# Patient Record
Sex: FEMALE | Race: WHITE | Employment: UNEMPLOYED | URBAN - METROPOLITAN AREA
[De-identification: names, ages, dates, MRNs, and addresses within clinical notes are randomized per-mention and may not be internally consistent; named-entity substitution may affect disease eponyms.]

---

## 2017-01-04 DIAGNOSIS — Z76.0 MEDICATION REFILL: ICD-10-CM

## 2017-01-04 RX ORDER — CYCLOBENZAPRINE HCL 10 MG
10 TABLET ORAL
Qty: 270 TAB | Refills: 4 | Status: SHIPPED | OUTPATIENT
Start: 2017-01-04 | End: 2017-03-08

## 2017-01-04 RX ORDER — VENLAFAXINE HYDROCHLORIDE 75 MG/1
CAPSULE, EXTENDED RELEASE ORAL
Qty: 270 CAP | Refills: 5 | Status: SHIPPED | OUTPATIENT
Start: 2017-01-04 | End: 2019-04-15 | Stop reason: SDUPTHER

## 2017-01-04 RX ORDER — ZOLPIDEM TARTRATE 12.5 MG/1
12.5 TABLET, FILM COATED, EXTENDED RELEASE ORAL
Qty: 90 TAB | Refills: 1 | Status: SHIPPED | OUTPATIENT
Start: 2017-01-04 | End: 2017-01-19 | Stop reason: SDUPTHER

## 2017-01-04 RX ORDER — ALPRAZOLAM 0.5 MG/1
1.5 TABLET ORAL
Qty: 270 TAB | Refills: 1 | Status: SHIPPED | OUTPATIENT
Start: 2017-01-04 | End: 2017-05-15

## 2017-01-04 RX ORDER — ACYCLOVIR 400 MG/1
400 TABLET ORAL 3 TIMES DAILY
Qty: 45 TAB | Refills: 4 | Status: SHIPPED | OUTPATIENT
Start: 2017-01-04 | End: 2017-03-08

## 2017-01-04 RX ORDER — POTASSIUM CHLORIDE 20 MEQ/1
20 TABLET, EXTENDED RELEASE ORAL 2 TIMES DAILY
Qty: 180 TAB | Refills: 4 | Status: SHIPPED | OUTPATIENT
Start: 2017-01-04 | End: 2017-12-06 | Stop reason: SDUPTHER

## 2017-01-04 RX ORDER — ROPINIROLE 3 MG/1
3 TABLET, FILM COATED ORAL 2 TIMES DAILY
Qty: 180 TAB | Refills: 4 | Status: SHIPPED | OUTPATIENT
Start: 2017-01-04 | End: 2018-03-31 | Stop reason: SDUPTHER

## 2017-01-04 RX ORDER — IBUPROFEN 800 MG/1
800 TABLET ORAL
Qty: 270 TAB | Refills: 4 | Status: SHIPPED | OUTPATIENT
Start: 2017-01-04 | End: 2018-03-15

## 2017-01-04 RX ORDER — GABAPENTIN 600 MG/1
600 TABLET ORAL
Qty: 90 TAB | Refills: 4 | Status: SHIPPED | OUTPATIENT
Start: 2017-01-04 | End: 2017-09-19

## 2017-01-04 NOTE — TELEPHONE ENCOUNTER
Requested Prescriptions     Pending Prescriptions Disp Refills    gabapentin (NEURONTIN) 600 mg tablet 90 Tab 4     Sig: Take 1 Tab by mouth nightly. Indications: NEUROPATHIC PAIN    zolpidem CR (AMBIEN CR) 12.5 mg tablet 90 Tab 1     Sig: Take 1 Tab by mouth nightly as needed for Sleep. Max Daily Amount: 12.5 mg.    ALPRAZolam (XANAX) 0.5 mg tablet 270 Tab 1     Sig: Take 3 Tabs by mouth three (3) times daily as needed for Anxiety. Max Daily Amount: 4.5 mg. Indications: ANXIETY    acyclovir (ZOVIRAX) 400 mg tablet 45 Tab 4     Sig: Take 1 Tab by mouth three (3) times daily. Indications: HERPES SIMPLEX INFECTION    cyclobenzaprine (FLEXERIL) 10 mg tablet 270 Tab 4     Sig: Take 1 Tab by mouth three (3) times daily as needed for Muscle Spasm(s).  potassium chloride (K-DUR, KLOR-CON) 20 mEq tablet 180 Tab 4     Sig: Take 1 Tab by mouth two (2) times a day.  rOPINIRole (REQUIP) 3 mg tab tab 180 Tab 4     Sig: Take 1 Tab by mouth two (2) times a day.  ibuprofen (MOTRIN) 800 mg tablet 270 Tab 1     Sig: Take 1 Tab by mouth every eight (8) hours as needed for Pain.  Cetirizine 10 mg cap       Sig: Take  by mouth.  venlafaxine-SR (EFFEXOR-XR) 75 mg capsule 90 Cap 0     Sig: Take 1 Cap by mouth daily.  Indications: ANXIETY WITH DEPRESSION     Pharmacy requesting 90 day supply of medications

## 2017-01-17 ENCOUNTER — OFFICE VISIT (OUTPATIENT)
Dept: INTERNAL MEDICINE CLINIC | Age: 62
End: 2017-01-17

## 2017-01-17 VITALS
BODY MASS INDEX: 34.81 KG/M2 | TEMPERATURE: 98.9 F | OXYGEN SATURATION: 97 % | WEIGHT: 216.6 LBS | HEIGHT: 66 IN | SYSTOLIC BLOOD PRESSURE: 145 MMHG | RESPIRATION RATE: 20 BRPM | DIASTOLIC BLOOD PRESSURE: 85 MMHG | HEART RATE: 83 BPM

## 2017-01-17 DIAGNOSIS — J01.90 ACUTE NON-RECURRENT SINUSITIS, UNSPECIFIED LOCATION: Primary | ICD-10-CM

## 2017-01-17 RX ORDER — AMOXICILLIN AND CLAVULANATE POTASSIUM 875; 125 MG/1; MG/1
1 TABLET, FILM COATED ORAL 2 TIMES DAILY
Qty: 20 TAB | Refills: 1 | Status: SHIPPED | OUTPATIENT
Start: 2017-01-17 | End: 2017-01-27

## 2017-01-17 NOTE — MR AVS SNAPSHOT
Visit Information Date & Time Provider Department Dept. Phone Encounter #  
 1/17/2017  1:45 PM Delano Butler [facility] [phone] [id number] Follow-up Instructions Return if symptoms worsen or fail to improve, for or as appointed. Your Appointments 4/3/2017 10:30 AM  
Office Visit with Delano Butler [facility]) Appt Note: Return in about 4 months (around 4/2/2017) for memory issues, breast pain, .; [address] [facility] [address] [facility]  
  
   
 [address] [facility] Upcoming Health Maintenance Date Due DTaP/Tdap/Td series (1 - Tdap) 12/2/2017* ZOSTER VACCINE AGE 60> 11/2/2018* FOBT Q 1 YEAR AGE 50-75 12/8/2017 BREAST CANCER SCRN MAMMOGRAM 12/15/2018 *Topic was postponed. The date shown is not the original due date. Allergies as of 1/17/2017  Review Complete On: 1/17/2017 By: Megan Mcintyre MD  
  
 Severity Noted Reaction Type Reactions Gluten  07/22/2016    Other (comments) GI upset Morphine  03/07/2016    Rash Stadol [Butorphanol Tartrate]  03/07/2016    Other (comments) Made her have palpitations Ultram [Tramadol]  03/07/2016    Other (comments) Made her have palpitations Current Immunizations  Reviewed on 7/22/2016 Name Date Influenza Vaccine (Quad) PF 12/2/2016 Pneumococcal Polysaccharide (PPSV-23) 2/9/2013 Not reviewed this visit You Were Diagnosed With   
  
 Codes Comments Acute non-recurrent sinusitis, unspecified location    -  Primary ICD-10-CM: J01.90 ICD-9-CM: 461.9 Vitals BP Pulse Temp Resp Height(growth percentile) Weight(growth percentile) 145/85 83 98.9 °F (37.2 °C) (Oral) 20 5' 6\" (1.676 m) 216 lb 9.6 oz (98.2 kg) SpO2 BMI OB Status Smoking Status 97% 34.96 kg/m2 Hysterectomy Never Smoker BMI and BSA Data Body Mass Index Body Surface Area 34.96 kg/m 2 2.14 m 2 Preferred Pharmacy Pharmacy Name Phone [address] [facility] [phone] Your Updated Medication List  
  
   
This list is accurate as of: 1/17/17  2:12 PM.  Always use your most recent med list.  
  
  
  
  
 acyclovir 400 mg tablet Commonly known as:  ZOVIRAX Take 1 Tab by mouth three (3) times daily. Indications: HERPES SIMPLEX INFECTION  
  
 albuterol 90 mcg/actuation inhaler Commonly known as:  PROVENTIL HFA, VENTOLIN HFA, PROAIR HFA Take  by inhalation. ALPRAZolam 0.5 mg tablet Commonly known as:  Chirag Cape Take 3 Tabs by mouth three (3) times daily as needed for Anxiety. Max Daily Amount: 4.5 mg. Indications: ANXIETY  
  
 amoxicillin-clavulanate 875-125 mg per tablet Commonly known as:  AUGMENTIN Take 1 Tab by mouth two (2) times a day for 10 days. aspirin delayed-release 81 mg tablet Take 1 Tab by mouth daily. Cetirizine 10 mg Cap Take 1 Can by mouth daily. Indications: SEASONAL ALLERGIC RHINITIS  
  
 cyclobenzaprine 10 mg tablet Commonly known as:  FLEXERIL Take 1 Tab by mouth three (3) times daily as needed for Muscle Spasm(s). Indications: MUSCLE SPASM  
  
 diazePAM 5 mg tablet Commonly known as:  VALIUM Take 2 tabs 30 minutes prior to testing. May take additional 5 mg in 2 hours if needed  Indications: ANXIETY  
  
 frovatriptan 2.5 mg tablet Commonly known as:  Eleni Dills Take 1 Tab by mouth once as needed for Migraine for up to 1 dose. furosemide 40 mg tablet Commonly known as:  LASIX Take 1 Tab by mouth daily. Indications: EDEMA, PERIPHERAL EDEMA DUE TO CHRONIC HEART FAILURE, as needed only for edema  
  
 gabapentin 600 mg tablet Commonly known as:  NEURONTIN Take 1 Tab by mouth nightly. Indications: NEUROPATHIC PAIN  
  
 hyoscyamine SR 0.375 mg SR tablet Commonly known as:  LEVBID  
 Take 1 Tab by mouth every twelve (12) hours as needed for Cramping. Indications: Irritable Bowel Syndrome  
  
 ibuprofen 800 mg tablet Commonly known as:  MOTRIN Take 1 Tab by mouth every eight (8) hours as needed for Pain. Indications: PAIN  
  
 mometasone 220 mcg (120 doses) Aepb inhaler Commonly known as:  Amado Hosteller Take 1 Puff by inhalation two (2) times a day. omeprazole 40 mg capsule Commonly known as:  PRILOSEC Take 1 Cap by mouth daily. ONE-A-DAY WOMENS FORMULA PO Take 2 Tabs by Mouth Once a Day. oxyCODONE-acetaminophen  mg per tablet Commonly known as:  PERCOCET 10 Take 1 Tab by mouth every four (4) hours as needed for Pain. Max Daily Amount: 6 Tabs. potassium chloride 20 mEq tablet Commonly known as:  K-DUR, KLOR-CON Take 1 Tab by mouth two (2) times a day. Indications: HYPOKALEMIA QUEtiapine 50 mg tablet Commonly known as:  SEROquel Take 1 Tab by mouth daily. Indications: DEPRESSION TREATMENT ADJUNCT  
  
 rOPINIRole 3 mg Tab tab Commonly known as:  Mallissa Poles Take 1 Tab by mouth two (2) times a day. Indications: Restless Legs Syndrome  
  
 sucralfate 1 gram tablet Commonly known as:  Justina Hamilton Take 1 g by mouth four (4) times daily. tiZANidine 4 mg capsule Commonly known as:  Palma Manual Take 4 mg by mouth three (3) times daily as needed. venlafaxine-SR 75 mg capsule Commonly known as:  EFFEXOR-XR Take 3 capsules by mouth daily  Indications: ANXIETY WITH DEPRESSION  
  
 ZOLMitriptan 5 mg nasal solution Commonly known as:  ZOMIG  
1 Spray by Nasal route once as needed for Migraine for up to 1 dose. zolpidem CR 12.5 mg tablet Commonly known as:  AMBIEN CR Take 1 Tab by mouth nightly as needed for Sleep. Max Daily Amount: 12.5 mg.  
  
  
  
  
Prescriptions Printed  Refills  
 amoxicillin-clavulanate (AUGMENTIN) 875-125 mg per tablet 1  
 Sig: Take 1 Tab by mouth two (2) times a day for 10 days. Class: Print Route: Oral  
  
Follow-up Instructions Return if symptoms worsen or fail to improve, for or as appointed. Patient Instructions Sinusitis: Care Instructions Your Care Instructions Sinusitis is an infection of the lining of the sinus cavities in your head. Sinusitis often follows a cold. It causes pain and pressure in your head and face. In most cases, sinusitis gets better on its own in 1 to 2 weeks. But some mild symptoms may last for several weeks. Sometimes antibiotics are needed. Follow-up care is a key part of your treatment and safety. Be sure to make and go to all appointments, and call your doctor if you are having problems. It's also a good idea to know your test results and keep a list of the medicines you take. How can you care for yourself at home? · Take an over-the-counter pain medicine, such as acetaminophen (Tylenol), ibuprofen (Advil, Motrin), or naproxen (Aleve). Read and follow all instructions on the label. · If the doctor prescribed antibiotics, take them as directed. Do not stop taking them just because you feel better. You need to take the full course of antibiotics. · Be careful when taking over-the-counter cold or flu medicines and Tylenol at the same time. Many of these medicines have acetaminophen, which is Tylenol. Read the labels to make sure that you are not taking more than the recommended dose. Too much acetaminophen (Tylenol) can be harmful. · Breathe warm, moist air from a steamy shower, a hot bath, or a sink filled with hot water. Avoid cold, dry air. Using a humidifier in your home may help. Follow the directions for cleaning the machine. · Use saline (saltwater) nasal washes to help keep your nasal passages open and wash out mucus and bacteria. You can buy saline nose drops at a grocery store or drugstore.  Or you can make your own at home by adding 1 teaspoon of salt and 1 teaspoon of baking soda to 2 cups of distilled water. If you make your own, fill a bulb syringe with the solution, insert the tip into your nostril, and squeeze gently. Melvia Creamer your nose. · Put a hot, wet towel or a warm gel pack on your face 3 or 4 times a day for 5 to 10 minutes each time. · Try a decongestant nasal spray like oxymetazoline (Afrin). Do not use it for more than 3 days in a row. Using it for more than 3 days can make your congestion worse. When should you call for help? Call your doctor now or seek immediate medical care if: 
· You have new or worse swelling or redness in your face or around your eyes. · You have a new or higher fever. Watch closely for changes in your health, and be sure to contact your doctor if: 
· You have new or worse facial pain. · The mucus from your nose becomes thicker (like pus) or has new blood in it. · You are not getting better as expected. Where can you learn more? Go to http://carly-jose.info/. Enter K520 in the search box to learn more about \"Sinusitis: Care Instructions. \" Current as of: July 29, 2016 Content Version: 11.1 © 9618-3560 [employer]. Care instructions adapted under license by [employer] (which disclaims liability or warranty for this information). If you have questions about a medical condition or this instruction, always ask your healthcare professional. [place] [address] any warranty or liability for your use of this information. Introducing [facility]! David Luna introduces [employer] patient portal. Now you can access parts of your medical record, email your doctor's office, and request medication refills online. 1. In your internet browser, go to [email or web address] 2. Click on the First Time User? Click Here link in the Sign In box. You will see the New Member Sign Up page. 3. Enter your [employer] Access Code exactly as it appears below. You will not need to use this code after youve completed the sign-up process. If you do not sign up before the expiration date, you must request a new code. · [employer] Access Code: [email or web address] Expires: 2/24/2017  1:44 PM 
 
4. Enter the last four digits of your Social Security Number (xxxx) and Date of Birth (mm/dd/yyyy) as indicated and click Submit. You will be taken to the next sign-up page. 5. Create a [employer] ID. This will be your [employer] login ID and cannot be changed, so think of one that is secure and easy to remember. 6. Create a [employer] password. You can change your password at any time. 7. Enter your Password Reset Question and Answer. This can be used at a later time if you forget your password. 8. Enter your e-mail address. You will receive e-mail notification when new information is available in [address]. 9. Click Sign Up. You can now view and download portions of your medical record. 10. Click the Download Summary menu link to download a portable copy of your medical information. If you have questions, please visit the Frequently Asked Questions section of the [employer] website. Remember, [employer] is NOT to be used for urgent needs. For medical emergencies, dial 911. Now available from your iPhone and Android! Please provide this summary of care documentation to your next provider. Your primary care clinician is listed as [facility]. If you have any questions after today's visit, please call [phone].

## 2017-01-17 NOTE — PROGRESS NOTES
ROOM # 5    Pt presents today for complaint of possible sinus infection. Pt states that the sxs started approx 7-10 days ago. Was initially a stuffy/runny nose but is now sinus congestion with a productive cough with yellow/green mucus. Pt preferred language for health care discussion is english. Is someone accompanying this pt? no    Is the patient using any DME equipment during OV? no    Depression Screening completed. Active Dx    Learning Assessment completed. Yes    Abuse Screening completed. Yes    Health Maintenance reviewed and discussed per provider. Yes, up to date    Advance Directive:  1. Do you have an advance directive in place? Patient Reply: No    2. If not, would you like material regarding how to put one in place? Patient Reply: No    Coordination of Care:  1. Have you been to the ER, urgent care clinic since your last visit? Hospitalized since your last visit? no    2. Have you seen or consulted any other health care providers outside of the [address] [place] [address] since your last visit? Include any pap smears or colon screening.  No

## 2017-01-17 NOTE — PROGRESS NOTES
HISTORY OF PRESENT ILLNESS  Toby Oliver is a 59 y.o. female. Visit Vitals    /85    Pulse 83    Temp 98.9 °F (37.2 °C) (Oral)    Resp 20    Ht 5' 6\" (1.676 m)    Wt 216 lb 9.6 oz (98.2 kg)    SpO2 97%    BMI 34.96 kg/m2       HPI Comments: In December she started with sinus infection. Responded to OTC tx but about a week ago sxs came back (after our snow storm). Now she actually feels worse. Sinus Infection    The history is provided by the patient (see comments). This is a recurrent problem. The current episode started more than 1 week ago. The problem has not changed since onset. Patient reports a subjective fever - was not measured. Associated symptoms include chills, congestion, ear pain, sinus pressure, sore throat, cough and rhinorrhea. Review of Systems   Constitutional: Positive for chills. HENT: Positive for congestion, ear pain, rhinorrhea, sinus pressure and sore throat. Greenish nasal drainage. Respiratory: Positive for cough. Physical Exam   Constitutional: She is oriented to person, place, and time. She appears well-developed and well-nourished. No distress. HENT:   Right Ear: Tympanic membrane, external ear and ear canal normal.   Left Ear: Tympanic membrane, external ear and ear canal normal.   Nose: Mucosal edema present. Right sinus exhibits maxillary sinus tenderness and frontal sinus tenderness. Left sinus exhibits maxillary sinus tenderness and frontal sinus tenderness. Mouth/Throat: Uvula is midline and mucous membranes are normal. Posterior oropharyngeal erythema present. Cardiovascular: Normal rate and regular rhythm. Pulmonary/Chest: Effort normal and breath sounds normal.   Musculoskeletal: She exhibits no edema. Neurological: She is alert and oriented to person, place, and time. Skin: Skin is warm and dry. She is not diaphoretic. Psychiatric: She has a normal mood and affect. Nursing note and vitals reviewed.       ASSESSMENT and PLAN    ICD-10-CM ICD-9-CM    1. Acute non-recurrent sinusitis, unspecified location J01.90 461.9      Nasal rinses and Augmentin.  Prn mucinex    F/u prn

## 2017-01-19 DIAGNOSIS — Z76.0 MEDICATION REFILL: ICD-10-CM

## 2017-01-19 RX ORDER — ZOLPIDEM TARTRATE 12.5 MG/1
12.5 TABLET, FILM COATED, EXTENDED RELEASE ORAL
Qty: 90 TAB | Refills: 1 | Status: SHIPPED | OUTPATIENT
Start: 2017-01-19 | End: 2017-05-15

## 2017-01-19 NOTE — TELEPHONE ENCOUNTER
Requested Prescriptions     Pending Prescriptions Disp Refills    zolpidem CR (AMBIEN CR) 12.5 mg tablet 90 Tab 1     Sig: Take 1 Tab by mouth nightly as needed for Sleep.  Max Daily Amount: 12.5 mg.

## 2017-01-31 ENCOUNTER — OFFICE VISIT (OUTPATIENT)
Dept: INTERNAL MEDICINE CLINIC | Age: 62
End: 2017-01-31

## 2017-01-31 VITALS
WEIGHT: 213 LBS | HEIGHT: 66 IN | BODY MASS INDEX: 34.23 KG/M2 | DIASTOLIC BLOOD PRESSURE: 76 MMHG | OXYGEN SATURATION: 98 % | RESPIRATION RATE: 18 BRPM | SYSTOLIC BLOOD PRESSURE: 132 MMHG | TEMPERATURE: 99 F | HEART RATE: 106 BPM

## 2017-01-31 DIAGNOSIS — J98.01 BRONCHOSPASM: ICD-10-CM

## 2017-01-31 DIAGNOSIS — J40 BRONCHITIS: ICD-10-CM

## 2017-01-31 DIAGNOSIS — R09.89 CHEST CONGESTION: Primary | ICD-10-CM

## 2017-01-31 DIAGNOSIS — R06.2 WHEEZING: ICD-10-CM

## 2017-01-31 RX ORDER — HYDROCODONE POLISTIREX AND CHLORPHENIRAMINE POLISTIREX 10; 8 MG/5ML; MG/5ML
1 SUSPENSION, EXTENDED RELEASE ORAL
Qty: 115 ML | Refills: 0 | Status: SHIPPED | OUTPATIENT
Start: 2017-01-31 | End: 2017-03-08

## 2017-01-31 RX ORDER — IPRATROPIUM BROMIDE AND ALBUTEROL SULFATE 2.5; .5 MG/3ML; MG/3ML
3 SOLUTION RESPIRATORY (INHALATION)
Qty: 1 NEBULE | Refills: 0 | Status: SHIPPED | COMMUNITY
Start: 2017-01-31 | End: 2017-01-31

## 2017-01-31 RX ORDER — HYDROCODONE POLISTIREX AND CHLORPHENIRAMINE POLISTIREX 10; 8 MG/5ML; MG/5ML
SUSPENSION, EXTENDED RELEASE ORAL
Status: CANCELLED | OUTPATIENT
Start: 2017-01-31

## 2017-01-31 NOTE — MR AVS SNAPSHOT
Visit Information Date & Time Provider Department Dept. Phone Encounter #  
 1/31/2017  2:30 PM Nilo Araiza, NP [email or web address] [phone] [id number] Your Appointments 4/3/2017 10:30 AM  
Office Visit with Gege Tarango, [address] [facility] [address] [facility]) Appt Note: Return in about 4 months (around 4/2/2017) for memory issues, breast pain, .; [address] [facility] [address]  
  
   
 [facility] [address] [facility] Upcoming Health Maintenance Date Due DTaP/Tdap/Td series (1 - Tdap) 12/2/2017* ZOSTER VACCINE AGE 60> 11/2/2018* FOBT Q 1 YEAR AGE 50-75 12/8/2017 BREAST CANCER SCRN MAMMOGRAM 12/15/2018 *Topic was postponed. The date shown is not the original due date. Allergies as of 1/31/2017  Review Complete On: 1/31/2017 By: Kishan Almaguer LPN Severity Noted Reaction Type Reactions Gluten  07/22/2016    Other (comments) GI upset Morphine  03/07/2016    Rash Stadol [Butorphanol Tartrate]  03/07/2016    Other (comments) Made her have palpitations Ultram [Tramadol]  03/07/2016    Other (comments) Made her have palpitations Current Immunizations  Reviewed on 7/22/2016 Name Date Influenza Vaccine (Quad) PF 12/2/2016 Pneumococcal Polysaccharide (PPSV-23) 2/9/2013 Not reviewed this visit You Were Diagnosed With   
  
 Codes Comments Chest congestion    -  Primary ICD-10-CM: R09.89 ICD-9-CM: 786.9 Bronchitis     ICD-10-CM: J40 ICD-9-CM: 737 Bronchospasm     ICD-10-CM: J98.01 
ICD-9-CM: 519.11 Vitals BP Pulse Temp Resp Height(growth percentile) Weight(growth percentile) 132/76 (BP 1 Location: Right arm, BP Patient Position: Sitting) (!) 104 99 °F (37.2 °C) (Oral) 16 5' 6\" (1.676 m) 213 lb (96.6 kg) SpO2 BMI OB Status Smoking Status 97% 34.38 kg/m2 Hysterectomy Never Smoker Vitals History BMI and BSA Data Body Mass Index Body Surface Area  
 34.38 kg/m 2 2.12 m 2 Preferred Pharmacy Pharmacy Name Phone [facility] [phone] Your Updated Medication List  
  
   
This list is accurate as of: 1/31/17  4:36 PM.  Always use your most recent med list.  
  
  
  
  
 acyclovir 400 mg tablet Commonly known as:  ZOVIRAX Take 1 Tab by mouth three (3) times daily. Indications: HERPES SIMPLEX INFECTION  
  
 albuterol 90 mcg/actuation inhaler Commonly known as:  PROVENTIL HFA, VENTOLIN HFA, PROAIR HFA Take  by inhalation. albuterol-ipratropium 2.5 mg-0.5 mg/3 ml Nebu Commonly known as:  DUO-NEB  
3 mL by Nebulization route now for 1 dose. ALPRAZolam 0.5 mg tablet Commonly known as:  Star Carp Take 3 Tabs by mouth three (3) times daily as needed for Anxiety. Max Daily Amount: 4.5 mg. Indications: ANXIETY  
  
 aspirin delayed-release 81 mg tablet Take 1 Tab by mouth daily. Cetirizine 10 mg Cap Take 1 Can by mouth daily. Indications: SEASONAL ALLERGIC RHINITIS  
  
 chlorpheniramine-HYDROcodone 10-8 mg/5 mL suspension Commonly known as:  Milon Baugh Take 5 mL by mouth every twelve (12) hours as needed for Cough. Max Daily Amount: 10 mL. cyclobenzaprine 10 mg tablet Commonly known as:  FLEXERIL Take 1 Tab by mouth three (3) times daily as needed for Muscle Spasm(s). Indications: MUSCLE SPASM  
  
 diazePAM 5 mg tablet Commonly known as:  VALIUM Take 2 tabs 30 minutes prior to testing. May take additional 5 mg in 2 hours if needed  Indications: ANXIETY  
  
 frovatriptan 2.5 mg tablet Commonly known as:  Luvenia Greta Take 1 Tab by mouth once as needed for Migraine for up to 1 dose. furosemide 40 mg tablet Commonly known as:  LASIX Take 1 Tab by mouth daily.  Indications: EDEMA, PERIPHERAL EDEMA DUE TO CHRONIC HEART FAILURE, as needed only for edema  
  
 gabapentin 600 mg tablet Commonly known as:  NEURONTIN Take 1 Tab by mouth nightly. Indications: NEUROPATHIC PAIN  
  
 hyoscyamine SR 0.375 mg SR tablet Commonly known as:  LEVBID Take 1 Tab by mouth every twelve (12) hours as needed for Cramping. Indications: Irritable Bowel Syndrome  
  
 ibuprofen 800 mg tablet Commonly known as:  MOTRIN Take 1 Tab by mouth every eight (8) hours as needed for Pain. Indications: PAIN  
  
 methylPREDNISolone (PF) 125 mg/2 mL Solr Commonly known as:  SOLU-MEDROL 2 mL by IntraVENous route once for 1 dose. mometasone 220 mcg (120 doses) Aepb inhaler Commonly known as:  Antonio Muss Take 1 Puff by inhalation two (2) times a day. omeprazole 40 mg capsule Commonly known as:  PRILOSEC Take 1 Cap by mouth daily. ONE-A-DAY WOMENS FORMULA PO Take 2 Tabs by Mouth Once a Day. oxyCODONE-acetaminophen  mg per tablet Commonly known as:  PERCOCET 10 Take 1 Tab by mouth every four (4) hours as needed for Pain. Max Daily Amount: 6 Tabs. potassium chloride 20 mEq tablet Commonly known as:  K-DUR, KLOR-CON Take 1 Tab by mouth two (2) times a day. Indications: HYPOKALEMIA QUEtiapine 50 mg tablet Commonly known as:  SEROquel Take 1 Tab by mouth daily. Indications: DEPRESSION TREATMENT ADJUNCT  
  
 rOPINIRole 3 mg Tab tab Commonly known as:  Leamon Hannah Take 1 Tab by mouth two (2) times a day. Indications: Restless Legs Syndrome  
  
 sucralfate 1 gram tablet Commonly known as:  Sho [place] Take 1 g by mouth four (4) times daily. tiZANidine 4 mg capsule Commonly known as:  Michelle Maguire Take 4 mg by mouth three (3) times daily as needed. venlafaxine-SR 75 mg capsule Commonly known as:  EFFEXOR-XR Take 3 capsules by mouth daily  Indications: ANXIETY WITH DEPRESSION  
  
 ZOLMitriptan 5 mg nasal solution Commonly known as:  ZOMIG  
 1 Spray by Nasal route once as needed for Migraine for up to 1 dose. zolpidem CR 12.5 mg tablet Commonly known as:  AMBIEN CR Take 1 Tab by mouth nightly as needed for Sleep. Max Daily Amount: 12.5 mg.  
  
  
  
  
Prescriptions Printed Refills  
 chlorpheniramine-HYDROcodone (TUSSIONEX) 10-8 mg/5 mL suspension 0 Sig: Take 5 mL by mouth every twelve (12) hours as needed for Cough. Max Daily Amount: 10 mL. Class: Print Route: Oral  
  
We Performed the Following METHYLPREDNISOLONE INJECTION [ [facility]] CA THER/PROPH/DIAG INJECTION, SUBCUT/IM [id number] CPT(R)] Patient Instructions Bronchitis: Care Instructions Your Care Instructions Bronchitis is inflammation of the bronchial tubes, which carry air to the lungs. The tubes swell and produce mucus, or phlegm. The mucus and inflamed bronchial tubes make you cough. You may have trouble breathing. Most cases of bronchitis are caused by viruses like those that cause colds. Antibiotics usually do not help and they may be harmful. Bronchitis usually develops rapidly and lasts about 2 to 3 weeks in otherwise healthy people. Follow-up care is a key part of your treatment and safety. Be sure to make and go to all appointments, and call your doctor if you are having problems. It's also a good idea to know your test results and keep a list of the medicines you take. How can you care for yourself at home? · Take all medicines exactly as prescribed. Call your doctor if you think you are having a problem with your medicine. · Get some extra rest. 
· Take an over-the-counter pain medicine, such as acetaminophen (Tylenol), ibuprofen (Advil, Motrin), or naproxen (Aleve) to reduce fever and relieve body aches. Read and follow all instructions on the label. · Do not take two or more pain medicines at the same time unless the doctor told you to.  Many pain medicines have acetaminophen, which is Tylenol. Too much acetaminophen (Tylenol) can be harmful. · Take an over-the-counter cough medicine that contains dextromethorphan to help quiet a dry, hacking cough so that you can sleep. Avoid cough medicines that have more than one active ingredient. Read and follow all instructions on the label. · Breathe moist air from a humidifier, hot shower, or sink filled with hot water. The heat and moisture will thin mucus so you can cough it out. · Do not smoke. Smoking can make bronchitis worse. If you need help quitting, talk to your doctor about stop-smoking programs and medicines. These can increase your chances of quitting for good. When should you call for help? Call 911 anytime you think you may need emergency care. For example, call if: 
· You have severe trouble breathing. Call your doctor now or seek immediate medical care if: 
· You have new or worse trouble breathing. · You cough up dark brown or bloody mucus (sputum). · You have a new or higher fever. · You have a new rash. Watch closely for changes in your health, and be sure to contact your doctor if: 
· You cough more deeply or more often, especially if you notice more mucus or a change in the color of your mucus. · You are not getting better as expected. Where can you learn more? Go to http://carly-jose.info/. Enter H333 in the search box to learn more about \"Bronchitis: Care Instructions. \" Current as of: May 23, 2016 Content Version: 11.1 © 2179-5864 [employer]. Care instructions adapted under license by [employer] (which disclaims liability or warranty for this information). If you have questions about a medical condition or this instruction, always ask your healthcare professional. [place] [address] any warranty or liability for your use of this information. Introducing [facility]!    
 Manju Torrez introduces "[employer] patient portal. Now you can access parts of your medical record, email your doctor's office, and request medication refills online. 1. In your internet browser, go to [email or web address] 2. Click on the First Time User? Click Here link in the Sign In box. You will see the New Member Sign Up page. 3. Enter your [employer] Access Code exactly as it appears below. You will not need to use this code after youve completed the sign-up process. If you do not sign up before the expiration date, you must request a new code. · [employer] Access Code: [email or web address] Expires: 2/24/2017  1:44 PM 
 
4. Enter the last four digits of your Social Security Number (xxxx) and Date of Birth (mm/dd/yyyy) as indicated and click Submit. You will be taken to the next sign-up page. 5. Create a [employer] ID. This will be your [employer] login ID and cannot be changed, so think of one that is secure and easy to remember. 6. Create a [employer] password. You can change your password at any time. 7. Enter your Password Reset Question and Answer. This can be used at a later time if you forget your password. 8. Enter your e-mail address. You will receive e-mail notification when new information is available in [address] 9. Click Sign Up. You can now view and download portions of your medical record. 10. Click the Download Summary menu link to download a portable copy of your medical information. If you have questions, please visit the Frequently Asked Questions section of the [employer] website. Remember, [employer] is NOT to be used for urgent needs. For medical emergencies, dial 911. Now available from your iPhone and Android! Please provide this summary of care documentation to your next provider. Your primary care clinician is listed as [facility]. If you have any questions after today's visit, please call [phone].

## 2017-01-31 NOTE — PROGRESS NOTES
Pt presented today with cold and chest congestion x 3 weeks. Has pt had any falls since last visit? No.  Pt preferred language for health care discussion is english. Advanced Directive? No    Is someone accompanying this pt? No    Is the patient using any DME equipment during OV? No      1. Have you been to the ER, urgent care clinic since your last visit? Hospitalized since your last visit? Yes When: 01/27/2017 [facility]    2. Have you seen or consulted any other health care providers outside of the [address] [place] [address] since your last visit? Include any pap smears or colon screening. No      Pt has a reminder for a \"due or due soon\" health maintenance. I have asked that she contact his primary care provider for follow-up on this health maintenance. Pt given injection of 125 mg of Solu Medrol. Pt tolerated well. Pt reports no pain after injection.

## 2017-01-31 NOTE — PROGRESS NOTES
Patient presents with chest congestion and unproductive cough x 3 weeks, states she was seen at [facility] ED three weeks ago and treated with abx,steroids and Tussionex, states she is still coughing with post tussive CP. Denies any worsening of the cough, denies any SOB,fever,chills, NVD. On checking records patient was seen at [facility] on the 01/22 and had a full cardiac work up including a negative Xray.

## 2017-01-31 NOTE — PATIENT INSTRUCTIONS
Bronchitis: Care Instructions  Your Care Instructions    Bronchitis is inflammation of the bronchial tubes, which carry air to the lungs. The tubes swell and produce mucus, or phlegm. The mucus and inflamed bronchial tubes make you cough. You may have trouble breathing. Most cases of bronchitis are caused by viruses like those that cause colds. Antibiotics usually do not help and they may be harmful. Bronchitis usually develops rapidly and lasts about 2 to 3 weeks in otherwise healthy people. Follow-up care is a key part of your treatment and safety. Be sure to make and go to all appointments, and call your doctor if you are having problems. It's also a good idea to know your test results and keep a list of the medicines you take. How can you care for yourself at home? · Take all medicines exactly as prescribed. Call your doctor if you think you are having a problem with your medicine. · Get some extra rest.  · Take an over-the-counter pain medicine, such as acetaminophen (Tylenol), ibuprofen (Advil, Motrin), or naproxen (Aleve) to reduce fever and relieve body aches. Read and follow all instructions on the label. · Do not take two or more pain medicines at the same time unless the doctor told you to. Many pain medicines have acetaminophen, which is Tylenol. Too much acetaminophen (Tylenol) can be harmful. · Take an over-the-counter cough medicine that contains dextromethorphan to help quiet a dry, hacking cough so that you can sleep. Avoid cough medicines that have more than one active ingredient. Read and follow all instructions on the label. · Breathe moist air from a humidifier, hot shower, or sink filled with hot water. The heat and moisture will thin mucus so you can cough it out. · Do not smoke. Smoking can make bronchitis worse. If you need help quitting, talk to your doctor about stop-smoking programs and medicines. These can increase your chances of quitting for good.   When should you call for help? Call 911 anytime you think you may need emergency care. For example, call if:  · You have severe trouble breathing. Call your doctor now or seek immediate medical care if:  · You have new or worse trouble breathing. · You cough up dark brown or bloody mucus (sputum). · You have a new or higher fever. · You have a new rash. Watch closely for changes in your health, and be sure to contact your doctor if:  · You cough more deeply or more often, especially if you notice more mucus or a change in the color of your mucus. · You are not getting better as expected. Where can you learn more? Go to http://carly-jose.info/. Enter H333 in the search box to learn more about \"Bronchitis: Care Instructions. \"  Current as of: May 23, 2016  Content Version: 11.1  © 8234-8015 [employer]. Care instructions adapted under license by [employer] (which disclaims liability or warranty for this information). If you have questions about a medical condition or this instruction, always ask your healthcare professional. [address] any warranty or liability for your use of this information.

## 2017-01-31 NOTE — PROGRESS NOTES
HISTORY OF PRESENT ILLNESS  Ceferino Coronado is a 59 y.o. female. Patient presents with chest congestion and unproductive cough x 3 weeks, states she was seen at [facility] ED three weeks ago and treated with abx,steroids and Tussionex, states she is still coughing with post tussive CP. Denies any worsening of the cough, denies any SOB,fever,chills, NVD. On checking records patient was seen at [facility] on the 01/22 and had a full cardiac work up including a negative Xray. Cold Symptoms   The history is provided by the patient. This is a recurrent problem. The current episode started more than 1 week ago. The problem occurs constantly. The problem has not changed since onset. The cough is non-productive. There has been no fever. Associated symptoms include chills and wheezing. Pertinent negatives include no sweats, no weight loss, no eye redness, no ear congestion, no ear pain, no headaches, no rhinorrhea, no sore throat, no myalgias, no shortness of breath, no nausea, no vomiting and no confusion. She has tried antibiotics, cough syrup, prescription drugs, inhalers and steroids for the symptoms. The treatment provided moderate relief. Her past medical history is significant for bronchitis and asthma. Review of Systems   Constitutional: Positive for chills. Negative for weight loss. HENT: Negative. Negative for ear pain, rhinorrhea and sore throat. Eyes: Negative for redness. Respiratory: Positive for cough and wheezing. Negative for shortness of breath. Cardiovascular: Negative. Gastrointestinal: Negative. Negative for nausea and vomiting. Genitourinary: Negative. Musculoskeletal: Negative. Negative for myalgias. Neurological: Negative. Negative for headaches. Psychiatric/Behavioral: Negative. Negative for confusion. Physical Exam   Constitutional: She is oriented to person, place, and time. She appears well-developed. HENT:   Head: Normocephalic. Cardiovascular: Tachycardia present. Pulmonary/Chest: Effort normal. She has wheezes in the right upper field and the left upper field. Musculoskeletal: Normal range of motion. Neurological: She is alert and oriented to person, place, and time. ASSESSMENT and PLAN    ICD-10-CM ICD-9-CM    1. Chest congestion R09.89 786.9 methylPREDNISolone, PF, (SOLU-MEDROL) 125 mg/2 mL solr      METHYLPREDNISOLONE INJECTION      OH THER/PROPH/DIAG INJECTION, SUBCUT/IM      chlorpheniramine-HYDROcodone (TUSSIONEX) 10-8 mg/5 mL suspension   2. Bronchitis J40 490 methylPREDNISolone, PF, (SOLU-MEDROL) 125 mg/2 mL solr      METHYLPREDNISOLONE INJECTION      OH THER/PROPH/DIAG INJECTION, SUBCUT/IM      chlorpheniramine-HYDROcodone (TUSSIONEX) 10-8 mg/5 mL suspension   3. Bronchospasm J98.01 519.11 methylPREDNISolone, PF, (SOLU-MEDROL) 125 mg/2 mL solr      METHYLPREDNISOLONE INJECTION      OH THER/PROPH/DIAG INJECTION, SUBCUT/IM      chlorpheniramine-HYDROcodone (TUSSIONEX) 10-8 mg/5 mL suspension   4. Wheezing R06.2 786.07      Encounter Diagnoses   Name Primary?  Chest congestion Yes    Bronchitis     Bronchospasm     Wheezing      Orders Placed This Encounter    albuterol-ipratropium (DUO-NEB) 2.5 mg-0.5 mg/3 ml nebu    methylPREDNISolone, PF, (SOLU-MEDROL) 125 mg/2 mL solr    chlorpheniramine-HYDROcodone (TUSSIONEX) 10-8 mg/5 mL suspension     Orders Placed This Encounter    METHYLPREDNISOLONE INJECTION (Qty 2)     Order Specific Question:   Dose     Answer:   125 mg/ 2 mL     Order Specific Question:   Site     Answer:   RIGHT GLUTEUS     Order Specific Question:   Expiration Date     Answer:   2/28/2019     Order Specific Question:   Lot#     Answer:   [id number]     Order Specific Question:        Answer:   Anuj Sánchez & Jj Mirza     Order Specific Question:   Charge Quantity? Answer:   4     Order Specific Question:   Perfomed by/Witnessed by:      Answer:   Saint Rashel LPN/ Yari Villar NP Order Specific Question:   NDC#     Answer:   4877-6881-97    THER/PROPH/DIAG INJECTION, SUBCUT/IM    albuterol-ipratropium (DUO-NEB) 2.5 mg-0.5 mg/3 ml nebu     Sig: 3 mL by Nebulization route now for 1 dose. Dispense:  1 Nebule     Refill:  0     Order Specific Question:   Expiration Date     Answer:   10/17/2018     Order Specific Question:   Lot#     Answer:   [id number]     Order Specific Question:        Answer:   [facility] [employer]    methylPREDNISolone, PF, (SOLU-MEDROL) 125 mg/2 mL solr     Si mL by IntraVENous route once for 1 dose. Dispense:  1 Vial     Refill:  0    chlorpheniramine-HYDROcodone (TUSSIONEX) 10-8 mg/5 mL suspension     Sig: Take 5 mL by mouth every twelve (12) hours as needed for Cough. Max Daily Amount: 10 mL. Dispense:  115 mL     Refill:  0     Orders Placed This Encounter    METHYLPREDNISOLONE INJECTION (Qty 2)    THER/PROPH/DIAG INJECTION, SUBCUT/IM    albuterol-ipratropium (DUO-NEB) 2.5 mg-0.5 mg/3 ml nebu    methylPREDNISolone, PF, (SOLU-MEDROL) 125 mg/2 mL solr    chlorpheniramine-HYDROcodone (TUSSIONEX) 10-8 mg/5 mL suspension     Megan Elizabeth was seen today for cold symptoms. Diagnoses and all orders for this visit:    Chest congestion  -     methylPREDNISolone, PF, (SOLU-MEDROL) 125 mg/2 mL solr; 2 mL by IntraVENous route once for 1 dose. -     METHYLPREDNISOLONE INJECTION (Qty 2)  -     THER/PROPH/DIAG INJECTION, SUBCUT/IM  -     chlorpheniramine-HYDROcodone (TUSSIONEX) 10-8 mg/5 mL suspension; Take 5 mL by mouth every twelve (12) hours as needed for Cough. Max Daily Amount: 10 mL. Bronchitis  -     methylPREDNISolone, PF, (SOLU-MEDROL) 125 mg/2 mL solr; 2 mL by IntraVENous route once for 1 dose. -     METHYLPREDNISOLONE INJECTION (Qty 2)  -     THER/PROPH/DIAG INJECTION, SUBCUT/IM  -     chlorpheniramine-HYDROcodone (TUSSIONEX) 10-8 mg/5 mL suspension; Take 5 mL by mouth every twelve (12) hours as needed for Cough.  Max Daily Amount: 10 mL.    Bronchospasm  -     methylPREDNISolone, PF, (SOLU-MEDROL) 125 mg/2 mL solr; 2 mL by IntraVENous route once for 1 dose. -     METHYLPREDNISOLONE INJECTION (Qty 2)  -     THER/PROPH/DIAG INJECTION, SUBCUT/IM  -     chlorpheniramine-HYDROcodone (TUSSIONEX) 10-8 mg/5 mL suspension; Take 5 mL by mouth every twelve (12) hours as needed for Cough. Max Daily Amount: 10 mL. Wheezing    Other orders  -     albuterol-ipratropium (DUO-NEB) 2.5 mg-0.5 mg/3 ml nebu; 3 mL by Nebulization route now for 1 dose. -     Cancel: chlorpheniramine-HYDROcodone (TUSSIONEX) 10-8 mg/5 mL suspension; Take  by mouth every twelve (12) hours as needed for Cough. Follow-up Disposition:  Return if symptoms worsen or fail to improve.   current treatment plan is effective, no change in therapy

## 2017-02-17 ENCOUNTER — OFFICE VISIT (OUTPATIENT)
Dept: INTERNAL MEDICINE CLINIC | Age: 62
End: 2017-02-17

## 2017-02-17 VITALS
HEART RATE: 115 BPM | DIASTOLIC BLOOD PRESSURE: 67 MMHG | OXYGEN SATURATION: 98 % | SYSTOLIC BLOOD PRESSURE: 146 MMHG | TEMPERATURE: 97.8 F | RESPIRATION RATE: 16 BRPM | HEIGHT: 66 IN | WEIGHT: 213.4 LBS | BODY MASS INDEX: 34.3 KG/M2

## 2017-02-17 DIAGNOSIS — J30.2 SEASONAL ALLERGIC RHINITIS, UNSPECIFIED ALLERGIC RHINITIS TRIGGER: ICD-10-CM

## 2017-02-17 DIAGNOSIS — J45.41 MODERATE PERSISTENT ASTHMA WITH ACUTE EXACERBATION: ICD-10-CM

## 2017-02-17 DIAGNOSIS — R05.3 PERSISTENT COUGH: Primary | ICD-10-CM

## 2017-02-17 RX ORDER — BENZONATATE 200 MG/1
200 CAPSULE ORAL
Qty: 21 CAP | Refills: 1 | Status: SHIPPED | OUTPATIENT
Start: 2017-02-17 | End: 2017-02-24

## 2017-02-17 RX ORDER — MONTELUKAST SODIUM 10 MG/1
10 TABLET ORAL DAILY
Qty: 30 TAB | Refills: 2 | Status: SHIPPED | OUTPATIENT
Start: 2017-02-17 | End: 2017-06-29 | Stop reason: SDUPTHER

## 2017-02-17 NOTE — MR AVS SNAPSHOT
Visit Information Date & Time Provider Department Dept. Phone Encounter #  
 2/17/2017  4:00 PM Delilah Music STARFACE [phone] [id number] Follow-up Instructions Return if symptoms worsen or fail to improve. Your Appointments 4/3/2017 10:30 AM  
Office Visit with [place] [facility]) Appt Note: Return in about 4 months (around 4/2/2017) for memory issues, breast pain, .; [address] [facility] [address]  
  
   
 Formerly [facility] [address] [facility] Upcoming Health Maintenance Date Due DTaP/Tdap/Td series (1 - Tdap) 12/2/2017* ZOSTER VACCINE AGE 60> 11/2/2018* FOBT Q 1 YEAR AGE 50-75 12/8/2017 BREAST CANCER SCRN MAMMOGRAM 12/15/2018 *Topic was postponed. The date shown is not the original due date. Allergies as of 2/17/2017  Review Complete On: 2/17/2017 By: Fan Beck PA-C Severity Noted Reaction Type Reactions Gluten  07/22/2016    Other (comments) GI upset Morphine  03/07/2016    Rash Stadol [Butorphanol Tartrate]  03/07/2016    Other (comments) Made her have palpitations Ultram [Tramadol]  03/07/2016    Other (comments) Made her have palpitations Current Immunizations  Reviewed on 7/22/2016 Name Date Influenza Vaccine (Quad) PF 12/2/2016 Pneumococcal Polysaccharide (PPSV-23) 2/9/2013 Not reviewed this visit You Were Diagnosed With   
  
 Codes Comments Persistent cough    -  Primary ICD-10-CM: D98 ICD-9-CM: 786.2 Moderate persistent asthma with acute exacerbation     ICD-10-CM: J45.41 
ICD-9-CM: 493.92 Seasonal allergic rhinitis, unspecified allergic rhinitis trigger     ICD-10-CM: J30.2 ICD-9-CM: 477.9 Vitals BP Pulse Temp Resp Height(growth percentile) Weight(growth percentile) 146/67 (BP 1 Location: Left arm, BP Patient Position: Sitting) (!) 115 97.8 °F (36.6 °C) (Oral) 16 5' 6\" (1.676 m) 213 lb 6.4 oz (96.8 kg) SpO2 BMI OB Status Smoking Status 98% 34.44 kg/m2 Hysterectomy Never Smoker BMI and BSA Data Body Mass Index Body Surface Area 34.44 kg/m 2 2.12 m 2 Preferred Pharmacy Pharmacy Name Phone [address] [facility] [phone] Your Updated Medication List  
  
   
This list is accurate as of: 2/17/17  5:24 PM.  Always use your most recent med list.  
  
  
  
  
 acyclovir 400 mg tablet Commonly known as:  ZOVIRAX Take 1 Tab by mouth three (3) times daily. Indications: HERPES SIMPLEX INFECTION  
  
 albuterol 90 mcg/actuation inhaler Commonly known as:  PROVENTIL HFA, VENTOLIN HFA, PROAIR HFA Take  by inhalation. ALPRAZolam 0.5 mg tablet Commonly known as:  Genny Moser Take 3 Tabs by mouth three (3) times daily as needed for Anxiety. Max Daily Amount: 4.5 mg. Indications: ANXIETY  
  
 aspirin delayed-release 81 mg tablet Take 1 Tab by mouth daily. benzonatate 200 mg capsule Commonly known as:  TESSALON Take 1 Cap by mouth three (3) times daily as needed for Cough for up to 7 days. Indications: COUGH Cetirizine 10 mg Cap Take 1 Can by mouth daily. Indications: SEASONAL ALLERGIC RHINITIS  
  
 chlorpheniramine-HYDROcodone 10-8 mg/5 mL suspension Commonly known as:  Melissa Toyins Take 5 mL by mouth every twelve (12) hours as needed for Cough. Max Daily Amount: 10 mL. cyclobenzaprine 10 mg tablet Commonly known as:  FLEXERIL Take 1 Tab by mouth three (3) times daily as needed for Muscle Spasm(s). Indications: MUSCLE SPASM  
  
 diazePAM 5 mg tablet Commonly known as:  VALIUM Take 2 tabs 30 minutes prior to testing. May take additional 5 mg in 2 hours if needed  Indications: ANXIETY  
  
 frovatriptan 2.5 mg tablet Commonly known as:  Brigitte Chambers Take 1 Tab by mouth once as needed for Migraine for up to 1 dose. furosemide 40 mg tablet Commonly known as:  LASIX Take 1 Tab by mouth daily. Indications: EDEMA, PERIPHERAL EDEMA DUE TO CHRONIC HEART FAILURE, as needed only for edema  
  
 gabapentin 600 mg tablet Commonly known as:  NEURONTIN Take 1 Tab by mouth nightly. Indications: NEUROPATHIC PAIN  
  
 hyoscyamine SR 0.375 mg SR tablet Commonly known as:  LEVBID Take 1 Tab by mouth every twelve (12) hours as needed for Cramping. Indications: Irritable Bowel Syndrome  
  
 ibuprofen 800 mg tablet Commonly known as:  MOTRIN Take 1 Tab by mouth every eight (8) hours as needed for Pain. Indications: PAIN  
  
 mometasone 220 mcg (120 doses) Aepb inhaler Commonly known as:  Tg Hippo Take 1 Puff by inhalation two (2) times a day. montelukast 10 mg tablet Commonly known as:  SINGULAIR Take 1 Tab by mouth daily. Indications: MAINTENANCE THERAPY FOR ASTHMA  
  
 omeprazole 40 mg capsule Commonly known as:  PRILOSEC Take 1 Cap by mouth daily. ONE-A-DAY WOMENS FORMULA PO Take 2 Tabs by Mouth Once a Day. oxyCODONE-acetaminophen  mg per tablet Commonly known as:  PERCOCET 10 Take 1 Tab by mouth every four (4) hours as needed for Pain. Max Daily Amount: 6 Tabs. potassium chloride 20 mEq tablet Commonly known as:  K-DUR, KLOR-CON Take 1 Tab by mouth two (2) times a day. Indications: HYPOKALEMIA QUEtiapine 50 mg tablet Commonly known as:  SEROquel Take 1 Tab by mouth daily. Indications: DEPRESSION TREATMENT ADJUNCT  
  
 rOPINIRole 3 mg Tab tab Commonly known as:  Emiliano Tang Take 1 Tab by mouth two (2) times a day. Indications: Restless Legs Syndrome  
  
 sucralfate 1 gram tablet Commonly known as:  Eudelia Brocks Take 1 g by mouth four (4) times daily. tiZANidine 4 mg capsule Commonly known as:  Aurora Muscat Take 4 mg by mouth three (3) times daily as needed. venlafaxine-SR 75 mg capsule Commonly known as:  EFFEXOR-XR Take 3 capsules by mouth daily  Indications: ANXIETY WITH DEPRESSION  
  
 ZOLMitriptan 5 mg nasal solution Commonly known as:  ZOMIG  
1 Spray by Nasal route once as needed for Migraine for up to 1 dose. zolpidem CR 12.5 mg tablet Commonly known as:  AMBIEN CR Take 1 Tab by mouth nightly as needed for Sleep. Max Daily Amount: 12.5 mg.  
  
  
  
  
Prescriptions Sent to Pharmacy Refills  
 benzonatate (TESSALON) 200 mg capsule 1 Sig: Take 1 Cap by mouth three (3) times daily as needed for Cough for up to 7 days. Indications: COUGH Class: Normal  
 Pharmacy: [address], [address] [place] [address] Ph #: [phone] Route: Oral  
 montelukast (SINGULAIR) 10 mg tablet 2 Sig: Take 1 Tab by mouth daily. Indications: MAINTENANCE THERAPY FOR ASTHMA Class: Normal  
 Pharmacy: [address], [address] Ph #: [phone] Route: Oral  
  
Follow-up Instructions Return if symptoms worsen or fail to improve. Patient Instructions Follow up with pulmonology ASAP to get your breathing checked out. If you need a referral, let us know. Cough: Care Instructions Your Care Instructions A cough is your body's response to something that bothers your throat or airways. Many things can cause a cough. You might cough because of a cold or the flu, bronchitis, or asthma. Smoking, postnasal drip, allergies, and stomach acid that backs up into your throat also can cause coughs. A cough is a symptom, not a disease. Most coughs stop when the cause, such as a cold, goes away. You can take a few steps at home to cough less and feel better. Follow-up care is a key part of your treatment and safety.  Be sure to make and go to all appointments, and call your doctor if you are having problems. It's also a good idea to know your test results and keep a list of the medicines you take. How can you care for yourself at home? · Drink lots of water and other fluids. This helps thin the mucus and soothes a dry or sore throat. Honey or lemon juice in hot water or tea may ease a dry cough. · Take cough medicine as directed by your doctor. · Prop up your head on pillows to help you breathe and ease a dry cough. · Try cough drops to soothe a dry or sore throat. Cough drops don't stop a cough. Medicine-flavored cough drops are no better than candy-flavored drops or hard candy. · Do not smoke. Avoid secondhand smoke. If you need help quitting, talk to your doctor about stop-smoking programs and medicines. These can increase your chances of quitting for good. When should you call for help? Call 911 anytime you think you may need emergency care. For example, call if: 
· You have severe trouble breathing. Call your doctor now or seek immediate medical care if: 
· You cough up blood. · You have new or worse trouble breathing. · You have a new or higher fever. · You have a new rash. Watch closely for changes in your health, and be sure to contact your doctor if: 
· You cough more deeply or more often, especially if you notice more mucus or a change in the color of your mucus. · You have new symptoms, such as a sore throat, an earache, or sinus pain. · You do not get better as expected. Where can you learn more? Go to http://carly-jose.info/. Enter D279 in the search box to learn more about \"Cough: Care Instructions. \" Current as of: May 27, 2016 Content Version: 11.1 © 0252-3490 [employer]. Care instructions adapted under license by [employer] (which disclaims liability or warranty for this information).  If you have questions about a medical condition or this instruction, always ask your healthcare professional. [employer] disclaims any warranty or liability for your use of this information. Introducing [facility]! Ann Mame introduces CITIA patient portal. Now you can access parts of your medical record, email your doctor's office, and request medication refills online. 1. In your internet browser, go to [email or web address]. [employer] 2. Click on the First Time User? Click Here link in the Sign In box. You will see the New Member Sign Up page. 3. Enter your CITIA Access Code exactly as it appears below. You will not need to use this code after youve completed the sign-up process. If you do not sign up before the expiration date, you must request a new code. · CITIA Access Code: [email or web address] Expires: 2/24/2017  1:44 PM 
 
4. Enter the last four digits of your Social Security Number (xxxx) and Date of Birth (mm/dd/yyyy) as indicated and click Submit. You will be taken to the next sign-up page. 5. Create a CITIA ID. This will be your CITIA login ID and cannot be changed, so think of one that is secure and easy to remember. 6. Create a CITIA password. You can change your password at any time. 7. Enter your Password Reset Question and Answer. This can be used at a later time if you forget your password. 8. Enter your e-mail address. You will receive e-mail notification when new information is available in [address] 9. Click Sign Up. You can now view and download portions of your medical record. 10. Click the Download Summary menu link to download a portable copy of your medical information. If you have questions, please visit the Frequently Asked Questions section of the CITIA website. Remember, CITIA is NOT to be used for urgent needs. For medical emergencies, dial 911. Now available from your iPhone and Android! Please provide this summary of care documentation to your next provider. Your primary care clinician is listed as [facility]. If you have any questions after today's visit, please call [phone].

## 2017-02-17 NOTE — PATIENT INSTRUCTIONS
Follow up with pulmonology ASAP to get your breathing checked out. If you need a referral, let us know. Cough: Care Instructions  Your Care Instructions  A cough is your body's response to something that bothers your throat or airways. Many things can cause a cough. You might cough because of a cold or the flu, bronchitis, or asthma. Smoking, postnasal drip, allergies, and stomach acid that backs up into your throat also can cause coughs. A cough is a symptom, not a disease. Most coughs stop when the cause, such as a cold, goes away. You can take a few steps at home to cough less and feel better. Follow-up care is a key part of your treatment and safety. Be sure to make and go to all appointments, and call your doctor if you are having problems. It's also a good idea to know your test results and keep a list of the medicines you take. How can you care for yourself at home? · Drink lots of water and other fluids. This helps thin the mucus and soothes a dry or sore throat. Honey or lemon juice in hot water or tea may ease a dry cough. · Take cough medicine as directed by your doctor. · Prop up your head on pillows to help you breathe and ease a dry cough. · Try cough drops to soothe a dry or sore throat. Cough drops don't stop a cough. Medicine-flavored cough drops are no better than candy-flavored drops or hard candy. · Do not smoke. Avoid secondhand smoke. If you need help quitting, talk to your doctor about stop-smoking programs and medicines. These can increase your chances of quitting for good. When should you call for help? Call 911 anytime you think you may need emergency care. For example, call if:  · You have severe trouble breathing. Call your doctor now or seek immediate medical care if:  · You cough up blood. · You have new or worse trouble breathing. · You have a new or higher fever. · You have a new rash.   Watch closely for changes in your health, and be sure to contact your doctor if:  · You cough more deeply or more often, especially if you notice more mucus or a change in the color of your mucus. · You have new symptoms, such as a sore throat, an earache, or sinus pain. · You do not get better as expected. Where can you learn more? Go to http://carly-jose.info/. Enter D279 in the search box to learn more about \"Cough: Care Instructions. \"  Current as of: May 27, 2016  Content Version: 11.1  © 7423-2352 [employer]. Care instructions adapted under license by [employer] (which disclaims liability or warranty for this information). If you have questions about a medical condition or this instruction, always ask your healthcare professional. [address] any warranty or liability for your use of this information.

## 2017-02-17 NOTE — PROGRESS NOTES
HISTORY OF PRESENT ILLNESS  Bita Yuan is a 59 y.o. female. HPI Comments: Pt with PMH of asthma presents with c/o 5 weeks of cough. She was seen at [facility] [place] on 1/22/17, where chest x-rays were clear. She was then seen here on 1/31/17 and treated with steroids and Tussionex. She feels that the steroids briefly lightened her cough, but never resolved it. States she was being seen by pulmonology for sleep, but hasn't been back. States she uses her Asmanex daily, and her albuterol multiple times per day. When asked, she didn't feel that the nebulizer treatment she received last time was particularly helpful. Headache   Associated symptoms include chest pain (possibly musculoskeletal secondary to cough), headaches and shortness of breath. Cough   Associated symptoms include chest pain (possibly musculoskeletal secondary to cough), headaches and shortness of breath. Review of Systems   Constitutional: Positive for chills and fever (subjective). HENT: Positive for ear pain and sore throat. Respiratory: Positive for cough, sputum production (intermittent, yellow), shortness of breath and wheezing. Negative for hemoptysis. Cardiovascular: Positive for chest pain (possibly musculoskeletal secondary to cough). Neurological: Positive for dizziness and headaches. Visit Vitals    /67 (BP 1 Location: Left arm, BP Patient Position: Sitting)    Pulse (!) 115    Temp 97.8 °F (36.6 °C) (Oral)    Resp 16    Ht 5' 6\" (1.676 m)    Wt 213 lb 6.4 oz (96.8 kg)    SpO2 98%    BMI 34.44 kg/m2       Physical Exam   Constitutional: She is oriented to person, place, and time. Vital signs are normal. She appears well-developed and well-nourished. She is cooperative. She does not appear ill. No distress. HENT:   Head: Normocephalic and atraumatic.    Right Ear: Tympanic membrane, external ear and ear canal normal.   Left Ear: Tympanic membrane, external ear and ear canal normal.   Nose: Nose normal. No mucosal edema or rhinorrhea. Mouth/Throat: Uvula is midline, oropharynx is clear and moist and mucous membranes are normal. Abnormal dentition. Eyes: Conjunctivae are normal.   Neck: Neck supple. Cardiovascular: Normal rate, regular rhythm and normal heart sounds. Exam reveals no gallop and no friction rub. No murmur heard. Pulses:       Radial pulses are 2+ on the right side, and 2+ on the left side. Pulmonary/Chest: Effort normal. She has no decreased breath sounds. She has wheezes (mild diffuse). She has no rhonchi. She has no rales. Lymphadenopathy:     She has cervical adenopathy. Right cervical: Superficial cervical adenopathy present. Left cervical: Superficial cervical adenopathy present. Neurological: She is alert and oriented to person, place, and time. Skin: Skin is warm and dry. Psychiatric: She has a normal mood and affect. Her speech is normal and behavior is normal. Thought content normal.   Nursing note and vitals reviewed. ASSESSMENT and PLAN    ICD-10-CM ICD-9-CM    1. Persistent cough R05 786.2 benzonatate (TESSALON) 200 mg capsule   2. Moderate persistent asthma with acute exacerbation J45.41 493.92 montelukast (SINGULAIR) 10 mg tablet   3. Seasonal allergic rhinitis, unspecified allergic rhinitis trigger J30.2 477.9 montelukast (SINGULAIR) 10 mg tablet     Seems to be poorly-managed asthma vs. COPD process? Pt strongly encouraged to follow up with pulmonology. Provided after-visit information on  Cough  Reviewed reasons to return or seek emergency care. Pt verbalized understanding and agreement with the plan of care.     Claudine De Leon PA-C

## 2017-02-17 NOTE — PROGRESS NOTES
Pt presented today with cough x 5 weeks, dizziness x 2 day and weak bladder . Has pt had any falls since last visit? Yes fell on sidewalk last week . Pt preferred language for health care discussion is english. Advanced Directive? no    Is someone accompanying this pt? no    Is the patient using any DME equipment during OV? no      1. Have you been to the ER, urgent care clinic since your last visit? Yes [facility]  for cough   Hospitalized since your last visit? No    2. Have you seen or consulted any other health care providers outside of the [address] [place] [address] since your last visit? Include any pap smears or colon screening. No      Patient  has a reminder for a \"due or due soon\" health maintenance. I have asked that she contact his primary care provider for follow-up on this health maintenance.

## 2017-02-20 ENCOUNTER — TELEPHONE (OUTPATIENT)
Dept: INTERNAL MEDICINE CLINIC | Age: 62
End: 2017-02-20

## 2017-02-20 NOTE — TELEPHONE ENCOUNTER
Pharmacist  from [address] [place], [place] [address] called wanting to know if they could have patient of Singulair 10 mg as an 90 day Rx  Please return call to pharmacy for clarification [phone] Reference number #[id number] please return call when available

## 2017-02-22 NOTE — TELEPHONE ENCOUNTER
Called and spoke with pharmacist (as requested below). Authorized 90 day supply of Singulair with 1 refill.

## 2017-03-03 ENCOUNTER — HOSPITAL ENCOUNTER (OUTPATIENT)
Dept: LAB | Age: 62
Discharge: HOME | End: 2017-03-03
Payer: MEDICARE

## 2017-03-03 ENCOUNTER — OFFICE VISIT (OUTPATIENT)
Dept: INTERNAL MEDICINE CLINIC | Age: 62
End: 2017-03-03

## 2017-03-03 VITALS
HEIGHT: 66 IN | OXYGEN SATURATION: 99 % | TEMPERATURE: 96.6 F | RESPIRATION RATE: 16 BRPM | SYSTOLIC BLOOD PRESSURE: 132 MMHG | HEART RATE: 82 BPM | DIASTOLIC BLOOD PRESSURE: 90 MMHG | BODY MASS INDEX: 33.91 KG/M2 | WEIGHT: 211 LBS

## 2017-03-03 DIAGNOSIS — N30.01 ACUTE CYSTITIS WITH HEMATURIA: Primary | ICD-10-CM

## 2017-03-03 DIAGNOSIS — N89.8 VAGINAL DISCHARGE: ICD-10-CM

## 2017-03-03 DIAGNOSIS — R10.30 LOWER ABDOMINAL PAIN: ICD-10-CM

## 2017-03-03 DIAGNOSIS — N30.01 ACUTE CYSTITIS WITH HEMATURIA: ICD-10-CM

## 2017-03-03 DIAGNOSIS — Z86.19 HISTORY OF GONORRHEA: ICD-10-CM

## 2017-03-03 LAB
BILIRUB UR QL STRIP: NEGATIVE
GLUCOSE UR-MCNC: NEGATIVE MG/DL
KETONES P FAST UR STRIP-MCNC: NEGATIVE MG/DL
PH UR STRIP: 5 [PH] (ref 4.6–8)
PROT UR QL STRIP: NORMAL MG/DL
SP GR UR STRIP: 1.02 (ref 1–1.03)
UA UROBILINOGEN AMB POC: NORMAL (ref 0.2–1)
URINALYSIS CLARITY POC: CLEAR
URINALYSIS COLOR POC: YELLOW
URINE BLOOD POC: NORMAL
URINE LEUKOCYTES POC: NORMAL
URINE NITRITES POC: NEGATIVE

## 2017-03-03 PROCEDURE — 87491 CHLMYD TRACH DNA AMP PROBE: CPT | Performed by: NURSE PRACTITIONER

## 2017-03-03 PROCEDURE — 87086 URINE CULTURE/COLONY COUNT: CPT | Performed by: NURSE PRACTITIONER

## 2017-03-03 RX ORDER — HYDROCODONE POLISTIREX AND CHLORPHENIRAMINE POLISTIREX 10; 8 MG/5ML; MG/5ML
5 SUSPENSION, EXTENDED RELEASE ORAL
COMMUNITY
Start: 2017-01-22 | End: 2017-03-08 | Stop reason: ALTCHOICE

## 2017-03-03 RX ORDER — DOXYCYCLINE 100 MG/1
100 TABLET ORAL 2 TIMES DAILY
Qty: 20 TAB | Refills: 0 | Status: SHIPPED | OUTPATIENT
Start: 2017-03-03 | End: 2017-03-08 | Stop reason: ALTCHOICE

## 2017-03-03 RX ORDER — FENTANYL 50 UG/1
1 PATCH TRANSDERMAL
COMMUNITY
End: 2017-05-15

## 2017-03-03 RX ORDER — AZITHROMYCIN 250 MG/1
250 TABLET, FILM COATED ORAL
COMMUNITY
Start: 2017-02-24 | End: 2017-03-08 | Stop reason: ALTCHOICE

## 2017-03-03 NOTE — PROGRESS NOTES
Pt presented today with vaginal discharge x 1 week, pt reports she was diagnosed with gonorrhea in 09/2016 . Has pt had any falls since last visit? No.  Pt preferred language for health care discussion is english. Advanced Directive? No    Is someone accompanying this pt? No    Is the patient using any DME equipment during OV? No      1. Have you been to the ER, urgent care clinic since your last visit? Hospitalized since your last visit? Yes When: one week ago for passing out [place] [facility]    2. Have you seen or consulted any other health care providers outside of the [address] [place] [address] since your last visit? Include any pap smears or colon screening. No      Pt has a reminder for a \"due or due soon\" health maintenance. I have asked that she contact his primary care provider for follow-up on this health maintenance.

## 2017-03-03 NOTE — MR AVS SNAPSHOT
Visit Information Date & Time Provider Department Dept. Phone Encounter #  
 3/3/2017  3:30 PM GAMAL Villatoromichael Asher 139 [id number] Your Appointments 4/3/2017 10:30 AM  
Office Visit with Erlinda Madihayung [employer] [address]) Appt Note: Return in about 4 months (around 4/2/2017) for memory issues, breast pain, .; [address] [facility] [address]  
  
   
 Formerly [facility] [address] [facility] Upcoming Health Maintenance Date Due DTaP/Tdap/Td series (1 - Tdap) 12/2/2017* ZOSTER VACCINE AGE 60> 11/2/2018* FOBT Q 1 YEAR AGE 50-75 12/8/2017 BREAST CANCER SCRN MAMMOGRAM 12/15/2018 *Topic was postponed. The date shown is not the original due date. Allergies as of 3/3/2017  Review Complete On: 3/3/2017 By: Ruma Dominguez LPN Severity Noted Reaction Type Reactions Gluten  07/22/2016    Other (comments) GI upset Morphine  03/07/2016    Rash Stadol [Butorphanol Tartrate]  03/07/2016    Other (comments) Made her have palpitations Ultram [Tramadol]  03/07/2016    Other (comments) Made her have palpitations Current Immunizations  Reviewed on 7/22/2016 Name Date Influenza Vaccine (Quad) PF 12/2/2016 Pneumococcal Polysaccharide (PPSV-23) 2/9/2013 Not reviewed this visit You Were Diagnosed With   
  
 Codes Comments Acute cystitis with hematuria    -  Primary ICD-10-CM: N30.01 
ICD-9-CM: 595.0 Vaginal discharge     ICD-10-CM: N89.8 ICD-9-CM: 623.5 Lower abdominal pain     ICD-10-CM: R10.30 ICD-9-CM: 789.09 History of gonorrhea     ICD-10-CM: Z86.19 ICD-9-CM: V12.09 Vitals BP Pulse Temp Resp Height(growth percentile) Weight(growth percentile)  132/90 (BP 1 Location: Right arm, BP Patient Position: Sitting) 82 96.6 °F (35.9 °C) (Oral) 16 5' 6\" (1.676 m) 211 lb (95.7 kg) SpO2 BMI OB Status Smoking Status 99% 34.06 kg/m2 Hysterectomy Never Smoker Vitals History BMI and BSA Data Body Mass Index Body Surface Area 34.06 kg/m 2 2.11 m 2 Preferred Pharmacy Pharmacy Name Phone [address] [phone] Your Updated Medication List  
  
   
This list is accurate as of: 3/3/17  4:00 PM.  Always use your most recent med list.  
  
  
  
  
 acyclovir 400 mg tablet Commonly known as:  ZOVIRAX Take 1 Tab by mouth three (3) times daily. Indications: HERPES SIMPLEX INFECTION  
  
 albuterol 90 mcg/actuation inhaler Commonly known as:  PROVENTIL HFA, VENTOLIN HFA, PROAIR HFA Take  by inhalation. ALPRAZolam 0.5 mg tablet Commonly known as:  Carletha Puller Take 3 Tabs by mouth three (3) times daily as needed for Anxiety. Max Daily Amount: 4.5 mg. Indications: ANXIETY  
  
 aspirin delayed-release 81 mg tablet Take 1 Tab by mouth daily. azithromycin 250 mg tablet Commonly known as:  ZITHROMAX  
250 mg. Cetirizine 10 mg Cap Take 1 Can by mouth daily. Indications: SEASONAL ALLERGIC RHINITIS * chlorpheniramine-HYDROcodone 10-8 mg/5 mL suspension Commonly known as:  TUSSIONEX  
5 mL. * chlorpheniramine-HYDROcodone 10-8 mg/5 mL suspension Commonly known as:  Kentrell Mahamed Take 5 mL by mouth every twelve (12) hours as needed for Cough. Max Daily Amount: 10 mL. cyclobenzaprine 10 mg tablet Commonly known as:  FLEXERIL Take 1 Tab by mouth three (3) times daily as needed for Muscle Spasm(s). Indications: MUSCLE SPASM  
  
 diazePAM 5 mg tablet Commonly known as:  VALIUM Take 2 tabs 30 minutes prior to testing. May take additional 5 mg in 2 hours if needed  Indications: ANXIETY  
  
 doxycycline 100 mg tablet Commonly known as:  ADOXA Take 1 Tab by mouth two (2) times a day for 10 days. fentaNYL 50 mcg/hr PATCH Commonly known as:  DURAGESIC  
1 Patch. frovatriptan 2.5 mg tablet Commonly known as:  Madeline Brisa Take 1 Tab by mouth once as needed for Migraine for up to 1 dose. furosemide 40 mg tablet Commonly known as:  LASIX Take 1 Tab by mouth daily. Indications: EDEMA, PERIPHERAL EDEMA DUE TO CHRONIC HEART FAILURE, as needed only for edema  
  
 gabapentin 600 mg tablet Commonly known as:  NEURONTIN Take 1 Tab by mouth nightly. Indications: NEUROPATHIC PAIN  
  
 hyoscyamine SR 0.375 mg SR tablet Commonly known as:  LEVBID Take 1 Tab by mouth every twelve (12) hours as needed for Cramping. Indications: Irritable Bowel Syndrome  
  
 ibuprofen 800 mg tablet Commonly known as:  MOTRIN Take 1 Tab by mouth every eight (8) hours as needed for Pain. Indications: PAIN  
  
 mometasone 220 mcg (120 doses) Aepb inhaler Commonly known as:  Deena Loupe Take 1 Puff by inhalation two (2) times a day. montelukast 10 mg tablet Commonly known as:  SINGULAIR Take 1 Tab by mouth daily. Indications: MAINTENANCE THERAPY FOR ASTHMA  
  
 omeprazole 40 mg capsule Commonly known as:  PRILOSEC Take 1 Cap by mouth daily. ONE-A-DAY WOMENS FORMULA PO Take 2 Tabs by Mouth Once a Day. oxyCODONE-acetaminophen  mg per tablet Commonly known as:  PERCOCET 10 Take 1 Tab by mouth every four (4) hours as needed for Pain. Max Daily Amount: 6 Tabs. potassium chloride 20 mEq tablet Commonly known as:  K-DUR, KLOR-CON Take 1 Tab by mouth two (2) times a day. Indications: HYPOKALEMIA QUEtiapine 50 mg tablet Commonly known as:  SEROquel Take 1 Tab by mouth daily. Indications: DEPRESSION TREATMENT ADJUNCT  
  
 rOPINIRole 3 mg Tab tab Commonly known as:  Amberly Alfaro Take 1 Tab by mouth two (2) times a day. Indications: Restless Legs Syndrome  
  
 tiZANidine 4 mg capsule Commonly known as:  [place] Take 4 mg by mouth three (3) times daily as needed. venlafaxine-SR 75 mg capsule Commonly known as:  EFFEXOR-XR Take 3 capsules by mouth daily  Indications: ANXIETY WITH DEPRESSION  
  
 ZOLMitriptan 5 mg nasal solution Commonly known as:  ZOMIG  
1 Spray by Nasal route once as needed for Migraine for up to 1 dose. zolpidem CR 12.5 mg tablet Commonly known as:  AMBIEN CR Take 1 Tab by mouth nightly as needed for Sleep. Max Daily Amount: 12.5 mg.  
  
 * Notice: This list has 2 medication(s) that are the same as other medications prescribed for you. Read the directions carefully, and ask your doctor or other care provider to review them with you. Prescriptions Sent to Pharmacy Refills  
 doxycycline (ADOXA) 100 mg tablet 0 Sig: Take 1 Tab by mouth two (2) times a day for 10 days. Class: Normal  
 Pharmacy: [address] [place] [address] #: [phone] Route: Oral  
  
We Performed the Following AMB POC URINALYSIS DIP STICK AUTO W/O MICRO [98497 CPT(R)] To-Do List   
 03/03/2017 Microbiology:  CULTURE, URINE   
  
 03/03/2017 Pathology:  NEISSERIA GONORRHOEAE, DEBO THINPREP Introducing [facility]! Aly Aguila introduces ikaSystems patient portal. Now you can access parts of your medical record, email your doctor's office, and request medication refills online. 1. In your internet browser, go to [email or web address] 2. Click on the First Time User? Click Here link in the Sign In box. You will see the New Member Sign Up page. 3. Enter your ikaSystems Access Code exactly as it appears below. You will not need to use this code after youve completed the sign-up process. If you do not sign up before the expiration date, you must request a new code. · [employer]ka[employer]s Access Code: [email or web address] Expires: 6/1/2017  3:59 PM 
 
 4. Enter the last four digits of your Social Security Number (xxxx) and Date of Birth (mm/dd/yyyy) as indicated and click Submit. You will be taken to the next sign-up page. 5. Create a Codexis ID. This will be your Codexis login ID and cannot be changed, so think of one that is secure and easy to remember. 6. Create a Codexis password. You can change your password at any time. 7. Enter your Password Reset Question and Answer. This can be used at a later time if you forget your password. 8. Enter your e-mail address. You will receive e-mail notification when new information is available in [address] 9. Click Sign Up. You can now view and download portions of your medical record. 10. Click the Download Summary menu link to download a portable copy of your medical information. If you have questions, please visit the Frequently Asked Questions section of the Codexis website. Remember, Codexis is NOT to be used for urgent needs. For medical emergencies, dial 911. Now available from your iPhone and Android! Please provide this summary of care documentation to your next provider. Your primary care clinician is listed as [facility]. If you have any questions after today's visit, please call [phone].

## 2017-03-03 NOTE — PROGRESS NOTES
HISTORY OF PRESENT ILLNESS  Lisandra Clayton is a 59 y.o. female. Vaginal Discharge    The history is provided by the patient. This is a new problem. The current episode started more than 2 days ago. The problem occurs constantly. The problem has been gradually worsening. The discharge occurs spontaneously. The discharge was white and yellow. She is not pregnant. Associated symptoms include abdominal pain, nausea, dysuria, genital itching and perineal odor. Pertinent negatives include no anorexia, no diaphoresis, no fever, no abdominal swelling, no constipation, no diarrhea, no vomiting, no dyspareunia, no frequency, no genital burning, no genital lesions, no perineal pain and no painful intercourse. Risk factors include history of STDs. She has tried nothing for the symptoms. The treatment provided no relief. Her past medical history is significant for STD. Her past medical history does not include irregular periods. Review of Systems   Constitutional: Negative for chills, diaphoresis, fever and malaise/fatigue. Eyes: Negative for blurred vision. Respiratory: Negative for shortness of breath. Cardiovascular: Negative for chest pain and palpitations. Gastrointestinal: Positive for abdominal pain and nausea. Negative for anorexia, blood in stool, constipation, diarrhea, heartburn and vomiting. Genitourinary: Positive for dysuria and vaginal discharge. Negative for dyspareunia, flank pain, frequency, hematuria and urgency. Musculoskeletal: Negative for myalgias. Neurological: Negative for dizziness and headaches. Endo/Heme/Allergies: Negative for polydipsia. Psychiatric/Behavioral: Negative for depression. Physical Exam   Constitutional: She appears well-developed and well-nourished. No distress. HENT:   Head: Normocephalic and atraumatic. Right Ear: External ear normal.   Left Ear: External ear normal.   Mouth/Throat: No oropharyngeal exudate.    Eyes: Pupils are equal, round, and reactive to light. Neck: Neck supple. Cardiovascular: Regular rhythm. Pulmonary/Chest: Breath sounds normal.   Abdominal: Bowel sounds are normal. She exhibits no distension. There is no tenderness. Skin: She is not diaphoretic. Psychiatric: She has a normal mood and affect. Nursing note and vitals reviewed. ASSESSMENT and PLAN    ICD-10-CM ICD-9-CM    1. Acute cystitis with hematuria N30.01 595.0 doxycycline (ADOXA) 100 mg tablet      AMB POC URINALYSIS DIP STICK AUTO W/O MICRO      CULTURE, URINE      NEISSERIA GONORRHOEAE, DEBO THINPREP   2. Vaginal discharge N89.8 623.5 doxycycline (ADOXA) 100 mg tablet      AMB POC URINALYSIS DIP STICK AUTO W/O MICRO      CULTURE, URINE      NEISSERIA GONORRHOEAE, DEBO THINPREP   3. Lower abdominal pain R10.30 789.09 doxycycline (ADOXA) 100 mg tablet      AMB POC URINALYSIS DIP STICK AUTO W/O MICRO      CULTURE, URINE      NEISSERIA GONORRHOEAE, DEBO THINPREP   4. History of gonorrhea Z86.19 V12.09 doxycycline (ADOXA) 100 mg tablet      AMB POC URINALYSIS DIP STICK AUTO W/O MICRO      CULTURE, URINE      NEISSERIA GONORRHOEAE, DEBO THINPREP   patient is started on broad spectrum antibiotic for the possibility of any std. Further plan of care will be established according to culture results.

## 2017-03-05 LAB
BACTERIA SPEC CULT: ABNORMAL
SERVICE CMNT-IMP: ABNORMAL

## 2017-03-06 DIAGNOSIS — Z86.19 HISTORY OF GONORRHEA: ICD-10-CM

## 2017-03-06 DIAGNOSIS — Z86.19 HISTORY OF GONORRHEA: Primary | ICD-10-CM

## 2017-03-07 ENCOUNTER — TELEPHONE (OUTPATIENT)
Dept: INTERNAL MEDICINE CLINIC | Age: 62
End: 2017-03-07

## 2017-03-07 LAB
C TRACH RRNA SPEC QL NAA+PROBE: NEGATIVE
N GONORRHOEA RRNA SPEC QL NAA+PROBE: POSITIVE
SPECIMEN SOURCE: ABNORMAL

## 2017-03-07 NOTE — TELEPHONE ENCOUNTER
Spoke with patient confirmed name and . Advised that we are still waiting on results, and will call as soon as they all come in.      Be advised

## 2017-03-08 ENCOUNTER — OFFICE VISIT (OUTPATIENT)
Dept: INTERNAL MEDICINE CLINIC | Age: 62
End: 2017-03-08

## 2017-03-08 VITALS
RESPIRATION RATE: 16 BRPM | HEIGHT: 66 IN | BODY MASS INDEX: 33.75 KG/M2 | WEIGHT: 210 LBS | OXYGEN SATURATION: 96 % | SYSTOLIC BLOOD PRESSURE: 111 MMHG | DIASTOLIC BLOOD PRESSURE: 82 MMHG | TEMPERATURE: 97.1 F | HEART RATE: 100 BPM

## 2017-03-08 DIAGNOSIS — M25.561 ACUTE PAIN OF RIGHT KNEE: Primary | ICD-10-CM
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
## 2017-03-08 DIAGNOSIS — A54.9 GONORRHEA: ICD-10-CM

## 2017-03-08 DIAGNOSIS — Z76.0 MEDICATION REFILL: ICD-10-CM

## 2017-03-08 RX ORDER — OXYCODONE AND ACETAMINOPHEN 10; 325 MG/1; MG/1
1 TABLET ORAL
Qty: 30 TAB | Refills: 0 | Status: SHIPPED | OUTPATIENT
Start: 2017-03-08 | End: 2017-05-15

## 2017-03-08 RX ORDER — DOXYCYCLINE 100 MG/1
TABLET ORAL
Refills: 0 | COMMUNITY
Start: 2017-03-03 | End: 2017-05-15 | Stop reason: ALTCHOICE

## 2017-03-08 RX ORDER — CEFTRIAXONE 250 MG/8ML
250 INJECTION, POWDER, FOR SOLUTION INTRAMUSCULAR; INTRAVENOUS ONCE
Qty: 250 MG | Refills: 0
Start: 2017-03-08 | End: 2017-03-08

## 2017-03-08 NOTE — MR AVS SNAPSHOT
Visit Information Date & Time Provider Department Dept. Phone Encounter #  
 3/8/2017  3:00 PM Marlon Prader, [address] [phone] [id number] Follow-up Instructions Return in about 10 days (around 3/18/2017) for or after MRI. Your Appointments 4/3/2017 10:30 AM  
Office Visit with Bhupindern Prader, [address] [facility] Appt Note: Return in about 4 months (around 4/2/2017) for memory issues, breast pain, .; [address] [facility] [address] [facility] [address] [facility] 75 [address] [facility] Upcoming Health Maintenance Date Due DTaP/Tdap/Td series (1 - Tdap) 12/2/2017* ZOSTER VACCINE AGE 60> 11/2/2018* FOBT Q 1 YEAR AGE 50-75 12/8/2017 BREAST CANCER SCRN MAMMOGRAM 12/15/2018 *Topic was postponed. The date shown is not the original due date. Allergies as of 3/8/2017  Review Complete On: 3/8/2017 By: Marlon Prader, MD  
  
 Severity Noted Reaction Type Reactions Gluten  07/22/2016    Other (comments) GI upset Morphine  03/07/2016    Rash Stadol [Butorphanol Tartrate]  03/07/2016    Other (comments) Made her have palpitations Ultram [Tramadol]  03/07/2016    Other (comments) Made her have palpitations Carisoprodol Low 08/07/2013    Other (comments) Constipaton Current Immunizations  Reviewed on 7/22/2016 Name Date Influenza Vaccine (Quad) PF 12/2/2016 Pneumococcal Polysaccharide (PPSV-23) 2/9/2013 Not reviewed this visit You Were Diagnosed With   
  
 Codes Comments Acute pain of right knee    -  Primary ICD-10-CM: M25.561 ICD-9-CM: 719.46 Fall, initial encounter     ICD-10-CM: W19. Siennafatuma Orozco ICD-9-CM: E888.9 Gonorrhea     ICD-10-CM: A54.9 ICD-9-CM: 098.0 Medication refill     ICD-10-CM: Z76.0 ICD-9-CM: V68.1 Vitals BP Pulse Temp Resp Height(growth percentile) Weight(growth percentile) 111/82 100 97.1 °F (36.2 °C) 16 5' 6\" (1.676 m) 210 lb (95.3 kg) SpO2 BMI OB Status Smoking Status 96% 33.89 kg/m2 Hysterectomy Never Smoker Vitals History BMI and BSA Data Body Mass Index Body Surface Area  
 33.89 kg/m 2 2.11 m 2 Preferred Pharmacy Pharmacy Name Phone 609 [address] [phone] Your Updated Medication List  
  
   
This list is accurate as of: 3/8/17  3:00 PM.  Always use your most recent med list.  
  
  
  
  
 albuterol 90 mcg/actuation inhaler Commonly known as:  PROVENTIL HFA, VENTOLIN HFA, PROAIR HFA Take  by inhalation. ALPRAZolam 0.5 mg tablet Commonly known as:  Ace Sa Take 3 Tabs by mouth three (3) times daily as needed for Anxiety. Max Daily Amount: 4.5 mg. Indications: ANXIETY  
  
 aspirin delayed-release 81 mg tablet Take 1 Tab by mouth daily. cefTRIAXone 250 mg injection Commonly known as:  ROCEPHIN  
250 mg by IntraMUSCular route once for 1 dose. Cetirizine 10 mg Cap Take 1 Can by mouth daily. Indications: SEASONAL ALLERGIC RHINITIS  
  
 diazePAM 5 mg tablet Commonly known as:  VALIUM Take 2 tabs 30 minutes prior to testing. May take additional 5 mg in 2 hours if needed  Indications: ANXIETY  
  
 doxycycline 100 mg tablet Commonly known as:  ADOXA  
take 1 tablet by mouth twice a day for 10 days  
  
 fentaNYL 50 mcg/hr PATCH Commonly known as:  DURAGESIC  
1 Patch. furosemide 40 mg tablet Commonly known as:  LASIX Take 1 Tab by mouth daily. Indications: EDEMA, PERIPHERAL EDEMA DUE TO CHRONIC HEART FAILURE, as needed only for edema  
  
 gabapentin 600 mg tablet Commonly known as:  NEURONTIN Take 1 Tab by mouth nightly. Indications: NEUROPATHIC PAIN  
  
 hyoscyamine SR 0.375 mg SR tablet Commonly known as:  LEVBID  
 Take 1 Tab by mouth every twelve (12) hours as needed for Cramping. Indications: Irritable Bowel Syndrome  
  
 ibuprofen 800 mg tablet Commonly known as:  MOTRIN Take 1 Tab by mouth every eight (8) hours as needed for Pain. Indications: PAIN  
  
 mometasone 220 mcg (120 doses) Aepb inhaler Commonly known as:  Amado Chandraeller Take 1 Puff by inhalation two (2) times a day. montelukast 10 mg tablet Commonly known as:  SINGULAIR Take 1 Tab by mouth daily. Indications: MAINTENANCE THERAPY FOR ASTHMA  
  
 omeprazole 40 mg capsule Commonly known as:  PRILOSEC Take 1 Cap by mouth daily. ONE-A-DAY WOMENS FORMULA PO Take 2 Tabs by Mouth Once a Day. oxyCODONE-acetaminophen  mg per tablet Commonly known as:  PERCOCET 10 Take 1 Tab by mouth every four (4) hours as needed for Pain. Max Daily Amount: 6 Tabs. potassium chloride 20 mEq tablet Commonly known as:  K-DUR, KLOR-CON Take 1 Tab by mouth two (2) times a day. Indications: HYPOKALEMIA QUEtiapine 50 mg tablet Commonly known as:  SEROquel Take 1 Tab by mouth daily. Indications: DEPRESSION TREATMENT ADJUNCT  
  
 rOPINIRole 3 mg Tab tab Commonly known as:  Mallissa Poles Take 1 Tab by mouth two (2) times a day. Indications: Restless Legs Syndrome  
  
 tiZANidine 4 mg capsule Commonly known as:  Palma Manual Take 4 mg by mouth three (3) times daily as needed. venlafaxine-SR 75 mg capsule Commonly known as:  EFFEXOR-XR Take 3 capsules by mouth daily  Indications: ANXIETY WITH DEPRESSION  
  
 zolpidem CR 12.5 mg tablet Commonly known as:  AMBIEN CR Take 1 Tab by mouth nightly as needed for Sleep. Max Daily Amount: 12.5 mg.  
  
  
  
  
Prescriptions Printed Refills  
 oxyCODONE-acetaminophen (PERCOCET 10)  mg per tablet 0 Sig: Take 1 Tab by mouth every four (4) hours as needed for Pain. Max Daily Amount: 6 Tabs. Class: Print Route: Oral  
  
We Performed the Following CEFTRIAXONE SODIUM INJECTION  MG [ [facility]] Comments:  
 Mix per protocol NE THER/PROPH/DIAG INJECTION, SUBCUT/IM [id number] CPT(R)] Follow-up Instructions Return in about 10 days (around 3/18/2017) for or after MRI. To-Do List   
 03/08/2017 Imaging:  MRI KNEE RT WO CONT   
  
 03/08/2017 Imaging:  XR KNEE RT 3 V Introducing [facility]! Leon Mendoza introduces Tripl patient portal. Now you can access parts of your medical record, email your doctor's office, and request medication refills online. 1. In your internet browser, go to [email or web address] 2. Click on the First Time User? Click Here link in the Sign In box. You will see the New Member Sign Up page. 3. Enter your Tripl Access Code exactly as it appears below. You will not need to use this code after youve completed the sign-up process. If you do not sign up before the expiration date, you must request a new code. · Tripl Access Code: [email or web address] Expires: 6/1/2017  3:59 PM 
 
4. Enter the last four digits of your Social Security Number (xxxx) and Date of Birth (mm/dd/yyyy) as indicated and click Submit. You will be taken to the next sign-up page. 5. Create a Tripl ID. This will be your Tripl login ID and cannot be changed, so think of one that is secure and easy to remember. 6. Create a Tripl password. You can change your password at any time. 7. Enter your Password Reset Question and Answer. This can be used at a later time if you forget your password. 8. Enter your e-mail address. You will receive e-mail notification when new information is available in [address] 9. Click Sign Up. You can now view and download portions of your medical record. 10. Click the Download Summary menu link to download a portable copy of your medical information.  
 
If you have questions, please visit the Frequently Asked Questions section of the [employer]. Remember, [employer] is NOT to be used for urgent needs. For medical emergencies, dial 911. Now available from your iPhone and Android! Please provide this summary of care documentation to your next provider. Your primary care clinician is listed as [facility]. If you have any questions after today's visit, please call [phone].

## 2017-03-08 NOTE — PROGRESS NOTES
HISTORY OF PRESENT ILLNESS  Sylvie Arora is a 59 y.o. female. Visit Vitals    /82    Pulse 100    Temp 97.1 °F (36.2 °C)    Resp 16    Ht 5' 6\" (1.676 m)    Wt 210 lb (95.3 kg)    SpO2 96%    BMI 33.89 kg/m2       HPI Comments: Had a twisting fall a week ago. Does not recall her right knee hurting at the time. No walking uncomfortable, can not go up steps, and the knee throbs    Leg Pain    The history is provided by the patient (see comments). This is a new problem. The current episode started more than 2 days ago. Associated symptoms include limited range of motion and stiffness. Treatments tried: NSAID, flexeril. The treatment provided mild relief. Exposure to STD   The history is provided by the patient (see last visit note. Tested + for GC. Here for rocephin shot). This is a new problem. Review of Systems   Musculoskeletal: Positive for stiffness. Physical Exam   Constitutional: She is oriented to person, place, and time. She appears well-developed and well-nourished. No distress. Cardiovascular: Normal rate. Pulmonary/Chest: Effort normal.   Musculoskeletal:        Back:    Right knee has some soft tissue swelling. Pain along medial meniscus  Ligaments appear intact  Do not appreciate swelling popliteal   Neurological: She is alert and oriented to person, place, and time. Skin: Skin is warm and dry. She is not diaphoretic. Psychiatric: She has a normal mood and affect. Nursing note and vitals reviewed. ASSESSMENT and PLAN    ICD-10-CM ICD-9-CM    1. Acute pain of right knee M25.561 719.46 MRI KNEE RT WO CONT      XR KNEE RT 3 V      oxyCODONE-acetaminophen (PERCOCET 10)  mg per tablet   2. Fall, initial encounter [id number]. XXXA H536.8 MRI KNEE RT WO CONT      XR KNEE RT 3 V      oxyCODONE-acetaminophen (PERCOCET 10)  mg per tablet   3.  Gonorrhea A54.9 098.0 doxycycline (ADOXA) 100 mg tablet      cefTRIAXone (ROCEPHIN) 250 mg injection      CEFTRIAXONE SODIUM INJECTION  MG      ND THER/PROPH/DIAG INJECTION, SUBCUT/IM   4. Medication refill Z76.0 V68.1        I suspect she tore a meniscus in her right knee--need MRI.  Plain xray here today    continue NSAIDS and muscle relaxer for her back    Will add a few days of percocet--not more than 3 days--to allow w/u    F/u one week

## 2017-03-08 NOTE — TELEPHONE ENCOUNTER
Spoke with Pt, confirmed name and . Advised of lab results. Pt stated that she has an appointment scheduled for today @ 1500. Will take care of Rocephin injection at that time. Pt verbalized understanding.      Be advised

## 2017-03-08 NOTE — PROGRESS NOTES
Chief Complaint   Patient presents with    Leg Pain     Right leg. sxs started yesterday has gotten progressively worse. Pt states that she fell last week    Exposure to STD     She tested + for gonorrhea. Needs to come in for a rocephin injection (250 mg) IM per Dr. James Quintana       Pt preferred language for health care discussion is english. Is someone accompanying this pt? no    Is the patient using any DME equipment during OV? no    Depression Screening completed. Active Dx    Learning Assessment completed. Yes    Abuse Screening completed. Yes    Health Maintenance reviewed and discussed per provider. Yes, up to date    Advance Directive:  1. Do you have an advance directive in place? Patient Reply:no    2. If not, would you like material regarding how to put one in place? Patient Reply: No    Coordination of Care:  1. Have you been to the ER, urgent care clinic since your last visit? Hospitalized since your last visit? no    2. Have you seen or consulted any other health care providers outside of the [address] [place] [address] since your last visit? Include any pap smears or colon screening.  no

## 2017-03-09 ENCOUNTER — TELEPHONE (OUTPATIENT)
Dept: INTERNAL MEDICINE CLINIC | Age: 62
End: 2017-03-09

## 2017-03-09 NOTE — TELEPHONE ENCOUNTER
Spoke with patient, confirmed name and . Advised patient of xray results, per Dr. Hayden Mclean. Patient verbalized understanding.      Be advised

## 2017-03-09 NOTE — TELEPHONE ENCOUNTER
----- Message from Lorie Owens MD sent at 3/8/2017 10:57 PM EST -----  Advise her that the bones of her knee looked good on the Xray. Will wait for the MRI.

## 2017-03-10 ENCOUNTER — TELEPHONE (OUTPATIENT)
Dept: INTERNAL MEDICINE CLINIC | Age: 62
End: 2017-03-10

## 2017-03-24 ENCOUNTER — HOSPITAL ENCOUNTER (OUTPATIENT)
Dept: MRI IMAGING | Age: 62
Discharge: HOME | End: 2017-03-24
Attending: INTERNAL MEDICINE
Payer: MEDICARE
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
## 2017-03-24 DIAGNOSIS — M25.561 ACUTE PAIN OF RIGHT KNEE: ICD-10-CM

## 2017-03-24 PROCEDURE — 73721 MRI JNT OF LWR EXTRE W/O DYE: CPT

## 2017-03-25 DIAGNOSIS — S83.209A TORN MENISCUS: Primary | ICD-10-CM

## 2017-03-27 ENCOUNTER — TELEPHONE (OUTPATIENT)
Dept: INTERNAL MEDICINE CLINIC | Age: 62
End: 2017-03-27

## 2017-03-27 NOTE — TELEPHONE ENCOUNTER
2 pt. Identifiers confirmed. Pt. Notified of below. She was told she will receive a call to schedule an aptmt when the ortho referral is placed.  No other questions/concerns at this time

## 2017-03-27 NOTE — TELEPHONE ENCOUNTER
----- Message from Allen Morrow MD sent at 3/25/2017  1:13 PM EDT -----  Please advise her that she does have a torn meniscus in her knee. I want to refer her to ortho.

## 2017-05-08 ENCOUNTER — TELEPHONE (OUTPATIENT)
Dept: INTERNAL MEDICINE CLINIC | Age: 62
End: 2017-05-08

## 2017-05-08 NOTE — TELEPHONE ENCOUNTER
Patient called and stated that she is in the hospital with TIA room 3 [address] [place] [address] was admitted last night

## 2017-05-09 ENCOUNTER — PATIENT OUTREACH (OUTPATIENT)
Dept: INTERNAL MEDICINE CLINIC | Age: 62
End: 2017-05-09

## 2017-05-09 NOTE — PROGRESS NOTES
Transitional Care Nurse Navigator Note:  Hospital Follow Up   Current admission [address] [facility] 5/7/17    Per EMR due to:   syncope     NN outgoing call to patient. ID verified. Pt states she is still admitted to the hospital. She is going to the [facility] and they didn't have a bed for her. She will see what the plan is for tomorrow. She received a walker today and will schedule an appt with Dr Randal Vaughn. Some of her meds were changed. She will take the bus to appts or have a friend bring her. She states she needs a MRI on her knee. She will call us back when she gets situated to schedule an appt. Will follow up with pt once discharged.

## 2017-05-11 ENCOUNTER — PATIENT OUTREACH (OUTPATIENT)
Dept: INTERNAL MEDICINE CLINIC | Age: 62
End: 2017-05-11

## 2017-05-11 NOTE — PROGRESS NOTES
Transitional Care Nurse Navigator Note:  Hospital Follow Up for Admission from [facility] 5/7/17 - 5/9/17      RRAT score: [facility]/high risk    Per EMR due to:   Syncope  RLE weakness    Discharge Plan  Cardiac Diet   Follow up with Brain Henderson MD within 7 days.   [address]: Physical Therapy only. Pt was going home to the [facility]. DME: walker with wheels      NN outgoing call to patient. Not able to reach pt. Left message on voice mail for return call.

## 2017-05-11 NOTE — TELEPHONE ENCOUNTER
Spoke with patient, confirmed name and . Pt states that she is currently in a shelter. I asked what she was returning a call about, pt unable to verbalized. I transferred pt to the front dest to schedule her appointment.        Be advised

## 2017-05-12 ENCOUNTER — PATIENT OUTREACH (OUTPATIENT)
Dept: INTERNAL MEDICINE CLINIC | Age: 62
End: 2017-05-12

## 2017-05-12 NOTE — PROGRESS NOTES
Transitional Care Nurse Navigator Note:  Hospital Follow Up for Admission from [address] [place] [address] 5/7/17 - 5/9/17  Per EMR due to:       NN outgoing call to patient. ID verified. Medications were reviewed. Pt states she needs meds but is not sure what needs new rx and refills. She will bring in medication bottles and written rx's she has. Also the wants to know what Dr Cheryl Parker wants her to take. She states she does not have percocet, xanax, ambien. She returned to [facility]. She is not able to lay down during the day and she has headache. She has taken motrin. Pt will take the bus or uber to her appts. She will also have visits from [address] [place] [address] for PT. She has a walker. Pt education done for syncope, changes in mentation, headache with no improvement to seek medical attention. Pt verbalized understanding and thanked us for the call. Pt follow up appt scheduled for 5/15/17.

## 2017-05-15 ENCOUNTER — OFFICE VISIT (OUTPATIENT)
Dept: INTERNAL MEDICINE CLINIC | Age: 62
End: 2017-05-15

## 2017-05-15 VITALS
BODY MASS INDEX: 34.87 KG/M2 | SYSTOLIC BLOOD PRESSURE: 122 MMHG | RESPIRATION RATE: 20 BRPM | DIASTOLIC BLOOD PRESSURE: 81 MMHG | HEIGHT: 66 IN | HEART RATE: 91 BPM | WEIGHT: 217 LBS | OXYGEN SATURATION: 97 % | TEMPERATURE: 98.2 F

## 2017-05-15 DIAGNOSIS — G43.909 MIGRAINE WITHOUT STATUS MIGRAINOSUS, NOT INTRACTABLE, UNSPECIFIED MIGRAINE TYPE: ICD-10-CM

## 2017-05-15 DIAGNOSIS — Z76.0 MEDICATION REFILL: ICD-10-CM

## 2017-05-15 DIAGNOSIS — R55 SYNCOPE, UNSPECIFIED SYNCOPE TYPE: Primary | ICD-10-CM

## 2017-05-15 RX ORDER — TIZANIDINE HYDROCHLORIDE 4 MG/1
4 CAPSULE, GELATIN COATED ORAL
Status: CANCELLED | OUTPATIENT
Start: 2017-05-15

## 2017-05-15 RX ORDER — ZOLMITRIPTAN 5 MG/1
SPRAY NASAL
COMMUNITY
End: 2017-05-15 | Stop reason: SDUPTHER

## 2017-05-15 RX ORDER — ALPRAZOLAM 0.5 MG/1
1.5 TABLET ORAL
Qty: 270 TAB | Refills: 1 | Status: CANCELLED | OUTPATIENT
Start: 2017-05-15

## 2017-05-15 RX ORDER — ZOLPIDEM TARTRATE 12.5 MG/1
12.5 TABLET, FILM COATED, EXTENDED RELEASE ORAL
Qty: 90 TAB | Refills: 1 | Status: CANCELLED | OUTPATIENT
Start: 2017-05-15

## 2017-05-15 RX ORDER — FROVATRIPTAN SUCCINATE 2.5 MG/1
2.5 TABLET, FILM COATED ORAL
COMMUNITY
End: 2018-03-15

## 2017-05-15 RX ORDER — ZOLMITRIPTAN 5 MG/1
1 SPRAY NASAL
Qty: 1 CONTAINER | Refills: 2 | Status: SHIPPED | OUTPATIENT
Start: 2017-05-15 | End: 2018-03-15

## 2017-05-15 NOTE — PATIENT INSTRUCTIONS
Learning About Polypharmacy  What is polypharmacy? Polypharmacy means taking many medicines. Older adults or people with long-term (chronic) diseases often need to do this. People may take many medicines to manage health problems and improve their health. But problems can happen when you take a lot of medicines:  · One medicine may cause problems if you take it with another medicine. A medicine also can cause problems if you have certain health problems. Or it can react to certain things you eat or drink. These are all called medicine interactions. · Side effects are more likely. · You may find it hard to keep track of and take the medicines as you should. Who is likely to have problems? Older adults and people with long-term health problems often take many types of medicines. They also may take herbs, vitamins, or supplements. The more medicines you take, the more likely problems are. The bodies of older adults may be more sensitive to medicines. This can also cause problems. What is a medicine interaction? The biggest danger when you take many medicines is an interaction. · A medicine may make another medicine stronger or weaker. For instance, a blood thinner makes bleeding more likely. If you also take aspirin, you make bleeding even more likely. And that could be harmful. · A medicine may cause side effects that create problems with other medicines. For instance, a medicine you take to control your urine may affect one you take for dementia. · A medicine you take for one health problem can make another health problem worse. For instance, a medicine you use for a cold could make high blood pressure worse. · A medicine or interaction can make some common problems worse. This happens most often in older adults. Medicines can make you fall or feel dizzy or confused. You may have trouble sleeping or feel depressed. Interactions can happen with:  · Prescription medicines.   · Over-the-counter (OTC) medicines. · Vitamin and mineral supplements. · Herbal remedies. · Foods and drinks. What can you do to make problems less likely? · Make a list of all the prescription and over-the-counter medicines you take. Include everything you take, even herbs and other products. Take the list to each doctor or hospital visit. Ask your doctor if there are any things on your list that you do not need. Find out if there are things you should not take. Anytime you see a doctor, show him or her your list of medicines. · Give your pharmacist your list of medicines before you  any new ones. Ask about interactions with any other medicines you take. If you go to more than one drugstore, make sure each of them has your list.  · Ask your doctor or pharmacist to run your list through a drug interaction . This is a computer database. It checks for medicines that can cause problems with others. If you find a problem, talk to your doctor. · Do not start taking any new medicines, herbs, vitamins, homeopathic remedies, supplements, or over-the-counter medicines without talking to your doctor first.  · Know your medicines by name. Understand what they do. Know how to take each one. Read all the information sheets that come with your medicines. You can use the Internet or find books that list the side effects and interactions of medicines. · Ask your doctor what side effects to expect. Learn what to do if you have one. Keep track of any symptoms or changes in how you feel. · Take your medicines exactly as prescribed. Call your doctor if you think you are having a problem with your medicine. Where can you learn more? Go to http://carly-jose.info/. Enter V645 in the search box to learn more about \"Learning About Polypharmacy. \"  Current as of: August 14, 2016  Content Version: 11.2  © 7694-2203 [employer].  Care instructions adapted under license by [employer] (which disclaims liability or warranty for this information). If you have questions about a medical condition or this instruction, always ask your healthcare professional. [place] [address] any warranty or liability for your use of this information.

## 2017-05-15 NOTE — MR AVS SNAPSHOT
Visit Information Date & Time Provider Department Dept. Phone Encounter #  
 5/15/2017  2:15 PM David Sands, [phone]  Follow-up Instructions Return in about 4 weeks (around 6/12/2017), or if symptoms worsen or fail to improve. Upcoming Health Maintenance Date Due DTaP/Tdap/Td series (1 - Tdap) 12/2/2017* ZOSTER VACCINE AGE 60> 11/2/2018* INFLUENZA AGE 9 TO ADULT 8/1/2017 FOBT Q 1 YEAR AGE 50-75 12/8/2017 BREAST CANCER SCRN MAMMOGRAM 12/15/2018 *Topic was postponed. The date shown is not the original due date. Allergies as of 5/15/2017  Review Complete On: 5/15/2017 By: David Sands MD  
  
 Severity Noted Reaction Type Reactions Gluten  07/22/2016    Other (comments) GI upset Morphine  03/07/2016    Rash Stadol [Butorphanol Tartrate]  03/07/2016    Other (comments) Made her have palpitations Ultram [Tramadol]  03/07/2016    Other (comments) Made her have palpitations Carisoprodol Low 08/07/2013    Other (comments) Constipaton Current Immunizations  Reviewed on 7/22/2016 Name Date Influenza Vaccine (Quad) PF 12/2/2016 Pneumococcal Polysaccharide (PPSV-23) 2/9/2013 Not reviewed this visit You Were Diagnosed With   
  
 Codes Comments Syncope, unspecified syncope type    -  Primary ICD-10-CM: R55 
ICD-9-CM: 780.2 Migraine without status migrainosus, not intractable, unspecified migraine type     ICD-10-CM: G43.909 ICD-9-CM: 346.90 Medication refill     ICD-10-CM: Z76.0 ICD-9-CM: V68.1 Vitals BP Pulse Temp Resp Height(growth percentile) Weight(growth percentile) 122/81 (BP 1 Location: Left arm, BP Patient Position: Sitting) 91 98.2 °F (36.8 °C) (Oral) 20 5' 6\" (1.676 m) 217 lb (98.4 kg) SpO2 BMI OB Status Smoking Status 97% 35.02 kg/m2 Hysterectomy Never Smoker Vitals History BMI and BSA Data Body Mass Index Body Surface Area 35.02 kg/m 2 2.14 m 2 Preferred Pharmacy Pharmacy Name Phone [facility], [address] [place] [address] [phone] Your Updated Medication List  
  
   
This list is accurate as of: 5/15/17  3:21 PM.  Always use your most recent med list.  
  
  
  
  
 albuterol 90 mcg/actuation inhaler Commonly known as:  PROVENTIL HFA, VENTOLIN HFA, PROAIR HFA Take  by inhalation. aspirin delayed-release 81 mg tablet Take 1 Tab by mouth daily. Cetirizine 10 mg Cap Take 1 Can by mouth daily. Indications: SEASONAL ALLERGIC RHINITIS  
  
 FROVA 2.5 mg tablet Generic drug:  frovatriptan Take 2.5 mg by mouth once as needed for Migraine. furosemide 40 mg tablet Commonly known as:  LASIX Take 1 Tab by mouth daily. Indications: EDEMA, PERIPHERAL EDEMA DUE TO CHRONIC HEART FAILURE, as needed only for edema  
  
 gabapentin 600 mg tablet Commonly known as:  NEURONTIN Take 1 Tab by mouth nightly. Indications: NEUROPATHIC PAIN  
  
 hyoscyamine SR 0.375 mg SR tablet Commonly known as:  LEVBID Take 1 Tab by mouth every twelve (12) hours as needed for Cramping. Indications: Irritable Bowel Syndrome  
  
 ibuprofen 800 mg tablet Commonly known as:  MOTRIN Take 1 Tab by mouth every eight (8) hours as needed for Pain. Indications: PAIN  
  
 mometasone 220 mcg (120 doses) Aepb inhaler Commonly known as:  Margart Major Take 1 Puff by inhalation two (2) times a day. montelukast 10 mg tablet Commonly known as:  SINGULAIR Take 1 Tab by mouth daily. Indications: MAINTENANCE THERAPY FOR ASTHMA  
  
 omeprazole 40 mg capsule Commonly known as:  PRILOSEC Take 1 Cap by mouth daily. ONE-A-DAY WOMENS FORMULA PO Take 2 Tabs by Mouth Once a Day. potassium chloride 20 mEq tablet Commonly known as:  K-OMEGA CAMPOS-CON  
 Take 1 Tab by mouth two (2) times a day. Indications: HYPOKALEMIA QUEtiapine 50 mg tablet Commonly known as:  SEROquel Take 1 Tab by mouth daily. Indications: DEPRESSION TREATMENT ADJUNCT  
  
 rOPINIRole 3 mg Tab tab Commonly known as:  Belita Leer Take 1 Tab by mouth two (2) times a day. Indications: Restless Legs Syndrome  
  
 tiZANidine 4 mg capsule Commonly known as:  Gerarda Fahad Take 4 mg by mouth three (3) times daily as needed. venlafaxine-SR 75 mg capsule Commonly known as:  EFFEXOR-XR Take 3 capsules by mouth daily  Indications: ANXIETY WITH DEPRESSION  
  
 ZOLMitriptan 5 mg nasal solution Commonly known as:  ZOMIG  
1 Spray by Nasal route once as needed for Migraine. Prescriptions Sent to Pharmacy Refills ZOLMitriptan (ZOMIG) 5 mg nasal solution 2 Si Spray by Nasal route once as needed for Migraine. Class: Normal  
 Pharmacy: [address] [place] [address] #: [phone] Route: Nasal  
  
Follow-up Instructions Return in about 4 weeks (around 2017), or if symptoms worsen or fail to improve. Patient Instructions Learning About Polypharmacy What is polypharmacy? Polypharmacy means taking many medicines. Older adults or people with long-term (chronic) diseases often need to do this. People may take many medicines to manage health problems and improve their health. But problems can happen when you take a lot of medicines: · One medicine may cause problems if you take it with another medicine. A medicine also can cause problems if you have certain health problems. Or it can react to certain things you eat or drink. These are all called medicine interactions. · Side effects are more likely. · You may find it hard to keep track of and take the medicines as you should. Who is likely to have problems? Older adults and people with long-term health problems often take many types of medicines. They also may take herbs, vitamins, or supplements. The more medicines you take, the more likely problems are. The bodies of older adults may be more sensitive to medicines. This can also cause problems. What is a medicine interaction? The biggest danger when you take many medicines is an interaction. · A medicine may make another medicine stronger or weaker. For instance, a blood thinner makes bleeding more likely. If you also take aspirin, you make bleeding even more likely. And that could be harmful. · A medicine may cause side effects that create problems with other medicines. For instance, a medicine you take to control your urine may affect one you take for dementia. · A medicine you take for one health problem can make another health problem worse. For instance, a medicine you use for a cold could make high blood pressure worse. · A medicine or interaction can make some common problems worse. This happens most often in older adults. Medicines can make you fall or feel dizzy or confused. You may have trouble sleeping or feel depressed. Interactions can happen with: 
· Prescription medicines. · Over-the-counter (OTC) medicines. · Vitamin and mineral supplements. · Herbal remedies. · Foods and drinks. What can you do to make problems less likely? · Make a list of all the prescription and over-the-counter medicines you take. Include everything you take, even herbs and other products. Take the list to each doctor or hospital visit. Ask your doctor if there are any things on your list that you do not need. Find out if there are things you should not take. Anytime you see a doctor, show him or her your list of medicines. · Give your pharmacist your list of medicines before you  any new ones. Ask about interactions with any other medicines you take.  If you go to more than one drugstore, make sure each of them has your list. 
· Ask your doctor or pharmacist to run your list through a drug interaction . This is a computer database. It checks for medicines that can cause problems with others. If you find a problem, talk to your doctor. · Do not start taking any new medicines, herbs, vitamins, homeopathic remedies, supplements, or over-the-counter medicines without talking to your doctor first. 
· Know your medicines by name. Understand what they do. Know how to take each one. Read all the information sheets that come with your medicines. You can use the Internet or find books that list the side effects and interactions of medicines. · Ask your doctor what side effects to expect. Learn what to do if you have one. Keep track of any symptoms or changes in how you feel. · Take your medicines exactly as prescribed. Call your doctor if you think you are having a problem with your medicine. Where can you learn more? Go to http://carly-jose.info/. Enter H198 in the search box to learn more about \"Learning About Polypharmacy. \" Current as of: August 14, 2016 Content Version: 11.2 © 1278-7899 [employer]. Care instructions adapted under license by [employer] (which disclaims liability or warranty for this information). If you have questions about a medical condition or this instruction, always ask your healthcare professional. [address] any warranty or liability for your use of this information. Introducing [facility]! [facility] introduces [employer] patient portal. Now you can access parts of your medical record, email your doctor's office, and request medication refills online. 1. In your internet browser, go to [email or web address] 2. Click on the First Time User? Click Here link in the Sign In box. You will see the New Member Sign Up page. 3. Enter your [employer] Access Code exactly as it appears below. You will not need to use this code after youve completed the sign-up process. If you do not sign up before the expiration date, you must request a new code. · [employer] Access Code: [email or web address] Expires: 6/1/2017  4:59 PM 
 
4. Enter the last four digits of your Social Security Number (xxxx) and Date of Birth (mm/dd/yyyy) as indicated and click Submit. You will be taken to the next sign-up page. 5. Create a [employer] ID. This will be your [employer] login ID and cannot be changed, so think of one that is secure and easy to remember. 6. Create a [employer] password. You can change your password at any time. 7. Enter your Password Reset Question and Answer. This can be used at a later time if you forget your password. 8. Enter your e-mail address. You will receive e-mail notification when new information is available in [address]. 9. Click Sign Up. You can now view and download portions of your medical record. 10. Click the Download Summary menu link to download a portable copy of your medical information. If you have questions, please visit the Frequently Asked Questions section of the [employer] website. Remember, [employer] is NOT to be used for urgent needs. For medical emergencies, dial 911. Now available from your iPhone and Android! Please provide this summary of care documentation to your next provider. Your primary care clinician is listed as [facility]. If you have any questions after today's visit, please call [phone].
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
## 2017-05-15 NOTE — PROGRESS NOTES
Pt presents today for a Hospital F/U TIA     Pt preferred language for health care discussion is english. Is someone accompanying this pt? no    Is the patient using any DME equipment during [address]? Yes a walker    Depression Screening completed. Not due    Learning Assessment completed. Not due    Abuse Screening completed. Not due    Health Maintenance reviewed and discussed per provider. yes      Advance Directive:  1. Do you have an advance directive in place? Patient Reply: No    Coordination of Care:  1. Have you been to the ER, urgent care clinic since your last visit? Hospitalized since your last visit? Yes [address] patient states she was diagnosed with a TIA, patient states she was discharged on Wednesday 5/10/17. 2. Have you seen or consulted any other health care providers outside of the [address] [place] [address] since your last visit? Include any pap smears or colon screening.  Yes see care team

## 2017-05-22 DIAGNOSIS — Z76.0 MEDICATION REFILL: ICD-10-CM

## 2017-05-22 RX ORDER — ZOLPIDEM TARTRATE 12.5 MG/1
12.5 TABLET, FILM COATED, EXTENDED RELEASE ORAL
Qty: 90 TAB | Refills: 1 | Status: CANCELLED | OUTPATIENT
Start: 2017-05-22

## 2017-05-22 NOTE — TELEPHONE ENCOUNTER
Patient request, last OV 5/15/17, next scheduled 6/13/17    Requested Prescriptions     Pending Prescriptions Disp Refills    ALPRAZolam (XANAX) 0.5 mg tablet 270 Tab 1     Sig: Take 3 Tabs by mouth three (3) times daily as needed for Anxiety. Max Daily Amount: 4.5 mg. Indications: anxiety    zolpidem CR (AMBIEN CR) 12.5 mg tablet 90 Tab 1     Sig: Take 1 Tab by mouth nightly as needed for Sleep.  Max Daily Amount: 12.5 mg.

## 2017-05-23 RX ORDER — ALPRAZOLAM 0.5 MG/1
1.5 TABLET ORAL
Qty: 270 TAB | Refills: 1 | Status: SHIPPED | OUTPATIENT
Start: 2017-05-23 | End: 2017-09-19 | Stop reason: SDUPTHER

## 2017-05-23 NOTE — TELEPHONE ENCOUNTER
She was recently in the hospital where her Sherri Been was stopped due to medication misuse. I will not restart it.  I will restart her xanax BUT WITH CAUTION    She needs to  the xanax RX

## 2017-05-30 ENCOUNTER — OFFICE VISIT (OUTPATIENT)
Dept: INTERNAL MEDICINE CLINIC | Age: 62
End: 2017-05-30

## 2017-05-30 VITALS
TEMPERATURE: 97 F | DIASTOLIC BLOOD PRESSURE: 83 MMHG | RESPIRATION RATE: 16 BRPM | HEART RATE: 78 BPM | BODY MASS INDEX: 35.52 KG/M2 | WEIGHT: 221 LBS | OXYGEN SATURATION: 98 % | SYSTOLIC BLOOD PRESSURE: 135 MMHG | HEIGHT: 66 IN

## 2017-05-30 DIAGNOSIS — S89.91XS KNEE INJURY, RIGHT, SEQUELA: ICD-10-CM

## 2017-05-30 DIAGNOSIS — M25.561 CHRONIC PAIN OF RIGHT KNEE: Primary | ICD-10-CM
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
## 2017-05-30 DIAGNOSIS — M25.561 ACUTE PAIN OF RIGHT KNEE: ICD-10-CM

## 2017-05-30 DIAGNOSIS — G89.29 CHRONIC PAIN OF RIGHT KNEE: Primary | ICD-10-CM

## 2017-05-30 LAB
ALCOHOL UR POC: NORMAL
AMPHETAMINES UR POC: NEGATIVE
BARBITURATES UR POC: NEGATIVE
BENZODIAZEPINES UR POC: NORMAL
BUPRENORPHINE UR POC: NORMAL
CANNABINOIDS UR POC: NORMAL
CARISOPRODOL UR POC: NORMAL
COCAINE UR POC: NEGATIVE
FENTANYL UR POC: NORMAL
Lab: NEGATIVE
MDMA/ECSTASY UR POC: NEGATIVE
METHADONE UR POC: NEGATIVE
METHAMPHETAMINE UR POC: NEGATIVE
METHYLPHENIDATE UR POC: NORMAL
OPIATES UR POC: NEGATIVE
OXYCODONE UR POC: NEGATIVE
PHENCYCLIDINE UR POC: NEGATIVE
PROPOXYPHENE UR POC: NORMAL
TRAMADOL UR POC: NORMAL
TRICYCLICS UR POC: NORMAL

## 2017-05-30 RX ORDER — OXYCODONE AND ACETAMINOPHEN 10; 325 MG/1; MG/1
1 TABLET ORAL
Qty: 45 TAB | Refills: 0 | Status: SHIPPED | OUTPATIENT
Start: 2017-05-30 | End: 2017-09-19 | Stop reason: SDUPTHER

## 2017-05-30 RX ORDER — METHYLPREDNISOLONE 4 MG/1
TABLET ORAL
Qty: 1 DOSE PACK | Refills: 0 | Status: SHIPPED | OUTPATIENT
Start: 2017-05-30 | End: 2017-09-19 | Stop reason: ALTCHOICE

## 2017-05-30 NOTE — PROGRESS NOTES
ROOM # 4    Tom Hoover presents today for   Chief Complaint   Patient presents with    Knee Pain     right knee pain. s/p fall on [place] knees in March       Mai Ga preferred language for health care discussion is english/other. Is someone accompanying this pt? no    Is the patient using any DME equipment during OV? no    Depression Screening:  PHQ over the last two weeks 7/22/2016   PHQ Not Done Active Diagnosis of Depression or Bipolar Disorder       Learning Assessment:  Learning Assessment 7/22/2016   PRIMARY LEARNER Patient   HIGHEST LEVEL OF EDUCATION - PRIMARY LEARNER  SOME COLLEGE   PRIMARY LANGUAGE ENGLISH   LEARNER PREFERENCE PRIMARY DEMONSTRATION   ANSWERED BY TINO Ga   RELATIONSHIP SELF       Abuse Screening:  Abuse Screening Questionnaire 7/22/2016   Do you ever feel afraid of your partner? N   Are you in a relationship with someone who physically or mentally threatens you? N   Is it safe for you to go home? Y       Fall Risk  No flowsheet data found. Health Maintenance reviewed and discussed per provider. Yes      Advance Directive:  1. Do you have an advance directive in place? Patient Reply: no    2. If not, would you like material regarding how to put one in place? Patient Reply: no    Coordination of Care:  1. Have you been to the ER, urgent care clinic since your last visit? Hospitalized since your last visit? Martín Roman    2. Have you seen or consulted any other health care providers outside of the [facility] since your last visit? Include any pap smears or colon screening.  no

## 2017-05-30 NOTE — MR AVS SNAPSHOT
Visit Information Date & Time Provider Department Dept. Phone Encounter #  
 5/30/2017  2:45 PM Kofi Vaz [address] [phone] [id number] Follow-up Instructions Return if symptoms worsen or fail to improve, for or as appointed. Your Appointments 5/30/2017  2:45 PM  
Office Visit with MD ANNIA Vaz [facility]) Appt Note: c/o pain R knee [address] [id number]  
[address] [facility]  
  
    
 6/13/2017  5:15 PM  
Office Visit with MD Kofi Vaz [address] [place] [address]) Appt Note: FU AND MED REFILLS  
 [address] [id number]  
[phone] Upcoming Health Maintenance Date Due DTaP/Tdap/Td series (1 - Tdap) 12/2/2017* ZOSTER VACCINE AGE 60> 11/2/2018* INFLUENZA AGE 9 TO ADULT 8/1/2017 FOBT Q 1 YEAR AGE 50-75 12/8/2017 BREAST CANCER SCRN MAMMOGRAM 12/15/2018 *Topic was postponed. The date shown is not the original due date. Allergies as of 5/30/2017  Review Complete On: 5/30/2017 By: Amber Turner MD  
  
 Severity Noted Reaction Type Reactions Gluten  07/22/2016    Other (comments) GI upset Morphine  03/07/2016    Rash Stadol [Butorphanol Tartrate]  03/07/2016    Other (comments) Made her have palpitations Ultram [Tramadol]  03/07/2016    Other (comments) Made her have palpitations Carisoprodol Low 08/07/2013    Other (comments) Constipaton Current Immunizations  Reviewed on 7/22/2016 Name Date Influenza Vaccine 3/18/2016 12:00 AM, 10/8/2010 12:00 AM, 9/28/2009 12:00 AM, 12/5/2008 12:00 AM  
 Influenza Vaccine (Quad) 10/28/2011 12:00 AM  
 Influenza Vaccine (Quad) PF 12/2/2016  Novel Influenza-H1N1-09, All Formulations 1/18/2010 12:00 AM  
 Pneumococcal Polysaccharide (PPSV-23) 2/9/2013 12:00 AM  
  
 Not reviewed this visit You Were Diagnosed With   
  
 Codes Comments Chronic pain of right knee    -  Primary ICD-10-CM: M25.561, N82.90 ICD-9-CM: 719.46, 338.29 Knee injury, right, sequela     ICD-10-CM: S89.91XS 
ICD-9-CM: 659. 9 Acute pain of right knee     ICD-10-CM: M25.561 ICD-9-CM: 719.46 Fall, initial encounter     ICD-10-CM: W19. Ruben Buitrago ICD-9-CM: E888.9 Vitals BP Pulse Temp Resp Height(growth percentile) Weight(growth percentile) 135/83 (BP 1 Location: Left arm, BP Patient Position: Sitting) 78 97 °F (36.1 °C) (Oral) 16 5' 6\" (1.676 m) 221 lb (100.2 kg) SpO2 BMI OB Status Smoking Status 98% 35.67 kg/m2 Hysterectomy Never Smoker Vitals History BMI and BSA Data Body Mass Index Body Surface Area  
 35.67 kg/m 2 2.16 m 2 Preferred Pharmacy Pharmacy Name Phone [phone] [facility], [address] [place] [address] [phone] Your Updated Medication List  
  
   
This list is accurate as of: 5/30/17  2:29 PM.  Always use your most recent med list.  
  
  
  
  
 albuterol 90 mcg/actuation inhaler Commonly known as:  PROVENTIL HFA, VENTOLIN HFA, PROAIR HFA Take  by inhalation. ALPRAZolam 0.5 mg tablet Commonly known as:  Lilian Jackson Take 3 Tabs by mouth three (3) times daily as needed for Anxiety. Max Daily Amount: 4.5 mg. Indications: anxiety  
  
 aspirin delayed-release 81 mg tablet Take 1 Tab by mouth daily. Cetirizine 10 mg Cap Take 1 Can by mouth daily. Indications: SEASONAL ALLERGIC RHINITIS  
  
 FROVA 2.5 mg tablet Generic drug:  frovatriptan Take 2.5 mg by mouth once as needed for Migraine. furosemide 40 mg tablet Commonly known as:  LASIX Take 1 Tab by mouth daily. Indications: EDEMA, PERIPHERAL EDEMA DUE TO CHRONIC HEART FAILURE, as needed only for edema  
  
 gabapentin 600 mg tablet Commonly known as:  NEURONTIN Take 1 Tab by mouth nightly. Indications: NEUROPATHIC PAIN  
  
 hyoscyamine SR 0.375 mg SR tablet Commonly known as:  LEVBID Take 1 Tab by mouth every twelve (12) hours as needed for Cramping. Indications: Irritable Bowel Syndrome  
  
 ibuprofen 800 mg tablet Commonly known as:  MOTRIN Take 1 Tab by mouth every eight (8) hours as needed for Pain. Indications: PAIN  
  
 methylPREDNISolone 4 mg tablet Commonly known as:  Jose L Hint Take as directed on the package  
  
 mometasone 220 mcg (120 doses) Aepb inhaler Commonly known as:  Levi Hoyles Take 1 Puff by inhalation two (2) times a day. montelukast 10 mg tablet Commonly known as:  SINGULAIR Take 1 Tab by mouth daily. Indications: MAINTENANCE THERAPY FOR ASTHMA  
  
 omeprazole 40 mg capsule Commonly known as:  PRILOSEC Take 1 Cap by mouth daily. ONE-A-DAY WOMENS FORMULA PO Take 2 Tabs by Mouth Once a Day. oxyCODONE-acetaminophen  mg per tablet Commonly known as:  PERCOCET 10 Take 1 Tab by mouth every four (4) hours as needed for Pain. Max Daily Amount: 6 Tabs. potassium chloride 20 mEq tablet Commonly known as:  K-DUR, KLOR-CON Take 1 Tab by mouth two (2) times a day. Indications: HYPOKALEMIA QUEtiapine 50 mg tablet Commonly known as:  SEROquel Take 1 Tab by mouth daily. Indications: DEPRESSION TREATMENT ADJUNCT  
  
 rOPINIRole 3 mg Tab tab Commonly known as:  Rexford Corns Take 1 Tab by mouth two (2) times a day. Indications: Restless Legs Syndrome  
  
 tiZANidine 4 mg capsule Commonly known as:  Laretta Bolk Take 4 mg by mouth three (3) times daily as needed. venlafaxine-SR 75 mg capsule Commonly known as:  EFFEXOR-XR Take 3 capsules by mouth daily  Indications: ANXIETY WITH DEPRESSION  
  
 ZOLMitriptan 5 mg nasal solution Commonly known as:  ZOMIG  
1 Spray by Nasal route once as needed for Migraine. Prescriptions Printed Refills  
 methylPREDNISolone (MEDROL DOSEPACK) 4 mg tablet 0 Sig: Take as directed on the package Class: Print  
 oxyCODONE-acetaminophen (PERCOCET 10)  mg per tablet 0 Sig: Take 1 Tab by mouth every four (4) hours as needed for Pain. Max Daily Amount: 6 Tabs. Class: Print Route: Oral  
  
We Performed the Following AMB POC DRUG SCREEN () [ HCP] Follow-up Instructions Return if symptoms worsen or fail to improve, for or as appointed. Introducing [facility]! [facility] introduces SnapMD patient portal. Now you can access parts of your medical record, email your doctor's office, and request medication refills online. 1. In your internet browser, go to [email or web address] 2. Click on the First Time User? Click Here link in the Sign In box. You will see the New Member Sign Up page. 3. Enter your SnapMD Access Code exactly as it appears below. You will not need to use this code after youve completed the sign-up process. If you do not sign up before the expiration date, you must request a new code. · SnapMD Access Code: [email or web address] Expires: 6/1/2017  4:59 PM 
 
4. Enter the last four digits of your Social Security Number (xxxx) and Date of Birth (mm/dd/yyyy) as indicated and click Submit. You will be taken to the next sign-up page. 5. Create a SnapMD ID. This will be your SnapMD login ID and cannot be changed, so think of one that is secure and easy to remember. 6. Create a SnapMD password. You can change your password at any time. 7. Enter your Password Reset Question and Answer. This can be used at a later time if you forget your password. 8. Enter your e-mail address. You will receive e-mail notification when new information is available in [address] 9. Click Sign Up. You can now view and download portions of your medical record. 10. Click the Download Summary menu link to download a portable copy of your medical information. If you have questions, please visit the Frequently Asked Questions section of the [employer] website. Remember, [employer] is NOT to be used for urgent needs. For medical emergencies, dial 911. Now available from your iPhone and Android! Please provide this summary of care documentation to your next provider. Your primary care clinician is listed as [facility]. If you have any questions after today's visit, please call [phone].

## 2017-05-30 NOTE — PROGRESS NOTES
HISTORY OF PRESENT ILLNESS  Araceli Mix is a 59 y.o. female. Visit Vitals    /83 (BP 1 Location: Left arm, BP Patient Position: Sitting)    Pulse 78    Temp 97 °F (36.1 °C) (Oral)    Resp 16    Ht 5' 6\" (1.676 m)    Wt 221 lb (100.2 kg)    SpO2 98%    BMI 35.67 kg/m2       HPI Comments: She has been in homeless shelter. But this all happened back in February and had a plan set up. But she has not yet seen ortho (if she has been in a shelter, they may not have been able to find her)  She ended up at [facility] after slipping and re-injuring her knee. They took Xrays and told her it was OK. But we know she has a torn meniscus    Today she was recently in the hospital with syncope. They told her it was due to too much medication. She denies this. See CareEverywhere. She was in [place] when this happened. She had several tests done inc EKG and nothing was found. But their notes are not clear as to what they actually stopped. But it appears they stopped her seroquel and ambien    Knee Pain   The history is provided by the patient (right ). This is a recurrent problem. The current episode started more than 1 week ago. The problem occurs daily. Review of Systems   Constitutional: Negative for chills and fever. Musculoskeletal: Positive for joint pain. Right knee pain         Physical Exam   Constitutional: She is oriented to person, place, and time. She appears well-developed and well-nourished. No distress. Musculoskeletal:   Right knee pain on motion. No redness or swelling. .    Neurological: She is alert and oriented to person, place, and time. Skin: Skin is warm and dry. She is not diaphoretic. Nursing note and vitals reviewed. ASSESSMENT and PLAN    ICD-10-CM ICD-9-CM    1. Chronic pain of right knee M25.561 719.46 AMB POC DRUG SCREEN ()    G89.29 338.29 oxyCODONE-acetaminophen (PERCOCET 10)  mg per tablet   2. Knee injury, right, sequela S89. 91XS 908.9 AMB POC DRUG SCREEN ()   3. Acute pain of right knee M25.561 719.46    4. Fall, initial encounter [address] eDlilah  F094.2       OK. Last controlled substance filled was 30 percocet on 3/9/17. UDS negative    Pt advised she must f/u with a pain specialist and must f/u with ortho re her knee. She has been seen at [facility] but they dismissed her for not having meds in her system. She knows about Dr. Ruthie Roche, but can not afford his up front fees.     F/u here as appt in June for recheck

## 2017-06-29 DIAGNOSIS — J45.41 MODERATE PERSISTENT ASTHMA WITH ACUTE EXACERBATION: ICD-10-CM

## 2017-06-29 DIAGNOSIS — J30.2 SEASONAL ALLERGIC RHINITIS, UNSPECIFIED ALLERGIC RHINITIS TRIGGER: ICD-10-CM

## 2017-06-29 RX ORDER — MONTELUKAST SODIUM 10 MG/1
TABLET ORAL
Qty: 90 TAB | Refills: 0 | Status: SHIPPED | OUTPATIENT
Start: 2017-06-29 | End: 2019-01-05

## 2017-07-11 ENCOUNTER — PATIENT OUTREACH (OUTPATIENT)
Dept: INTERNAL MEDICINE CLINIC | Age: 62
End: 2017-07-11

## 2017-07-11 NOTE — PROGRESS NOTES
Transitional Care Nurse Navigator Note:  Hospital Follow Up for Admission from [facility] 7/5/17 - 7/7/17    Per EMR due to: Altered mental status    NN outgoing call to patient. ID verified. Pt states she is very stressed out. She is at the [facility] at this time. But she is homeless and [place]. She was overlooked for a room. Pt offered resources for shelters. She states she has already tried and their full. Pt states her memory still fades some, and her speech is better. At this time speech sounds okay, she sounds tired. She is complaining of ear pain. Pt offered and appt, she really only wants to only see Dr Perla Wade.   appt scheduled for 7/27/17 with Dr Perla Wade.     Pt encouraged to call the office for concerns or help with resources. She verbalized understanding and thanked us for the call.

## 2017-09-07 NOTE — TELEPHONE ENCOUNTER
Requested Prescriptions     Pending Prescriptions Disp Refills    Cetirizine 10 mg cap 90 Cap 5     Sig: Take 1 Can by mouth daily.  Indications: SEASONAL ALLERGIC RHINITIS

## 2017-09-19 ENCOUNTER — HOSPITAL ENCOUNTER (OUTPATIENT)
Dept: LAB | Age: 62
Discharge: HOME | End: 2017-09-19
Payer: MEDICARE

## 2017-09-19 ENCOUNTER — OFFICE VISIT (OUTPATIENT)
Dept: INTERNAL MEDICINE CLINIC | Age: 62
End: 2017-09-19

## 2017-09-19 VITALS
OXYGEN SATURATION: 97 % | DIASTOLIC BLOOD PRESSURE: 87 MMHG | HEART RATE: 74 BPM | HEIGHT: 66 IN | TEMPERATURE: 97.9 F | BODY MASS INDEX: 34.07 KG/M2 | SYSTOLIC BLOOD PRESSURE: 146 MMHG | WEIGHT: 212 LBS | RESPIRATION RATE: 18 BRPM

## 2017-09-19 DIAGNOSIS — Z87.898 H/O: PITUITARY TUMOR: ICD-10-CM

## 2017-09-19 DIAGNOSIS — Z76.0 MEDICATION REFILL: ICD-10-CM

## 2017-09-19 DIAGNOSIS — Z23 ENCOUNTER FOR IMMUNIZATION: ICD-10-CM

## 2017-09-19 DIAGNOSIS — M25.561 CHRONIC PAIN OF RIGHT KNEE: ICD-10-CM

## 2017-09-19 DIAGNOSIS — Z79.891 LONG TERM (CURRENT) USE OF OPIATE ANALGESIC: ICD-10-CM

## 2017-09-19 DIAGNOSIS — R41.3 MEMORY IMPAIRMENT: Chronic | ICD-10-CM

## 2017-09-19 DIAGNOSIS — E78.5 DYSLIPIDEMIA: Chronic | ICD-10-CM

## 2017-09-19 DIAGNOSIS — G89.29 CHRONIC PAIN OF RIGHT KNEE: ICD-10-CM

## 2017-09-19 DIAGNOSIS — G25.81 RESTLESS LEGS SYNDROME (RLS): Chronic | ICD-10-CM

## 2017-09-19 DIAGNOSIS — G89.4 CHRONIC PAIN SYNDROME: Chronic | ICD-10-CM

## 2017-09-19 DIAGNOSIS — G89.4 CHRONIC PAIN SYNDROME: Primary | Chronic | ICD-10-CM

## 2017-09-19 LAB
ALBUMIN SERPL-MCNC: 3.6 G/DL (ref 3.4–5)
ALBUMIN/GLOB SERPL: 1 {RATIO} (ref 0.8–1.7)
ALP SERPL-CCNC: 102 U/L (ref 45–117)
ALT SERPL-CCNC: 19 U/L (ref 13–56)
ANION GAP SERPL CALC-SCNC: 7 MMOL/L (ref 3–18)
AST SERPL-CCNC: 11 U/L (ref 15–37)
BASOPHILS # BLD: 0 K/UL (ref 0–0.06)
BASOPHILS NFR BLD: 0 % (ref 0–2)
BILIRUB SERPL-MCNC: 0.3 MG/DL (ref 0.2–1)
BUN SERPL-MCNC: 12 MG/DL (ref 7–18)
BUN/CREAT SERPL: 14 (ref 12–20)
CALCIUM SERPL-MCNC: 8.6 MG/DL (ref 8.5–10.1)
CHLORIDE SERPL-SCNC: 106 MMOL/L (ref 100–108)
CHOLEST SERPL-MCNC: 196 MG/DL
CO2 SERPL-SCNC: 29 MMOL/L (ref 21–32)
CREAT SERPL-MCNC: 0.88 MG/DL (ref 0.6–1.3)
DIFFERENTIAL METHOD BLD: ABNORMAL
EOSINOPHIL # BLD: 0.2 K/UL (ref 0–0.4)
EOSINOPHIL NFR BLD: 2 % (ref 0–5)
ERYTHROCYTE [DISTWIDTH] IN BLOOD BY AUTOMATED COUNT: 14.6 % (ref 11.6–14.5)
FOLATE SERPL-MCNC: 17.3 NG/ML (ref 3.1–17.5)
GLOBULIN SER CALC-MCNC: 3.6 G/DL (ref 2–4)
GLUCOSE SERPL-MCNC: 84 MG/DL (ref 74–99)
HCT VFR BLD AUTO: 39.5 % (ref 35–45)
HDLC SERPL-MCNC: 68 MG/DL (ref 40–60)
HDLC SERPL: 2.9 {RATIO} (ref 0–5)
HGB BLD-MCNC: 12.6 G/DL (ref 12–16)
LDLC SERPL CALC-MCNC: 111.6 MG/DL (ref 0–100)
LIPID PROFILE,FLP: ABNORMAL
LYMPHOCYTES # BLD: 2.8 K/UL (ref 0.9–3.6)
LYMPHOCYTES NFR BLD: 29 % (ref 21–52)
MCH RBC QN AUTO: 31.8 PG (ref 24–34)
MCHC RBC AUTO-ENTMCNC: 31.9 G/DL (ref 31–37)
MCV RBC AUTO: 99.7 FL (ref 74–97)
MONOCYTES # BLD: 0.7 K/UL (ref 0.05–1.2)
MONOCYTES NFR BLD: 8 % (ref 3–10)
NEUTS SEG # BLD: 5.8 K/UL (ref 1.8–8)
NEUTS SEG NFR BLD: 61 % (ref 40–73)
PLATELET # BLD AUTO: 334 K/UL (ref 135–420)
PMV BLD AUTO: 11 FL (ref 9.2–11.8)
POTASSIUM SERPL-SCNC: 3.7 MMOL/L (ref 3.5–5.5)
PROT SERPL-MCNC: 7.2 G/DL (ref 6.4–8.2)
RBC # BLD AUTO: 3.96 M/UL (ref 4.2–5.3)
SODIUM SERPL-SCNC: 142 MMOL/L (ref 136–145)
T4 FREE SERPL-MCNC: 0.9 NG/DL (ref 0.7–1.5)
TRIGL SERPL-MCNC: 82 MG/DL (ref ?–150)
TSH SERPL DL<=0.05 MIU/L-ACNC: 1.36 UIU/ML (ref 0.36–3.74)
VIT B12 SERPL-MCNC: 822 PG/ML (ref 211–911)
VLDLC SERPL CALC-MCNC: 16.4 MG/DL
WBC # BLD AUTO: 9.4 K/UL (ref 4.6–13.2)

## 2017-09-19 PROCEDURE — 36415 COLL VENOUS BLD VENIPUNCTURE: CPT | Performed by: INTERNAL MEDICINE

## 2017-09-19 PROCEDURE — 85025 COMPLETE CBC W/AUTO DIFF WBC: CPT | Performed by: INTERNAL MEDICINE

## 2017-09-19 PROCEDURE — 80053 COMPREHEN METABOLIC PANEL: CPT | Performed by: INTERNAL MEDICINE

## 2017-09-19 PROCEDURE — G0480 DRUG TEST DEF 1-7 CLASSES: HCPCS | Performed by: INTERNAL MEDICINE

## 2017-09-19 PROCEDURE — 80061 LIPID PANEL: CPT | Performed by: INTERNAL MEDICINE

## 2017-09-19 PROCEDURE — 84439 ASSAY OF FREE THYROXINE: CPT | Performed by: INTERNAL MEDICINE

## 2017-09-19 PROCEDURE — 82607 VITAMIN B-12: CPT | Performed by: INTERNAL MEDICINE

## 2017-09-19 PROCEDURE — 84146 ASSAY OF PROLACTIN: CPT | Performed by: INTERNAL MEDICINE

## 2017-09-19 RX ORDER — FLUTICASONE PROPIONATE 110 UG/1
2 AEROSOL, METERED RESPIRATORY (INHALATION)
COMMUNITY
Start: 2017-05-16 | End: 2017-11-09 | Stop reason: SDUPTHER

## 2017-09-19 RX ORDER — ALPRAZOLAM 0.5 MG/1
1.5 TABLET ORAL
Qty: 270 TAB | Refills: 1 | Status: SHIPPED | OUTPATIENT
Start: 2017-09-19 | End: 2018-02-23 | Stop reason: SDUPTHER

## 2017-09-19 RX ORDER — ATORVASTATIN CALCIUM 10 MG/1
10 TABLET, FILM COATED ORAL DAILY
Qty: 90 TAB | Refills: 3 | Status: SHIPPED | OUTPATIENT
Start: 2017-09-19 | End: 2018-05-15 | Stop reason: SDUPTHER

## 2017-09-19 RX ORDER — OXYCODONE AND ACETAMINOPHEN 10; 325 MG/1; MG/1
1 TABLET ORAL
Qty: 45 TAB | Refills: 0 | Status: SHIPPED | OUTPATIENT
Start: 2017-09-19 | End: 2017-11-09 | Stop reason: SDUPTHER

## 2017-09-19 RX ORDER — TIZANIDINE HYDROCHLORIDE 4 MG/1
4 CAPSULE, GELATIN COATED ORAL 2 TIMES DAILY
Qty: 180 CAP | Refills: 1 | Status: SHIPPED | OUTPATIENT
Start: 2017-09-19 | End: 2017-11-09 | Stop reason: ALTCHOICE

## 2017-09-19 RX ORDER — ATORVASTATIN CALCIUM 10 MG/1
10 TABLET, FILM COATED ORAL
COMMUNITY
Start: 2017-05-09 | End: 2017-09-19 | Stop reason: SDUPTHER

## 2017-09-19 RX ORDER — BENZONATATE 200 MG/1
200 CAPSULE ORAL
Qty: 60 CAP | Refills: 5 | Status: SHIPPED | OUTPATIENT
Start: 2017-09-19 | End: 2018-03-15

## 2017-09-19 NOTE — PROGRESS NOTES
Chief Complaint   Patient presents with    Pain (Chronic)    Medication Refill       Pt preferred language for health care discussion is English. Is someone accompanying this pt? no    Is the patient using any DME equipment during OV? no    Depression Screening:  PHQ over the last two weeks 9/19/2017 7/22/2016   PHQ Not Done - Active Diagnosis of Depression or Bipolar Disorder   Little interest or pleasure in doing things Nearly every day -   Feeling down, depressed or hopeless Nearly every day -   Total Score PHQ 2 6 -       Learning Assessment:  Learning Assessment 7/22/2016   PRIMARY LEARNER Patient   HIGHEST LEVEL OF EDUCATION - PRIMARY LEARNER  SOME COLLEGE   PRIMARY LANGUAGE ENGLISH   LEARNER PREFERENCE PRIMARY DEMONSTRATION   ANSWERED BY TINO Ga   RELATIONSHIP SELF       Abuse Screening:  Abuse Screening Questionnaire 7/22/2016   Do you ever feel afraid of your partner? N   Are you in a relationship with someone who physically or mentally threatens you? N   Is it safe for you to go home? Y         Health Maintenance reviewed and discussed per provider. Yes    Pt is due for FIT test, Colonoscopy, Mammogram, Bone Density, Pap, Hep C screen, Microalbumin, A1C, Lipid, Foot exam, Diabetic eye exam, Zostavax, Pneumo-13 or Peumo-23, Flu, Tdap. Please order/place referral if appropriate. Pt currently taking Antiplatelet therapy? no      Advance Directive:  1. Do you have an advance directive in place? Patient Reply:no    2. If not, would you like material regarding how to put one in place? Patient Reply: no    Coordination of Care:  1. Have you been to the ER, urgent care clinic since your last visit? Hospitalized since your last visit? no    2. Have you seen or consulted any other health care providers outside of the [address] [place] [address] since your last visit? Include any pap smears or colon screening. no      Any and all medication changes made under Dr. Nick Cobos verbal orders.

## 2017-09-19 NOTE — PROGRESS NOTES
HISTORY OF PRESENT ILLNESS  Jimmy Duarte is a 57 y.o. female. Visit Vitals    /87    Pulse 74    Temp 97.9 °F (36.6 °C) (Oral)    Resp 18    Ht 5' 6\" (1.676 m)    Wt 212 lb (96.2 kg)    SpO2 97%    BMI 34.22 kg/m2       HPI Comments: Grandson passed away. He had cancer. She has been staying with her son temporarily. She has been homeless. Has no reliable transportation. Needs form for [employer]. Pt here for pain meds. She stopped her gabapentin because she says it was causing problems with her mentation and \"craziness. \"    Medication Refill   The history is provided by the patient. This is a new problem. Review of Systems   Constitutional: Negative. Musculoskeletal: Positive for back pain, joint pain, myalgias and neck pain. Psychiatric/Behavioral: Depression: denies suicidal ideation. The patient is nervous/anxious. Physical Exam   Constitutional: She is oriented to person, place, and time. She appears well-developed and well-nourished. No distress. Cardiovascular: Normal rate and regular rhythm. Pulmonary/Chest: Effort normal and breath sounds normal.   Musculoskeletal: She exhibits no edema. Neurological: She is alert and oriented to person, place, and time. Skin: Skin is warm and dry. She is not diaphoretic. Psychiatric: She has a normal mood and affect. Nursing note and vitals reviewed. ASSESSMENT and PLAN    ICD-10-CM ICD-9-CM    1. Chronic pain syndrome M09.3 057.0 METABOLIC PANEL, COMPREHENSIVE      CBC WITH AUTOMATED DIFF   2. Restless legs syndrome (RLS) U04.31 861.48 METABOLIC PANEL, COMPREHENSIVE      CBC WITH AUTOMATED DIFF   3. Memory impairment I93.1 489.00 METABOLIC PANEL, COMPREHENSIVE      LIPID PANEL      CBC WITH AUTOMATED DIFF      TSH AND FREE T4      VITAMIN B12 & FOLATE   4. Chronic pain of right knee M25.561 719.46 oxyCODONE-acetaminophen (PERCOCET 10)  mg per tablet    G89.29 338.29    5.  Dyslipidemia E78.5 272.4 LIPID PANEL   6. Encounter for immunization Z23 V03.89 INFLUENZA VIRUS VAC QUAD,SPLIT,PRESV FREE SYRINGE IM      ADMIN INFLUENZA VIRUS VAC   7. H/O: pituitary tumor Z87.898 V12.29 PROLACTIN   8. Medication refill Z76.0 V68.1 ALPRAZolam (XANAX) 0.5 mg tablet      atorvastatin (LIPITOR) 10 mg tablet      tiZANidine (ZANAFLEX) 4 mg capsule      oxyCODONE-acetaminophen (PERCOCET 10)  mg per tablet      benzonatate (TESSALON) 200 mg capsule   9.  Long term (current) use of opiate analgesic Z79.891 V58.69 TOXASSURE SELECT 13 (MW)       Update lab    Refills as noted    F/u 8 weeks for MWV

## 2017-09-19 NOTE — MR AVS SNAPSHOT
Visit Information Date & Time Provider Department Dept. Phone Encounter #  
 9/19/2017  5:15 PM Sergey Borden, [address] [phone] [id number] Follow-up Instructions Return in about 2 months (around 11/19/2017) for Medicare Wellness Visit, htn, cholesterol. Your Appointments 9/19/2017  5:15 PM  
Office Visit with MD ANNIA Love [facility]) Appt Note: MED REFILLS  
 [address] [employer] [address]  
  
   
 [address] [facility] Upcoming Health Maintenance Date Due INFLUENZA AGE 9 TO ADULT 10/1/2017* DTaP/Tdap/Td series (1 - Tdap) 12/2/2017* ZOSTER VACCINE AGE 60> 11/2/2018* FOBT Q 1 YEAR AGE 50-75 12/8/2017 BREAST CANCER SCRN MAMMOGRAM 12/15/2018 *Topic was postponed. The date shown is not the original due date. Allergies as of 9/19/2017  Review Complete On: 9/19/2017 By: Sergey Borden MD  
  
 Severity Noted Reaction Type Reactions Gluten  07/22/2016    Other (comments) GI upset Morphine  03/07/2016    Rash Stadol [Butorphanol Tartrate]  03/07/2016    Other (comments) Made her have palpitations Ultram [Tramadol]  03/07/2016    Other (comments) Made her have palpitations Carisoprodol Low 08/07/2013    Other (comments) Constipaton Current Immunizations  Reviewed on 7/22/2016 Name Date Influenza Vaccine 3/18/2016 12:00 AM, 10/8/2010 12:00 AM, 9/28/2009 12:00 AM, 12/5/2008 12:00 AM  
 Influenza Vaccine (Quad) 10/28/2011 12:00 AM  
 Influenza Vaccine (Quad) PF  Incomplete, 12/2/2016 Novel Influenza-H1N1-09, All Formulations 1/18/2010 12:00 AM  
 Pneumococcal Polysaccharide (PPSV-23) 2/9/2013 12:00 AM  
  
 Not reviewed this visit You Were Diagnosed With   
  
 Codes Comments Chronic pain syndrome    -  Primary ICD-10-CM: G89.4 ICD-9-CM: 338.4 Restless legs syndrome (RLS)     ICD-10-CM: G25.81 ICD-9-CM: 333.94 Memory impairment     ICD-10-CM: R41.3 ICD-9-CM: 780.93 Chronic pain of right knee     ICD-10-CM: M25.561, G89.29 ICD-9-CM: 719.46, 338.29 Dyslipidemia     ICD-10-CM: E78.5 ICD-9-CM: 272.4 Encounter for immunization     ICD-10-CM: U54 ICD-9-CM: V03.89   
 H/O: pituitary tumor     ICD-10-CM: Z87.898 ICD-9-CM: V12.29 Medication refill     ICD-10-CM: Z76.0 ICD-9-CM: V68.1 Long term (current) use of opiate analgesic     ICD-10-CM: S24.630 ICD-9-CM: V58.69 Vitals BP Pulse Temp Resp Height(growth percentile) Weight(growth percentile) 146/87 74 97.9 °F (36.6 °C) (Oral) 18 5' 6\" (1.676 m) 212 lb (96.2 kg) SpO2 BMI OB Status Smoking Status 97% 34.22 kg/m2 Hysterectomy Never Smoker BMI and BSA Data Body Mass Index Body Surface Area  
 34.22 kg/m 2 2.12 m 2 Preferred Pharmacy Pharmacy Name Phone 100 Leanne Liang [facility] [phone] Your Updated Medication List  
  
   
This list is accurate as of: 9/19/17  5:03 PM.  Always use your most recent med list.  
  
  
  
  
 albuterol 90 mcg/actuation inhaler Commonly known as:  PROVENTIL HFA, VENTOLIN HFA, PROAIR HFA Take  by inhalation. ALPRAZolam 0.5 mg tablet Commonly known as:  Shermon Collie Take 3 Tabs by mouth three (3) times daily as needed for Anxiety. Max Daily Amount: 4.5 mg. Indications: anxiety  
  
 aspirin delayed-release 81 mg tablet Take 1 Tab by mouth daily. atorvastatin 10 mg tablet Commonly known as:  LIPITOR Take 1 Tab by mouth daily. benzonatate 200 mg capsule Commonly known as:  TESSALON Take 1 Cap by mouth three (3) times daily as needed for Cough. Indications: COUGH Cetirizine 10 mg Cap Take 1 Can by mouth daily. Indications: SEASONAL ALLERGIC RHINITIS  
  
 fluticasone 110 mcg/actuation inhaler Commonly known as:  FLOVENT HFA  
2 Puffs. FROVA 2.5 mg tablet Generic drug:  frovatriptan Take 2.5 mg by mouth once as needed for Migraine. furosemide 40 mg tablet Commonly known as:  LASIX Take 1 Tab by mouth daily. Indications: EDEMA, PERIPHERAL EDEMA DUE TO CHRONIC HEART FAILURE, as needed only for edema  
  
 hyoscyamine SR 0.375 mg SR tablet Commonly known as:  LEVBID Take 1 Tab by mouth every twelve (12) hours as needed for Cramping. Indications: Irritable Bowel Syndrome  
  
 ibuprofen 800 mg tablet Commonly known as:  MOTRIN Take 1 Tab by mouth every eight (8) hours as needed for Pain. Indications: PAIN  
  
 mometasone 220 mcg (120 doses) Aepb inhaler Commonly known as:  Al Barthel Take 1 Puff by inhalation two (2) times a day. montelukast 10 mg tablet Commonly known as:  SINGULAIR  
TAKE 1 TABLET DAILY FOR MAINTENANCE THERAPY FOR ASTHMA  
  
 omeprazole 40 mg capsule Commonly known as:  PRILOSEC Take 1 Cap by mouth daily. ONE-A-DAY WOMENS FORMULA PO Take 2 Tabs by Mouth Once a Day. oxyCODONE-acetaminophen  mg per tablet Commonly known as:  PERCOCET 10 Take 1 Tab by mouth every four (4) hours as needed for Pain. Max Daily Amount: 6 Tabs. potassium chloride 20 mEq tablet Commonly known as:  K-DUR, KLOR-CON Take 1 Tab by mouth two (2) times a day. Indications: HYPOKALEMIA QUEtiapine 50 mg tablet Commonly known as:  SEROquel Take 1 Tab by mouth daily. Indications: DEPRESSION TREATMENT ADJUNCT  
  
 rOPINIRole 3 mg Tab tab Commonly known as:  Aliya Christine Take 1 Tab by mouth two (2) times a day. Indications: Restless Legs Syndrome  
  
 tiZANidine 4 mg capsule Commonly known as:  Bragg Husk Take 1 Cap by mouth two (2) times a day. Indications: MUSCLE SPASM  
  
 venlafaxine-SR 75 mg capsule Commonly known as:  EFFEXOR-XR Take 3 capsules by mouth daily  Indications: ANXIETY WITH DEPRESSION  
  
 ZOLMitriptan 5 mg nasal solution Commonly known as:  ZOMIG  
1 Spray by Nasal route once as needed for Migraine. Prescriptions Printed Refills ALPRAZolam (XANAX) 0.5 mg tablet 1 Sig: Take 3 Tabs by mouth three (3) times daily as needed for Anxiety. Max Daily Amount: 4.5 mg. Indications: anxiety Class: Print Route: Oral  
 oxyCODONE-acetaminophen (PERCOCET 10)  mg per tablet 0 Sig: Take 1 Tab by mouth every four (4) hours as needed for Pain. Max Daily Amount: 6 Tabs. Class: Print Route: Oral  
  
Prescriptions Sent to Pharmacy Refills  
 atorvastatin (LIPITOR) 10 mg tablet 3 Sig: Take 1 Tab by mouth daily. Class: Normal  
 Pharmacy: [address], [address] Ph #: [phone] Route: Oral  
 tiZANidine (ZANAFLEX) 4 mg capsule 1 Sig: Take 1 Cap by mouth two (2) times a day. Indications: MUSCLE SPASM Class: Normal  
 Pharmacy: [address], [address] Ph #: [phone] Route: Oral  
 benzonatate (TESSALON) 200 mg capsule 5 Sig: Take 1 Cap by mouth three (3) times daily as needed for Cough. Indications: COUGH Class: Normal  
 Pharmacy: [address], [address] Ph #: [phone] Route: Oral  
  
We Performed the Following ADMIN INFLUENZA VIRUS VAC [ [facility]] INFLUENZA VIRUS VAC QUAD,SPLIT,PRESV FREE SYRINGE IM [id number] CPT(R)] Follow-up Instructions Return in about 2 months (around 11/19/2017) for Medicare Wellness Visit, htn, cholesterol. To-Do List   
 09/19/2017 Lab:  CBC WITH AUTOMATED DIFF   
  
 09/19/2017 Lab:  LIPID PANEL   
  
 09/19/2017 Lab:  METABOLIC PANEL, COMPREHENSIVE   
  
 09/19/2017 Lab:  PROLACTIN   
  
 09/19/2017 Lab:  [address] (MW)   
  
 09/19/2017 Lab:  TSH AND FREE T4   
  
 09/19/2017 Lab:  VITAMIN B12 & FOLATE Introducing [facility]! [facility] introduces [employer] patient portal. Now you can access parts of your medical record, email your doctor's office, and request medication refills online. 1. In your internet browser, go to [email or web address] 2. Click on the First Time User? Click Here link in the Sign In box. You will see the New Member Sign Up page. 3. Enter your eV[employer] Access Code exactly as it appears below. You will not need to use this code after youve completed the sign-up process. If you do not sign up before the expiration date, you must request a new code. · [employer] Access Code: [email or web address] Expires: 12/18/2017  5:03 PM 
 
4. Enter the last four digits of your Social Security Number (xxxx) and Date of Birth (mm/dd/yyyy) as indicated and click Submit. You will be taken to the next sign-up page. 5. Create a eVariant ID. This will be your eVariant login ID and cannot be changed, so think of one that is secure and easy to remember. 6. Create a eVariant password. You can change your password at any time. 7. Enter your Password Reset Question and Answer. This can be used at a later time if you forget your password. 8. Enter your e-mail address. You will receive e-mail notification when new information is available in [address] 9. Click Sign Up. You can now view and download portions of your medical record. 10. Click the Download Summary menu link to download a portable copy of your medical information. If you have questions, please visit the Frequently Asked Questions section of the eV[employer] website. Remember, eVariant is NOT to be used for urgent needs. For medical emergencies, dial 911. Now available from your iPhone and Android! Please provide this summary of care documentation to your next provider. Your primary care clinician is listed as [facility].  If you have any questions after today's visit, please call [phone].

## 2017-09-22 LAB — PROLACTIN SERPL-MCNC: 24.2 NG/ML

## 2017-09-28 NOTE — PROGRESS NOTES
Xanax noted. Percocet not noted.  But pt indicated she takes this intermittently so it might not be present

## 2017-09-29 LAB — TOXASSURE SELECT 13: NORMAL

## 2017-10-16 ENCOUNTER — TELEPHONE (OUTPATIENT)
Dept: INTERNAL MEDICINE CLINIC | Age: 62
End: 2017-10-16

## 2017-11-09 ENCOUNTER — OFFICE VISIT (OUTPATIENT)
Dept: INTERNAL MEDICINE CLINIC | Age: 62
End: 2017-11-09

## 2017-11-09 VITALS
TEMPERATURE: 98.8 F | DIASTOLIC BLOOD PRESSURE: 91 MMHG | HEIGHT: 66 IN | RESPIRATION RATE: 20 BRPM | SYSTOLIC BLOOD PRESSURE: 132 MMHG | HEART RATE: 75 BPM | BODY MASS INDEX: 33.59 KG/M2 | WEIGHT: 209 LBS | OXYGEN SATURATION: 98 %

## 2017-11-09 DIAGNOSIS — G43.509 PERSISTENT MIGRAINE AURA WITHOUT CEREBRAL INFARCTION AND WITHOUT STATUS MIGRAINOSUS, NOT INTRACTABLE: Primary | ICD-10-CM

## 2017-11-09 DIAGNOSIS — Z76.0 MEDICATION REFILL: ICD-10-CM

## 2017-11-09 DIAGNOSIS — R51.9 OCCIPITAL HEADACHE: ICD-10-CM

## 2017-11-09 PROBLEM — G89.29 CHRONIC PAIN OF RIGHT KNEE: Status: ACTIVE | Noted: 2017-11-09

## 2017-11-09 PROBLEM — M25.561 CHRONIC PAIN OF RIGHT KNEE: Status: ACTIVE | Noted: 2017-11-09

## 2017-11-09 RX ORDER — BACLOFEN 10 MG/1
10 TABLET ORAL 3 TIMES DAILY
Qty: 45 TAB | Refills: 5 | Status: SHIPPED | OUTPATIENT
Start: 2017-11-09 | End: 2017-11-09 | Stop reason: SDUPTHER

## 2017-11-09 RX ORDER — BACLOFEN 10 MG/1
10 TABLET ORAL 3 TIMES DAILY
Qty: 45 TAB | Refills: 5 | Status: SHIPPED | OUTPATIENT
Start: 2017-11-09 | End: 2018-01-18 | Stop reason: SDUPTHER

## 2017-11-09 RX ORDER — FLUTICASONE PROPIONATE 110 UG/1
2 AEROSOL, METERED RESPIRATORY (INHALATION) EVERY 12 HOURS
Qty: 1 INHALER | Refills: 5 | Status: SHIPPED | OUTPATIENT
Start: 2017-11-09 | End: 2017-11-10 | Stop reason: SDUPTHER

## 2017-11-09 RX ORDER — OXYCODONE AND ACETAMINOPHEN 10; 325 MG/1; MG/1
1 TABLET ORAL
Qty: 45 TAB | Refills: 0 | Status: SHIPPED | OUTPATIENT
Start: 2017-11-09 | End: 2017-12-06 | Stop reason: SDUPTHER

## 2017-11-09 RX ORDER — ALBUTEROL SULFATE 90 UG/1
2 AEROSOL, METERED RESPIRATORY (INHALATION)
Qty: 1 INHALER | Refills: 5 | Status: SHIPPED | OUTPATIENT
Start: 2017-11-09 | End: 2018-03-15

## 2017-11-09 NOTE — PROGRESS NOTES
ROOM # 3    Ayaan Hernandez presents today for   Chief Complaint   Patient presents with    Headache     pt states her migraine medicine is not working, she is having head and neck pain       Ayaan Hernandez preferred language for health care discussion is english/other. Is someone accompanying this pt? no    Is the patient using any DME equipment during OV? no    Depression Screening:  PHQ over the last two weeks 9/19/2017 7/22/2016   PHQ Not Done - Active Diagnosis of Depression or Bipolar Disorder   Little interest or pleasure in doing things Nearly every day -   Feeling down, depressed or hopeless Nearly every day -   Total Score PHQ 2 6 -       Learning Assessment:  Learning Assessment 7/22/2016   PRIMARY LEARNER Patient   HIGHEST LEVEL OF EDUCATION - PRIMARY LEARNER  SOME COLLEGE   PRIMARY LANGUAGE ENGLISH   LEARNER PREFERENCE PRIMARY DEMONSTRATION   ANSWERED BY TINO Ga   RELATIONSHIP SELF       Abuse Screening:  Abuse Screening Questionnaire 7/22/2016   Do you ever feel afraid of your partner? N   Are you in a relationship with someone who physically or mentally threatens you? N   Is it safe for you to go home? Y       Fall Risk  No flowsheet data found. Health Maintenance reviewed and discussed per provider. Yes    Ayaan Hernandez is due for none. Please order/place referral if appropriate. Advance Directive:  1. Do you have an advance directive in place? Patient Reply: no    2. If not, would you like material regarding how to put one in place? Patient Reply: yes    Coordination of Care:  1. Have you been to the ER, urgent care clinic since your last visit? Hospitalized since your last visit? no    2. Have you seen or consulted any other health care providers outside of the [address] [place] [address] since your last visit? Include any pap smears or colon screening.  no

## 2017-11-09 NOTE — MR AVS SNAPSHOT
Visit Information Date & Time Provider Department Dept. Phone Encounter #  
 11/9/2017  5:00 PM [address] [phone] [id number] Follow-up Instructions Return in about 6 weeks (around 12/21/2017) for headache, blood pressure, check on med changes. Your Appointments 12/6/2017  3:45 PM  
Office Visit with [address] ([address]) Appt Note: 30 mwv htn and chol; medicare annual wellness visit [address]  
  
   
 [address] [facility] Upcoming Health Maintenance Date Due FOBT Q 1 YEAR AGE 50-75 12/8/2017 DTaP/Tdap/Td series (1 - Tdap) 12/2/2017* ZOSTER VACCINE AGE 60> 11/2/2018* BREAST CANCER SCRN MAMMOGRAM 12/15/2018 *Topic was postponed. The date shown is not the original due date. Allergies as of 11/9/2017  Review Complete On: 11/9/2017 By: Radha Anton MD  
  
 Severity Noted Reaction Type Reactions Gluten  07/22/2016    Other (comments) GI upset Morphine  03/07/2016    Rash Stadol [Butorphanol Tartrate]  03/07/2016    Other (comments) Made her have palpitations Ultram [Tramadol]  03/07/2016    Other (comments) Made her have palpitations Carisoprodol Low 08/07/2013    Other (comments) Constipaton Current Immunizations  Reviewed on 7/22/2016 Name Date Influenza Vaccine 3/18/2016 12:00 AM, 10/8/2010 12:00 AM, 9/28/2009 12:00 AM, 12/5/2008 12:00 AM  
 Influenza Vaccine (Quad) 10/28/2011 12:00 AM  
 Influenza Vaccine (Quad) PF 9/19/2017  5:12 PM, 12/2/2016 Novel Influenza-H1N1-09, All Formulations 1/18/2010 12:00 AM  
 Pneumococcal Polysaccharide (PPSV-23) 2/9/2013 12:00 AM  
  
 Not reviewed this visit You Were Diagnosed With   
  
 Codes Comments Persistent migraine aura without cerebral infarction and without status migrainosus, not intractable    -  Primary ICD-10-CM: G43.509 ICD-9-CM: 346.50 Occipital headache     ICD-10-CM: R51 ICD-9-CM: 784.0 Medication refill     ICD-10-CM: Z76.0 ICD-9-CM: V68.1 Vitals BP Pulse Temp Resp Height(growth percentile) Weight(growth percentile) (!) 132/91 (BP 1 Location: Left arm, BP Patient Position: Sitting) 75 98.8 °F (37.1 °C) (Oral) 20 5' 6\" (1.676 m) 209 lb (94.8 kg) SpO2 BMI OB Status Smoking Status 98% 33.73 kg/m2 Hysterectomy Never Smoker Vitals History BMI and BSA Data Body Mass Index Body Surface Area  
 33.73 kg/m 2 2.1 m 2 Preferred Pharmacy Pharmacy Name Phone 100 Leanne Liang, [facility] [phone] Your Updated Medication List  
  
   
This list is accurate as of: 11/9/17  5:44 PM.  Always use your most recent med list.  
  
  
  
  
 albuterol 90 mcg/actuation inhaler Commonly known as:  PROVENTIL HFA, VENTOLIN HFA, PROAIR HFA Take 2 Puffs by inhalation every four (4) hours as needed for Wheezing. Indications: Bronchospastic Pulmonary Disease ALPRAZolam 0.5 mg tablet Commonly known as:  Palacios Leaks Take 3 Tabs by mouth three (3) times daily as needed for Anxiety. Max Daily Amount: 4.5 mg. Indications: anxiety  
  
 aspirin delayed-release 81 mg tablet Take 1 Tab by mouth daily. atorvastatin 10 mg tablet Commonly known as:  LIPITOR Take 1 Tab by mouth daily. baclofen 10 mg tablet Commonly known as:  LIORESAL Take 1 Tab by mouth three (3) times daily. Indications: Muscle Spasticity of Spinal Origin  
  
 benzonatate 200 mg capsule Commonly known as:  TESSALON Take 1 Cap by mouth three (3) times daily as needed for Cough. Indications: COUGH Cetirizine 10 mg Cap Take 1 Can by mouth daily. Indications: SEASONAL ALLERGIC RHINITIS fluticasone 110 mcg/actuation inhaler Commonly known as:  FLOVENT HFA Take 2 Puffs by inhalation every twelve (12) hours. FROVA 2.5 mg tablet Generic drug:  frovatriptan Take 2.5 mg by mouth once as needed for Migraine. furosemide 40 mg tablet Commonly known as:  LASIX Take 1 Tab by mouth daily. Indications: EDEMA, PERIPHERAL EDEMA DUE TO CHRONIC HEART FAILURE, as needed only for edema  
  
 hyoscyamine SR 0.375 mg SR tablet Commonly known as:  LEVBID Take 1 Tab by mouth every twelve (12) hours as needed for Cramping. Indications: Irritable Bowel Syndrome  
  
 ibuprofen 800 mg tablet Commonly known as:  MOTRIN Take 1 Tab by mouth every eight (8) hours as needed for Pain. Indications: PAIN  
  
 mometasone 220 mcg (120 doses) Aepb inhaler Commonly known as:  Deena Loupe Take 1 Puff by inhalation two (2) times a day. montelukast 10 mg tablet Commonly known as:  SINGULAIR  
TAKE 1 TABLET DAILY FOR MAINTENANCE THERAPY FOR ASTHMA  
  
 omeprazole 40 mg capsule Commonly known as:  PRILOSEC Take 1 Cap by mouth daily. ONE-A-DAY WOMENS FORMULA PO Take 2 Tabs by Mouth Once a Day. oxyCODONE-acetaminophen  mg per tablet Commonly known as:  PERCOCET 10 Take 1 Tab by mouth every four (4) hours as needed for Pain. Max Daily Amount: 6 Tabs. potassium chloride 20 mEq tablet Commonly known as:  K-DUR, KLOR-CON Take 1 Tab by mouth two (2) times a day. Indications: HYPOKALEMIA QUEtiapine 50 mg tablet Commonly known as:  SEROquel Take 1 Tab by mouth daily. Indications: DEPRESSION TREATMENT ADJUNCT  
  
 rOPINIRole 3 mg Tab tab Commonly known as:  Amberly Alfaro Take 1 Tab by mouth two (2) times a day. Indications: Restless Legs Syndrome  
  
 venlafaxine-SR 75 mg capsule Commonly known as:  EFFEXOR-XR Take 3 capsules by mouth daily  Indications: ANXIETY WITH DEPRESSION  
  
 ZOLMitriptan 5 mg nasal solution Commonly known as:  ZOMIG  
 1 Spray by Nasal route once as needed for Migraine. Prescriptions Printed Refills  
 oxyCODONE-acetaminophen (PERCOCET 10)  mg per tablet 0 Sig: Take 1 Tab by mouth every four (4) hours as needed for Pain. Max Daily Amount: 6 Tabs. Class: Print Route: Oral  
  
Prescriptions Sent to Pharmacy Refills  
 albuterol (PROVENTIL HFA, VENTOLIN HFA, PROAIR HFA) 90 mcg/actuation inhaler 5 Sig: Take 2 Puffs by inhalation every four (4) hours as needed for Wheezing. Indications: Bronchospastic Pulmonary Disease Class: Normal  
 Pharmacy: [address], [address] Ph #: [phone] Route: Inhalation  
 fluticasone (FLOVENT HFA) 110 mcg/actuation inhaler 5 Sig: Take 2 Puffs by inhalation every twelve (12) hours. Class: Normal  
 Pharmacy: [address], [address] Ph #: [phone] Route: Inhalation  
 baclofen (LIORESAL) 10 mg tablet 5 Sig: Take 1 Tab by mouth three (3) times daily. Indications: Muscle Spasticity of Spinal Origin Class: Normal  
 Pharmacy: [address], [address] Ph #: [phone] Route: Oral  
  
We Performed the Following REFERRAL TO NEUROLOGY [ARV12 Custom] Follow-up Instructions Return in about 6 weeks (around 12/21/2017) for headache, blood pressure, check on med changes. Referral Information Referral ID Referred By Referred To  
  
 [id number] Henna Barrera MD   
   [id number] Phone: [phone] Fax: [phone] Visits Status Start Date End Date 1 New Request 11/9/17 11/9/18 If your referral has a status of pending review or denied, additional information will be sent to support the outcome of this decision. Introducing [facility]! Suzy Mallory introduces [employer] patient portal. Now you can access parts of your medical record, email your doctor's office, and request medication refills online. 1. In your internet browser, go to [email or web address] 2. Click on the First Time User? Click Here link in the Sign In box. You will see the New Member Sign Up page. 3. Enter your [employer] Access Code exactly as it appears below. You will not need to use this code after youve completed the sign-up process. If you do not sign up before the expiration date, you must request a new code. · [employer] Access Code: [email or web address] Expires: 12/18/2017  4:03 PM 
 
4. Enter the last four digits of your Social Security Number (xxxx) and Date of Birth (mm/dd/yyyy) as indicated and click Submit. You will be taken to the next sign-up page. 5. Create a [employer]annaBe ID. This will be your [employer] login ID and cannot be changed, so think of one that is secure and easy to remember. 6. Create a [employer]Be password. You can change your password at any time. 7. Enter your Password Reset Question and Answer. This can be used at a later time if you forget your password. 8. Enter your e-mail address. You will receive e-mail notification when new information is available in [address] 9. Click Sign Up. You can now view and download portions of your medical record. 10. Click the Download Summary menu link to download a portable copy of your medical information. If you have questions, please visit the Frequently Asked Questions section of the [employer] website. Remember, [employer]Be is NOT to be used for urgent needs. For medical emergencies, dial 911. Now available from your iPhone and Android! Please provide this summary of care documentation to your next provider. Your primary care clinician is listed as [facility]. If you have any questions after today's visit, please call [phone].

## 2017-11-09 NOTE — PROGRESS NOTES
HISTORY OF PRESENT ILLNESS  Toby Oliver is a 57 y.o. female. Visit Vitals    BP (!) 132/91 (BP 1 Location: Left arm, BP Patient Position: Sitting)    Pulse 75    Temp 98.8 °F (37.1 °C) (Oral)    Resp 20    Ht 5' 6\" (1.676 m)    Wt 209 lb (94.8 kg)    SpO2 98%    BMI 33.73 kg/m2       HPI Comments: Ms. Gael Jane reports continued severe headaches. She has been taking migraine medications without relief. Pt reports recent dental visits, sts she needs to have dental work done. She has been treated with abx for toothache recently. Pt also has hx of chronic neck pain which is ongoing. Her pain is localized to the front and sides of her head. She also c/o photophobia. Headache   The history is provided by the patient. The current episode started more than 1 week ago. The problem occurs daily. The problem has not changed since onset. Associated symptoms include headaches. Pertinent negatives include no chest pain and no shortness of breath. The symptoms are relieved by medications. Review of Systems   Constitutional: Negative for chills and fever. HENT:        Toothache   Eyes: Positive for photophobia. Respiratory: Negative for shortness of breath. Cardiovascular: Negative for chest pain and palpitations. Musculoskeletal: Positive for neck pain. Neurological: Positive for headaches. Physical Exam   Constitutional: She is oriented to person, place, and time. She appears well-developed and well-nourished. No distress. HENT:   Head: Normocephalic. Right Ear: Tympanic membrane, external ear and ear canal normal.   Left Ear: Tympanic membrane, external ear and ear canal normal.   Mouth/Throat: Uvula is midline, oropharynx is clear and moist and mucous membranes are normal.   Eyes: Conjunctivae and EOM are normal. Pupils are equal, round, and reactive to light. Right eye exhibits no discharge. Left eye exhibits no discharge. Mildly photophobic   Neck: Muscular tenderness present. Rigidity present. Decreased range of motion present. Fullness of the submandibular glands. Occipital pain and tightness of the muscles. Cardiovascular: Normal rate and regular rhythm. Pulmonary/Chest: Effort normal and breath sounds normal.   Musculoskeletal: She exhibits no edema. Neurological: She is alert and oriented to person, place, and time. Skin: Skin is warm and dry. She is not diaphoretic. Psychiatric: She has a normal mood and affect. Nursing note and vitals reviewed. ASSESSMENT and PLAN    ICD-10-CM ICD-9-CM    1. Persistent migraine aura without cerebral infarction and without status migrainosus, not intractable G43.509 346.50 baclofen (LIORESAL) 10 mg tablet      REFERRAL TO NEUROLOGY   2. Occipital headache R51 784.0 oxyCODONE-acetaminophen (PERCOCET 10)  mg per tablet      baclofen (LIORESAL) 10 mg tablet      REFERRAL TO NEUROLOGY   3. Medication refill Z76.0 V68.1 albuterol (PROVENTIL HFA, VENTOLIN HFA, PROAIR HFA) 90 mcg/actuation inhaler      fluticasone (FLOVENT HFA) 110 mcg/actuation inhaler      oxyCODONE-acetaminophen (PERCOCET 10)  mg per tablet     Pt is having significant, persistent pain that is likely due to an overlap of different problems including migraines, occipital headaches, dental problems and TMJ. Will change from Zanaflex to baclofen for neck muscle spasms. Will refill oxycodone. Refer to neurology for further evaluation. Pt will also follow up with dentist/oral surgeon - instructed her to call [employer] to see if any local oral surgeons will take her [employer] insurance. She can follow up here in 6 weeks to re-check.      Discussed BMI/weight, lifestyle, diet and exercise

## 2017-11-10 DIAGNOSIS — Z76.0 MEDICATION REFILL: ICD-10-CM

## 2017-11-10 RX ORDER — FLUTICASONE PROPIONATE 110 UG/1
2 AEROSOL, METERED RESPIRATORY (INHALATION) EVERY 12 HOURS
Qty: 3 INHALER | Refills: 5 | Status: SHIPPED | OUTPATIENT
Start: 2017-11-10 | End: 2017-11-13 | Stop reason: SDUPTHER

## 2017-11-10 NOTE — TELEPHONE ENCOUNTER
Requested Prescriptions     Pending Prescriptions Disp Refills    fluticasone (FLOVENT HFA) 110 mcg/actuation inhaler 1 Inhaler 5     Sig: Take 2 Puffs by inhalation every twelve (12) hours.      PHARMACY IS REQUESTING A 90 DAY SUPPLY

## 2017-11-13 DIAGNOSIS — Z76.0 MEDICATION REFILL: ICD-10-CM

## 2017-11-13 RX ORDER — FLUTICASONE PROPIONATE 110 UG/1
2 AEROSOL, METERED RESPIRATORY (INHALATION) EVERY 12 HOURS
Qty: 3 INHALER | Refills: 5 | Status: SHIPPED | OUTPATIENT
Start: 2017-11-13 | End: 2018-03-15

## 2017-12-06 ENCOUNTER — OFFICE VISIT (OUTPATIENT)
Dept: INTERNAL MEDICINE CLINIC | Age: 62
End: 2017-12-06

## 2017-12-06 VITALS
SYSTOLIC BLOOD PRESSURE: 167 MMHG | HEART RATE: 76 BPM | TEMPERATURE: 98.6 F | WEIGHT: 203 LBS | OXYGEN SATURATION: 98 % | DIASTOLIC BLOOD PRESSURE: 103 MMHG | RESPIRATION RATE: 18 BRPM | HEIGHT: 66 IN | BODY MASS INDEX: 32.62 KG/M2

## 2017-12-06 DIAGNOSIS — G89.29 CHRONIC PAIN OF RIGHT KNEE: ICD-10-CM

## 2017-12-06 DIAGNOSIS — M25.561 CHRONIC PAIN OF RIGHT KNEE: ICD-10-CM

## 2017-12-06 DIAGNOSIS — Z23 ENCOUNTER FOR IMMUNIZATION: ICD-10-CM

## 2017-12-06 DIAGNOSIS — Z74.09 MOBILITY IMPAIRED: ICD-10-CM

## 2017-12-06 DIAGNOSIS — E87.6 HYPOKALEMIA: ICD-10-CM

## 2017-12-06 DIAGNOSIS — Z12.31 ENCOUNTER FOR SCREENING MAMMOGRAM FOR MALIGNANT NEOPLASM OF BREAST: ICD-10-CM

## 2017-12-06 DIAGNOSIS — I10 ESSENTIAL HYPERTENSION: Chronic | ICD-10-CM

## 2017-12-06 DIAGNOSIS — Z76.0 MEDICATION REFILL: ICD-10-CM

## 2017-12-06 DIAGNOSIS — G25.81 RESTLESS LEGS SYNDROME (RLS): Chronic | ICD-10-CM

## 2017-12-06 DIAGNOSIS — G89.4 CHRONIC PAIN SYNDROME: Chronic | ICD-10-CM

## 2017-12-06 DIAGNOSIS — Z00.00 MEDICARE ANNUAL WELLNESS VISIT, SUBSEQUENT: Primary | ICD-10-CM

## 2017-12-06 RX ORDER — OXYCODONE AND ACETAMINOPHEN 10; 325 MG/1; MG/1
1 TABLET ORAL
Qty: 60 TAB | Refills: 0 | Status: SHIPPED | OUTPATIENT
Start: 2017-12-06 | End: 2018-01-18 | Stop reason: SDUPTHER

## 2017-12-06 RX ORDER — POTASSIUM CHLORIDE 20 MEQ/1
20 TABLET, EXTENDED RELEASE ORAL 2 TIMES DAILY
Qty: 180 TAB | Refills: 4 | Status: SHIPPED | OUTPATIENT
Start: 2017-12-06 | End: 2018-03-31 | Stop reason: SDUPTHER
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
## 2017-12-06 NOTE — MR AVS SNAPSHOT
Visit Information Date & Time Provider Department Dept. Phone Encounter #  
 12/6/2017  3:45 PM [facility] [address] [phone] [id number] Follow-up Instructions Return in about 6 weeks (around 1/17/2018) for htn, chronic pain. Your Appointments 12/21/2017  2:15 PM  
Office Visit with MD [facility] [address] [facility]) Appt Note: 6 week f/u headache, blood pressure, med changes [address]  
  
   
 [address] [facility] Upcoming Health Maintenance Date Due  
 MEDICARE YEARLY EXAM 6/29/1973 FOBT Q 1 YEAR AGE 50-75 12/8/2017 ZOSTER VACCINE AGE 60> 11/2/2018* DTaP/Tdap/Td series (1 - Tdap) 12/6/2018* BREAST CANCER SCRN MAMMOGRAM 12/15/2018 *Topic was postponed. The date shown is not the original due date. Allergies as of 12/6/2017  Review Complete On: 12/6/2017 By: Bari Silverman LPN Severity Noted Reaction Type Reactions Gluten  07/22/2016    Other (comments) GI upset Morphine  03/07/2016    Rash Stadol [Butorphanol Tartrate]  03/07/2016    Other (comments) Made her have palpitations Ultram [Tramadol]  03/07/2016    Other (comments) Made her have palpitations Carisoprodol Low 08/07/2013    Other (comments) Constipaton Current Immunizations  Reviewed on 7/22/2016 Name Date Influenza Vaccine 3/18/2016 12:00 AM, 10/8/2010 12:00 AM, 9/28/2009 12:00 AM, 12/5/2008 12:00 AM  
 Influenza Vaccine (Quad) 10/28/2011 12:00 AM  
 Influenza Vaccine (Quad) PF  Incomplete, 9/19/2017  5:12 PM, 12/2/2016 Novel Influenza-H1N1-09, All Formulations 1/18/2010 12:00 AM  
 Pneumococcal Polysaccharide (PPSV-23) 2/9/2013 12:00 AM  
  
 Not reviewed this visit You Were Diagnosed With   
  
 Codes Comments Medicare annual wellness visit, subsequent    -  Primary ICD-10-CM: Z00.00 ICD-9-CM: V70.0 Encounter for immunization     ICD-10-CM: T47 ICD-9-CM: V03.89 Encounter for screening mammogram for malignant neoplasm of breast     ICD-10-CM: Z12.31 
ICD-9-CM: V76.12 Essential hypertension     ICD-10-CM: I10 
ICD-9-CM: 401.9 Chronic pain syndrome     ICD-10-CM: G89.4 ICD-9-CM: 338.4 Restless legs syndrome (RLS)     ICD-10-CM: G25.81 ICD-9-CM: 333.94 Hypokalemia     ICD-10-CM: E87.6 ICD-9-CM: 276.8 Mobility impaired     ICD-10-CM: Z74.09 
ICD-9-CM: 799.89 Chronic pain of right knee     ICD-10-CM: M25.561, G89.29 ICD-9-CM: 719.46, 338.29 Medication refill     ICD-10-CM: Z76.0 ICD-9-CM: V68.1 Vitals BP Pulse Temp Resp Height(growth percentile) Weight(growth percentile) (!) 167/103 76 98.6 °F (37 °C) (Oral) 18 5' 6\" (1.676 m) 203 lb (92.1 kg) SpO2 BMI OB Status Smoking Status 98% 32.77 kg/m2 Hysterectomy Never Smoker Vitals History BMI and BSA Data Body Mass Index Body Surface Area 32.77 kg/m 2 2.07 m 2 Preferred Pharmacy Pharmacy Name Phone [address] [facility] [phone] Your Updated Medication List  
  
   
This list is accurate as of: 12/6/17  4:56 PM.  Always use your most recent med list.  
  
  
  
  
 albuterol 90 mcg/actuation inhaler Commonly known as:  PROVENTIL HFA, VENTOLIN HFA, PROAIR HFA Take 2 Puffs by inhalation every four (4) hours as needed for Wheezing. Indications: Bronchospastic Pulmonary Disease ALPRAZolam 0.5 mg tablet Commonly known as:  Marj Cook Take 3 Tabs by mouth three (3) times daily as needed for Anxiety. Max Daily Amount: 4.5 mg. Indications: anxiety  
  
 aspirin delayed-release 81 mg tablet Take 1 Tab by mouth daily. atorvastatin 10 mg tablet Commonly known as:  LIPITOR Take 1 Tab by mouth daily. baclofen 10 mg tablet Commonly known as:  LIORESAL Take 1 Tab by mouth three (3) times daily. Indications: Muscle Spasticity of Spinal Origin  
  
 benzonatate 200 mg capsule Commonly known as:  TESSALON Take 1 Cap by mouth three (3) times daily as needed for Cough. Indications: COUGH Cetirizine 10 mg Cap Take 1 Can by mouth daily. Indications: SEASONAL ALLERGIC RHINITIS  
  
 fluticasone 110 mcg/actuation inhaler Commonly known as:  FLOVENT HFA Take 2 Puffs by inhalation every twelve (12) hours. FROVA 2.5 mg tablet Generic drug:  frovatriptan Take 2.5 mg by mouth once as needed for Migraine. furosemide 40 mg tablet Commonly known as:  LASIX Take 1 Tab by mouth daily. Indications: EDEMA, PERIPHERAL EDEMA DUE TO CHRONIC HEART FAILURE, as needed only for edema  
  
 hyoscyamine SR 0.375 mg SR tablet Commonly known as:  LEVBID Take 1 Tab by mouth every twelve (12) hours as needed for Cramping. Indications: Irritable Bowel Syndrome  
  
 ibuprofen 800 mg tablet Commonly known as:  MOTRIN Take 1 Tab by mouth every eight (8) hours as needed for Pain. Indications: PAIN  
  
 montelukast 10 mg tablet Commonly known as:  SINGULAIR  
TAKE 1 TABLET DAILY FOR MAINTENANCE THERAPY FOR ASTHMA  
  
 omeprazole 40 mg capsule Commonly known as:  PRILOSEC Take 1 Cap by mouth daily. ONE-A-DAY WOMENS FORMULA PO Take 2 Tabs by Mouth Once a Day. oxyCODONE-acetaminophen  mg per tablet Commonly known as:  PERCOCET 10 Take 1 Tab by mouth every four (4) hours as needed for Pain. Max Daily Amount: 6 Tabs. potassium chloride 20 mEq tablet Commonly known as:  K-DUR, KLOR-CON Take 1 Tab by mouth two (2) times a day. Indications: hypokalemia QUEtiapine 50 mg tablet Commonly known as:  SEROquel Take 1 Tab by mouth daily. Indications: DEPRESSION TREATMENT ADJUNCT  
  
 rOPINIRole 3 mg Tab tab Commonly known as:  Demario Arianne  
 Take 1 Tab by mouth two (2) times a day. Indications: Restless Legs Syndrome  
  
 venlafaxine-SR 75 mg capsule Commonly known as:  EFFEXOR-XR Take 3 capsules by mouth daily  Indications: ANXIETY WITH DEPRESSION  
  
 ZOLMitriptan 5 mg nasal solution Commonly known as:  ZOMIG  
1 Spray by Nasal route once as needed for Migraine. Prescriptions Printed Refills  
 oxyCODONE-acetaminophen (PERCOCET 10)  mg per tablet 0 Sig: Take 1 Tab by mouth every four (4) hours as needed for Pain. Max Daily Amount: 6 Tabs. Class: Print Route: Oral  
  
Prescriptions Sent to Pharmacy Refills  
 potassium chloride (K-DUR, KLOR-CON) 20 mEq tablet 4 Sig: Take 1 Tab by mouth two (2) times a day. Indications: hypokalemia Class: Normal  
 Pharmacy: [address], [address] [place] [address] #: [phone] Route: Oral  
  
We Performed the Following ADMIN INFLUENZA VIRUS VAC [ [facility]] AMB SUPPLY ORDER [[id number] Custom] Comments:  
 Rollator walker INFLUENZA VIRUS VAC QUAD,SPLIT,PRESV FREE SYRINGE IM [id number] CPT(R)] Follow-up Instructions Return in about 6 weeks (around 1/17/2018) for htn, chronic pain. To-Do List   
 12/09/2017 Imaging:  [facility] MAMMO BI SCREENING INCL CAD Patient Instructions Medicare Wellness Visit, Female The best way to live healthy is to have a healthy lifestyle by eating a well-balanced diet, exercising regularly, limiting alcohol and stopping smoking. Regular physical exams and screening tests are another way to keep healthy. Preventive exams provided by your health care provider can find health problems before they become diseases or illnesses. Preventive services including immunizations, screening tests, monitoring and exams can help you take care of your own health.  
 
All people over age 72 should have a pneumovax  and and a prevnar shot to prevent pneumonia. These are once in a lifetime unless you and your provider decide differently. All people over 65 should have a yearly flu shot and a tetanus vaccine every 10 years. A bone mass density to screen for osteoporosis or thinning of the bones should be done every 2 years after 65. Screening for diabetes mellitus with a blood sugar test should be done every year. Glaucoma is a disease of the eye due to increased ocular pressure that can lead to blindness and it should be done every year by an eye professional. 
 
Cardiovascular screening tests that check for elevated lipids (fatty part of blood) which can lead to heart disease and strokes should be done every 5 years. Colorectal screening that evaluates for blood or polyps in your colon should be done yearly as a stool test or every five years as a flexible sigmoidoscope or every 10 years as a colonoscopy up to age 76. Breast cancer screening with a mammogram is recommended biennially  for women age 54-69. Screening for cervical cancer with a pap smear and pelvic exam is recommended for women after age 72 years every 2 years up to age 79 or when the provider and patient decide to stop. If there is a history of cervical abnormalities or other increased risk for cancer then the test is recommended yearly. Hepatitis C screening is also recommended for anyone born between [birth year] through [address]. A shingles vaccine is also recommended once in a lifetime after age 61. Your Medicare Wellness Exam is recommended annually. Here is a list of your current Health Maintenance items with a due date: 
Health Maintenance Due Topic Date Due  
 Annual Well Visit  06/29/1973  Stool testing for trace blood  12/08/2017 Introducing [facility]! Ann Vilchis introduces [employer] patient portal. Now you can access parts of your medical record, email your doctor's office, and request medication refills online. 1. In your internet browser, go to [email or web address] 2. Click on the First Time User? Click Here link in the Sign In box. You will see the New Member Sign Up page. 3. Enter your [employer] Access Code exactly as it appears below. You will not need to use this code after youve completed the sign-up process. If you do not sign up before the expiration date, you must request a new code. · [employer] Access Code: [email or web address] Expires: 12/18/2017  4:03 PM 
 
4. Enter the last four digits of your Social Security Number (xxxx) and Date of Birth (mm/dd/yyyy) as indicated and click Submit. You will be taken to the next sign-up page. 5. Create a [employer] ID. This will be your [employer] login ID and cannot be changed, so think of one that is secure and easy to remember. 6. Create a [employer] password. You can change your password at any time. 7. Enter your Password Reset Question and Answer. This can be used at a later time if you forget your password. 8. Enter your e-mail address. You will receive e-mail notification when new information is available in [address] 9. Click Sign Up. You can now view and download portions of your medical record. 10. Click the Download Summary menu link to download a portable copy of your medical information. If you have questions, please visit the Frequently Asked Questions section of the [employer] website. Remember, [employer] is NOT to be used for urgent needs. For medical emergencies, dial 911. Now available from your iPhone and Android! Please provide this summary of care documentation to your next provider. Your primary care clinician is listed as [facility]. If you have any questions after today's visit, please call [phone].

## 2017-12-06 NOTE — PROGRESS NOTES
HISTORY OF PRESENT ILLNESS  Moon Caputo is a 57 y.o. female. Visit Vitals    BP (!) 167/103    Pulse 76    Temp 98.6 °F (37 °C) (Oral)    Resp 18    Ht 5' 6\" (1.676 m)    Wt 203 lb (92.1 kg)    SpO2 98%    BMI 32.77 kg/m2       HPI Comments: Pt need refill on her percocet  She is awaiting a new pain doctor. I have agreed to give her meds until then. She would also like a walker/rollator. She is having more trouble when she goes out and she fatigues easily. She has balance problems as well as arthritis    She is currently in a program to get her into stable housing    Medication Refill     Hypertension    The history is provided by the patient. This is a chronic problem. The current episode started more than 1 week ago. The problem has not changed since onset. Associated symptoms include neck pain. Risk factors include obesity, a sedentary lifestyle, hypertension, stress and postmenopause. Joint Pain    The history is provided by the patient. This is a chronic problem. The current episode started more than 1 week ago. The problem occurs daily. The problem has not changed since onset. Pain location: multiple, primarily knees. The quality of the pain is described as aching and constant. The pain is moderate. Associated symptoms include limited range of motion, stiffness, back pain and neck pain. Review of Systems   Constitutional: Negative for chills and fever. Respiratory: Negative. Cardiovascular: Negative. Musculoskeletal: Positive for back pain, falls, joint pain, neck pain and stiffness. Neurological: Negative for sensory change and focal weakness. Physical Exam   Constitutional: She is oriented to person, place, and time. She appears well-developed and well-nourished. No distress. Cardiovascular: Normal rate and regular rhythm. Pulmonary/Chest: Effort normal and breath sounds normal.   Musculoskeletal: She exhibits no edema. Wearing right knee support. . Uses cane to ambulate   Neurological: She is alert and oriented to person, place, and time. Skin: Skin is warm and dry. She is not diaphoretic. Psychiatric: She has a normal mood and affect. Speech and mentation are slightly slowed   Nursing note and vitals reviewed. ASSESSMENT and PLAN    ICD-10-CM ICD-9-CM    1. Medicare annual wellness visit, subsequent Z00.00 V70.0    2. Encounter for immunization Z23 V03.89 ADMIN INFLUENZA VIRUS VAC      INFLUENZA VIRUS VAC QUAD,SPLIT,PRESV FREE SYRINGE IM   3. Encounter for screening mammogram for malignant neoplasm of breast Z12.31 V76.12 [facility] MAMMO BI SCREENING INCL CAD   4. Essential hypertension I10 401.9    5. Chronic pain syndrome G89.4 338.4 AMB SUPPLY ORDER   6. Restless legs syndrome (RLS) G25.81 333.94 AMB SUPPLY ORDER   7. Hypokalemia E87.6 276.8 potassium chloride (K-DUR, KLOR-CON) 20 mEq tablet   8. Mobility impaired Z74.09 799.89 AMB SUPPLY ORDER   9. Chronic pain of right knee M25.561 719.46 AMB SUPPLY ORDER    G89.29 338.29    10. Medication refill Z76.0 V68.1 oxyCODONE-acetaminophen (PERCOCET 10)  mg per tablet      potassium chloride (K-DUR, KLOR-CON) 20 mEq tablet       BP way up today--she is in pain    Refill her percocet   reviewed and appropriate activity noted. No adverse activity noted.   UDS done Sept    Reviewed last lab--needs to continue her potassium supplement    F/u 6 weeks for BP check

## 2017-12-06 NOTE — PATIENT INSTRUCTIONS

## 2017-12-06 NOTE — PROGRESS NOTES
ROOM # 3    Gabino Reynoso presents today for   Chief Complaint   Patient presents with    Annual Wellness Visit    Medication Refill       Gabino Reynoso preferred language for health care discussion is english/other. Is someone accompanying this pt? no    Is the patient using any DME equipment during [address]? Yes, cane    Depression Screening:  PHQ over the last two weeks 9/19/2017 7/22/2016   PHQ Not Done - Active Diagnosis of Depression or Bipolar Disorder   Little interest or pleasure in doing things Nearly every day -   Feeling down, depressed or hopeless Nearly every day -   Total Score PHQ 2 6 -       Learning Assessment:  Learning Assessment 7/22/2016   PRIMARY LEARNER Patient   HIGHEST LEVEL OF EDUCATION - PRIMARY LEARNER  SOME COLLEGE   PRIMARY LANGUAGE ENGLISH   LEARNER PREFERENCE PRIMARY DEMONSTRATION   ANSWERED BY TINO Ga   RELATIONSHIP SELF       Abuse Screening:  Abuse Screening Questionnaire 12/6/2017 7/22/2016   Do you ever feel afraid of your partner? N N   Are you in a relationship with someone who physically or mentally threatens you? N N   Is it safe for you to go home? Y Y       Fall Risk  No flowsheet data found. Health Maintenance reviewed and discussed per provider. Yes    Gabino Reynoso is due for mwv, tdap. Please order/place referral if appropriate. Advance Directive:  1. Do you have an advance directive in place? Patient Reply: no    2. If not, would you like material regarding how to put one in place? Patient Reply: no    Coordination of Care:  1. Have you been to the ER, urgent care clinic since your last visit? Hospitalized since your last visit? no    2. Have you seen or consulted any other health care providers outside of the [address] [place] [address] since your last visit? Include any pap smears or colon screening. no      Presented for Influenza Vaccine. Administered in left deltoid without complaints. Pt observed for 5 min. No adverse reaction noted.  Pt left office in stable condition

## 2018-01-18 ENCOUNTER — HOSPITAL ENCOUNTER (OUTPATIENT)
Dept: LAB | Age: 63
Discharge: HOME | End: 2018-01-18
Payer: MEDICARE

## 2018-01-18 ENCOUNTER — OFFICE VISIT (OUTPATIENT)
Dept: INTERNAL MEDICINE CLINIC | Age: 63
End: 2018-01-18

## 2018-01-18 VITALS
DIASTOLIC BLOOD PRESSURE: 81 MMHG | SYSTOLIC BLOOD PRESSURE: 144 MMHG | RESPIRATION RATE: 14 BRPM | HEART RATE: 73 BPM | WEIGHT: 209 LBS | BODY MASS INDEX: 33.59 KG/M2 | TEMPERATURE: 98.3 F | HEIGHT: 66 IN | OXYGEN SATURATION: 95 %

## 2018-01-18 DIAGNOSIS — I10 ESSENTIAL HYPERTENSION: Primary | Chronic | ICD-10-CM

## 2018-01-18 DIAGNOSIS — R41.3 MEMORY DEFICIT: ICD-10-CM

## 2018-01-18 DIAGNOSIS — Z79.891 LONG TERM (CURRENT) USE OF OPIATE ANALGESIC: ICD-10-CM

## 2018-01-18 DIAGNOSIS — E87.6 HYPOKALEMIA: ICD-10-CM

## 2018-01-18 DIAGNOSIS — Z86.73 HX OF COMPLETED STROKE: ICD-10-CM

## 2018-01-18 DIAGNOSIS — Z76.0 MEDICATION REFILL: ICD-10-CM

## 2018-01-18 DIAGNOSIS — R51.9 OCCIPITAL HEADACHE: ICD-10-CM

## 2018-01-18 DIAGNOSIS — G43.509 PERSISTENT MIGRAINE AURA WITHOUT CEREBRAL INFARCTION AND WITHOUT STATUS MIGRAINOSUS, NOT INTRACTABLE: ICD-10-CM

## 2018-01-18 DIAGNOSIS — G89.4 CHRONIC PAIN SYNDROME: Chronic | ICD-10-CM

## 2018-01-18 DIAGNOSIS — I10 ESSENTIAL HYPERTENSION: Chronic | ICD-10-CM

## 2018-01-18 LAB
ALBUMIN SERPL-MCNC: 3.8 G/DL (ref 3.4–5)
ALBUMIN/GLOB SERPL: 1.1 {RATIO} (ref 0.8–1.7)
ALP SERPL-CCNC: 98 U/L (ref 45–117)
ALT SERPL-CCNC: 20 U/L (ref 13–56)
ANION GAP SERPL CALC-SCNC: 4 MMOL/L (ref 3–18)
AST SERPL-CCNC: 17 U/L (ref 15–37)
BILIRUB SERPL-MCNC: 0.4 MG/DL (ref 0.2–1)
BUN SERPL-MCNC: 13 MG/DL (ref 7–18)
BUN/CREAT SERPL: 14 (ref 12–20)
CALCIUM SERPL-MCNC: 8.8 MG/DL (ref 8.5–10.1)
CHLORIDE SERPL-SCNC: 105 MMOL/L (ref 100–108)
CHOLEST SERPL-MCNC: 186 MG/DL
CO2 SERPL-SCNC: 32 MMOL/L (ref 21–32)
CREAT SERPL-MCNC: 0.95 MG/DL (ref 0.6–1.3)
FOLATE SERPL-MCNC: 11.8 NG/ML (ref 3.1–17.5)
GLOBULIN SER CALC-MCNC: 3.5 G/DL (ref 2–4)
GLUCOSE SERPL-MCNC: 87 MG/DL (ref 74–99)
HDLC SERPL-MCNC: 71 MG/DL (ref 40–60)
HDLC SERPL: 2.6 {RATIO} (ref 0–5)
LDLC SERPL CALC-MCNC: 96.6 MG/DL (ref 0–100)
LIPID PROFILE,FLP: ABNORMAL
POTASSIUM SERPL-SCNC: 3.9 MMOL/L (ref 3.5–5.5)
PROT SERPL-MCNC: 7.3 G/DL (ref 6.4–8.2)
SODIUM SERPL-SCNC: 141 MMOL/L (ref 136–145)
TRIGL SERPL-MCNC: 92 MG/DL (ref ?–150)
VIT B12 SERPL-MCNC: 890 PG/ML (ref 211–911)
VLDLC SERPL CALC-MCNC: 18.4 MG/DL

## 2018-01-18 PROCEDURE — 36415 COLL VENOUS BLD VENIPUNCTURE: CPT | Performed by: INTERNAL MEDICINE

## 2018-01-18 PROCEDURE — 82607 VITAMIN B-12: CPT | Performed by: INTERNAL MEDICINE

## 2018-01-18 PROCEDURE — 80061 LIPID PANEL: CPT | Performed by: INTERNAL MEDICINE

## 2018-01-18 PROCEDURE — G0480 DRUG TEST DEF 1-7 CLASSES: HCPCS | Performed by: INTERNAL MEDICINE

## 2018-01-18 PROCEDURE — 80053 COMPREHEN METABOLIC PANEL: CPT | Performed by: INTERNAL MEDICINE

## 2018-01-18 RX ORDER — BACLOFEN 10 MG/1
10 TABLET ORAL 3 TIMES DAILY
Qty: 270 TAB | Refills: 4 | Status: SHIPPED | OUTPATIENT
Start: 2018-01-18 | End: 2018-03-15

## 2018-01-18 RX ORDER — OXYCODONE AND ACETAMINOPHEN 10; 325 MG/1; MG/1
1 TABLET ORAL
Qty: 60 TAB | Refills: 0 | Status: SHIPPED | OUTPATIENT
Start: 2018-01-18 | End: 2018-02-27 | Stop reason: SDUPTHER

## 2018-01-18 NOTE — MR AVS SNAPSHOT
[facility] 
 
 
 [address] [facility] [address] 
[phone] Patient: Memo Metcalf MRN: [id number] Visit Information Date & Time Provider Department Dept. Phone Encounter #  
 1/18/2018  4:00 PM Melissa Ceron [facility] [id number] Follow-up Instructions Return in about 8 weeks (around 3/15/2018) for htn, memory. Upcoming Health Maintenance Date Due FOBT Q 1 YEAR AGE 50-75 12/8/2017 ZOSTER VACCINE AGE 60> 11/2/2018* DTaP/Tdap/Td series (1 - Tdap) 12/6/2018* MEDICARE YEARLY EXAM 12/7/2018 BREAST CANCER SCRN MAMMOGRAM 12/15/2018 *Topic was postponed. The date shown is not the original due date. Allergies as of 1/18/2018  Review Complete On: 1/18/2018 By: Dolores Coffey MD  
  
 Severity Noted Reaction Type Reactions Gluten  07/22/2016    Other (comments) GI upset Morphine  03/07/2016    Rash Stadol [Butorphanol Tartrate]  03/07/2016    Other (comments) Made her have palpitations Ultram [Tramadol]  03/07/2016    Other (comments) Made her have palpitations Carisoprodol Low 08/07/2013    Other (comments) Constipaton Current Immunizations  Reviewed on 7/22/2016 Name Date Influenza Vaccine 3/18/2016 12:00 AM, 10/8/2010 12:00 AM, 9/28/2009 12:00 AM, 12/5/2008 12:00 AM  
 Influenza Vaccine (Quad) 10/28/2011 12:00 AM  
 Influenza Vaccine (Quad) PF 12/6/2017, 9/19/2017  5:12 PM, 12/2/2016 Novel Influenza-H1N1-09, All Formulations 1/18/2010 12:00 AM  
 Pneumococcal Polysaccharide (PPSV-23) 2/9/2013 12:00 AM  
  
 Not reviewed this visit You Were Diagnosed With   
  
 Codes Comments Essential hypertension    -  Primary ICD-10-CM: I10 
ICD-9-CM: 401.9 Chronic pain syndrome     ICD-10-CM: G89.4 ICD-9-CM: 338.4  Persistent migraine aura without cerebral infarction and without status migrainosus, not intractable     ICD-10-CM: G43.509 ICD-9-CM: 346.50 Occipital headache     ICD-10-CM: R51 ICD-9-CM: 784.0 Memory deficit     ICD-10-CM: R41.3 ICD-9-CM: 780.93 Hypokalemia     ICD-10-CM: E87.6 ICD-9-CM: 276.8 Medication refill     ICD-10-CM: Z76.0 ICD-9-CM: V68.1 Hx of completed stroke     ICD-10-CM: Z86.73 
ICD-9-CM: V12.54 Long term (current) use of opiate analgesic     ICD-10-CM: R72.004 ICD-9-CM: V58.69 Vitals BP Pulse Temp Resp Height(growth percentile) Weight(growth percentile) 144/81 (BP 1 Location: Left arm, BP Patient Position: Sitting) 73 98.3 °F (36.8 °C) (Oral) 14 5' 6\" (1.676 m) 209 lb (94.8 kg) SpO2 BMI OB Status Smoking Status 95% 33.73 kg/m2 Hysterectomy Never Smoker BMI and BSA Data Body Mass Index Body Surface Area  
 33.73 kg/m 2 2.1 m 2 Preferred Pharmacy Pharmacy Name Phone [address], [facility] [phone] Your Updated Medication List  
  
   
This list is accurate as of: 1/18/18  4:08 PM.  Always use your most recent med list.  
  
  
  
  
 albuterol 90 mcg/actuation inhaler Commonly known as:  PROVENTIL HFA, VENTOLIN HFA, PROAIR HFA Take 2 Puffs by inhalation every four (4) hours as needed for Wheezing. Indications: Bronchospastic Pulmonary Disease ALPRAZolam 0.5 mg tablet Commonly known as:  Bro[place]lyn Snuffer Take 3 Tabs by mouth three (3) times daily as needed for Anxiety. Max Daily Amount: 4.5 mg. Indications: anxiety  
  
 aspirin delayed-release 81 mg tablet Take 1 Tab by mouth daily. atorvastatin 10 mg tablet Commonly known as:  LIPITOR Take 1 Tab by mouth daily. baclofen 10 mg tablet Commonly known as:  LIORESAL Take 1 Tab by mouth three (3) times daily. Indications: Muscle Spasticity of Spinal Origin  
  
 benzonatate 200 mg capsule Commonly known as:  TESSALON  
 Take 1 Cap by mouth three (3) times daily as needed for Cough. Indications: COUGH Cetirizine 10 mg Cap Take 1 Can by mouth daily. Indications: SEASONAL ALLERGIC RHINITIS  
  
 fluticasone 110 mcg/actuation inhaler Commonly known as:  FLOVENT HFA Take 2 Puffs by inhalation every twelve (12) hours. FROVA 2.5 mg tablet Generic drug:  frovatriptan Take 2.5 mg by mouth once as needed for Migraine. furosemide 40 mg tablet Commonly known as:  LASIX Take 1 Tab by mouth daily. Indications: EDEMA, PERIPHERAL EDEMA DUE TO CHRONIC HEART FAILURE, as needed only for edema  
  
 hyoscyamine SR 0.375 mg SR tablet Commonly known as:  LEVBID Take 1 Tab by mouth every twelve (12) hours as needed for Cramping. Indications: Irritable Bowel Syndrome  
  
 ibuprofen 800 mg tablet Commonly known as:  MOTRIN Take 1 Tab by mouth every eight (8) hours as needed for Pain. Indications: PAIN  
  
 montelukast 10 mg tablet Commonly known as:  SINGULAIR  
TAKE 1 TABLET DAILY FOR MAINTENANCE THERAPY FOR ASTHMA  
  
 omeprazole 40 mg capsule Commonly known as:  PRILOSEC Take 1 Cap by mouth daily. ONE-A-DAY WOMENS FORMULA PO Take 2 Tabs by Mouth Once a Day. oxyCODONE-acetaminophen  mg per tablet Commonly known as:  PERCOCET 10 Take 1 Tab by mouth every four (4) hours as needed for Pain. Max Daily Amount: 6 Tabs. potassium chloride 20 mEq tablet Commonly known as:  K-DUR, KLOR-CON Take 1 Tab by mouth two (2) times a day. Indications: hypokalemia QUEtiapine 50 mg tablet Commonly known as:  SEROquel Take 1 Tab by mouth daily. Indications: DEPRESSION TREATMENT ADJUNCT  
  
 rOPINIRole 3 mg Tab tab Commonly known as:  Fan Comstock Take 1 Tab by mouth two (2) times a day. Indications: Restless Legs Syndrome  
  
 venlafaxine-SR 75 mg capsule Commonly known as:  EFFEXOR-XR Take 3 capsules by mouth daily  Indications: ANXIETY WITH DEPRESSION  
  
 ZOLMitriptan 5 mg nasal solution Commonly known as:  ZOMIG  
1 Spray by Nasal route once as needed for Migraine. Prescriptions Printed Refills  
 oxyCODONE-acetaminophen (PERCOCET 10)  mg per tablet 0 Sig: Take 1 Tab by mouth every four (4) hours as needed for Pain. Max Daily Amount: 6 Tabs. Class: Print Route: Oral  
  
Prescriptions Sent to Pharmacy Refills  
 baclofen (LIORESAL) 10 mg tablet 4 Sig: Take 1 Tab by mouth three (3) times daily. Indications: Muscle Spasticity of Spinal Origin Class: Normal  
 Pharmacy: [address], [address] [place] [address] #: [phone] Route: Oral  
  
We Performed the Following REFERRAL TO NEUROPSYCHOLOGY [UTU61 Custom] Comments:  
 Please test for memory deficits. Pt has h/o stroke, TIA, pituitary tumor, anxiety Follow-up Instructions Return in about 8 weeks (around 3/15/2018) for htn, memory. To-Do List   
 01/18/2018 Lab:  LIPID PANEL   
  
 01/18/2018 Lab:  METABOLIC PANEL, COMPREHENSIVE   
  
 01/18/2018 Imaging:  MRI BRAIN WO CONT   
  
 01/18/2018 Lab:  Tami Avila 13 (MW)   
  
 01/18/2018 Lab:  VITAMIN B12 & FOLATE Referral Information Referral ID Referred By Referred To  
  
 [id number] [facility] Not Available Visits Status Start Date End Date 1 New Request 1/18/18 1/18/19 If your referral has a status of pending review or denied, additional information will be sent to support the outcome of this decision. Referral ID Referred By Referred To  
 [id number] [facility] Not Available Visits Status Start Date End Date 1 New Request 1/18/18 1/18/19 If your referral has a status of pending review or denied, additional information will be sent to support the outcome of this decision. Introducing hospitals & HEALTH SERVICES!    
 Waqas Greenberg introduces [employer] patient portal. Now you can access parts of your medical record, email your doctor's office, and request medication refills online. 1. In your internet browser, go to [email or web address] 2. Click on the First Time User? Click Here link in the Sign In box. You will see the New Member Sign Up page. 3. Enter your [employer] Access Code exactly as it appears below. You will not need to use this code after youve completed the sign-up process. If you do not sign up before the expiration date, you must request a new code. · [employer] Access Code: [email or web address] Expires: 4/18/2018  4:08 PM 
 
4. Enter the last four digits of your Social Security Number (xxxx) and Date of Birth (mm/dd/yyyy) as indicated and click Submit. You will be taken to the next sign-up page. 5. Create a [employer] ID. This will be your [employer] login ID and cannot be changed, so think of one that is secure and easy to remember. 6. Create a [employer] password. You can change your password at any time. 7. Enter your Password Reset Question and Answer. This can be used at a later time if you forget your password. 8. Enter your e-mail address. You will receive e-mail notification when new information is available in [address] 9. Click Sign Up. You can now view and download portions of your medical record. 10. Click the Download Summary menu link to download a portable copy of your medical information. If you have questions, please visit the Frequently Asked Questions section of the [employer] website. Remember, [employer] is NOT to be used for urgent needs. For medical emergencies, dial 911. Now available from your iPhone and Android! Please provide this summary of care documentation to your next provider. Your primary care clinician is listed as [facility]. If you have any questions after today's visit, please call [phone].

## 2018-01-18 NOTE — PROGRESS NOTES
Patient presents to the clinic for a medication refill. Patient would also like to discuss elevated blood pressure. Flu Shot Requested: received    Depression Screening:  PHQ over the last two weeks 9/19/2017 7/22/2016   PHQ Not Done - Active Diagnosis of Depression or Bipolar Disorder   Little interest or pleasure in doing things Nearly every day -   Feeling down, depressed or hopeless Nearly every day -   Total Score PHQ 2 6 -       Learning Assessment:  Learning Assessment 7/22/2016   PRIMARY LEARNER Patient   HIGHEST LEVEL OF EDUCATION - PRIMARY LEARNER  SOME COLLEGE   PRIMARY LANGUAGE ENGLISH   LEARNER PREFERENCE PRIMARY DEMONSTRATION   ANSWERED BY TINO Ga   RELATIONSHIP SELF       Abuse Screening:  Abuse Screening Questionnaire 12/6/2017 7/22/2016   Do you ever feel afraid of your partner? N N   Are you in a relationship with someone who physically or mentally threatens you? N N   Is it safe for you to go home? Y Y       Health Maintenance reviewed and discussed per provider: yes     Coordination of Care:    1. Have you been to the ER, urgent care clinic since your last visit? Hospitalized since your last visit? no    2. Have you seen or consulted any other health care providers outside of the [address] [place] [address] since your last visit? Include any pap smears or colon screening.  yes

## 2018-01-18 NOTE — PROGRESS NOTES
HISTORY OF PRESENT ILLNESS  Joselito Looney is a 57 y.o. female. Blood pressure 144/81, pulse 73, temperature 98.3 °F (36.8 °C), temperature source Oral, resp. rate 14, height 5' 6\" (1.676 m), weight 209 lb (94.8 kg), SpO2 95 %. HPI Comments: Pt moved recently and missed her pain management appointment. She has not called them yet to reschedule. She says she fell a couple of times in the snow last week    She has seen Dr. Enoc Fountain and told neurologic is stable. He is a neurologic ophthalmologist    Medication Refill   The history is provided by the patient. This is a new problem. Hypertension    The history is provided by the patient. This is a chronic problem. The current episode started more than 1 week ago. The problem has been gradually improving. Associated symptoms include malaise/fatigue. There are no associated agents to hypertension. Risk factors include postmenopause, stress and hypertension. Review of Systems   Constitutional: Positive for malaise/fatigue. Negative for chills and fever. Respiratory: Negative. Cardiovascular: Negative. Musculoskeletal: Positive for back pain, joint pain and myalgias. Psychiatric/Behavioral: The patient is nervous/anxious. Physical Exam   Constitutional: She is oriented to person, place, and time. She appears well-developed and well-nourished. No distress. Pulmonary/Chest: Effort normal.   Neurological: She is alert and oriented to person, place, and time. Mild speech impediment at baseline   Skin: Skin is warm and dry. She is not diaphoretic. Psychiatric: She has a normal mood and affect. Nursing note and vitals reviewed. ASSESSMENT and PLAN    ICD-10-CM ICD-9-CM    1. Essential hypertension J16 687.2 METABOLIC PANEL, COMPREHENSIVE      LIPID PANEL   2. Chronic pain syndrome G89.4 338.4    3.  Persistent migraine aura without cerebral infarction and without status migrainosus, not intractable G43.509 346.50 baclofen (LIORESAL) 10 mg tablet   4. Occipital headache R51 784.0 baclofen (LIORESAL) 10 mg tablet      MRI BRAIN WO CONT   5. Memory deficit R41.3 780.93 REFERRAL TO NEUROPSYCHOLOGY      METABOLIC PANEL, COMPREHENSIVE      VITAMIN B12 & FOLATE      MRI BRAIN WO CONT   6. Hypokalemia P64.5 613.6 METABOLIC PANEL, COMPREHENSIVE   7. Medication refill Z76.0 V68.1 oxyCODONE-acetaminophen (PERCOCET 10)  mg per tablet   8. Hx of completed stroke Z86.73 V12.54 MRI BRAIN WO CONT   9. Long term (current) use of opiate analgesic Z79.891 V58.69 TOXASSURE SELECT 13 (MW)         D/w pt. She will be given one month med. But she must get back with pain management. I will not continue the percocet here. Will update UDS    BP much improved    Will request MRI brain. Refer for neuropsych testing. There may be elements of depression but her sxs may be due to vascular dx and prior CNS events.  Update some pertinent labs for memory    F/u 6-8 weeks for BP, memory

## 2018-01-24 ENCOUNTER — HOSPITAL ENCOUNTER (OUTPATIENT)
Dept: MRI IMAGING | Age: 63
Discharge: HOME | End: 2018-01-24
Attending: INTERNAL MEDICINE
Payer: MEDICARE

## 2018-01-24 DIAGNOSIS — R51.9 OCCIPITAL HEADACHE: ICD-10-CM

## 2018-01-24 DIAGNOSIS — R41.3 MEMORY DEFICIT: ICD-10-CM

## 2018-01-24 DIAGNOSIS — Z86.73 HX OF COMPLETED STROKE: ICD-10-CM

## 2018-01-24 PROCEDURE — 70551 MRI BRAIN STEM W/O DYE: CPT

## 2018-01-27 NOTE — PROGRESS NOTES
Please advise pt that her brain scan looked good.  Some old mild damage noted (could not specify if this could have been from old trauma)but otherwise was fine

## 2018-02-11 LAB — TOXASSURE SELECT 13: NORMAL

## 2018-02-19 ENCOUNTER — OFFICE VISIT (OUTPATIENT)
Dept: INTERNAL MEDICINE CLINIC | Age: 63
End: 2018-02-19

## 2018-02-19 VITALS
SYSTOLIC BLOOD PRESSURE: 145 MMHG | TEMPERATURE: 98.7 F | DIASTOLIC BLOOD PRESSURE: 95 MMHG | BODY MASS INDEX: 36 KG/M2 | RESPIRATION RATE: 18 BRPM | HEIGHT: 66 IN | HEART RATE: 82 BPM | OXYGEN SATURATION: 96 % | WEIGHT: 224 LBS

## 2018-02-19 DIAGNOSIS — R22.0 FACIAL SWELLING: ICD-10-CM

## 2018-02-19 DIAGNOSIS — H91.93 DECREASED HEARING OF BOTH EARS: ICD-10-CM

## 2018-02-19 DIAGNOSIS — J01.10 SUBACUTE FRONTAL SINUSITIS: Primary | ICD-10-CM

## 2018-02-19 RX ORDER — AMOXICILLIN AND CLAVULANATE POTASSIUM 875; 125 MG/1; MG/1
1 TABLET, FILM COATED ORAL 2 TIMES DAILY
Qty: 20 TAB | Refills: 1 | Status: SHIPPED | OUTPATIENT
Start: 2018-02-19 | End: 2018-03-15

## 2018-02-19 RX ORDER — FLUTICASONE PROPIONATE 50 MCG
2 SPRAY, SUSPENSION (ML) NASAL DAILY
Qty: 1 BOTTLE | Refills: 1 | Status: SHIPPED | OUTPATIENT
Start: 2018-02-19 | End: 2019-12-02 | Stop reason: SDUPTHER

## 2018-02-19 NOTE — PROGRESS NOTES
Sunitha Thomas presents today for   Chief Complaint   Patient presents with    Ear Fullness     x 3 weeks      Pt stated she hears \" humming\" x bilateral. Pt stated she has tried putting ear drops in ear which gave no relief. Pt did not bring meds or med list  To OV and is unsure of all meds by name and dosage. Sunitha Thomas preferred language for health care discussion is english/other. Is someone accompanying this pt? No     Is the patient using any DME equipment during OV? No     Depression Screening:  PHQ over the last two weeks 9/19/2017 7/22/2016   PHQ Not Done - Active Diagnosis of Depression or Bipolar Disorder   Little interest or pleasure in doing things Nearly every day -   Feeling down, depressed or hopeless Nearly every day -   Total Score PHQ 2 6 -       Learning Assessment:  Learning Assessment 7/22/2016   PRIMARY LEARNER Patient   HIGHEST LEVEL OF EDUCATION - PRIMARY LEARNER  SOME COLLEGE   PRIMARY LANGUAGE ENGLISH   LEARNER PREFERENCE PRIMARY DEMONSTRATION   ANSWERED BY TINO Ga   RELATIONSHIP SELF       Abuse Screening:  Abuse Screening Questionnaire 12/6/2017 7/22/2016   Do you ever feel afraid of your partner? N N   Are you in a relationship with someone who physically or mentally threatens you? N N   Is it safe for you to go home? Y Y       Fall Risk  No flowsheet data found. Health Maintenance reviewed and discussed per provider. Yes    Sunitha Thomas is due for FOBT. Please order/place referral if appropriate. Advance Directive:  1. Do you have an advance directive in place? Patient Reply:  Yes; pt stating she is rewriting it     2. If not, would you like material regarding how to put one in place? Patient Reply: No     Coordination of Care:  1. Have you been to the ER, urgent care clinic since your last visit? Hospitalized since your last visit? No     2.  Have you seen or consulted any other health care providers outside of the [address] [place] [address] since your last visit?  No

## 2018-02-19 NOTE — PROGRESS NOTES
HISTORY OF PRESENT ILLNESS  Damon Mcburney is a 57 y.o. female. Visit Vitals    BP (!) 145/95 (BP 1 Location: Left arm, BP Patient Position: Sitting)    Pulse 82    Temp 98.7 °F (37.1 °C) (Oral)    Resp 18    Ht 5' 6\" (1.676 m)    Wt 224 lb (101.6 kg)    SpO2 96%    BMI 36.15 kg/m2       HPI Comments: For about 3 weeks she has had sinus and er sxs. Her ears feel \"clogged\" and she does not her as well. Her face hs felt puffy as well. When she leans over sometimesshe can her better as if something\"shifted\" inside. Has been using hot packs on her face. Has been using her sinus pills regularly      Ear Fullness    The history is provided by the patient (see comments). This is a new problem. The problem occurs daily. The problem has not changed since onset. Associated symptoms include headaches (\"solid\" for three weeks. Some like her usual migraines but some not.), hearing loss and sore throat. Pertinent negatives include no ear discharge. Review of Systems   Constitutional: Positive for chills. Negative for fever. HENT: Positive for congestion, hearing loss, sinus pain, sore throat and tinnitus. Negative for ear discharge. Eyes: Negative for blurred vision and double vision. Respiratory: Negative. Cardiovascular: Negative. Neurological: Positive for dizziness and headaches (\"solid\" for three weeks. Some like her usual migraines but some not.). Physical Exam   Constitutional: She is oriented to person, place, and time. She appears well-developed and well-nourished. No distress. HENT:   Right Ear: Tympanic membrane, external ear and ear canal normal.   Left Ear: Tympanic membrane, external ear and ear canal normal.   Nose: Mucosal edema present. Left sinus exhibits no frontal sinus tenderness. Mouth/Throat: Uvula is midline, oropharynx is clear and moist and mucous membranes are normal.   Eyes: Conjunctivae and EOM are normal.   Cardiovascular: Normal rate and regular rhythm. Pulmonary/Chest: Effort normal and breath sounds normal.   Musculoskeletal: She exhibits no edema. Lymphadenopathy:     She has no cervical adenopathy. Neurological: She is alert and oriented to person, place, and time. Skin: Skin is warm and dry. She is not diaphoretic. Psychiatric: She has a normal mood and affect. Nursing note and vitals reviewed. ASSESSMENT and PLAN    ICD-10-CM ICD-9-CM    1. Subacute frontal sinusitis J01.10 461.1 REFERRAL TO ENT-OTOLARYNGOLOGY      amoxicillin-clavulanate (AUGMENTIN) 875-125 mg per tablet      fluticasone (FLONASE) 50 mcg/actuation nasal spray   2. Facial swelling R22.0 784.2 REFERRAL TO ENT-OTOLARYNGOLOGY      amoxicillin-clavulanate (AUGMENTIN) 875-125 mg per tablet      fluticasone (FLONASE) 50 mcg/actuation nasal spray   3. Decreased hearing of both ears H91.93 389.9 REFERRAL TO ENT-OTOLARYNGOLOGY      amoxicillin-clavulanate (AUGMENTIN) 875-125 mg per tablet      fluticasone (FLONASE) 50 mcg/actuation nasal spray       Pt has been sick for three weeks  Will tx for sinusitis and refer to ENT    RX augmentin and flonase    Discussed BMI/weight, lifestyle, diet and exercise. Discussed effect on blood pressure, blood sugar, and joints especially  Focus on limiting white carbs, portion control, and moving more.   Pt has gained 15# of weight --cautioned to work on this    F/u as appointed in March

## 2018-02-19 NOTE — MR AVS SNAPSHOT
[facility] 
 
 
 [address] [place] [address] 
[phone] Patient: Rick Reece MRN: [id number] Visit Information Date & Time Provider Department Dept. Phone Encounter #  
 2018  4:45 PM Anisa Arreguin [place] [phone] [id number] Follow-up Instructions Return if symptoms worsen or fail to improve, for or as appointed in March. Your Appointments 3/15/2018 12:00 PM  
Office Visit with Anisa Arreguin MD  
[employer] [facility]) Appt Note: 8 week f/u htn, memory [address]  
  
   
 [address] [facility] Upcoming Health Maintenance Date Due FOBT Q 1 YEAR AGE 50-75 2017 ZOSTER VACCINE AGE 60> 2018* DTaP/Tdap/Td series (1 - Tdap) 2018* MEDICARE YEARLY EXAM 2018 BREAST CANCER SCRN MAMMOGRAM 12/15/2018 *Topic was postponed. The date shown is not the original due date. Allergies as of 2018  Review Complete On: 2018 By: Anisa Arreguin MD  
  
 Severity Noted Reaction Type Reactions Gluten  2016    Other (comments) GI upset Morphine  2016    Rash Stadol [Butorphanol Tartrate]  2016    Other (comments) Made her have palpitations Ultram [Tramadol]  2016    Other (comments) Made her have palpitations Carisoprodol Low 2013    Other (comments) Constipaton Current Immunizations  Reviewed on 2016 Name Date Influenza Vaccine 3/18/2016 12:00 AM, 10/8/2010 12:00 AM, 2009 12:00 AM, 2008 12:00 AM  
 Influenza Vaccine (Quad) 10/28/2011 12:00 AM  
 Influenza Vaccine (Quad) PF 2017, 2017  5:12 PM, 2016  Novel Influenza-H1N1-09, All Formulations 2010 12:00 AM  
 Pneumococcal Polysaccharide (PPSV-23) 2/9/2013 12:00 AM  
  
 Not reviewed this visit You Were Diagnosed With   
  
 Codes Comments Subacute frontal sinusitis    -  Primary ICD-10-CM: J01.10 ICD-9-CM: 302.8 Facial swelling     ICD-10-CM: R22.0 ICD-9-CM: 784.2 Decreased hearing of both ears     ICD-10-CM: H91.93 
ICD-9-CM: 389. 9 Vitals BP Pulse Temp Resp Height(growth percentile) Weight(growth percentile) (!) 145/95 (BP 1 Location: Left arm, BP Patient Position: Sitting) 82 98.7 °F (37.1 °C) (Oral) 18 5' 6\" (1.676 m) 224 lb (101.6 kg) SpO2 BMI OB Status Smoking Status 96% 36.15 kg/m2 Hysterectomy Never Smoker Vitals History BMI and BSA Data Body Mass Index Body Surface Area  
 36.15 kg/m 2 2.18 m 2 Preferred Pharmacy Pharmacy Name Phone [facility], [address] [place] [address] [phone] Your Updated Medication List  
  
   
This list is accurate as of: 2/19/18  5:31 PM.  Always use your most recent med list.  
  
  
  
  
 albuterol 90 mcg/actuation inhaler Commonly known as:  PROVENTIL HFA, VENTOLIN HFA, PROAIR HFA Take 2 Puffs by inhalation every four (4) hours as needed for Wheezing. Indications: Bronchospastic Pulmonary Disease ALPRAZolam 0.5 mg tablet Commonly known as:  Dewey Dunker Take 3 Tabs by mouth three (3) times daily as needed for Anxiety. Max Daily Amount: 4.5 mg. Indications: anxiety  
  
 amoxicillin-clavulanate 875-125 mg per tablet Commonly known as:  AUGMENTIN Take 1 Tab by mouth two (2) times a day. aspirin delayed-release 81 mg tablet Take 1 Tab by mouth daily. atorvastatin 10 mg tablet Commonly known as:  LIPITOR Take 1 Tab by mouth daily. baclofen 10 mg tablet Commonly known as:  LIORESAL Take 1 Tab by mouth three (3) times daily. Indications: Muscle Spasticity of Spinal Origin  
  
 benzonatate 200 mg capsule Commonly known as:  TESSALON Take 1 Cap by mouth three (3) times daily as needed for Cough. Indications: COUGH Cetirizine 10 mg Cap Take 1 Can by mouth daily. Indications: SEASONAL ALLERGIC RHINITIS * fluticasone 110 mcg/actuation inhaler Commonly known as:  FLOVENT HFA Take 2 Puffs by inhalation every twelve (12) hours. * fluticasone 50 mcg/actuation nasal spray Commonly known as:  Jaymie Jossue 2 Sprays by Both Nostrils route daily. Indications: Allergic Rhinitis FROVA 2.5 mg tablet Generic drug:  frovatriptan Take 2.5 mg by mouth once as needed for Migraine. furosemide 40 mg tablet Commonly known as:  LASIX Take 1 Tab by mouth daily. Indications: EDEMA, PERIPHERAL EDEMA DUE TO CHRONIC HEART FAILURE, as needed only for edema  
  
 hyoscyamine SR 0.375 mg SR tablet Commonly known as:  LEVBID Take 1 Tab by mouth every twelve (12) hours as needed for Cramping. Indications: Irritable Bowel Syndrome  
  
 ibuprofen 800 mg tablet Commonly known as:  MOTRIN Take 1 Tab by mouth every eight (8) hours as needed for Pain. Indications: PAIN  
  
 montelukast 10 mg tablet Commonly known as:  SINGULAIR  
TAKE 1 TABLET DAILY FOR MAINTENANCE THERAPY FOR ASTHMA  
  
 omeprazole 40 mg capsule Commonly known as:  PRILOSEC Take 1 Cap by mouth daily. ONE-A-DAY WOMENS FORMULA PO Take 2 Tabs by Mouth Once a Day. oxyCODONE-acetaminophen  mg per tablet Commonly known as:  PERCOCET 10 Take 1 Tab by mouth every four (4) hours as needed for Pain. Max Daily Amount: 6 Tabs. potassium chloride 20 mEq tablet Commonly known as:  K-DUR, KLOR-CON Take 1 Tab by mouth two (2) times a day. Indications: hypokalemia QUEtiapine 50 mg tablet Commonly known as:  SEROquel Take 1 Tab by mouth daily. Indications: DEPRESSION TREATMENT ADJUNCT  
  
 rOPINIRole 3 mg Tab tab Commonly known as:  Terry Chavez  
 Take 1 Tab by mouth two (2) times a day. Indications: Restless Legs Syndrome  
  
 venlafaxine-SR 75 mg capsule Commonly known as:  EFFEXOR-XR Take 3 capsules by mouth daily  Indications: ANXIETY WITH DEPRESSION  
  
 ZOLMitriptan 5 mg nasal solution Commonly known as:  ZOMIG  
1 Spray by Nasal route once as needed for Migraine. * Notice: This list has 2 medication(s) that are the same as other medications prescribed for you. Read the directions carefully, and ask your doctor or other care provider to review them with you. Prescriptions Sent to Pharmacy Refills  
 amoxicillin-clavulanate (AUGMENTIN) 875-125 mg per tablet 1 Sig: Take 1 Tab by mouth two (2) times a day. Class: Normal  
 Pharmacy: [address] [place] [address] Ph #: [phone] Route: Oral  
 fluticasone (FLONASE) 50 mcg/actuation nasal spray 1 Si Sprays by Both Nostrils route daily. Indications: Allergic Rhinitis Class: Normal  
 Pharmacy: [address] [place] [address] Ph #: [phone] Route: Both Nostrils We Performed the Following REFERRAL TO ENT-OTOLARYNGOLOGY [AXI91 Custom] Follow-up Instructions Return if symptoms worsen or fail to improve, for or as appointed in March. Referral Information Referral ID Referred By Referred To  
  
 [id number] [address], [address] [place] [address], MD Christopher Watson  3rd [facility] Phone: [phone] Fax: [phone] Visits Status Start Date End Date 1 New Request 18 If your referral has a status of pending review or denied, additional information will be sent to support the outcome of this decision. Introducing [facility]!    
 [facility] introduces [employer] patient portal. Now you can access parts of your medical record, email your doctor's office, and request medication refills online. 1. In your internet browser, go to [email or web address] 2. Click on the First Time User? Click Here link in the Sign In box. You will see the New Member Sign Up page. 3. Enter your [employer] Access Code exactly as it appears below. You will not need to use this code after youve completed the sign-up process. If you do not sign up before the expiration date, you must request a new code. · [employer] Access Code: [email or web address] Expires: 4/18/2018  4:08 PM 
 
4. Enter the last four digits of your Social Security Number (xxxx) and Date of Birth (mm/dd/yyyy) as indicated and click Submit. You will be taken to the next sign-up page. 5. Create a [employer] ID. This will be your [employer] login ID and cannot be changed, so think of one that is secure and easy to remember. 6. Create a [employer] password. You can change your password at any time. 7. Enter your Password Reset Question and Answer. This can be used at a later time if you forget your password. 8. Enter your e-mail address. You will receive e-mail notification when new information is available in [address] 9. Click Sign Up. You can now view and download portions of your medical record. 10. Click the Download Summary menu link to download a portable copy of your medical information. If you have questions, please visit the Frequently Asked Questions section of the [employer] website. Remember, [employer] is NOT to be used for urgent needs. For medical emergencies, dial 911. Now available from your iPhone and Android! Please provide this summary of care documentation to your next provider. Your primary care clinician is listed as [facility]. If you have any questions after today's visit, please call [phone].

## 2018-02-19 NOTE — ACP (ADVANCE CARE PLANNING)
Advance Directive:  1. Do you have an advance directive in place? Patient Reply:  Yes; pt stating she is rewriting it     2. If not, would you like material regarding how to put one in place?  Patient Reply: No

## 2018-02-23 DIAGNOSIS — Z76.0 MEDICATION REFILL: ICD-10-CM

## 2018-02-23 NOTE — TELEPHONE ENCOUNTER
Requested Prescriptions     Pending Prescriptions Disp Refills    ALPRAZolam (XANAX) 0.5 mg tablet 270 Tab 1     Sig: Take 3 Tabs by mouth three (3) times daily as needed for Anxiety. Max Daily Amount: 4.5 mg.  Indications: anxiety

## 2018-02-26 RX ORDER — ALPRAZOLAM 0.5 MG/1
1.5 TABLET ORAL
Qty: 270 TAB | Refills: 1 | Status: SHIPPED | OUTPATIENT
Start: 2018-02-26 | End: 2018-03-15

## 2018-02-26 NOTE — TELEPHONE ENCOUNTER
printed and placed in PCP medication refill review folder.      Last UDS date: n/a  Pain contract signed: n/a  Next Appt:03/15/2018  Last Appt:02/19/2018

## 2018-02-27 DIAGNOSIS — Z76.0 MEDICATION REFILL: ICD-10-CM

## 2018-02-27 NOTE — TELEPHONE ENCOUNTER
Requested Prescriptions     Pending Prescriptions Disp Refills    oxyCODONE-acetaminophen (PERCOCET 10)  mg per tablet 60 Tab 0     Sig: Take 1 Tab by mouth every four (4) hours as needed for Pain. Max Daily Amount: 6 Tabs.

## 2018-02-28 RX ORDER — OXYCODONE AND ACETAMINOPHEN 10; 325 MG/1; MG/1
1 TABLET ORAL
Qty: 60 TAB | Refills: 0 | Status: SHIPPED | OUTPATIENT
Start: 2018-02-28 | End: 2018-05-15 | Stop reason: SDUPTHER

## 2018-02-28 NOTE — TELEPHONE ENCOUNTER
printed and placed in PCP medication refill review folder.      Last UDS date: 1/18/2018  Pain contract signed: None signed or scanned in system???  Last office visit: 2/19/2018  Next Appt: 3/15/2018

## 2018-02-28 NOTE — TELEPHONE ENCOUNTER
Still must sign pain contract.   This month she must make it stretch until she sees pain management  RX printed

## 2018-03-02 ENCOUNTER — HOSPITAL ENCOUNTER (OUTPATIENT)
Dept: MAMMOGRAPHY | Age: 63
Discharge: HOME | End: 2018-03-02
Attending: INTERNAL MEDICINE
Payer: MEDICARE

## 2018-03-02 ENCOUNTER — HOSPITAL ENCOUNTER (OUTPATIENT)
Dept: MAMMOGRAPHY | Age: 63
End: 2018-03-02
Attending: INTERNAL MEDICINE
Payer: MEDICARE

## 2018-03-02 DIAGNOSIS — Z12.31 ENCOUNTER FOR SCREENING MAMMOGRAM FOR MALIGNANT NEOPLASM OF BREAST: ICD-10-CM

## 2018-03-02 PROCEDURE — 77063 BREAST TOMOSYNTHESIS BI: CPT

## 2018-03-15 ENCOUNTER — OFFICE VISIT (OUTPATIENT)
Dept: INTERNAL MEDICINE CLINIC | Age: 63
End: 2018-03-15

## 2018-03-15 VITALS
HEIGHT: 66 IN | RESPIRATION RATE: 17 BRPM | WEIGHT: 219 LBS | TEMPERATURE: 98.1 F | OXYGEN SATURATION: 99 % | HEART RATE: 66 BPM | BODY MASS INDEX: 35.2 KG/M2 | DIASTOLIC BLOOD PRESSURE: 80 MMHG | SYSTOLIC BLOOD PRESSURE: 136 MMHG

## 2018-03-15 DIAGNOSIS — I10 ESSENTIAL HYPERTENSION: Primary | Chronic | ICD-10-CM

## 2018-03-15 DIAGNOSIS — F51.01 PRIMARY INSOMNIA: ICD-10-CM

## 2018-03-15 DIAGNOSIS — G89.4 CHRONIC PAIN SYNDROME: Chronic | ICD-10-CM

## 2018-03-15 DIAGNOSIS — E78.5 DYSLIPIDEMIA: Chronic | ICD-10-CM

## 2018-03-15 DIAGNOSIS — R41.3 MEMORY IMPAIRMENT: Chronic | ICD-10-CM

## 2018-03-15 DIAGNOSIS — Z76.0 MEDICATION REFILL: ICD-10-CM

## 2018-03-15 RX ORDER — BISMUTH SUBSALICYLATE 262 MG
1 TABLET,CHEWABLE ORAL DAILY
Qty: 90 TAB | Refills: 4 | Status: SHIPPED | OUTPATIENT
Start: 2018-03-15 | End: 2019-07-29 | Stop reason: SDUPTHER

## 2018-03-15 RX ORDER — GUAIFENESIN 100 MG/5ML
81 LIQUID (ML) ORAL DAILY
Qty: 90 TAB | Refills: 4 | Status: SHIPPED | OUTPATIENT
Start: 2018-03-15 | End: 2019-04-15

## 2018-03-15 RX ORDER — ZOLPIDEM TARTRATE 12.5 MG/1
12.5 TABLET, FILM COATED, EXTENDED RELEASE ORAL
Qty: 90 TAB | Refills: 1 | Status: SHIPPED | OUTPATIENT
Start: 2018-03-15 | End: 2018-03-31

## 2018-03-15 RX ORDER — UREA 10 %
100 LOTION (ML) TOPICAL
COMMUNITY
End: 2018-03-15

## 2018-03-15 RX ORDER — LANOLIN ALCOHOL/MO/W.PET/CERES
1000 CREAM (GRAM) TOPICAL DAILY
Qty: 90 TAB | Refills: 4 | Status: SHIPPED | OUTPATIENT
Start: 2018-03-15 | End: 2019-07-29 | Stop reason: SDUPTHER

## 2018-03-15 NOTE — MR AVS SNAPSHOT
[address] [place] [address] 
 
 
 [address] [facility] [address] 
[phone] Patient: Savanah Medina MRN: [id number] UZE:7/48/1926 Visit Information Date & Time Provider Department Dept. Phone Encounter #  
 3/15/2018 12:00 PM Layne Fontenot, [facility] [phone] [id number] Follow-up Instructions Return in about 3 months (around 6/15/2018) for htn, cholesterol, chronic pain, Med refills. Upcoming Health Maintenance Date Due FOBT Q 1 YEAR AGE 50-75 12/8/2017 ZOSTER VACCINE AGE 60> 11/2/2018* DTaP/Tdap/Td series (1 - Tdap) 12/6/2018* MEDICARE YEARLY EXAM 12/7/2018 BREAST CANCER SCRN MAMMOGRAM 3/2/2020 *Topic was postponed. The date shown is not the original due date. Allergies as of 3/15/2018  Review Complete On: 3/15/2018 By: Layne Fontenot MD  
  
 Severity Noted Reaction Type Reactions Gluten  07/22/2016    Other (comments) GI upset Morphine  03/07/2016    Rash Stadol [Butorphanol Tartrate]  03/07/2016    Other (comments) Made her have palpitations Ultram [Tramadol]  03/07/2016    Other (comments) Made her have palpitations Carisoprodol Low 08/07/2013    Other (comments) Constipaton Current Immunizations  Reviewed on 7/22/2016 Name Date Influenza Vaccine 3/18/2016 12:00 AM, 10/8/2010 12:00 AM, 9/28/2009 12:00 AM, 12/5/2008 12:00 AM  
 Influenza Vaccine (Quad) 10/28/2011 12:00 AM  
 Influenza Vaccine (Quad) PF 12/6/2017, 9/19/2017  5:12 PM, 12/2/2016 Novel Influenza-H1N1-09, All Formulations 1/18/2010 12:00 AM  
 Pneumococcal Polysaccharide (PPSV-23) 2/9/2013 12:00 AM  
  
 Not reviewed this visit You Were Diagnosed With   
  
 Codes Comments Essential hypertension    -  Primary ICD-10-CM: I10 
ICD-9-CM: 401.9 Dyslipidemia     ICD-10-CM: E78.5 ICD-9-CM: 272.4 Memory impairment     ICD-10-CM: R41.3 ICD-9-CM: 780.93   
 Chronic pain syndrome     ICD-10-CM: G89.4 ICD-9-CM: 338.4 Primary insomnia     ICD-10-CM: F51.01 
ICD-9-CM: 307.42 Medication refill     ICD-10-CM: Z76.0 ICD-9-CM: V68.1 Vitals BP Pulse Temp Resp Height(growth percentile) Weight(growth percentile) 136/80 66 98.1 °F (36.7 °C) (Oral) 17 5' 6\" (1.676 m) 219 lb (99.3 kg) SpO2 BMI OB Status Smoking Status 99% 35.35 kg/m2 Hysterectomy Never Smoker Vitals History BMI and BSA Data Body Mass Index Body Surface Area  
 35.35 kg/m 2 2.15 m 2 Preferred Pharmacy Pharmacy Name Phone [address] [facility], [address] [place] [address] [phone] Your Updated Medication List  
  
   
This list is accurate as of 3/15/18 12:38 PM.  Always use your most recent med list.  
  
  
  
  
 aspirin 81 mg chewable tablet Take 1 Tab by mouth daily. atorvastatin 10 mg tablet Commonly known as:  LIPITOR Take 1 Tab by mouth daily. Cetirizine 10 mg Cap Take 1 Can by mouth daily. Indications: SEASONAL ALLERGIC RHINITIS  
  
 cyanocobalamin 1,000 mcg tablet Commonly known as:  VITAMIN B-12 Take 1 Tab by mouth daily. fluticasone 50 mcg/actuation nasal spray Commonly known as:  Jeppie [place] 2 Sprays by Both Nostrils route daily. Indications: Allergic Rhinitis  
  
 montelukast 10 mg tablet Commonly known as:  SINGULAIR  
TAKE 1 TABLET DAILY FOR MAINTENANCE THERAPY FOR ASTHMA  
  
 multivitamin tablet Commonly known as:  ONE A DAY Take 1 Tab by mouth daily. omeprazole 40 mg capsule Commonly known as:  PRILOSEC Take 1 Cap by mouth daily. ONE-A-DAY WOMENS FORMULA PO Take 2 Tabs by Mouth Once a Day. oxyCODONE-acetaminophen  mg per tablet Commonly known as:  PERCOCET 10 Take 1 Tab by mouth every four (4) hours as needed for Pain. Max Daily Amount: 6 Tabs. potassium chloride 20 mEq tablet Commonly known as:  K-DUR, KLOR-CON  
 Take 1 Tab by mouth two (2) times a day. Indications: hypokalemia  
  
 rOPINIRole 3 mg Tab tab Commonly known as:  Carren Bach Take 1 Tab by mouth two (2) times a day. Indications: Restless Legs Syndrome  
  
 venlafaxine-SR 75 mg capsule Commonly known as:  EFFEXOR-XR Take 3 capsules by mouth daily  Indications: ANXIETY WITH DEPRESSION  
  
 zolpidem CR 12.5 mg tablet Commonly known as:  AMBIEN CR Take 1 Tab by mouth nightly as needed for Sleep. Max Daily Amount: 12.5 mg.  
  
  
  
  
Prescriptions Printed Refills  
 zolpidem CR (AMBIEN CR) 12.5 mg tablet 1 Sig: Take 1 Tab by mouth nightly as needed for Sleep. Max Daily Amount: 12.5 mg.  
 Class: Print Route: Oral  
 aspirin 81 mg chewable tablet 4 Sig: Take 1 Tab by mouth daily. Class: Print Route: Oral  
 multivitamin (ONE A DAY) tablet 4 Sig: Take 1 Tab by mouth daily. Class: Print Route: Oral  
 cyanocobalamin (VITAMIN B-12) 1,000 mcg tablet 4 Sig: Take 1 Tab by mouth daily. Class: Print Route: Oral  
  
Follow-up Instructions Return in about 3 months (around 6/15/2018) for htn, cholesterol, chronic pain, Med refills. Introducing [facility]! Dear Chase Chiu: Thank you for requesting a [employer] account. Our records indicate that you already have an active [employer] account. You can access your account anytime at [email or web address] Did you know that you can access your hospital and ER discharge instructions at any time in [employer]? You can also review all of your test results from your hospital stay or ER visit. Additional Information If you have questions, please visit the Frequently Asked Questions section of the [employer] website at [email or web address] Remember, [employer] is NOT to be used for urgent needs. For medical emergencies, dial 911. Now available from your iPhone and Android! Please provide this summary of care documentation to your next provider. Your primary care clinician is listed as [facility]. If you have any questions after today's visit, please call [phone].

## 2018-03-15 NOTE — PROGRESS NOTES
HISTORY OF PRESENT ILLNESS  Savanah Medina is a 57 y.o. female. Visit Vitals    /80    Pulse 66    Temp 98.1 °F (36.7 °C) (Oral)    Resp 17    Ht 5' 6\" (1.676 m)    Wt 219 lb (99.3 kg)    SpO2 99%    BMI 35.35 kg/m2       HPI Comments: She is on percocet. But she reports she is not using it every day now. Has tried to take only as needed. Some days 3-4 per day. She has an appt later this month 3/27 for pain management. She had been taking 3  800 mg ibuprofen per day. Still using a heating pad and resting a bit. She is seeing PT on her shoulder  She will be having knee surgery late in May. Hypertension    The history is provided by the patient. This is a chronic problem. The current episode started more than 1 week ago. The problem has not changed since onset. Pertinent negatives include no confusion. There are no associated agents to hypertension. Risk factors include postmenopause and stress. Memory Loss    The history is provided by the patient. This is a chronic problem. The current episode started more than 1 week ago. The problem has not changed since onset. Pertinent negatives include no confusion and no hallucinations. Risk factors: h/o head trauma, h/o depression, stress. Her past medical history is significant for hypertension. Medication Refill   The history is provided by the patient. This is a new problem. Review of Systems   Psychiatric/Behavioral: Positive for memory loss. Negative for confusion and hallucinations. Physical Exam   Constitutional: She is oriented to person, place, and time. She appears well-developed and well-nourished. No distress. Cardiovascular: Normal rate. Pulmonary/Chest: Effort normal.   Musculoskeletal: She exhibits no edema. Flexion of neck limited. Very tight paraspinus muscles. Neurological: She is alert and oriented to person, place, and time. Slight left facial droop, chronic   Skin: Skin is warm and dry.  She is not diaphoretic. Psychiatric: She has a normal mood and affect. Nursing note and vitals reviewed. ASSESSMENT and PLAN    ICD-10-CM ICD-9-CM    1. Essential hypertension I10 401.9    2. Dyslipidemia E78.5 272.4    3. Memory impairment R41.3 780.93    4. Chronic pain syndrome G89.4 338.4    5. Primary insomnia F51.01 307.42    6. Medication refill Z76.0 V68.1 zolpidem CR (AMBIEN CR) 12.5 mg tablet      aspirin 81 mg chewable tablet      multivitamin (ONE A DAY) tablet      cyanocobalamin (VITAMIN B-12) 1,000 mcg tablet       BP controlled    Lab done in January was good    Has enough percocet until her appt with pain management. Refills as noted. Pr requested a few ambien for sleep. State it worked well for her in the past with no side effects.     F/u 3-4 months for BP, chol

## 2018-03-15 NOTE — PROGRESS NOTES
Identified pt with two pt identifiers(name and ). Reviewed record in preparation for visit and have obtained necessary documentation. Chief Complaint   Patient presents with    Hypertension     2mo f/u    Memory Loss    Medication Refill     pt requesting chewable baby aspirin (90day supply), multi-vitamin be printed so she can take to [facility]      Health Maintenance Due   Topic    FOBT Q 1 YEAR AGE 50-75      Coordination of Care Questionnaire:  :   1) Have you been to an emergency room, urgent care clinic since your last visit? no   Hospitalized since your last visit? no             2. Have seen or consulted any other health care provider since your last visit? YES  If yes, where when, and reason for visit?    -Dr. Chandler Dandy @ [facility] for knee (Right knee replacement coming up 2018)  -PT on both shoulders    Patient is accompanied by self I have received verbal consent from Lauren Mulligan to discuss any/all medical information while they are present in the room.     Nurse unable to find requested pharmacy in EMR, pt gave the following location info:  [facility] [phone] on [address]

## 2018-03-22 ENCOUNTER — PATIENT OUTREACH (OUTPATIENT)
Dept: INTERNAL MEDICINE CLINIC | Age: 63
End: 2018-03-22

## 2018-03-26 RX ORDER — FUROSEMIDE 20 MG/1
TABLET ORAL
COMMUNITY
Start: 2016-06-10 | End: 2018-09-27 | Stop reason: SDUPTHER

## 2018-03-26 RX ORDER — AMLODIPINE BESYLATE 5 MG/1
5 TABLET ORAL
COMMUNITY
Start: 2018-03-21 | End: 2018-05-15

## 2018-03-26 RX ORDER — NALOXONE HYDROCHLORIDE 0.4 MG/ML
0.4 INJECTION, SOLUTION INTRAMUSCULAR; INTRAVENOUS; SUBCUTANEOUS
COMMUNITY
Start: 2018-03-20 | End: 2018-03-31 | Stop reason: SDUPTHER

## 2018-03-26 RX ORDER — HYOSCYAMINE SULFATE 0.38 MG/1
0.38 TABLET, EXTENDED RELEASE ORAL
COMMUNITY
Start: 2017-05-09 | End: 2019-04-15

## 2018-03-26 NOTE — PROGRESS NOTES
Chris Bhagat is a 57 y.o. female   This patient was received as a referral from inpatient admission   Inpatient RRAT Score: 15 - Moderate Risk  Patient's challenges to self management identified:  housing, financial, depression, ineffective coping, lack of knowledge about disease, level of motivation, medication management, PCP relationship, stages of grief, support system, transportation, utilization of services    Medication Management:  poor adherence, poor understanding - she has not started Norvasc - was unsure is she should. Instructed to go ahead and get Rx filled and start medication. Questionable medication compliance after reviewing medication dispense record. Patient states used to have CPAP machine at home but couldn't tolerate it so doesn't use it. She is presently living in a shelter \"apartment\" where she has to sign in regularly. Also states HHN and therapist have been out to see her. Summary of patients top three problems:     Problem 1: Understanding of medications      Problem 2: Housing     Problem 3: financial    Patients motivational level on a scale of 0-10: 3  Advance Care Planning:   Patient was offered the opportunity to discuss advance care planning:  no     Does patient have an Advance Directive:  unknown   If no, did you provide information on Advance Care Planning?  no     [employer], Referrals, and Durable Medical Equipment: walker      Future Appointments  Date Time Provider Cory Simentali   3/31/2018 10:30 AM Grayson Beauchamp [place] [address] [facility]   5/15/2018 1:45 PM Grayson Beauchamp [place] [address] [facility]       Goals      Knowledge and adherence of prescribed medication (ie. action, side effects, missed dose, etc.).  Prevent complications post hospitalization. Patient verbalized understanding of all information discussed. Patient has this Nurse Navigators contact information for any further questions, concerns, or needs.

## 2018-03-31 ENCOUNTER — OFFICE VISIT (OUTPATIENT)
Dept: INTERNAL MEDICINE CLINIC | Age: 63
End: 2018-03-31

## 2018-03-31 VITALS
SYSTOLIC BLOOD PRESSURE: 143 MMHG | RESPIRATION RATE: 14 BRPM | HEART RATE: 65 BPM | HEIGHT: 66 IN | OXYGEN SATURATION: 98 % | WEIGHT: 222 LBS | DIASTOLIC BLOOD PRESSURE: 89 MMHG | BODY MASS INDEX: 35.68 KG/M2 | TEMPERATURE: 98.4 F

## 2018-03-31 DIAGNOSIS — G89.4 CHRONIC PAIN SYNDROME: Chronic | ICD-10-CM

## 2018-03-31 DIAGNOSIS — Z76.0 MEDICATION REFILL: ICD-10-CM

## 2018-03-31 DIAGNOSIS — R30.0 DYSURIA: ICD-10-CM

## 2018-03-31 DIAGNOSIS — E87.6 HYPOKALEMIA: ICD-10-CM

## 2018-03-31 DIAGNOSIS — Z09 HOSPITAL DISCHARGE FOLLOW-UP: Primary | ICD-10-CM

## 2018-03-31 DIAGNOSIS — R29.898 WEAKNESS OF BOTH LEGS: ICD-10-CM

## 2018-03-31 PROBLEM — E66.01 SEVERE OBESITY (BMI 35.0-39.9) WITH COMORBIDITY (HCC): Status: ACTIVE | Noted: 2018-03-31

## 2018-03-31 RX ORDER — NALOXONE HYDROCHLORIDE 0.4 MG/ML
0.4 INJECTION, SOLUTION INTRAMUSCULAR; INTRAVENOUS; SUBCUTANEOUS ONCE
Qty: 1 ML | Refills: 0 | Status: SHIPPED | OUTPATIENT
Start: 2018-03-31 | End: 2018-03-31

## 2018-03-31 RX ORDER — ROPINIROLE 1 MG/1
1.5 TABLET, FILM COATED ORAL 2 TIMES DAILY
Qty: 180 TAB | Refills: 0
Start: 2018-03-31 | End: 2019-04-15

## 2018-03-31 RX ORDER — POTASSIUM CHLORIDE 20 MEQ/1
20 TABLET, EXTENDED RELEASE ORAL DAILY
Qty: 90 TAB | Refills: 0
Start: 2018-03-31 | End: 2019-07-15 | Stop reason: SDUPTHER

## 2018-03-31 NOTE — MR AVS SNAPSHOT
[facility] 
 
 
 [address] [address] 
[phone] Patient: Pillo Nair MRN: [id number] Visit Information Date & Time Provider Department Dept. Phone Encounter #  
 3/31/2018 10:30 AM Keshav Hernandez MD [facility] [phone] [id number] Follow-up Instructions Return if symptoms worsen or fail to improve, for as appt in May. Your Appointments 5/15/2018  1:45 PM  
PHYSICAL with Keshav Hernandez MD  
[facility]) Appt Note: 2 month f/u Chol HTN, chronic pain, PRE OP: R knee replacement [address]  
  
   
 [address] [facility] Upcoming Health Maintenance Date Due FOBT Q 1 YEAR AGE 50-75 12/8/2017 Bone Densitometry (Dexa) Screening 6/29/2018 ZOSTER VACCINE AGE 60> 11/2/2018* DTaP/Tdap/Td series (1 - Tdap) 12/6/2018* MEDICARE YEARLY EXAM 12/7/2018 BREAST CANCER SCRN MAMMOGRAM 3/2/2020 *Topic was postponed. The date shown is not the original due date. Allergies as of 3/31/2018  Review Complete On: 3/31/2018 By: Keshav Hernandez MD  
  
 Severity Noted Reaction Type Reactions Gluten  07/22/2016    Other (comments) GI upset Morphine  03/07/2016    Rash Stadol [Butorphanol Tartrate]  03/07/2016    Other (comments) Made her have palpitations Ultram [Tramadol]  03/07/2016    Other (comments) Made her have palpitations Carisoprodol Low 08/07/2013    Other (comments) Constipaton Current Immunizations  Reviewed on 7/22/2016 Name Date Influenza Vaccine 3/18/2016 12:00 AM, 10/8/2010 12:00 AM, 9/28/2009 12:00 AM, 12/5/2008 12:00 AM  
 Influenza Vaccine (Quad) 10/28/2011 12:00 AM  
 Influenza Vaccine (Quad) PF 12/6/2017, 9/19/2017  5:12 PM, 12/2/2016 Novel Influenza-H1N1-09, All Formulations 1/18/2010 12:00 AM  
 Pneumococcal Polysaccharide (PPSV-23) 2/9/2013 12:00 AM  
  
 Not reviewed this visit You Were Diagnosed With   
  
 Codes Comments Hospital discharge follow-up    -  Primary ICD-10-CM: [facility] ICD-9-CM: V67.59 Weakness of both legs     ICD-10-CM: R29.898 ICD-9-CM: 729.89 Chronic pain syndrome     ICD-10-CM: G89.4 ICD-9-CM: 338. 4 Dysuria     ICD-10-CM: R30.0 ICD-9-CM: 788.1 Hypokalemia     ICD-10-CM: E87.6 ICD-9-CM: 276.8 Medication refill     ICD-10-CM: Z76.0 ICD-9-CM: V68.1 Vitals BP Pulse Temp Resp Height(growth percentile) Weight(growth percentile) 143/89 65 98.4 °F (36.9 °C) (Oral) 14 5' 6\" (1.676 m) 222 lb (100.7 kg) SpO2 BMI OB Status Smoking Status 98% 35.83 kg/m2 Hysterectomy Never Smoker BMI and BSA Data Body Mass Index Body Surface Area  
 35.83 kg/m 2 2.17 m 2 Preferred Pharmacy Pharmacy Name Phone [address] [facility], [address] [place] [address] [phone] Your Updated Medication List  
  
   
This list is accurate as of 3/31/18 11:32 AM.  Always use your most recent med list. amLODIPine 5 mg tablet Commonly known as:  Last Otter 5 mg. aspirin 81 mg chewable tablet Take 1 Tab by mouth daily. atorvastatin 10 mg tablet Commonly known as:  LIPITOR Take 1 Tab by mouth daily. Cetirizine 10 mg Cap Take 1 Can by mouth daily. Indications: SEASONAL ALLERGIC RHINITIS  
  
 cyanocobalamin 1,000 mcg tablet Commonly known as:  VITAMIN B-12 Take 1 Tab by mouth daily. fluticasone 50 mcg/actuation nasal spray Commonly known as:  Marlaine Romance 2 Sprays by Both Nostrils route daily. Indications: Allergic Rhinitis  
  
 furosemide 20 mg tablet Commonly known as:  LASIX TAKE 1 TABLET DAILY AS NEEDED FOR SWELLING  
  
 hyoscyamine SR 0.375 mg SR tablet Commonly known as:  LEVBID  
 0.375 mg.  
  
 montelukast 10 mg tablet Commonly known as:  SINGULAIR  
TAKE 1 TABLET DAILY FOR MAINTENANCE THERAPY FOR ASTHMA  
  
 multivitamin tablet Commonly known as:  ONE A DAY Take 1 Tab by mouth daily. naloxone 0.4 mg/mL injection Commonly known as:  NARCAN  
1 mL by IntraMUSCular route once for 1 dose. Indications: OPIATE-INDUCED RESPIRATORY DEPRESSION  
  
 omeprazole 40 mg capsule Commonly known as:  PRILOSEC Take 1 Cap by mouth daily. oxyCODONE-acetaminophen  mg per tablet Commonly known as:  PERCOCET 10 Take 1 Tab by mouth every four (4) hours as needed for Pain. Max Daily Amount: 6 Tabs. potassium chloride 20 mEq tablet Commonly known as:  K-DUR, KLOR-CON Take 1 Tab by mouth daily. Indications: hypokalemia  
  
 rOPINIRole 1 mg tablet Commonly known as:  Vallonia Dines Take 1.5 Tabs by mouth two (2) times a day. Indications: Restless Legs Syndrome  
  
 venlafaxine-SR 75 mg capsule Commonly known as:  EFFEXOR-XR Take 3 capsules by mouth daily  Indications: ANXIETY WITH DEPRESSION Prescriptions Sent to Pharmacy Refills  
 naloxone (NARCAN) 0.4 mg/mL injection 0 Si mL by IntraMUSCular route once for 1 dose. Indications: OPIATE-INDUCED RESPIRATORY DEPRESSION Class: Normal  
 Pharmacy: [address] [place] [address] #: [phone] Route: IntraMUSCular We Performed the Following REFERRAL TO UROLOGY [FHG971 Custom] Comments:  
 Difficulty emptying bladder Follow-up Instructions Return if symptoms worsen or fail to improve, for as appt in May. Referral Information Referral ID Referred By Referred To  
  
 [id number] [address], [address] [place] [address], MD   
   [address] [place] [address] Phone: [phone] Fax: [phone] Visits Status Start Date End Date 1 New Request 3/31/18 3/31/19 If your referral has a status of pending review or denied, additional information will be sent to support the outcome of this decision. Introducing [facility] SERVICES! Dear Sabrina Shi: Thank you for requesting a Quartzy account. Our records indicate that you already have an active Quartzy account. You can access your account anytime at [email or web address] Did you know that you can access your hospital and ER discharge instructions at any time in Quartzy? You can also review all of your test results from your hospital stay or ER visit. Additional Information If you have questions, please visit the Frequently Asked Questions section of the Quartzy website at [email or web address] Remember, Quartzy is NOT to be used for urgent needs. For medical emergencies, dial 911. Now available from your iPhone and Android! Please provide this summary of care documentation to your next provider. Your primary care clinician is listed as [facility]. If you have any questions after today's visit, please call [phone].

## 2018-03-31 NOTE — PROGRESS NOTES
ROOM # 8    Gema Amaro presents today for   Chief Complaint   Patient presents with   [facility] Follow Up       Gema Amaro preferred language for health care discussion is english/other. Is someone accompanying this pt? no    Is the patient using any DME equipment during [address]? Yes walker    Depression Screening:  PHQ over the last two weeks 9/19/2017 7/22/2016   PHQ Not Done - Active Diagnosis of Depression or Bipolar Disorder   Little interest or pleasure in doing things Nearly every day -   Feeling down, depressed or hopeless Nearly every day -   Total Score PHQ 2 6 -       Learning Assessment:  Learning Assessment 7/22/2016   PRIMARY LEARNER Patient   HIGHEST LEVEL OF EDUCATION - PRIMARY LEARNER  SOME COLLEGE   PRIMARY LANGUAGE ENGLISH   LEARNER PREFERENCE PRIMARY DEMONSTRATION   ANSWERED BY TINO Ga   RELATIONSHIP SELF       Abuse Screening:  Abuse Screening Questionnaire 12/6/2017 7/22/2016   Do you ever feel afraid of your partner? N N   Are you in a relationship with someone who physically or mentally threatens you? N N   Is it safe for you to go home? Y Y       Fall Risk  No flowsheet data found. Health Maintenance reviewed and discussed per provider. Yes    Gema Amaro is due for   Health Maintenance Due   Topic Date Due    FOBT Q 1 YEAR AGE 50-75  12/08/2017    Bone Densitometry (Dexa) Screening  06/29/2018     Please order/place referral if appropriate. Advance Directive:  1. Do you have an advance directive in place? Patient Reply: no    2. If not, would you like material regarding how to put one in place? Patient Reply: no    Coordination of Care:  1. Have you been to the ER, urgent care clinic since your last visit? Hospitalized since your last visit? Yes [address] [place] [address]    2. Have you seen or consulted any other health care providers outside of the [address] [place] [address] since your last visit? Include any pap smears or colon screening.  no

## 2018-03-31 NOTE — PROGRESS NOTES
HISTORY OF PRESENT ILLNESS  Chris Bhagat is a 57 y.o. female. Visit Vitals    /89    Pulse 65    Temp 98.4 °F (36.9 °C) (Oral)    Resp 14    Ht 5' 6\" (1.676 m)    Wt 222 lb (100.7 kg)    SpO2 98%    BMI 35.83 kg/m2       HPI Comments: She was in the hospital with weakness of the legs. She was diagnosed with probable medication misuse and overdose of opiates. She had lumbar MRI which did not find anything new. The prior surgical site was fine    She also reports problem emptying her bladder. But, again, the MRI did not find any compromise of the cauda equina    MRI t-spine found also some degenerative changes to the disc and joints but nothing to cause her sxs    Many of her meds were held per pt. She says she was told not to take a lot of meds but her D/C summary says otherwise. They did change her to the oxycodone from percocet  They told her to stop the [facility] and the xanax  They decreased  Her requip dose    She did not bring any paperwork with her! She is having home health coming out    Basically the cut back several of her meds            Hospital Follow Up   The history is provided by the patient. This is a new problem. ROS    Physical Exam   Constitutional: She is oriented to person, place, and time. She appears well-developed and well-nourished. No distress. Cardiovascular: Normal rate. Pulmonary/Chest: Effort normal.   Neurological: She is alert and oriented to person, place, and time. Skin: Skin is warm and dry. She is not diaphoretic. Nursing note and vitals reviewed. ASSESSMENT and PLAN    ICD-10-CM ICD-9-CM    1. Hospital discharge follow-up Z09 V67.59    2. Weakness of both legs R29.898 729.89    3. Chronic pain syndrome G89.4 338.4    4. Dysuria R30.0 788.1 REFERRAL TO UROLOGY   5. Hypokalemia E87.6 276.8 potassium chloride (K-DUR, KLOR-CON) 20 mEq tablet   6.  Medication refill Z76.0 V68.1 rOPINIRole (REQUIP) 1 mg tablet      potassium chloride (K-DUR, KLOR-CON) 20 mEq tablet     Available hospital/ER record reviewed  Reviewed med changes again with pt. She is somewhat confused by them    She will also discuss with pain management     Leg cramps and \"weakness\" is still present off and on. But nothing was found in the hospital. Neurology was involved but I did not see a note from them. Bladder sxs. ? Med vs other. She rarely takes the levsin which can interfere.  Refer to urology    Continue lower dose of potassium and requip  She will discuss with pain management    rx given for naloxone since she says the hospital did not give her one    F/u as appt in May

## 2018-04-06 ENCOUNTER — TELEPHONE (OUTPATIENT)
Dept: INTERNAL MEDICINE CLINIC | Age: 63
End: 2018-04-06

## 2018-04-06 ENCOUNTER — PATIENT OUTREACH (OUTPATIENT)
Dept: INTERNAL MEDICINE CLINIC | Age: 63
End: 2018-04-06

## 2018-04-06 NOTE — TELEPHONE ENCOUNTER
Patient seen for her back pain today her levels are 8 out of 10 she has Percocet and Scot Klaudia for breakthrough pain has not been filled as of yet just calling to let us know any other questions regarding please call José Antonio Ramirez at [phone]

## 2018-04-06 NOTE — PROGRESS NOTES
Follow-Up      Date/Time:  4/6/2018 3:35 PM    Patient was admitted to [facility] with weakness. She came in and saw Dr. Watt Fleischer in post hospital follow up on 3/31/18. Patient states she is doing ok however continues to have a problem with leg and arm cramping and chronic pain. She has a follow up appointment with Dr. Renata Nix at the pain management office in May. She also has an appointment with urology on April 16th. She said that the home health nurses are still coming and seeing her. Update on   Goals      Knowledge and adherence of prescribed medication (ie. action, side effects, missed dose, etc.). Pt is adhering to medication regime at this time.  Prevent complications post hospitalization. Patient has not had a readmission and is aware of symptoms to watch for and report if they should occur            Will continue to follow.     PCP/Specialist follow up: Future Appointments  Date Time Provider Cory Daniels   4/16/2018 1:15 PM Kimo Vasquez MD [facility]   5/15/2018 1:45 PM Edward Montana MD [address] [facility]

## 2018-04-10 NOTE — TELEPHONE ENCOUNTER
Maximiliano Chaney from [facility] called about pt pain stated that pt did not  her prescription for narcan as the pharmacy was out of it stated understanding. She also stated that pt has an upcoming appt with pain management in May stated understanding and that pt did inform Dr Becca Rivera of this at last appt she stated understanding and that she just had to inform us of pt pain stated understanding no other questions or concerns noted at this time.

## 2018-04-20 ENCOUNTER — PATIENT OUTREACH (OUTPATIENT)
Dept: INTERNAL MEDICINE CLINIC | Age: 63
End: 2018-04-20

## 2018-04-20 NOTE — PROGRESS NOTES
Follow-Up      Date/Time:  2018 12:38 PM    I contacted the patient today  by telephone in follow up. Verified name and  with patient as identifiers. Patient states she is doing well. She states that Home health has finished coming to see her. She continues to exercise (walking) daily - she has a short course that she walks every day. She is in chronic pain and states that most of the time her pain level is an 8. She has an appt with pain management on 5/3/18. She is taking her medication as prescribed. Update on   Goals      Knowledge and adherence of prescribed medication (ie. action, side effects, missed dose, etc.). Pt is adhering to medication regime at this time. Patient continues to take medications as prescribed.  Prevent complications post hospitalization. Patient has not had a readmission and is aware of symptoms to watch for and report if they should occur    Patient continues to do well without readmission            Patient has graduated from the Transitions of Care Coordination  program on 18. Patient's symptoms are stable at this time. Patient/family has the ability to self-manage. Care management goals have been completed at this time. No further nurse navigator follow up scheduled. Pt has nurse navigator's contact information for any further questions, concerns, or needs.   Patients upcoming visits:  Future Appointments  Date Time Provider Cory Daniels   2018 2:00 PM MD Ellis Burch   5/15/2018 1:45 PM Chuyita Bowles [place] [id number] [facility]

## 2018-05-15 ENCOUNTER — OFFICE VISIT (OUTPATIENT)
Dept: INTERNAL MEDICINE CLINIC | Age: 63
End: 2018-05-15

## 2018-05-15 VITALS
TEMPERATURE: 98.1 F | OXYGEN SATURATION: 97 % | WEIGHT: 227.6 LBS | SYSTOLIC BLOOD PRESSURE: 124 MMHG | HEART RATE: 73 BPM | HEIGHT: 66 IN | BODY MASS INDEX: 36.58 KG/M2 | DIASTOLIC BLOOD PRESSURE: 69 MMHG | RESPIRATION RATE: 20 BRPM

## 2018-05-15 DIAGNOSIS — Z01.818 PRE-OP EXAM: Primary | ICD-10-CM

## 2018-05-15 DIAGNOSIS — Z76.0 MEDICATION REFILL: ICD-10-CM

## 2018-05-15 DIAGNOSIS — M17.11 PRIMARY OSTEOARTHRITIS OF RIGHT KNEE: ICD-10-CM

## 2018-05-15 DIAGNOSIS — I10 ESSENTIAL HYPERTENSION: Chronic | ICD-10-CM

## 2018-05-15 DIAGNOSIS — Z01.818 PRE-OP EXAM: ICD-10-CM

## 2018-05-15 DIAGNOSIS — E78.5 DYSLIPIDEMIA: Chronic | ICD-10-CM

## 2018-05-15 DIAGNOSIS — J45.41 MODERATE PERSISTENT ASTHMA WITH ACUTE EXACERBATION: Chronic | ICD-10-CM

## 2018-05-15 DIAGNOSIS — G89.4 CHRONIC PAIN SYNDROME: Chronic | ICD-10-CM

## 2018-05-15 RX ORDER — MONTELUKAST SODIUM 10 MG/1
10 TABLET ORAL DAILY
Qty: 90 TAB | Refills: 4 | Status: SHIPPED | OUTPATIENT
Start: 2018-05-15 | End: 2019-01-05

## 2018-05-15 RX ORDER — ATORVASTATIN CALCIUM 10 MG/1
10 TABLET, FILM COATED ORAL DAILY
Qty: 90 TAB | Refills: 4 | Status: SHIPPED | OUTPATIENT
Start: 2018-05-15 | End: 2019-01-05

## 2018-05-15 RX ORDER — QUETIAPINE FUMARATE 50 MG/1
50 TABLET, FILM COATED ORAL DAILY
Qty: 90 TAB | Refills: 4 | Status: SHIPPED | OUTPATIENT
Start: 2018-05-15 | End: 2019-04-15

## 2018-05-15 RX ORDER — OMEPRAZOLE 40 MG/1
40 CAPSULE, DELAYED RELEASE ORAL DAILY
Qty: 90 CAP | Refills: 4 | Status: SHIPPED | OUTPATIENT
Start: 2018-05-15 | End: 2019-07-29 | Stop reason: SDUPTHER

## 2018-05-15 RX ORDER — ACYCLOVIR 400 MG/1
400 TABLET ORAL 3 TIMES DAILY
Qty: 90 TAB | Refills: 4 | Status: SHIPPED | OUTPATIENT
Start: 2018-05-15 | End: 2019-07-29 | Stop reason: SDUPTHER

## 2018-05-15 RX ORDER — OXYCODONE AND ACETAMINOPHEN 10; 325 MG/1; MG/1
1 TABLET ORAL
Qty: 100 TAB | Refills: 0 | Status: SHIPPED | OUTPATIENT
Start: 2018-05-15 | End: 2018-08-07 | Stop reason: SDUPTHER

## 2018-05-15 RX ORDER — CETIRIZINE HCL 10 MG
10 TABLET ORAL DAILY
Qty: 90 TAB | Refills: 4 | Status: SHIPPED | OUTPATIENT
Start: 2018-05-15 | End: 2019-05-15 | Stop reason: SDUPTHER

## 2018-05-15 RX ORDER — BENZONATATE 200 MG/1
200 CAPSULE ORAL
Qty: 90 CAP | Refills: 4 | Status: SHIPPED | OUTPATIENT
Start: 2018-05-15 | End: 2019-05-16
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
## 2018-05-15 NOTE — MR AVS SNAPSHOT
[address] [facility] 
 
 
 [address] [facility] 83 [address] 
[phone] Patient: Raiza Monet MRN: [id number] Visit Information Date & Time Provider Department Dept. Phone Encounter #  
 5/15/2018  1:45 PM [phone] Follow-up Instructions Return in about 4 weeks (around 6/12/2018) for hospital f/u, knee surgery. Your Appointments 8/31/2018 10:00 AM  
New Patient with Nrom Hilton PT [facility] ([address] [place] [address]) Appt Note: NP - CARY - NPPW **REQ'D TO WAIT UNTIL AUG/SEPT FOR APPT**  
 [address] 2 [facility] 68 [address]  
  
    
 12/10/2018 10:00 AM  
ESTABLISHED PATIENT with Dylon Velasco NP [facility] ([address] [place] [address]) Appt Note: Return in about 7 months (around 12/10/2018) for UUI, FRANCK with NP  
 [address] [place] [address]  
[phone]  
  
   
 [place] [id number] Upcoming Health Maintenance Date Due FOBT Q 1 YEAR AGE 50-75 12/8/2017 Bone Densitometry (Dexa) Screening 6/29/2018 ZOSTER VACCINE AGE 60> 11/2/2018* DTaP/Tdap/Td series (1 - Tdap) 12/6/2018* Influenza Age 5 to Adult 8/1/2018 MEDICARE YEARLY EXAM 12/7/2018 BREAST CANCER SCRN MAMMOGRAM 3/2/2020 *Topic was postponed. The date shown is not the original due date. Allergies as of 5/15/2018  Review Complete On: 5/15/2018 By: Kelton Oropeza MD  
  
 Severity Noted Reaction Type Reactions Gluten  07/22/2016    Other (comments) GI upset Morphine  03/07/2016    Rash Stadol [Butorphanol Tartrate]  03/07/2016    Other (comments) Made her have palpitations Ultram [Tramadol]  03/07/2016    Other (comments) Made her have palpitations Carisoprodol Low 08/07/2013    Other (comments) Constipaton Current Immunizations  Reviewed on 5/14/2018 Name Date Influenza Vaccine 12/6/2017 12:00 AM, 3/18/2016 12:00 AM, 10/8/2010 12:00 AM, 9/28/2009 12:00 AM, 12/5/2008 12:00 AM  
 Influenza Vaccine (Quad) 10/28/2011 12:00 AM  
 Influenza Vaccine (Quad) PF 12/6/2017, 9/19/2017  5:12 PM, 12/2/2016 Novel Influenza-H1N1-09, All Formulations 1/18/2010 12:00 AM  
 Pneumococcal Polysaccharide (PPSV-23) 2/9/2013 12:00 AM  
  
 Not reviewed this visit You Were Diagnosed With   
  
 Codes Comments Pre-op exam    -  Primary ICD-10-CM: U64.980 ICD-9-CM: V72.84 Primary osteoarthritis of right knee     ICD-10-CM: M17.11 ICD-9-CM: 715.16 Essential hypertension     ICD-10-CM: I10 
ICD-9-CM: 401.9 Dyslipidemia     ICD-10-CM: E78.5 ICD-9-CM: 272.4 Chronic pain syndrome     ICD-10-CM: G89.4 ICD-9-CM: 338.4 Moderate persistent asthma with acute exacerbation     ICD-10-CM: J45.41 
ICD-9-CM: 493.92 Medication refill     ICD-10-CM: Z76.0 ICD-9-CM: V68.1 Vitals BP Pulse Temp Resp Height(growth percentile) Weight(growth percentile) 124/69 (BP 1 Location: Left arm, BP Patient Position: Sitting) 73 98.1 °F (36.7 °C) (Oral) 20 5' 6\" (1.676 m) 227 lb 9.6 oz (103.2 kg) SpO2 BMI OB Status Smoking Status 97% 36.74 kg/m2 Hysterectomy Never Smoker Vitals History BMI and BSA Data Body Mass Index Body Surface Area 36.74 kg/m 2 2.19 m 2 Preferred Pharmacy Pharmacy Name Phone [address], [place] [phone] Your Updated Medication List  
  
   
This list is accurate as of 5/15/18  3:00 PM.  Always use your most recent med list.  
  
  
  
  
 acyclovir 400 mg tablet Commonly known as:  ZOVIRAX Take 1 Tab by mouth three (3) times daily. Indications: herpes simplex infection  
  
 aspirin 81 mg chewable tablet Take 1 Tab by mouth daily. atorvastatin 10 mg tablet Commonly known as:  LIPITOR Take 1 Tab by mouth daily. benzonatate 200 mg capsule Commonly known as:  TESSALON Take 1 Cap by mouth three (3) times daily as needed for Cough. Indications: Cough * Cetirizine 10 mg Cap Take 1 Can by mouth daily. Indications: SEASONAL ALLERGIC RHINITIS * cetirizine 10 mg tablet Commonly known as:  ZYRTEC Take 1 Tab by mouth daily. cyanocobalamin 1,000 mcg tablet Commonly known as:  VITAMIN B-12 Take 1 Tab by mouth daily. fluticasone 50 mcg/actuation nasal spray Commonly known as:  Ruthe Para 2 Sprays by Both Nostrils route daily. Indications: Allergic Rhinitis  
  
 furosemide 20 mg tablet Commonly known as:  LASIX TAKE 1 TABLET DAILY AS NEEDED FOR SWELLING  
  
 hyoscyamine SR 0.375 mg SR tablet Commonly known as:  LEVBID  
0.375 mg.  
  
 * montelukast 10 mg tablet Commonly known as:  SINGULAIR  
TAKE 1 TABLET DAILY FOR MAINTENANCE THERAPY FOR ASTHMA * montelukast 10 mg tablet Commonly known as:  SINGULAIR Take 1 Tab by mouth daily. Indications: MAINTENANCE THERAPY FOR ASTHMA  
  
 multivitamin tablet Commonly known as:  ONE A DAY Take 1 Tab by mouth daily. naloxone 2 mg/actuation Spry Use 1 spray intranasally into 1 nostril. Use a new Narcan nasal spray for subsequent doses and administer into alternating nostrils. May repeat every 2 to 3 minutes as needed. omeprazole 40 mg capsule Commonly known as:  PRILOSEC Take 1 Cap by mouth daily. oxyCODONE-acetaminophen  mg per tablet Commonly known as:  PERCOCET 10 Take 1 Tab by mouth every four (4) hours as needed for Pain. Max Daily Amount: 6 Tabs. potassium chloride 20 mEq tablet Commonly known as:  K-DUR, KLOR-CON Take 1 Tab by mouth daily. Indications: hypokalemia QUEtiapine 50 mg tablet Commonly known as:  SEROquel Take 1 Tab by mouth daily. rOPINIRole 1 mg tablet Commonly known as:  Chelsie Massed Take 1.5 Tabs by mouth two (2) times a day. Indications: Restless Legs Syndrome  
  
 venlafaxine-SR 75 mg capsule Commonly known as:  EFFEXOR-XR Take 3 capsules by mouth daily  Indications: ANXIETY WITH DEPRESSION  
  
 * Notice: This list has 4 medication(s) that are the same as other medications prescribed for you. Read the directions carefully, and ask your doctor or other care provider to review them with you. Prescriptions Printed Refills  
 oxyCODONE-acetaminophen (PERCOCET 10)  mg per tablet 0 Sig: Take 1 Tab by mouth every four (4) hours as needed for Pain. Max Daily Amount: 6 Tabs. Class: Print Route: Oral  
 naloxone 2 mg/actuation spry prn Sig: Use 1 spray intranasally into 1 nostril. Use a new Narcan nasal spray for subsequent doses and administer into alternating nostrils. May repeat every 2 to 3 minutes as needed. Class: Print Prescriptions Sent to Pharmacy Refills  
 acyclovir (ZOVIRAX) 400 mg tablet 4 Sig: Take 1 Tab by mouth three (3) times daily. Indications: herpes simplex infection Class: Normal  
 Pharmacy: [address] [facility] Ph #: [phone] Route: Oral  
 benzonatate (TESSALON) 200 mg capsule 4 Sig: Take 1 Cap by mouth three (3) times daily as needed for Cough. Indications: Cough Class: Normal  
 Pharmacy: [address], [facility] Ph #: [phone] Route: Oral  
 QUEtiapine (SEROQUEL) 50 mg tablet 4 Sig: Take 1 Tab by mouth daily. Class: Normal  
 Pharmacy: [address], [facility] Ph #: [phone] Route: Oral  
 montelukast (SINGULAIR) 10 mg tablet 4 Sig: Take 1 Tab by mouth daily. Indications: MAINTENANCE THERAPY FOR ASTHMA  Class: Normal  
 Pharmacy: [facility] 82 [address] [facility] [address], [place] [employer] Ph #: [phone] Route: Oral  
 omeprazole (PRILOSEC) 40 mg capsule 4 Sig: Take 1 Cap by mouth daily. Class: Normal  
 Pharmacy: [address], [place] [employer] Ph #: [phone] Route: Oral  
 cetirizine (ZYRTEC) 10 mg tablet 4 Sig: Take 1 Tab by mouth daily. Class: Normal  
 Pharmacy: [address], [place] [employer] Ph #: [phone] Route: Oral  
 atorvastatin (LIPITOR) 10 mg tablet 4 Sig: Take 1 Tab by mouth daily. Class: Normal  
 Pharmacy: [address], [place] Ph #: [phone] Route: Oral  
  
Follow-up Instructions Return in about 4 weeks (around 6/12/2018) for hospital f/u, knee surgery. To-Do List   
 05/15/2018 Lab:  CBC WITH AUTOMATED DIFF   
  
 05/15/2018 ECG:  EKG, 12 LEAD, INITIAL   
  
 05/15/2018 Lab:  METABOLIC PANEL, BASIC   
  
 05/15/2018 Lab:  PROTHROMBIN TIME + INR   
  
 05/15/2018 Lab:  PTT   
  
 05/15/2018 Lab:  URINALYSIS W/ RFLX MICROSCOPIC   
  
 05/15/2018 Imaging:  XR CHEST PA LAT Introducing [facility]! Dear Kristel List: Thank you for requesting a [employer] account. Our records indicate that you already have an active [employer] account. You can access your account anytime at [email or web address] Did you know that you can access your hospital and ER discharge instructions at any time in [employer]? You can also review all of your test results from your hospital stay or ER visit. Additional Information If you have questions, please visit the Frequently Asked Questions section of the [employer] website at [email or web address] Remember, [employer] is NOT to be used for urgent needs. For medical emergencies, dial 911. Now available from your iPhone and Android! Please provide this summary of care documentation to your next provider. Your primary care clinician is listed as [facility]. If you have any questions after today's visit, please call [phone].

## 2018-05-15 NOTE — PROGRESS NOTES
ROOM # 6  Pt presents today for pre-op exam. Pt states she has also had a lot of problems with mouth and not being able to wear CPAP due to gums shrinking and being swollen. Nicole Lees presents today for   Chief Complaint   Patient presents with   [facility] Pre-op Exam     R TKR       Nicole Lees preferred language for health care discussion is english/other. Is someone accompanying this pt? no    Is the patient using any DME equipment during [address]? Yes, cane    Depression Screening:  PHQ over the last two weeks 9/19/2017 7/22/2016   PHQ Not Done - Active Diagnosis of Depression or Bipolar Disorder   Little interest or pleasure in doing things Nearly every day -   Feeling down, depressed or hopeless Nearly every day -   Total Score PHQ 2 6 -       Learning Assessment:  Learning Assessment 7/22/2016   PRIMARY LEARNER Patient   HIGHEST LEVEL OF EDUCATION - PRIMARY LEARNER  SOME COLLEGE   PRIMARY LANGUAGE ENGLISH   LEARNER PREFERENCE PRIMARY DEMONSTRATION   ANSWERED BY TINO Ga   RELATIONSHIP SELF       Abuse Screening:  Abuse Screening Questionnaire 12/6/2017 7/22/2016   Do you ever feel afraid of your partner? N N   Are you in a relationship with someone who physically or mentally threatens you? N N   Is it safe for you to go home? Y Y       Health Maintenance reviewed and discussed per provider. Yes    Nicole Lees is due for   Health Maintenance Due   Topic Date Due    FOBT Q 1 YEAR AGE 50-75  12/08/2017    Bone Densitometry (Dexa) Screening  06/29/2018     Please order/place referral if appropriate. Advance Directive:  1. Do you have an advance directive in place? Patient Reply: yes    2. If not, would you like material regarding how to put one in place? Patient Reply: no    Coordination of Care:  1. Have you been to the ER, urgent care clinic since your last visit? Hospitalized since your last visit? no    2.  Have you seen or consulted any other health care providers outside of the [facility] [address] [facility] [address] since your last visit? Include any pap smears or colon screening.  no

## 2018-06-08 ENCOUNTER — TELEPHONE (OUTPATIENT)
Dept: INTERNAL MEDICINE CLINIC | Age: 63
End: 2018-06-08

## 2018-06-12 ENCOUNTER — TELEPHONE (OUTPATIENT)
Dept: INTERNAL MEDICINE CLINIC | Age: 63
End: 2018-06-12

## 2018-06-12 DIAGNOSIS — F32.A DEPRESSIVE DISORDER: Chronic | ICD-10-CM

## 2018-06-12 DIAGNOSIS — R41.3 MEMORY IMPAIRMENT: Chronic | ICD-10-CM

## 2018-06-12 DIAGNOSIS — Z96.651 S/P TKR (TOTAL KNEE REPLACEMENT), RIGHT: Primary | ICD-10-CM

## 2018-06-12 DIAGNOSIS — G89.4 CHRONIC PAIN SYNDROME: Chronic | ICD-10-CM

## 2018-06-12 NOTE — TELEPHONE ENCOUNTER
Alison Jasso from [address] [place] [address] called in regards to [address].  requesting PCP place order for Home Health so insurance will pay for it. For further detail please call  at [phone] (Ext [phone]).

## 2018-06-12 NOTE — TELEPHONE ENCOUNTER
???  Home Health for what? I have not assessed her for that. She should have had Home Health written after her surgery.

## 2018-06-12 NOTE — TELEPHONE ENCOUNTER
Attempted to reach patient regarding orders. No answer; left message for pt to return call to the office at [phone]. Will continue to try to contact patient.

## 2018-06-13 NOTE — TELEPHONE ENCOUNTER
June with [place] . 2 pt identifiers confirmed. States that pt has had surgery but is having a hard time accomplishing ADLs by herself at this time. PT was there yesterday and thinks she needs personal care as well. Need order from PCP for Home Health. Verbalized would notify provider and will be in contact.

## 2018-06-14 NOTE — TELEPHONE ENCOUNTER
Tiana Jones with [place] . 2 pt identifiers confirmed. States that pt has only PT at this time. There is no preference for agency. All pt needs is a home health aide. Verbalized understanding. No further questions or concerns at this time.

## 2018-06-14 NOTE — TELEPHONE ENCOUNTER
Does she have any Home Health now? They always send [facility] after knee surgery along with PT  Is there a preference for agency? Are they asking for anything other than a home health aide?

## 2018-06-19 NOTE — TELEPHONE ENCOUNTER
Curtis Reyes with [facility] called and stated that the patient does not have a home health nurse she needs an order from the doctor to receive home health.  Please call Curtis Reyes at [phone] ext [phone] with any questions please

## 2018-06-20 ENCOUNTER — HOME HEALTH ADMISSION (OUTPATIENT)
Dept: HOME HEALTH SERVICES | Facility: HOME HEALTH | Age: 63
End: 2018-06-20

## 2018-06-21 NOTE — TELEPHONE ENCOUNTER
There are 2 different call threads on this matter. Please check and coordinate. Was on order sent to [facility]?

## 2018-06-21 NOTE — TELEPHONE ENCOUNTER
Tien Atkins with [address]. 2 pt identifiers confirmed. She states that PT came from [facility] so she is assuming it will be [facility].

## 2018-06-25 NOTE — TELEPHONE ENCOUNTER
[address]. 2 pt identifiers confirmed. Pt is currently having nurse aide in the home BID and this will be for 5 weeks. The dates should be between 6/24/18-7/28/18. Verbalized understanding. No further questions or concerns at this time.

## 2018-07-26 DIAGNOSIS — G89.4 CHRONIC PAIN SYNDROME: Chronic | ICD-10-CM

## 2018-07-26 DIAGNOSIS — Z76.0 MEDICATION REFILL: ICD-10-CM

## 2018-07-26 RX ORDER — OXYCODONE AND ACETAMINOPHEN 10; 325 MG/1; MG/1
1 TABLET ORAL
Qty: 100 TAB | Refills: 0 | OUTPATIENT
Start: 2018-07-26

## 2018-07-26 NOTE — TELEPHONE ENCOUNTER
PCP: Veena Pugh MD    Last appt: 7/23/2018  Future Appointments  Date Time Provider Cory Simentali   8/31/2018 10:00 AM Moira Duke PT [facility] JOHNNY FERGUSON   12/10/2018 10:00 AM Dylon Velasco, NP [address]       Requested Prescriptions     Pending Prescriptions Disp Refills    oxyCODONE-acetaminophen (PERCOCET 10)  mg per tablet 100 Tab 0     Sig: Take 1 Tab by mouth every four (4) hours as needed for Pain. Max Daily Amount: 6 Tabs. Request for a 30 or 90 day supply? Provider Discretion    Pharmacy: Printed    Other Comments:   printed and placed in PCP medication refill review folder.      Last UDS date: 1-18-18  Pain contract signed:  None noted on file

## 2018-07-26 NOTE — TELEPHONE ENCOUNTER
Requested Prescriptions     Pending Prescriptions Disp Refills    oxyCODONE-acetaminophen (PERCOCET 10)  mg per tablet 100 Tab 0     Sig: Take 1 Tab by mouth every four (4) hours as needed for Pain. Max Daily Amount: 6 Tabs.

## 2018-07-30 NOTE — TELEPHONE ENCOUNTER
2 Pt Identifiers verified;notified pt of reason Percocet refill was denied;verbalized understanding and has upcoming appt 8-7-18

## 2018-08-07 ENCOUNTER — TELEPHONE (OUTPATIENT)
Dept: INTERNAL MEDICINE CLINIC | Age: 63
End: 2018-08-07

## 2018-08-07 ENCOUNTER — OFFICE VISIT (OUTPATIENT)
Dept: INTERNAL MEDICINE CLINIC | Age: 63
End: 2018-08-07

## 2018-08-07 ENCOUNTER — HOSPITAL ENCOUNTER (OUTPATIENT)
Dept: LAB | Age: 63
Discharge: HOME | End: 2018-08-07
Payer: MEDICARE

## 2018-08-07 VITALS
RESPIRATION RATE: 18 BRPM | DIASTOLIC BLOOD PRESSURE: 90 MMHG | OXYGEN SATURATION: 97 % | TEMPERATURE: 98 F | HEART RATE: 88 BPM | BODY MASS INDEX: 32.47 KG/M2 | WEIGHT: 202 LBS | SYSTOLIC BLOOD PRESSURE: 135 MMHG | HEIGHT: 66 IN

## 2018-08-07 DIAGNOSIS — M54.2 NECK PAIN: ICD-10-CM

## 2018-08-07 DIAGNOSIS — Z79.891 LONG TERM (CURRENT) USE OF OPIATE ANALGESIC: ICD-10-CM

## 2018-08-07 DIAGNOSIS — G89.4 CHRONIC PAIN SYNDROME: Chronic | ICD-10-CM

## 2018-08-07 DIAGNOSIS — K04.7 DENTAL ABSCESS: ICD-10-CM

## 2018-08-07 DIAGNOSIS — M54.5 CHRONIC LOW BACK PAIN, UNSPECIFIED BACK PAIN LATERALITY, WITH SCIATICA PRESENCE UNSPECIFIED: ICD-10-CM

## 2018-08-07 DIAGNOSIS — G89.29 CHRONIC LOW BACK PAIN, UNSPECIFIED BACK PAIN LATERALITY, WITH SCIATICA PRESENCE UNSPECIFIED: ICD-10-CM

## 2018-08-07 DIAGNOSIS — Z76.0 MEDICATION REFILL: ICD-10-CM

## 2018-08-07 DIAGNOSIS — Z23 ENCOUNTER FOR IMMUNIZATION: ICD-10-CM

## 2018-08-07 DIAGNOSIS — Z87.898 H/O: PITUITARY TUMOR: ICD-10-CM

## 2018-08-07 DIAGNOSIS — I10 ESSENTIAL HYPERTENSION: Primary | Chronic | ICD-10-CM

## 2018-08-07 PROBLEM — M54.50 CHRONIC LOW BACK PAIN: Status: ACTIVE | Noted: 2018-08-07

## 2018-08-07 PROCEDURE — G0480 DRUG TEST DEF 1-7 CLASSES: HCPCS | Performed by: INTERNAL MEDICINE

## 2018-08-07 RX ORDER — AMOXICILLIN AND CLAVULANATE POTASSIUM 875; 125 MG/1; MG/1
1 TABLET, FILM COATED ORAL 2 TIMES DAILY
Qty: 20 TAB | Refills: 0 | Status: SHIPPED | OUTPATIENT
Start: 2018-08-07 | End: 2018-08-17

## 2018-08-07 RX ORDER — HYDROCODONE BITARTRATE AND ACETAMINOPHEN 5; 325 MG/1; MG/1
TABLET ORAL
Refills: 0 | COMMUNITY
Start: 2018-08-03 | End: 2019-01-05

## 2018-08-07 RX ORDER — OXYCODONE AND ACETAMINOPHEN 10; 325 MG/1; MG/1
1 TABLET ORAL
Qty: 100 TAB | Refills: 0 | Status: SHIPPED | OUTPATIENT
Start: 2018-08-07 | End: 2018-10-16 | Stop reason: SDUPTHER

## 2018-08-07 RX ORDER — DICLOFENAC SODIUM 75 MG/1
75 TABLET, DELAYED RELEASE ORAL 2 TIMES DAILY
Qty: 180 TAB | Refills: 1 | Status: SHIPPED | OUTPATIENT
Start: 2018-08-07 | End: 2019-04-15

## 2018-08-07 NOTE — PROGRESS NOTES
HISTORY OF PRESENT ILLNESS  Louis Boykin is a 70 y.o. female. Visit Vitals    /90 (BP 1 Location: Right arm, BP Patient Position: Sitting)    Pulse 88    Temp 98 °F (36.7 °C) (Oral)    Resp 18    Ht 5' 6\" (1.676 m)    Wt 202 lb (91.6 kg)    SpO2 97%    BMI 32.6 kg/m2       HPI Comments: She also states she has a toothache and an abscess. She finally got a dentist but can not breseen until 8/21/18    She had a right TKR recently 5/29/18. She delayed PT for ? Reason. She is now in PT. She also saw Dr. Delbert Bhandari who told her he would not give her any opioid pain meds and suggested she go to [place] for pain management  She was given a few norco by ? I know ortho wn't give her any more pain meds. She has also been to the ER asking for pain meds. Weigh is down 25#    Joint Pain    The history is provided by the patient. This is a chronic problem. The current episode started more than 1 week ago. The problem occurs daily. The problem has not changed since onset. Dental Pain    The history is provided by the patient. This is a new problem. The current episode started more than 1 week ago. The problem occurs daily. The problem has not changed since onset. The pain is moderate. Review of Systems   HENT: Positive for dental problem. Musculoskeletal: Positive for joint pain. Physical Exam   Constitutional: She is oriented to person, place, and time. She appears well-developed and well-nourished. No distress. Cardiovascular: Normal rate. Pulmonary/Chest: Effort normal and breath sounds normal.   Musculoskeletal:   Right knee looks very good   Neurological: She is alert and oriented to person, place, and time. Skin: Skin is warm and dry. She is not diaphoretic. Nursing note and vitals reviewed. ASSESSMENT and PLAN    ICD-10-CM ICD-9-CM    1. Essential hypertension I10 401.9    2.  Dental abscess K04.7 522.5 amoxicillin-clavulanate (AUGMENTIN) 875-125 mg per tablet 3. Chronic pain syndrome G89.4 338.4 oxyCODONE-acetaminophen (PERCOCET 10)  mg per tablet   4. Neck pain M54.2 723.1 XR SPINE CERV 4 OR 5 V      REFERRAL TO NEUROSURGERY      diclofenac EC (VOLTAREN) 75 mg EC tablet   5. Chronic low back pain, unspecified back pain laterality, with sciatica presence unspecified M54.5 724.2 XR SPINE LUMB COMP W BEND    G89.29 338.29 REFERRAL TO NEUROSURGERY      diclofenac EC (VOLTAREN) 75 mg EC tablet   6. Encounter for immunization Z23 V03.89 HYDROcodone-acetaminophen (NORCO) 5-325 mg per tablet      PNEUMOCOCCAL CONJ VACCINE 13 VALENT IM                     7. Medication refill Z76.0 V68.1 oxyCODONE-acetaminophen (PERCOCET 10)  mg per tablet   8. Long term (current) use of opiate analgesic Z79.891 V58.69 TOXASSURE SELECT 13 (MW)       BP looks good    Refer to neurosurgery for an opinion on recurrent neck and low back pain    Discussed her pain meds. She wants percocet. I advised her I will only write it if she will agree to tapering it down. She has agreed today  Regular dose TID for now, then slow taper each month    UDS today    BP looks great and her weight is down. Due to dental complaints, inability to get in for another few weeks, recent total joint knee surgery, will RX antibiotic. F/u one month to discuss meds and taper further.

## 2018-08-07 NOTE — MR AVS SNAPSHOT
[address] [place] [address] 
 
 
 [facility] [address] [facility] [address] 
[phone] Patient: Rupesh Laboy MRN: [id number] AIE:1[birth year]05/[birth year]106 Visit Information Date & Time Provider Department Dept. Phone Encounter #  
 8/7/2018  2:45 PM [phone] [id number] Follow-up Instructions Return in about 1 month (around 9/7/2018) for chronic pain, taper meds. Your Appointments 8/31/2018 10:00 AM  
New Patient with Nonnie Mealy, PT [facility]. [address] ([address] [place] [address]) Appt Note: NP - MCR - NPPW **REQ'D TO WAIT UNTIL AUG/SEPT FOR APPT**  
 [address] [place] [id number]  
  
    
 12/10/2018 10:00 AM  
ESTABLISHED PATIENT with [address], NP [facility]. [address] ([address] [place] [address]) Appt Note: Return in about 7 months (around 12/10/2018) for UUI, FRANCK with NP  
 [address] [place] [address]  
[phone]  
  
   
 [place] [id number] Upcoming Health Maintenance Date Due FOBT Q 1 YEAR AGE 50-75 12/8/2017 GLAUCOMA SCREENING Q2Y 6/29/2018 Bone Densitometry (Dexa) Screening 6/29/2018 Pneumococcal 65+ Low/Medium Risk (1 of 2 - PCV13) 6/29/2018 Influenza Age 5 to Adult 9/1/2018* ZOSTER VACCINE AGE 60> 11/2/2018* DTaP/Tdap/Td series (1 - Tdap) 12/6/2018* MEDICARE YEARLY EXAM 12/7/2018 BREAST CANCER SCRN MAMMOGRAM 3/2/2020 *Topic was postponed. The date shown is not the original due date. Allergies as of 8/7/2018  Review Complete On: 8/7/2018 By: Beatris Meza MD  
  
 Severity Noted Reaction Type Reactions Gluten  07/22/2016    Other (comments) GI upset Morphine  08/14/2015    Rash Other reaction(s): mild rash/itching Carisoprodol Low 08/07/2013    Other (comments) Other reaction(s): other/intolerance Constipaton Constipaton Stadol [Butorphanol Tartrate] Low 08/07/2013    Other (comments) Other reaction(s): cardiac dysrhythmia Increased HR Made her have palpitations Ultram [Tramadol] Low 08/07/2013    Other (comments) Other reaction(s): unknown Unsure- mixed with Stadol and is unsure which med she reacted to. Made her have palpitations Current Immunizations  Reviewed on 8/7/2018 Name Date Influenza Vaccine 12/6/2017 12:00 AM, 3/18/2016 12:00 AM, 10/8/2010 12:00 AM, 9/28/2009 12:00 AM, 12/5/2008 12:00 AM  
 Influenza Vaccine (Quad) 10/28/2011 12:00 AM  
 Influenza Vaccine (Quad) PF 12/6/2017, 9/19/2017  5:12 PM, 12/2/2016 Novel Influenza-H1N1-09, All Formulations 1/18/2010 12:00 AM  
 Pneumococcal Polysaccharide (PPSV-23) 2/9/2013 12:00 AM  
  
 Reviewed by Bethany Fuller PHARMD on 8/7/2018 at 12:54 PM  
You Were Diagnosed With   
  
 Codes Comments Essential hypertension    -  Primary ICD-10-CM: I10 
ICD-9-CM: 401.9 Dental abscess     ICD-10-CM: K04.7 ICD-9-CM: 522.5 Chronic pain syndrome     ICD-10-CM: G89.4 ICD-9-CM: 338.4 Neck pain     ICD-10-CM: M54.2 ICD-9-CM: 723.1 Chronic low back pain, unspecified back pain laterality, with sciatica presence unspecified     ICD-10-CM: M54.5, G89.29 ICD-9-CM: 724.2, 338.29 Encounter for immunization     ICD-10-CM: W50 ICD-9-CM: V03.89 Medication refill     ICD-10-CM: Z76.0 ICD-9-CM: V68.1 Long term (current) use of opiate analgesic     ICD-10-CM: W37.035 ICD-9-CM: V58.69 Vitals BP Pulse Temp Resp Height(growth percentile) Weight(growth percentile) 135/90 (BP 1 Location: Right arm, BP Patient Position: Sitting) 88 98 °F (36.7 °C) (Oral) 18 5' 6\" (1.676 m) 202 lb (91.6 kg) SpO2 BMI OB Status Smoking Status 97% 32.6 kg/m2 Hysterectomy Never Smoker Vitals History BMI and BSA Data Body Mass Index Body Surface Area 32.6 kg/m 2 2.07 m 2 Preferred Pharmacy Pharmacy Name Phone [address], [place] [phone] Your Updated Medication List  
  
   
This list is accurate as of 8/7/18  3:02 PM.  Always use your most recent med list.  
  
  
  
  
 acyclovir 400 mg tablet Commonly known as:  ZOVIRAX Take 1 Tab by mouth three (3) times daily. Indications: herpes simplex infection  
  
 amoxicillin-clavulanate 875-125 mg per tablet Commonly known as:  AUGMENTIN Take 1 Tab by mouth two (2) times a day for 10 days. aspirin 81 mg chewable tablet Take 1 Tab by mouth daily. atorvastatin 10 mg tablet Commonly known as:  LIPITOR Take 1 Tab by mouth daily. benzonatate 200 mg capsule Commonly known as:  TESSALON Take 1 Cap by mouth three (3) times daily as needed for Cough. Indications: Cough * Cetirizine 10 mg Cap Take 1 Can by mouth daily. Indications: SEASONAL ALLERGIC RHINITIS * cetirizine 10 mg tablet Commonly known as:  ZYRTEC Take 1 Tab by mouth daily. cyanocobalamin 1,000 mcg tablet Commonly known as:  VITAMIN B-12 Take 1 Tab by mouth daily. fluticasone 50 mcg/actuation nasal spray Commonly known as:  Caffie Euler 2 Sprays by Both Nostrils route daily. Indications: Allergic Rhinitis  
  
 furosemide 20 mg tablet Commonly known as:  LASIX TAKE 1 TABLET DAILY AS NEEDED FOR SWELLING. 40mg 8/7/18 HYDROcodone-acetaminophen 5-325 mg per tablet Commonly known as:  Frandy Punt  
take 1-2 tablets by mouth every 4 hours if needed for pain  
  
 hyoscyamine SR 0.375 mg SR tablet Commonly known as:  LEVBID  
0.375 mg.  
  
 * montelukast 10 mg tablet Commonly known as:  SINGULAIR  
TAKE 1 TABLET DAILY FOR MAINTENANCE THERAPY FOR ASTHMA * montelukast 10 mg tablet Commonly known as:  SINGULAIR Take 1 Tab by mouth daily. Indications: MAINTENANCE THERAPY FOR ASTHMA multivitamin tablet Commonly known as:  ONE A DAY Take 1 Tab by mouth daily. naloxone 2 mg/actuation Spry Use 1 spray intranasally into 1 nostril. Use a new Narcan nasal spray for subsequent doses and administer into alternating nostrils. May repeat every 2 to 3 minutes as needed. omeprazole 40 mg capsule Commonly known as:  PRILOSEC Take 1 Cap by mouth daily. oxyCODONE-acetaminophen  mg per tablet Commonly known as:  PERCOCET 10 Take 1 Tab by mouth every four (4) hours as needed for Pain. Max Daily Amount: 6 Tabs. potassium chloride 20 mEq tablet Commonly known as:  K-DUR, KLOR-CON Take 1 Tab by mouth daily. Indications: hypokalemia QUEtiapine 50 mg tablet Commonly known as:  SEROquel Take 1 Tab by mouth daily. rOPINIRole 1 mg tablet Commonly known as:  Sanchez Ramus Take 1.5 Tabs by mouth two (2) times a day. Indications: Restless Legs Syndrome  
  
 venlafaxine-SR 75 mg capsule Commonly known as:  EFFEXOR-XR Take 3 capsules by mouth daily  Indications: ANXIETY WITH DEPRESSION  
  
 * Notice: This list has 4 medication(s) that are the same as other medications prescribed for you. Read the directions carefully, and ask your doctor or other care provider to review them with you. Prescriptions Printed Refills  
 oxyCODONE-acetaminophen (PERCOCET 10)  mg per tablet 0 Sig: Take 1 Tab by mouth every four (4) hours as needed for Pain. Max Daily Amount: 6 Tabs. Class: Print Route: Oral  
  
Prescriptions Sent to Pharmacy Refills  
 amoxicillin-clavulanate (AUGMENTIN) 875-125 mg per tablet 0 Sig: Take 1 Tab by mouth two (2) times a day for 10 days. Class: Normal  
 Pharmacy: [address] [facility] #: [phone] Route: Oral  
  
We Performed the Following ADMIN PNEUMOCOCCAL VACCINE [ [facility]] REFERRAL TO NEUROSURGERY [GCZ45 Custom] Follow-up Instructions Return in about 1 month (around 9/7/2018) for chronic pain, taper meds. To-Do List   
 08/07/2018 Lab:  Camron Garcia 13 (MW)   
  
 08/07/2018 Imaging:  XR SPINE CERV 4 OR 5 V   
  
 08/07/2018 Imaging:  XR SPINE LUMB COMP W BEND Referral Information Referral ID Referred By Referred To  
  
 [id number] [address], [address] [place] [address] [place] Galeazzi, [address] Rupinder Lamas Phone: [phone] Fax: [phone] Visits Status Start Date End Date 1 New Request 8/7/18 8/7/19 If your referral has a status of pending review or denied, additional information will be sent to support the outcome of this decision. Patient Instructions Vaccine Information Statement Pneumococcal Conjugate Vaccine (PCV13): What You Need to Know Many Vaccine Information Statements are available in Slovenian and other languages. See www.immunize.org/vis. Hojas de información Sobre Vacunas están disponibles en español y en muchos otros idiomas. Visite www.immunize.org/vis. 1. Why get vaccinated? Vaccination can protect both children and adults from pneumococcal disease. Pneumococcal disease is caused by bacteria that can spread from person to person through close contact. It can cause ear infections, and it can also lead to more serious infections of the: 
 Lungs (pneumonia),  Blood (bacteremia), and 
 Covering of the brain and spinal cord (meningitis). Pneumococcal pneumonia is most common among adults. Pneumococcal meningitis can cause deafness and brain damage, and it kills about 1 child in 10 who get it. Anyone can get pneumococcal disease, but children under 3years of age and adults 72 years and older, people with certain medical conditions, and cigarette smokers are at the highest risk.  
  
Before there was a vaccine, the [facility] saw: 
 more than 700 cases of meningitis, 
  about 13,000 blood infections, 
 about 5 million ear infections, and 
 about 200 deaths 
in children under 5 each year from pneumococcal disease. Since vaccine became available, severe pneumococcal disease in these children has fallen by 88%. About 18,000 older adults die of pneumococcal disease each year in the [place]. Treatment of pneumococcal infections with penicillin and other drugs is not as effective as it used to be, because some strains of the disease have become resistant to these drugs. This makes prevention of the disease, through vaccination, even more important. 2. PCV13 vaccine Pneumococcal conjugate vaccine (called PCV13) protects against 13 types of pneumococcal bacteria. PCV13 is routinely given to children at 2, 4, 6, and 1515 months of age. It is also recommended for children and adults 3to 59years of age with certain health conditions, and for all adults 72years of age and older. Your doctor can give you details. 3. Some people should not get this vaccine Anyone who has ever had a life-threatening allergic reaction to a dose of this vaccine, to an earlier pneumococcal vaccine called PCV7, or to any vaccine containing diphtheria toxoid (for example, DTaP), should not get PCV13. Anyone with a severe allergy to any component of PCV13 should not get the vaccine. Tell your doctor if the person being vaccinated has any severe allergies. If the person scheduled for vaccination is not feeling well, your healthcare provider might decide to reschedule the shot on another day. 4. Risks of a vaccine reaction With any medicine, including vaccines, there is a chance of reactions. These are usually mild and go away on their own, but serious reactions are also possible. Problems reported following PCV13 varied by age and dose in the series. The most common problems reported among children were:  About half became drowsy after the shot, had a temporary loss of appetite, or had redness or tenderness where the shot was given.  About 1 out of 3 had swelling where the shot was given.  About 1 out of 3 had a mild fever, and about 1 in 20 had a fever over 102.2°F. 
 Up to about 8 out of 10 became fussy or irritable. Adults have reported pain, redness, and swelling where the shot was given; also mild fever, fatigue, headache, chills, or muscle pain. [employer] children who get PCV13 along with inactivated flu vaccine at the same time may be at increased risk for seizures caused by fever. Ask your doctor for more information. Problems that could happen after any vaccine:  People sometimes faint after a medical procedure, including vaccination. Sitting or lying down for about 15 minutes can help prevent fainting, and injuries caused by a fall. Tell your doctor if you feel dizzy, or have vision changes or ringing in the ears.  Some older children and adults get severe pain in the shoulder and have difficulty moving the arm where a shot was given. This happens very rarely.  Any medication can cause a severe allergic reaction. Such reactions from a vaccine are very rare, estimated at about 1 in a million doses, and would happen within a few minutes to a few hours after the vaccination. As with any medicine, there is a very small chance of a vaccine causing a serious injury or death. The safety of vaccines is always being monitored. For more information, visit: www.cdc.gov/vaccinesafety/  
 
5. What if there is a serious reaction? What should I look for?  Look for anything that concerns you, such as signs of a severe allergic reaction, very high fever, or unusual behavior.  
 
Signs of a severe allergic reaction can include hives, swelling of the face and throat, difficulty breathing, a fast heartbeat, dizziness, and weakness  usually within a few minutes to a few hours after the vaccination. What should I do?  If you think it is a severe allergic reaction or other emergency that cant wait, call 9-1-1 or get the person to the nearest hospital. Otherwise, call your doctor. Reactions should be reported to the Vaccine Adverse Event Reporting System (VAERS). Your doctor should file this report, or you can do it yourself through the VAERS web site at www.vaers. [facility].gov, or by calling 6-442.706.3983. VAERS does not give medical advice. 6. The National Vaccine Injury Compensation Program 
 
The [facility] Vaccine Injury Compensation Program (VICP) is a federal program that was created to compensate people who may have been injured by certain vaccines. Persons who believe they may have been injured by a vaccine can learn about the program and about filing a claim by calling 2-325.995.5766 or visiting the [employer] website at www.[facility].com.gov/vaccinecompensation. There is a time limit to file a claim for compensation. 7. How can I learn more?  Ask your healthcare provider. He or she can give you the vaccine package insert or suggest other sources of information.  Call your local or state health department.  Contact the Centers for Disease Control and Prevention (CDC): 
- Call 6-179.870.9791 (1-800-CDC-INFO) or 
- Visit CDCs website at www.cdc.gov/vaccines Vaccine Information Statement PCV13 Vaccine 11/5/2015  
[address] [id number] Department of [facility] and [employer] Centers for Disease Control and Prevention Office Use Only [facility] HE[facility]! Dear Maribel Mohs: Thank you for requesting a [employer] account. Our records indicate that you already have an active [employer] account. You can access your account anytime at [email or web address] Did you know that you can access your hospital and ER discharge instructions at any time in [employer]? You can also review all of your test results from your hospital stay or ER visit. Additional Information If you have questions, please visit the Frequently Asked Questions section of the [employer] website at [email or web address] Remember, [employer] is NOT to be used for urgent needs. For medical emergencies, dial 911. Now available from your iPhone and Android! Please provide this summary of care documentation to your next provider. Your primary care clinician is listed as [facility]. If you have any questions after today's visit, please call [phone].

## 2018-08-07 NOTE — PATIENT INSTRUCTIONS
Vaccine Information Statement     Pneumococcal Conjugate Vaccine (PCV13): What You Need to Know    Many Vaccine Information Statements are available in Faroese and other languages. See www.immunize.org/vis. Hojas de información Sobre Vacunas están disponibles en español y en muchos otros idiomas. Visite www.immunize.org/vis. 1. Why get vaccinated? Vaccination can protect both children and adults from pneumococcal disease. Pneumococcal disease is caused by bacteria that can spread from person to person through close contact. It can cause ear infections, and it can also lead to more serious infections of the:   Lungs (pneumonia),   Blood (bacteremia), and   Covering of the brain and spinal cord (meningitis). Pneumococcal pneumonia is most common among adults. Pneumococcal meningitis can cause deafness and brain damage, and it kills about 1 child in 10 who get it. Anyone can get pneumococcal disease, but children under 3years of age and adults 72 years and older, people with certain medical conditions, and cigarette smokers are at the highest risk. Before there was a vaccine, the [facility] saw:   more than 700 cases of meningitis,   about 13,000 blood infections,   about 5 million ear infections, and   about 200 deaths  in children under 5 each year from pneumococcal disease. Since vaccine became available, severe pneumococcal disease in these children has fallen by 88%. About 18,000 older adults die of pneumococcal disease each year in the [place]. Treatment of pneumococcal infections with penicillin and other drugs is not as effective as it used to be, because some strains of the disease have become resistant to these drugs. This makes prevention of the disease, through vaccination, even more important. 2. PCV13 vaccine    Pneumococcal conjugate vaccine (called PCV13) protects against 13 types of pneumococcal bacteria.       PCV13 is routinely given to children at 2, 4, 6, and 1515 months of age. It is also recommended for children and adults 3to 59years of age with certain health conditions, and for all adults 72years of age and older. Your doctor can give you details. 3. Some people should not get this vaccine    Anyone who has ever had a life-threatening allergic reaction to a dose of this vaccine, to an earlier pneumococcal vaccine called PCV7, or to any vaccine containing diphtheria toxoid (for example, DTaP), should not get PCV13. Anyone with a severe allergy to any component of PCV13 should not get the vaccine. Tell your doctor if the person being vaccinated has any severe allergies. If the person scheduled for vaccination is not feeling well, your healthcare provider might decide to reschedule the shot on another day. 4. Risks of a vaccine reaction    With any medicine, including vaccines, there is a chance of reactions. These are usually mild and go away on their own, but serious reactions are also possible. Problems reported following PCV13 varied by age and dose in the series. The most common problems reported among children were:    About half became drowsy after the shot, had a temporary loss of appetite, or had redness or tenderness where the shot was given.  About 1 out of 3 had swelling where the shot was given.  About 1 out of 3 had a mild fever, and about 1 in 20 had a fever over 102.2°F.   Up to about 8 out of 10 became fussy or irritable. Adults have reported pain, redness, and swelling where the shot was given; also mild fever, fatigue, headache, chills, or muscle pain. [employer] children who get PCV13 along with inactivated flu vaccine at the same time may be at increased risk for seizures caused by fever. Ask your doctor for more information. Problems that could happen after any vaccine:     People sometimes faint after a medical procedure, including vaccination.  Sitting or lying down for about 15 minutes can help prevent fainting, and injuries caused by a fall. Tell your doctor if you feel dizzy, or have vision changes or ringing in the ears.  Some older children and adults get severe pain in the shoulder and have difficulty moving the arm where a shot was given. This happens very rarely.  Any medication can cause a severe allergic reaction. Such reactions from a vaccine are very rare, estimated at about 1 in a million doses, and would happen within a few minutes to a few hours after the vaccination. As with any medicine, there is a very small chance of a vaccine causing a serious injury or death. The safety of vaccines is always being monitored. For more information, visit: www.cdc.gov/vaccinesafety/     5. What if there is a serious reaction? What should I look for?  Look for anything that concerns you, such as signs of a severe allergic reaction, very high fever, or unusual behavior. Signs of a severe allergic reaction can include hives, swelling of the face and throat, difficulty breathing, a fast heartbeat, dizziness, and weakness - usually within a few minutes to a few hours after the vaccination. What should I do?  If you think it is a severe allergic reaction or other emergency that cant wait, call 9-1-1 or get the person to the nearest hospital. Otherwise, call your doctor. Reactions should be reported to the Vaccine Adverse Event Reporting System (VAERS). Your doctor should file this report, or you can do it yourself through the VAERS web site at www.vaers. hhs.gov, or by calling 4-517.239.9013. VAERS does not give medical advice. 6. The National Vaccine Injury Compensation Program    The [facility] Vaccine Injury Compensation Program (VICP) is a federal program that was created to compensate people who may have been injured by certain vaccines.     Persons who believe they may have been injured by a vaccine can learn about the program and about filing a claim by calling 1-847.852.9915 or visiting the [facility] [employer] website at www.[facility].gov/vaccinecompensation. There is a time limit to file a claim for compensation. 7. How can I learn more?  Ask your healthcare provider. He or she can give you the vaccine package insert or suggest other sources of information.  Call your local or state health department.  Contact the Centers for Disease Control and Prevention (CDC):  - Call 2-314.970.5553 (9-558-IRV-INFO) or  - Visit CDCs website at www.cdc.gov/vaccines    Vaccine Information Statement   PCV13 Vaccine   2015   42 ASH Mcbride [id number]    Department of Health and Human Services  Centers for Disease Control and Prevention    Office Use Only

## 2018-08-07 NOTE — PROGRESS NOTES
ROOM # 4    King Sara presents today for   Chief Complaint   Patient presents with    Medication Evaluation       King Sara preferred language for health care discussion is english/other. Is someone accompanying this pt? no    Is the patient using any DME equipment during OV? no    Depression Screening:  PHQ over the last two weeks 9/19/2017 7/22/2016   PHQ Not Done - Active Diagnosis of Depression or Bipolar Disorder   Little interest or pleasure in doing things Nearly every day -   Feeling down, depressed, irritable, or hopeless Nearly every day -   Total Score PHQ 2 6 -       Learning Assessment:  Learning Assessment 7/22/2016   PRIMARY LEARNER Patient   HIGHEST LEVEL OF EDUCATION - PRIMARY LEARNER  SOME COLLEGE   PRIMARY LANGUAGE ENGLISH   LEARNER PREFERENCE PRIMARY DEMONSTRATION   ANSWERED BY TINO Ga   RELATIONSHIP SELF       Abuse Screening:  Abuse Screening Questionnaire 8/7/2018 12/6/2017 7/22/2016   Do you ever feel afraid of your partner? N N N   Are you in a relationship with someone who physically or mentally threatens you? N N N   Is it safe for you to go home? Y Y Y       Fall Risk  Fall Risk Assessment, last 12 mths 8/7/2018   Able to walk? Yes   Fall in past 12 months? No       Health Maintenance reviewed and discussed per provider. Yes    King Sara is due for   Health Maintenance Due   Topic Date Due    FOBT Q 1 YEAR AGE 50-75  12/08/2017    GLAUCOMA SCREENING Q2Y  06/29/2018    Bone Densitometry (Dexa) Screening  06/29/2018    Pneumococcal 65+ Low/Medium Risk (1 of 2 - PCV13) 06/29/2018   HM to be reviewed by provider  Please order/place referral if appropriate. Advance Directive:  1. Do you have an advance directive in place? Patient Reply: no    2. If not, would you like material regarding how to put one in place? Patient Reply: yes, given    Coordination of Care:  1. Have you been to the ER, urgent care clinic since your last visit?   Hospitalized since your last visit? [facility] for pain    2. Have you seen or consulted any other health care providers outside of the [address] [place] [address] since your last visit? Include any pap smears or colon screening.  no

## 2018-08-08 ENCOUNTER — HOSPITAL ENCOUNTER (OUTPATIENT)
Dept: LAB | Age: 63
Discharge: HOME | End: 2018-08-08
Payer: MEDICARE

## 2018-08-08 DIAGNOSIS — Z87.898 H/O: PITUITARY TUMOR: ICD-10-CM

## 2018-08-08 LAB
ANION GAP SERPL CALC-SCNC: 10 MMOL/L (ref 3–18)
APPEARANCE UR: ABNORMAL
APTT PPP: 29.2 SEC (ref 23–36.4)
BACTERIA URNS QL MICRO: ABNORMAL /HPF
BASOPHILS # BLD: 0 K/UL (ref 0–0.1)
BASOPHILS NFR BLD: 0 % (ref 0–2)
BILIRUB UR QL: ABNORMAL
BUN SERPL-MCNC: 9 MG/DL (ref 7–18)
BUN/CREAT SERPL: 9 (ref 12–20)
CALCIUM SERPL-MCNC: 9.5 MG/DL (ref 8.5–10.1)
CAOX CRY URNS QL MICRO: ABNORMAL
CHLORIDE SERPL-SCNC: 108 MMOL/L (ref 100–108)
CO2 SERPL-SCNC: 25 MMOL/L (ref 21–32)
COLOR UR: ABNORMAL
CREAT SERPL-MCNC: 0.96 MG/DL (ref 0.6–1.3)
DIFFERENTIAL METHOD BLD: ABNORMAL
EOSINOPHIL # BLD: 0.4 K/UL (ref 0–0.4)
EOSINOPHIL NFR BLD: 5 % (ref 0–5)
EPITH CASTS URNS QL MICRO: ABNORMAL /LPF (ref 0–5)
ERYTHROCYTE [DISTWIDTH] IN BLOOD BY AUTOMATED COUNT: 16 % (ref 11.6–14.5)
GLUCOSE SERPL-MCNC: 95 MG/DL (ref 74–99)
GLUCOSE UR STRIP.AUTO-MCNC: 100 MG/DL
HCT VFR BLD AUTO: 36.9 % (ref 35–45)
HGB BLD-MCNC: 11.7 G/DL (ref 12–16)
HGB UR QL STRIP: NEGATIVE
INR PPP: 1.1 (ref 0.8–1.2)
KETONES UR QL STRIP.AUTO: ABNORMAL MG/DL
LEUKOCYTE ESTERASE UR QL STRIP.AUTO: ABNORMAL
LYMPHOCYTES # BLD: 2.8 K/UL (ref 0.9–3.6)
LYMPHOCYTES NFR BLD: 36 % (ref 21–52)
MCH RBC QN AUTO: 29 PG (ref 24–34)
MCHC RBC AUTO-ENTMCNC: 31.7 G/DL (ref 31–37)
MCV RBC AUTO: 91.3 FL (ref 74–97)
MONOCYTES # BLD: 0.9 K/UL (ref 0.05–1.2)
MONOCYTES NFR BLD: 11 % (ref 3–10)
NEUTS SEG # BLD: 3.8 K/UL (ref 1.8–8)
NEUTS SEG NFR BLD: 48 % (ref 40–73)
NITRITE UR QL STRIP.AUTO: NEGATIVE
PH UR STRIP: 5 [PH] (ref 5–8)
PLATELET # BLD AUTO: 330 K/UL (ref 135–420)
PMV BLD AUTO: 13.1 FL (ref 9.2–11.8)
POTASSIUM SERPL-SCNC: 3.9 MMOL/L (ref 3.5–5.5)
PROT UR STRIP-MCNC: 300 MG/DL
PROTHROMBIN TIME: 13.4 SEC (ref 11.5–15.2)
RBC # BLD AUTO: 4.04 M/UL (ref 4.2–5.3)
RBC #/AREA URNS HPF: 0 /HPF (ref 0–5)
SODIUM SERPL-SCNC: 143 MMOL/L (ref 136–145)
SP GR UR REFRACTOMETRY: >1.03 (ref 1–1.03)
UROBILINOGEN UR QL STRIP.AUTO: 1 EU/DL (ref 0.2–1)
WBC # BLD AUTO: 7.8 K/UL (ref 4.6–13.2)
WBC URNS QL MICRO: ABNORMAL /HPF (ref 0–4)

## 2018-08-08 PROCEDURE — 84146 ASSAY OF PROLACTIN: CPT | Performed by: INTERNAL MEDICINE

## 2018-08-08 PROCEDURE — 80048 BASIC METABOLIC PNL TOTAL CA: CPT | Performed by: INTERNAL MEDICINE

## 2018-08-08 PROCEDURE — 85730 THROMBOPLASTIN TIME PARTIAL: CPT | Performed by: INTERNAL MEDICINE

## 2018-08-08 PROCEDURE — 85025 COMPLETE CBC W/AUTO DIFF WBC: CPT | Performed by: INTERNAL MEDICINE

## 2018-08-08 PROCEDURE — 81001 URINALYSIS AUTO W/SCOPE: CPT | Performed by: INTERNAL MEDICINE

## 2018-08-08 PROCEDURE — 36415 COLL VENOUS BLD VENIPUNCTURE: CPT | Performed by: INTERNAL MEDICINE

## 2018-08-08 PROCEDURE — 85610 PROTHROMBIN TIME: CPT | Performed by: INTERNAL MEDICINE

## 2018-08-09 ENCOUNTER — TELEPHONE (OUTPATIENT)
Dept: INTERNAL MEDICINE CLINIC | Age: 63
End: 2018-08-09

## 2018-08-09 LAB — PROLACTIN SERPL-MCNC: 2.1 NG/ML

## 2018-08-09 NOTE — TELEPHONE ENCOUNTER
Christa Pugh from lab is calling to see if there is another code being used for INR. Patient's insurance will not cover INR test with current code. Fax is [phone].

## 2018-08-11 DIAGNOSIS — Z76.0 MEDICATION REFILL: ICD-10-CM

## 2018-08-13 LAB — DRUGS UR: NORMAL

## 2018-08-13 RX ORDER — ALPRAZOLAM 0.5 MG/1
1.5 TABLET ORAL
Qty: 270 TAB | Refills: 1 | Status: SHIPPED | OUTPATIENT
Start: 2018-08-13 | End: 2018-10-16 | Stop reason: SDUPTHER

## 2018-08-13 NOTE — TELEPHONE ENCOUNTER
Lab contacted. Informed of below per Dr Xiomara Oates.  No other questions or concerns at this time.

## 2018-08-13 NOTE — TELEPHONE ENCOUNTER
PCP: Jose Kramer MD    Last appt: 8/8/2018  Future Appointments  Date Time Provider Cory Daniels   8/31/2018 10:00 AM Andrew Can [address]   9/6/2018 12:15 PM [facility]   12/10/2018 10:00 AM Migeul Garcia, NP [address]       Requested Prescriptions     Pending Prescriptions Disp Refills    ALPRAZolam (XANAX) 0.5 mg tablet 270 Tab 1     Sig: Take 3 Tabs by mouth three (3) times daily as needed for Anxiety. Max Daily Amount: 4.5 mg. Indications: anxiety       Request for a 30 or 90 day supply? Provider Discretion    Pharmacy: Printed    Other Comments:   printed and placed in PCP medication refill review folder.      Last UDS date: 8-7-18  Pain contract signed: N/A

## 2018-08-20 ENCOUNTER — HOSPITAL ENCOUNTER (EMERGENCY)
Age: 63
Discharge: HOME | End: 2018-08-21
Attending: EMERGENCY MEDICINE
Payer: MEDICARE

## 2018-08-20 ENCOUNTER — APPOINTMENT (OUTPATIENT)
Dept: GENERAL RADIOLOGY | Age: 63
End: 2018-08-20
Attending: EMERGENCY MEDICINE
Payer: MEDICARE

## 2018-08-20 VITALS
HEART RATE: 94 BPM | SYSTOLIC BLOOD PRESSURE: 138 MMHG | OXYGEN SATURATION: 97 % | BODY MASS INDEX: 32.47 KG/M2 | HEIGHT: 66 IN | DIASTOLIC BLOOD PRESSURE: 97 MMHG | RESPIRATION RATE: 17 BRPM | WEIGHT: 202 LBS | TEMPERATURE: 98.5 F

## 2018-08-20 DIAGNOSIS — M25.561 ACUTE PAIN OF RIGHT KNEE: Primary | ICD-10-CM
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
## 2018-08-20 PROCEDURE — 73502 X-RAY EXAM HIP UNI 2-3 VIEWS: CPT

## 2018-08-20 PROCEDURE — 99283 EMERGENCY DEPT VISIT LOW MDM: CPT

## 2018-08-21 ENCOUNTER — APPOINTMENT (OUTPATIENT)
Dept: GENERAL RADIOLOGY | Age: 63
End: 2018-08-21
Attending: EMERGENCY MEDICINE
Payer: MEDICARE

## 2018-08-21 PROCEDURE — 74011250637 HC RX REV CODE- 250/637: Performed by: EMERGENCY MEDICINE

## 2018-08-21 PROCEDURE — 73564 X-RAY EXAM KNEE 4 OR MORE: CPT

## 2018-08-21 PROCEDURE — L1830 KO IMMOB CANVAS LONG PRE OTS: HCPCS

## 2018-08-21 RX ORDER — OXYCODONE AND ACETAMINOPHEN 5; 325 MG/1; MG/1
2 TABLET ORAL
Status: COMPLETED | OUTPATIENT
Start: 2018-08-21 | End: 2018-08-21

## 2018-08-21 RX ADMIN — OXYCODONE HYDROCHLORIDE AND ACETAMINOPHEN 2 TABLET: 5; 325 TABLET ORAL at 01:45
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
## 2018-08-21 NOTE — DISCHARGE INSTRUCTIONS
Preventing Falls: Care Instructions  Your Care Instructions    Getting around your home safely can be a challenge if you have injuries or health problems that make it easy for you to fall. Loose rugs and furniture in walkways are among the dangers for many older people who have problems walking or who have poor eyesight. People who have conditions such as arthritis, osteoporosis, or dementia also have to be careful not to fall. You can make your home safer with a few simple measures. Follow-up care is a key part of your treatment and safety. Be sure to make and go to all appointments, and call your doctor if you are having problems. It's also a good idea to know your test results and keep a list of the medicines you take. How can you care for yourself at home? Taking care of yourself  · You may get dizzy if you do not drink enough water. To prevent dehydration, drink plenty of fluids, enough so that your urine is light yellow or clear like water. Choose water and other caffeine-free clear liquids. If you have kidney, heart, or liver disease and have to limit fluids, talk with your doctor before you increase the amount of fluids you drink. · Exercise regularly to improve your strength, muscle tone, and balance. Walk if you can. Swimming may be a good choice if you cannot walk easily. · Have your vision and hearing checked each year or any time you notice a change. If you have trouble seeing and hearing, you might not be able to avoid objects and could lose your balance. · Know the side effects of the medicines you take. Ask your doctor or pharmacist whether the medicines you take can affect your balance. Sleeping pills or sedatives can affect your balance. · Limit the amount of alcohol you drink. Alcohol can impair your balance and other senses. · Ask your doctor whether calluses or corns on your feet need to be removed.  If you wear loose-fitting shoes because of calluses or corns, you can lose your balance and fall. · Talk to your doctor if you have numbness in your feet. Preventing falls at home  · Remove raised doorway thresholds, throw rugs, and clutter. Repair loose carpet or raised areas in the floor. · Move furniture and electrical cords to keep them out of walking paths. · Use nonskid floor wax, and wipe up spills right away, especially on ceramic tile floors. · If you use a walker or cane, put rubber tips on it. If you use crutches, clean the bottoms of them regularly with an abrasive pad, such as steel wool. · Keep your house well lit, especially [employer], and outside walkways. Use night-lights in areas such as hallways and bathrooms. Add extra light switches or use remote switches (such as switches that go on or off when you clap your hands) to make it easier to turn lights on if you have to get up during the night. · Install sturdy handrails on stairways. · Move items in your cabinets so that the things you use a lot are on the lower shelves (about waist level). · Keep a cordless phone and a flashlight with new batteries by your bed. If possible, put a phone in each of the main rooms of your house, or carry a cell phone in case you fall and cannot reach a phone. Or, you can wear a device around your neck or wrist. You push a button that sends a signal for help. · Wear low-heeled shoes that fit well and give your feet good support. Use footwear with nonskid soles. Check the heels and soles of your shoes for wear. Repair or replace worn heels or soles. · Do not wear socks without shoes on wood floors. · Walk on the grass when the sidewalks are slippery. If you live in an area that gets snow and ice in the winter, sprinkle salt on slippery steps and sidewalks. Preventing falls in the bath  · Install grab bars and nonskid mats inside and outside your shower or tub and near the toilet and sinks. · Use shower chairs and bath benches.   · Use a hand-held shower head that will allow you to sit while showering. · Get into a tub or shower by putting the weaker leg in first. Get out of a tub or shower with your strong side first.  · Repair loose toilet seats and consider installing a raised toilet seat to make getting on and off the toilet easier. · Keep your bathroom door unlocked while you are in the shower. Where can you learn more? Go to http://carly-jose.info/. Enter [id number] [id number] in the search box to learn more about \"Preventing Falls: Care Instructions. \"  Current as of: May 12, 2017  Content Version: 11.7  © 1211-9986 [employer]. Care instructions adapted under license by [employer] (which disclaims liability or warranty for this information). If you have questions about a medical condition or this instruction, always ask your healthcare professional. [place] [address] any warranty or liability for your use of this information. How to Get Up Safely After a Fall: Care Instructions  Your Care Instructions    If you have injuries, health problems, or other reasons that may make it easy for you to fall at home, it is a good idea to learn how to get up safely after a fall. Learning how to get up correctly can help you avoid making an injury worse. Also, knowing what to do if you cannot get up can help you stay safe until help arrives. Follow-up care is a key part of your treatment and safety. Be sure to make and go to all appointments, and call your doctor if you are having problems. It's also a good idea to know your test results and keep a list of the medicines you take. How can you care for yourself after a fall? If you think you can get up  First lie still for a few minutes and think about how you feel. If your body feels okay and you think you can get up safely, follow the rest of the steps below:  1. Look for a chair or other piece of furniture that is close to you.   2. Roll onto your side and rest. Roll by turning your head in the direction you want to roll, move your shoulder and arm, then hip and leg in the same direction. 3. Lie still for a moment to let your blood pressure adjust.  4. Slowly push your upper body up, lift your head, and take a moment to rest.  5. Slowly get up on your hands and knees, and crawl to the chair or other stable piece of furniture. 6. Put your hands on the chair. 7. Move one foot forward, and place it flat on the floor. Your other leg should be bent with the knee on the floor. 8. Rise slowly, turn your body, and sit in the chair. Stay seated for a bit and think about how you feel. Call for help. Even if you feel okay, let someone know what happened to you. You might not know that you have a serious injury. If you cannot get up  1. If you think you are injured after a fall or you cannot get up, try not to panic. 2. Call out for help. 3. If you have a phone within reach or you have an emergency call device, use it to call for help. 4. If you do not have a phone within reach, try to slide yourself toward it. If you cannot get to the phone, try to slide toward a door or window or a place where you think you can be heard. 5. [place] or use an object to make noise so someone might hear you. 6. If you can reach something that you can use for a pillow, place it under your head. Try to stay warm by covering yourself with a blanket or clothing while you wait for help. When should you call for help? Call 911 anytime you think you may need emergency care. For example, call if:    · You passed out (lost consciousness).     · You cannot get up after a fall.     · You have severe pain.    Call your doctor now or seek immediate medical care if:    · You have new or worse pain.     · You are dizzy or lightheaded.     · You hit your head.    Watch closely for changes in your health, and be sure to contact your doctor if:    · You do not get better as expected. Where can you learn more?   Go to http://lisa.info/. Enter Y609 in the search box to learn more about \"How to Get Up Safely After a Fall: Care Instructions. \"  Current as of: May 12, 2017  Content Version: 11.7  © 9135-8507 [employer]. Care instructions adapted under license by [employer] (which disclaims liability or warranty for this information). If you have questions about a medical condition or this instruction, always ask your healthcare professional. [address] any warranty or liability for your use of this information.

## 2018-08-21 NOTE — ED NOTES
1:52 AM  08/21/18     Discharge instructions given to patient (name) with verbalization of understanding. Patient accompanied by self. Patient discharged with the following prescriptions none. Patient discharged to home (destination).       Satinder Corey RN
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
## 2018-09-27 DIAGNOSIS — G89.4 CHRONIC PAIN SYNDROME: Chronic | ICD-10-CM

## 2018-09-27 DIAGNOSIS — Z76.0 MEDICATION REFILL: ICD-10-CM

## 2018-09-27 NOTE — TELEPHONE ENCOUNTER
Patient request, last OV 8/18/18    Requested Prescriptions     Pending Prescriptions Disp Refills    oxyCODONE-acetaminophen (PERCOCET 10)  mg per tablet 100 Tab 0     Sig: Take 1 Tab by mouth every four (4) hours as needed for Pain. Max Daily Amount: 6 Tabs.  furosemide (LASIX) 20 mg tablet      ALPRAZolam (XANAX) 0.5 mg tablet 270 Tab 1     Sig: Take 3 Tabs by mouth three (3) times daily as needed for Anxiety. Max Daily Amount: 4.5 mg. Indications: anxiety    zolpidem CR (AMBIEN CR) 12.5 mg tablet 90 Tab 1     Sig: Take 1 Tab by mouth nightly as needed for Sleep.  Max Daily Amount: 12.5 mg.

## 2018-09-28 RX ORDER — PROMETHAZINE HYDROCHLORIDE 25 MG/1
25 TABLET ORAL
Qty: 60 TAB | Refills: 4 | Status: SHIPPED | OUTPATIENT
Start: 2018-09-28 | End: 2019-07-29 | Stop reason: SDUPTHER

## 2018-09-28 NOTE — TELEPHONE ENCOUNTER
Requested Prescriptions     Pending Prescriptions Disp Refills    promethazine (PHENERGAN) 25 mg tablet       Sig: Take 1 Tab by mouth every six (6) hours as needed for Nausea.

## 2018-10-02 RX ORDER — ALPRAZOLAM 0.5 MG/1
1.5 TABLET ORAL
Qty: 270 TAB | Refills: 1 | Status: CANCELLED | OUTPATIENT
Start: 2018-10-02

## 2018-10-02 RX ORDER — OXYCODONE AND ACETAMINOPHEN 10; 325 MG/1; MG/1
1 TABLET ORAL
Qty: 100 TAB | Refills: 0 | Status: CANCELLED | OUTPATIENT
Start: 2018-10-02

## 2018-10-02 RX ORDER — FUROSEMIDE 20 MG/1
20 TABLET ORAL DAILY
Qty: 30 TAB | Refills: 5 | Status: SHIPPED | OUTPATIENT
Start: 2018-10-02 | End: 2019-07-15 | Stop reason: SDUPTHER

## 2018-10-02 RX ORDER — ZOLPIDEM TARTRATE 12.5 MG/1
12.5 TABLET, FILM COATED, EXTENDED RELEASE ORAL
Qty: 90 TAB | Refills: 1 | Status: CANCELLED | OUTPATIENT
Start: 2018-10-02

## 2018-10-02 NOTE — TELEPHONE ENCOUNTER
PCP: Louis Bowden MD    Last appt: 9/7/2018  Future Appointments  Date Time Provider Cory Daniels   12/10/2018 10:00 AM [address], NP [address]   None schedule with Dr Kunal Betts    Requested Prescriptions     Pending Prescriptions Disp Refills    oxyCODONE-acetaminophen (PERCOCET 10)  mg per tablet 100 Tab 0     Sig: Take 1 Tab by mouth every four (4) hours as needed for Pain. Max Daily Amount: 6 Tabs.  furosemide (LASIX) 20 mg tablet      ALPRAZolam (XANAX) 0.5 mg tablet 270 Tab 1     Sig: Take 3 Tabs by mouth three (3) times daily as needed for Anxiety. Max Daily Amount: 4.5 mg. Indications: anxiety    zolpidem CR (AMBIEN CR) 12.5 mg tablet 90 Tab 1     Sig: Take 1 Tab by mouth nightly as needed for Sleep. Max Daily Amount: 12.5 mg.       Request for a 30 or 90 day supply? Provider Discretion    Pharmacy: [facility]    Other Comments:   printed and placed in PCP medication refill review folder.      Last UDS date: 08/07/2018    Pain contract signed: not found

## 2018-10-16 ENCOUNTER — OFFICE VISIT (OUTPATIENT)
Dept: INTERNAL MEDICINE CLINIC | Age: 63
End: 2018-10-16

## 2018-10-16 VITALS
RESPIRATION RATE: 14 BRPM | SYSTOLIC BLOOD PRESSURE: 139 MMHG | BODY MASS INDEX: 31.98 KG/M2 | HEIGHT: 66 IN | OXYGEN SATURATION: 96 % | HEART RATE: 68 BPM | DIASTOLIC BLOOD PRESSURE: 78 MMHG | WEIGHT: 199 LBS | TEMPERATURE: 98.1 F

## 2018-10-16 DIAGNOSIS — Z79.891 LONG TERM (CURRENT) USE OF OPIATE ANALGESIC: ICD-10-CM

## 2018-10-16 DIAGNOSIS — Z23 ENCOUNTER FOR IMMUNIZATION: ICD-10-CM

## 2018-10-16 DIAGNOSIS — Z76.0 MEDICATION REFILL: ICD-10-CM

## 2018-10-16 DIAGNOSIS — G25.81 RESTLESS LEGS SYNDROME (RLS): Chronic | ICD-10-CM

## 2018-10-16 DIAGNOSIS — I10 ESSENTIAL HYPERTENSION: Primary | Chronic | ICD-10-CM

## 2018-10-16 DIAGNOSIS — E78.5 DYSLIPIDEMIA: Chronic | ICD-10-CM

## 2018-10-16 DIAGNOSIS — G89.4 CHRONIC PAIN SYNDROME: Chronic | ICD-10-CM

## 2018-10-16 RX ORDER — ZOLMITRIPTAN 5 MG/1
1 SPRAY NASAL
Qty: 1 CONTAINER | Refills: 5 | Status: SHIPPED | OUTPATIENT
Start: 2018-10-16 | End: 2019-11-06 | Stop reason: SDUPTHER

## 2018-10-16 RX ORDER — ZOLPIDEM TARTRATE 12.5 MG/1
12.5 TABLET, FILM COATED, EXTENDED RELEASE ORAL
Qty: 90 TAB | Refills: 1 | Status: SHIPPED | OUTPATIENT
Start: 2018-10-16 | End: 2019-04-15

## 2018-10-16 RX ORDER — FROVATRIPTAN SUCCINATE 2.5 MG/1
2.5 TABLET, FILM COATED ORAL
Qty: 30 TAB | Refills: 5 | Status: SHIPPED | OUTPATIENT
Start: 2018-10-16 | End: 2018-10-16

## 2018-10-16 RX ORDER — OXYCODONE AND ACETAMINOPHEN 10; 325 MG/1; MG/1
1 TABLET ORAL
Qty: 100 TAB | Refills: 0 | Status: SHIPPED | OUTPATIENT
Start: 2018-10-16 | End: 2018-11-07 | Stop reason: SDUPTHER

## 2018-10-16 RX ORDER — ALPRAZOLAM 0.5 MG/1
1.5 TABLET ORAL
Qty: 270 TAB | Refills: 1 | Status: SHIPPED | OUTPATIENT
Start: 2018-10-16 | End: 2019-02-04 | Stop reason: SDUPTHER

## 2018-10-16 NOTE — PROGRESS NOTES
ROOM # 5    Savana Martino presents today for   Chief Complaint   Patient presents with    Medication Evaluation       Savana Martino preferred language for health care discussion is english/other. Is someone accompanying this pt? No    Is the patient using any DME equipment during OV? No    Depression Screening:  PHQ over the last two weeks 9/19/2017 7/22/2016   PHQ Not Done - Active Diagnosis of Depression or Bipolar Disorder   Little interest or pleasure in doing things Nearly every day -   Feeling down, depressed, irritable, or hopeless Nearly every day -   Total Score PHQ 2 6 -       Learning Assessment:  Learning Assessment 7/22/2016   PRIMARY LEARNER Patient   HIGHEST LEVEL OF EDUCATION - PRIMARY LEARNER  SOME COLLEGE   PRIMARY LANGUAGE ENGLISH   LEARNER PREFERENCE PRIMARY DEMONSTRATION   ANSWERED BY TINO Ga   RELATIONSHIP SELF       Abuse Screening:  Abuse Screening Questionnaire 8/7/2018 12/6/2017 7/22/2016   Do you ever feel afraid of your partner? N N N   Are you in a relationship with someone who physically or mentally threatens you? N N N   Is it safe for you to go home? Dilia Brewer       Fall Risk  Fall Risk Assessment, last 12 mths 10/16/2018 8/7/2018   Able to walk? Yes Yes   Fall in past 12 months? Yes No   Fall with injury? Yes -   Number of falls in past 12 months 1 -   Fall Risk Score 2 -       Visit Vitals    /78 (BP 1 Location: Left arm, BP Patient Position: Sitting)    Pulse 68    Temp 98.1 °F (36.7 °C) (Oral)    Resp 14    Ht 5' 6\" (1.676 m)    Wt 199 lb (90.3 kg)    SpO2 96%    BMI 32.12 kg/m2       Health Maintenance reviewed and discussed per provider.  Yes    Savana Martino is due for   Health Maintenance Due   Topic Date Due    Shingrix Vaccine Age 50> (1 of 2) 06/29/2003    FOBT Q 1 YEAR AGE 50-75  12/08/2017    GLAUCOMA SCREENING Q2Y  06/29/2018    Bone Densitometry (Dexa) Screening  06/29/2018    Influenza Age 9 to Adult  08/01/2018     Please order/place referral if appropriate. Advance Directive:  1. Do you have an advance directive in place? Patient Reply: No    2. If not, would you like material regarding how to put one in place? Patient Reply: No    Coordination of Care:  1. Have you been to the ER, urgent care clinic since your last visit? Hospitalized since your last visit? No    2. Have you seen or consulted any other health care providers outside of the [address] [place] [address] since your last visit? Include any pap smears or colon screening.  No

## 2018-10-16 NOTE — PROGRESS NOTES
HISTORY OF PRESENT ILLNESS  Alpa Guillen is a 70 y.o. female. Visit Vitals    /78 (BP 1 Location: Left arm, BP Patient Position: Sitting)    Pulse 68    Temp 98.1 °F (36.7 °C) (Oral)    Resp 14    Ht 5' 6\" (1.676 m)    Wt 199 lb (90.3 kg)    LMP  (LMP Unknown)    SpO2 96%    BMI 32.12 kg/m2       HPI Comments: Pt was seen by neurosurgery. See their note in EPIC. Dr. Kelsey Handy commented that she is a set up for chronic pain and recommended pain management. They were supposed to make a referral  She had not yet filled her most recent xanax RX and returned it for a new one. She brought in her percocet bottle. 10/325--filled #100 on 8/10/2018     She also has been seen at the ER with migraines. They gave her an RX for fioricet--she has not filled it yet. .  Cautioned her today re mixing meds. Medication Evaluation   The history is provided by the patient (see comments). This is a new problem. Associated symptoms include headaches. Joint Pain    The history is provided by the patient. This is a chronic problem. The current episode started more than 1 week ago. The problem occurs daily. The problem has not changed since onset. The quality of the pain is described as aching and constant. The pain is moderate. Associated symptoms include back pain and neck pain. The symptoms are aggravated by activity. She has tried OTC pain medications (opioid) for the symptoms. Hypertension    The history is provided by the patient. This is a chronic problem. The current episode started more than 1 week ago. The problem has not changed since onset. Associated symptoms include headaches and neck pain. Risk factors include obesity, postmenopause, a sedentary lifestyle and stress. Review of Systems   Constitutional: Positive for weight loss. Cardiovascular: Negative. Musculoskeletal: Positive for back pain, joint pain, myalgias and neck pain. Neurological: Positive for sensory change and headaches. Psychiatric/Behavioral: The patient is nervous/anxious and has insomnia. Physical Exam   Constitutional: She is oriented to person, place, and time. She appears well-developed and well-nourished. No distress. Cardiovascular: Normal rate and regular rhythm. Pulmonary/Chest: Effort normal and breath sounds normal.   Musculoskeletal: She exhibits no edema. Neurological: She is alert and oriented to person, place, and time. Skin: Skin is warm and dry. She is not diaphoretic. Psychiatric: She has a normal mood and affect. Nursing note and vitals reviewed. ASSESSMENT and PLAN    ICD-10-CM ICD-9-CM    1. Essential hypertension I10 401.9    2. Dyslipidemia E78.5 272.4    3. Restless legs syndrome (RLS) G25.81 333.94    4. Encounter for immunization Z23 V03.89 INFLUENZA VACCINE INACTIVATED (IIV), SUBUNIT, ADJUVANTED, IM   5. Chronic pain syndrome G89.4 338.4 oxyCODONE-acetaminophen (PERCOCET 10)  mg per tablet   6. Medication refill Z76.0 V68.1 ALPRAZolam (XANAX) 0.5 mg tablet      oxyCODONE-acetaminophen (PERCOCET 10)  mg per tablet      naloxone 2 mg/actuation spry      zolpidem CR (AMBIEN CR) 12.5 mg tablet      frovatriptan (FROVA) 2.5 mg tablet      ZOLMitriptan (ZOMIG) 5 mg nasal solution   7. Long term (current) use of opiate analgesic Z79.891 V58.69 oxyCODONE-acetaminophen (PERCOCET 10)  mg per tablet      naloxone 2 mg/actuation spry       BP controlled    Chronic pain on numerous meds. UDS up to date    Reviewed meds with. Cautioned re intermixing and risk of accidental overdose.     F/u 2 months for next re-evaluation and refills

## 2018-10-16 NOTE — MR AVS SNAPSHOT
[address] [place] [address] 
 
 
 [address] [facility] [address] 
[phone] Patient: Nuha Sullivan MRN: [id number] YJP:[id number] Visit Information Date & Time Provider Department Dept. Phone Encounter #  
 10/16/2018  2:15 PM [address] [phone] [id number] Follow-up Instructions Return in about 2 months (around 12/16/2018) for chronic pain, Med refills. Your Appointments 12/10/2018 10:00 AM  
ESTABLISHED PATIENT with Dylon 67, NP [facility] ([facility]) Appt Note: Return in about 7 months (around 12/10/2018) for UUI, FRANCK with NP  
 [address] [address]  
[phone]  
  
   
 [facility] 68 [address] Upcoming Health Maintenance Date Due Shingrix Vaccine Age 50> (1 of 2) 6/29/2003 FOBT Q 1 YEAR AGE 50-75 12/8/2017 GLAUCOMA SCREENING Q2Y 6/29/2018 Bone Densitometry (Dexa) Screening 6/29/2018 Influenza Age 5 to Adult 8/1/2018 DTaP/Tdap/Td series (1 - Tdap) 12/6/2018* MEDICARE YEARLY EXAM 12/7/2018 Pneumococcal 65+ Low/Medium Risk (2 of 2 - PPSV23) 8/7/2019 BREAST CANCER SCRN MAMMOGRAM 3/2/2020 *Topic was postponed. The date shown is not the original due date. Allergies as of 10/16/2018  Review Complete On: 10/16/2018 By: Millie Robledo MD  
  
 Severity Noted Reaction Type Reactions Gluten  07/22/2016    Other (comments) GI upset Morphine  08/14/2015    Rash Other reaction(s): mild rash/itching Carisoprodol Low 08/07/2013    Other (comments) Other reaction(s): other/intolerance Constipaton Constipaton Stadol [Butorphanol Tartrate] Low 08/07/2013    Other (comments) Other reaction(s): cardiac dysrhythmia Increased HR Made her have palpitations Ultram [Tramadol] Low 08/07/2013    Other (comments) Other reaction(s): unknown Unsure- mixed with Stadol and is unsure which med she reacted to. Made her have palpitations Current Immunizations  Reviewed on 10/16/2018 Name Date Influenza Vaccine 12/6/2017 12:00 AM, 3/18/2016 12:00 AM, 10/8/2010 12:00 AM, 9/28/2009 12:00 AM, 12/5/2008 12:00 AM  
 Influenza Vaccine (Quad) 10/28/2011 12:00 AM  
 Influenza Vaccine (Quad) PF 12/6/2017, 9/19/2017  5:12 PM, 12/2/2016 Influenza Vaccine (Tri) Adjuvanted  Incomplete Novel Influenza-H1N1-09, All Formulations 1/18/2010 12:00 AM  
 Pneumococcal Conjugate (PCV-13) 8/7/2018 Pneumococcal Polysaccharide (PPSV-23) 2/9/2013 12:00 AM  
  
 Reviewed by David Hobson PHARMD on 10/16/2018 at 11:24 AM  
You Were Diagnosed With   
  
 Codes Comments Essential hypertension    -  Primary ICD-10-CM: I10 
ICD-9-CM: 401.9 Dyslipidemia     ICD-10-CM: E78.5 ICD-9-CM: 272.4 Restless legs syndrome (RLS)     ICD-10-CM: G25.81 ICD-9-CM: 333.94 Encounter for immunization     ICD-10-CM: I95 ICD-9-CM: V03.89 Chronic pain syndrome     ICD-10-CM: G89.4 ICD-9-CM: 338.4 Medication refill     ICD-10-CM: Z76.0 ICD-9-CM: V68.1 Long term (current) use of opiate analgesic     ICD-10-CM: E57.243 ICD-9-CM: V58.69 Vitals BP Pulse Temp Resp Height(growth percentile) Weight(growth percentile) 139/78 (BP 1 Location: Left arm, BP Patient Position: Sitting) 68 98.1 °F (36.7 °C) (Oral) 14 5' 6\" (1.676 m) 199 lb (90.3 kg) LMP SpO2 BMI OB Status Smoking Status (LMP Unknown) 96% 32.12 kg/m2 Hysterectomy Never Smoker Vitals History BMI and BSA Data Body Mass Index Body Surface Area  
 32.12 kg/m 2 2.05 m 2 Preferred Pharmacy Pharmacy Name Phone [facility] [facility], [address] [place] [phone] Your Updated Medication List  
  
   
This list is accurate as of 10/16/18  2:55 PM.  Always use your most recent med list.  
  
  
  
  
 acyclovir 400 mg tablet Commonly known as:  ZOVIRAX Take 1 Tab by mouth three (3) times daily. Indications: herpes simplex infection ALPRAZolam 0.5 mg tablet Commonly known as:  Edmonia Silversmith Take 3 Tabs by mouth three (3) times daily as needed for Anxiety. Max Daily Amount: 4.5 mg. Indications: anxiety  
  
 aspirin 81 mg chewable tablet Take 1 Tab by mouth daily. atorvastatin 10 mg tablet Commonly known as:  LIPITOR Take 1 Tab by mouth daily. benzonatate 200 mg capsule Commonly known as:  TESSALON Take 1 Cap by mouth three (3) times daily as needed for Cough. Indications: Cough * Cetirizine 10 mg Cap Take 1 Can by mouth daily. Indications: SEASONAL ALLERGIC RHINITIS * cetirizine 10 mg tablet Commonly known as:  ZYRTEC Take 1 Tab by mouth daily. cyanocobalamin 1,000 mcg tablet Commonly known as:  VITAMIN B-12 Take 1 Tab by mouth daily. diclofenac EC 75 mg EC tablet Commonly known as:  VOLTAREN Take 1 Tab by mouth two (2) times a day. fluticasone 50 mcg/actuation nasal spray Commonly known as:  Norma Kana 2 Sprays by Both Nostrils route daily. Indications: Allergic Rhinitis  
  
 frovatriptan 2.5 mg tablet Commonly known as:  Veverly Jury Take 1 Tab by mouth once as needed for Migraine for up to 1 dose. furosemide 20 mg tablet Commonly known as:  LASIX Take 1 Tab by mouth daily. HYDROcodone-acetaminophen 5-325 mg per tablet Commonly known as:  Almeta Reasoner  
take 1-2 tablets by mouth every 4 hours if needed for pain  
  
 hyoscyamine SR 0.375 mg SR tablet Commonly known as:  LEVBID  
0.375 mg.  
  
 * montelukast 10 mg tablet Commonly known as:  SINGULAIR  
TAKE 1 TABLET DAILY FOR MAINTENANCE THERAPY FOR ASTHMA * montelukast 10 mg tablet Commonly known as:  SINGULAIR Take 1 Tab by mouth daily. Indications: MAINTENANCE THERAPY FOR ASTHMA  
  
 multivitamin tablet Commonly known as:  ONE A DAY  
 Take 1 Tab by mouth daily. naloxone 2 mg/actuation Spry Use 1 spray intranasally into 1 nostril. Use a new Narcan nasal spray for subsequent doses and administer into alternating nostrils. May repeat every 2 to 3 minutes as needed. omeprazole 40 mg capsule Commonly known as:  PRILOSEC Take 1 Cap by mouth daily. oxyCODONE-acetaminophen  mg per tablet Commonly known as:  PERCOCET 10 Take 1 Tab by mouth every four (4) hours as needed for Pain. Max Daily Amount: 6 Tabs. potassium chloride 20 mEq tablet Commonly known as:  K-DUR, KLOR-CON Take 1 Tab by mouth daily. Indications: hypokalemia  
  
 promethazine 25 mg tablet Commonly known as:  PHENERGAN Take 1 Tab by mouth every six (6) hours as needed for Nausea. QUEtiapine 50 mg tablet Commonly known as:  SEROquel Take 1 Tab by mouth daily. rOPINIRole 1 mg tablet Commonly known as:  Emeterio Manish Take 1.5 Tabs by mouth two (2) times a day. Indications: Restless Legs Syndrome  
  
 venlafaxine-SR 75 mg capsule Commonly known as:  EFFEXOR-XR Take 3 capsules by mouth daily  Indications: ANXIETY WITH DEPRESSION  
  
 ZOLMitriptan 5 mg nasal solution Commonly known as:  ZOMIG  
1 Spray by Nasal route once as needed for Migraine for up to 1 dose. zolpidem CR 12.5 mg tablet Commonly known as:  AMBIEN CR Take 1 Tab by mouth nightly as needed for Sleep. Max Daily Amount: 12.5 mg.  
  
 * Notice: This list has 4 medication(s) that are the same as other medications prescribed for you. Read the directions carefully, and ask your doctor or other care provider to review them with you. Prescriptions Printed Refills ALPRAZolam (XANAX) 0.5 mg tablet 1 Sig: Take 3 Tabs by mouth three (3) times daily as needed for Anxiety. Max Daily Amount: 4.5 mg. Indications: anxiety Class: Print  Route: Oral  
 oxyCODONE-acetaminophen (PERCOCET 10)  mg per tablet 0  
 Sig: Take 1 Tab by mouth every four (4) hours as needed for Pain. Max Daily Amount: 6 Tabs. Class: Print Route: Oral  
 naloxone 2 mg/actuation spry prn Sig: Use 1 spray intranasally into 1 nostril. Use a new Narcan nasal spray for subsequent doses and administer into alternating nostrils. May repeat every 2 to 3 minutes as needed. Class: Print  
 zolpidem CR (AMBIEN CR) 12.5 mg tablet 1 Sig: Take 1 Tab by mouth nightly as needed for Sleep. Max Daily Amount: 12.5 mg.  
 Class: Print Route: Oral  
  
Prescriptions Sent to Pharmacy Refills  
 frovatriptan (FROVA) 2.5 mg tablet 5 Sig: Take 1 Tab by mouth once as needed for Migraine for up to 1 dose. Class: Normal  
 Pharmacy: [address] [place] [address] Ph #: [phone] Route: Oral  
 ZOLMitriptan (ZOMIG) 5 mg nasal solution 5 Si Spray by Nasal route once as needed for Migraine for up to 1 dose. Class: Normal  
 Pharmacy: [address] [place] [address] Ph #: [phone] Route: Nasal  
  
We Performed the Following INFLUENZA VACCINE INACTIVATED (IIV), SUBUNIT, ADJUVANTED, IM [id number] CPT(R)] Follow-up Instructions Return in about 2 months (around 2018) for chronic pain, Med refills. Introducing hospitals & HEALTH SERVICES! Dear Elsa Young: Thank you for requesting a [employer] account. Our records indicate that you already have an active [employer] account. You can access your account anytime at [email or web address] Did you know that you can access your hospital and ER discharge instructions at any time in [employer]? You can also review all of your test results from your hospital stay or ER visit. Additional Information If you have questions, please visit the Frequently Asked Questions section of the [employer] website at [email or web address] Remember, MyChart is NOT to be used for urgent needs. For medical emergencies, dial 911. Now available from your iPhone and Android! Please provide this summary of care documentation to your next provider. Your primary care clinician is listed as [facility]. If you have any questions after today's visit, please call [phone].

## 2018-11-07 ENCOUNTER — HOSPITAL ENCOUNTER (OUTPATIENT)
Dept: LAB | Age: 63
Discharge: HOME | End: 2018-11-07
Payer: MEDICARE

## 2018-11-07 ENCOUNTER — OFFICE VISIT (OUTPATIENT)
Dept: INTERNAL MEDICINE CLINIC | Age: 63
End: 2018-11-07

## 2018-11-07 VITALS
OXYGEN SATURATION: 98 % | RESPIRATION RATE: 14 BRPM | BODY MASS INDEX: 31.98 KG/M2 | SYSTOLIC BLOOD PRESSURE: 177 MMHG | TEMPERATURE: 96.6 F | DIASTOLIC BLOOD PRESSURE: 92 MMHG | HEART RATE: 63 BPM | HEIGHT: 66 IN | WEIGHT: 199 LBS

## 2018-11-07 DIAGNOSIS — Z79.891 LONG TERM (CURRENT) USE OF OPIATE ANALGESIC: ICD-10-CM

## 2018-11-07 DIAGNOSIS — G44.209 TENSION-TYPE HEADACHE, NOT INTRACTABLE, UNSPECIFIED CHRONICITY PATTERN: ICD-10-CM

## 2018-11-07 DIAGNOSIS — N89.8 VAGINAL DISCHARGE: ICD-10-CM

## 2018-11-07 DIAGNOSIS — G89.4 CHRONIC PAIN SYNDROME: Chronic | ICD-10-CM

## 2018-11-07 DIAGNOSIS — Z76.0 MEDICATION REFILL: ICD-10-CM

## 2018-11-07 DIAGNOSIS — N89.8 VAGINA ITCHING: Primary | ICD-10-CM

## 2018-11-07 DIAGNOSIS — R03.0 ELEVATED BLOOD PRESSURE READING: ICD-10-CM

## 2018-11-07 DIAGNOSIS — N89.8 VAGINA ITCHING: ICD-10-CM

## 2018-11-07 LAB
BILIRUB UR QL STRIP: NEGATIVE
GLUCOSE UR-MCNC: NEGATIVE MG/DL
KETONES P FAST UR STRIP-MCNC: NEGATIVE MG/DL
PH UR STRIP: 5 [PH] (ref 4.6–8)
PROT UR QL STRIP: NEGATIVE
SP GR UR STRIP: 1.01 (ref 1–1.03)
UA UROBILINOGEN AMB POC: NORMAL (ref 0.2–1)
URINALYSIS CLARITY POC: CLEAR
URINALYSIS COLOR POC: YELLOW
URINE BLOOD POC: NEGATIVE
URINE LEUKOCYTES POC: NEGATIVE
URINE NITRITES POC: NEGATIVE

## 2018-11-07 PROCEDURE — 87798 DETECT AGENT NOS DNA AMP: CPT | Performed by: INTERNAL MEDICINE

## 2018-11-07 RX ORDER — OXYCODONE AND ACETAMINOPHEN 10; 325 MG/1; MG/1
1 TABLET ORAL
Qty: 100 TAB | Refills: 0 | Status: SHIPPED | OUTPATIENT
Start: 2018-11-07 | End: 2019-01-05

## 2018-11-07 NOTE — PROGRESS NOTES
HISTORY OF PRESENT ILLNESS  Royal Solis is a 70 y.o. female. Visit Vitals  BP (!) 177/92 (BP 1 Location: Right arm, BP Patient Position: Sitting)   Pulse 63   Temp 96.6 °F (35.9 °C) (Oral)   Resp 14   Ht 5' 6\" (1.676 m)   Wt 199 lb (90.3 kg)   LMP  (LMP Unknown)   SpO2 98%   BMI 32.12 kg/m²       She recently had Oral sex both ways. Male to female and female to male. No vaginal intercourse  The man later called her and told her she gave him an STD but offered no proof and no details. Since then she has noticed an odor, itching, and discharge. She would like an STD check. Vaginal Discharge    This is a new problem. The current episode started more than 1 week ago. The problem occurs daily. The discharge was malodorous, milky and watery. Pertinent negatives include no fever. Headache   The history is provided by the patient (throbbing and scalp pain on top of her head. ). This is a recurrent problem. The current episode started more than 1 week ago. The problem occurs every several days. The problem has not changed since onset. Associated symptoms include headaches. Exacerbated by: unknown. Nothing relieves the symptoms. She has tried acetaminophen (fioricet) for the symptoms. Improvement on treatment: sometimes but not today. Review of Systems   Constitutional: Negative for chills and fever. Genitourinary: Positive for vaginal discharge. See HPI   Neurological: Positive for headaches. Physical Exam   Constitutional: She is oriented to person, place, and time. She appears well-developed and well-nourished. No distress. HENT:   nothing appreciated on her scalp or vertex where she complains of pain. No masses or tenderness noted. Genitourinary: There is no rash or tenderness on the right labia. There is no rash or tenderness on the left labia. Cervix exhibits no motion tenderness, no discharge and no friability. Genitourinary Comments: No odor or discharge noted.    Neurological: She is alert and oriented to person, place, and time. Skin: Skin is warm and dry. She is not diaphoretic. Psychiatric: She has a normal mood and affect. Feeling a little blue right now. Tearful   Nursing note and vitals reviewed. ASSESSMENT and PLAN    ICD-10-CM ICD-9-CM    1. Vagina itching N89.8 698.1 AMB POC URINALYSIS DIP STICK AUTO W/O MICRO      NUSWAB VAGINITIS PLUS   2. Vaginal discharge N89.8 623.5 NUSWAB VAGINITIS PLUS   3. Tension-type headache, not intractable, unspecified chronicity pattern G44.209 339.10    4. Elevated blood pressure reading R03.0 796.2          Nothing obvious on vaginal exam  Will send Nuswab    Headache--sounds tension by where it hurts on top of head. Fioricet did not help today    Elevated blood pressure--? Due to her headache. She normally has fine BP. Has not been sleeping (has not taken [place])  Also has not taken her requip in a while. Suggested she take them for a couple of days only to see if headache improves. Incidentally, she plans to move to [place] soon to live with her son. She will need to find doctors there including one for her chronic knee pain.     F/u prn

## 2018-11-07 NOTE — PROGRESS NOTES
ROOM #  6    Staci Adams presents today for   Chief Complaint   Patient presents with    Vaginal Itching     x 3 weeks    Vaginal Discharge     x 3 weeks       Staci dAams preferred language for health care discussion is english/other. Is someone accompanying this pt? NO    Is the patient using any DME equipment during OV? NO    Depression Screening:  PHQ over the last two weeks 9/19/2017 7/22/2016   PHQ Not Done - Active Diagnosis of Depression or Bipolar Disorder   Little interest or pleasure in doing things Nearly every day -   Feeling down, depressed, irritable, or hopeless Nearly every day -   Total Score PHQ 2 6 -       Learning Assessment:  Learning Assessment 7/22/2016   PRIMARY LEARNER Patient   HIGHEST LEVEL OF EDUCATION - PRIMARY LEARNER  SOME COLLEGE   PRIMARY LANGUAGE ENGLISH   LEARNER PREFERENCE PRIMARY DEMONSTRATION   ANSWERED BY TINO Ga   RELATIONSHIP SELF       Abuse Screening:  Abuse Screening Questionnaire 8/7/2018 12/6/2017 7/22/2016   Do you ever feel afraid of your partner? N N N   Are you in a relationship with someone who physically or mentally threatens you? N N N   Is it safe for you to go home? Kell Simental       Fall Risk  Fall Risk Assessment, last 12 mths 11/7/2018 10/16/2018 8/7/2018   Able to walk? Yes Yes Yes   Fall in past 12 months? Yes Yes No   Fall with injury? Yes Yes -   Number of falls in past 12 months 2 1 -   Fall Risk Score 3 2 -       Health Maintenance reviewed and discussed per provider. Yes    Staci Adams is due for   Health Maintenance Due   Topic Date Due    Shingrix Vaccine Age 50> (1 of 2) 06/29/2003    FOBT Q 1 YEAR AGE 50-75  12/08/2017    GLAUCOMA SCREENING Q2Y  06/29/2018    Bone Densitometry (Dexa) Screening  06/29/2018         Please order/place referral if appropriate. Advance Directive:  1. Do you have an advance directive in place? Patient Reply: NO    2. If not, would you like material regarding how to put one in place?  Patient Reply: NO    Coordination of Care:  1. Have you been to the ER, urgent care clinic since your last visit? Hospitalized since your last visit? NO    2. Have you seen or consulted any other health care providers outside of the [facility] since your last visit? Include any pap smears or colon screening.  Pain Management

## 2018-11-13 LAB
A VAGINAE DNA VAG QL NAA+PROBE: NORMAL SCORE
BVAB2 DNA VAG QL NAA+PROBE: NORMAL SCORE
C ALBICANS DNA VAG QL NAA+PROBE: NEGATIVE
C GLABRATA DNA VAG QL NAA+PROBE: NEGATIVE
C TRACH RRNA SPEC QL NAA+PROBE: NEGATIVE
MEGA1 DNA VAG QL NAA+PROBE: NORMAL SCORE
N GONORRHOEA RRNA SPEC QL NAA+PROBE: NEGATIVE
SPECIMEN SOURCE: NORMAL
T VAGINALIS RRNA SPEC QL NAA+PROBE: NEGATIVE

## 2018-11-19 ENCOUNTER — TELEPHONE (OUTPATIENT)
Dept: INTERNAL MEDICINE CLINIC | Age: 63
End: 2018-11-19

## 2018-11-19 NOTE — LETTER
12/21/2018 3:35 PM 
 
Ms. Niall Melendez [address] [facility] [place] [address] [place] [id number] To Appeals Committee: I am writing to request an appeal for Frovatriptan 2.5 mg tablet and Zolmitriptan 5 mg nasal spray for patient Niall Melendez (1955) who has the following diagnoses relevant to this request: Jessica Everton. This request is medically necessary for the following reasons: Currently therapeutic when taking both medication and a change could cause and increase in her pain and decrease her daily functions. Pt has also been on these medicatiosn since 2016 therefore this request is for a continuation of therapy. Pt has previously tried and failed:Relpax, Sumatriptan, Topamax. Medication has assisted the patient to achieve or maintain maximum functional capacity in performing daily activities, taking into account both the functional capacity of the individual and those functional capacities that are appropriate for individuals of the same age due to decrease in symptoms. Please let me know if you require any additional information from my records Sincerely, Adam Silva MD

## 2018-11-27 NOTE — TELEPHONE ENCOUNTER
[facility] is reviewing your PA request and will respond within 24-72 hours. Both PA submitted will await response.

## 2018-12-01 ENCOUNTER — TELEPHONE (OUTPATIENT)
Dept: INTERNAL MEDICINE CLINIC | Age: 63
End: 2018-12-01

## 2018-12-01 NOTE — TELEPHONE ENCOUNTER
[facility] in [place] called to verify the prescription the was written by Dr. Vaishnavi Burk so it could be dispensed to the patient

## 2018-12-05 NOTE — TELEPHONE ENCOUNTER
Pt contacted at home number, as no information was listed to contact pharm. 2 pt identifiers confirmed. Pt states that she was able to get medication dispensed by pharm and has no other questions or concerns at this time.

## 2018-12-05 NOTE — TELEPHONE ENCOUNTER
Called spoke with patient verified with two identifiers advised of labs results advised that Dr. Jaron Sanchez did send out letters to her about her labs so she should be getting those letters soon. Patient verbalized understanding. Telephone Encounter by Nona Hudson at 08/28/18 11:45 AM     Author:  Nona Hudson Service:  (none) Author Type:  Patient      Filed:  08/28/18 11:49 AM Encounter Date:  8/28/2018 Status:  Signed     :  Nona Hudson (Patient )              GIANNI SESAY    Patient Age: 54 year old    ACCT STATUS:   MESSAGE:[SK1.1T] Patient states she faxed over forms from her insurance regarding her upcoming surgery 10/18 that needs to be completed as soon as possible. Patient states they were faxed to [phone]  [facility]. Patient is wondering if  received the forms and wanting to know the status on the completion of those forms. Please follow up with patient.[SK1.1M]     Routed to provider's clinical pool.     Next and Last Visit with Provider and Department  Next visit with ROLO PRIEST is on 10/10/2018 at  9:15 AM in UROGYNECOLOGY ST2  Next visit with UROLOGIC/GYNECOLOGY is on 10/10/2018 at  9:15 AM in UROGYNECOLOGY ST2  Last visit with ROLO PRIEST was on 06/20/2018 at  8:30 AM in UROGYNECOLOGY ST2  Last visit with UROLOGIC/GYNECOLOGY was on 06/20/2018 at  8:30 AM in UROGYNECOLOGY ST2     WEIGHT AND HEIGHT: As of 06/20/2018 weight is 152.2 lbs.(69.037 kg).   BMI is 21.56 kg/(m^2) calculated from:     Height 5' 6.5\" (1.689 m) as of 5/1/17     Weight 135 lb 9.6 oz (61.508 kg) as of 5/1/17[SK1.1T]      Allergies      Allergen   Reactions   • Dilaudid [Hydromorphone Hcl]  Nausea and Vomiting   • Lactose Intolerance (Gi)  Nausea and Vomiting   • Morphine  Nausea and Vomiting   • Vicodin [Hydrocodone-Acetaminophen]  Nausea and Vomiting   • Parlodel [Bromocriptine]  Other - See Comments     intolerance[SK1.2T]      Current outpatient prescriptions       Medication  Sig Dispense Refill   • liothyronine (CYTOMEL) 25 MCG tablet Take 12.5 mcg by mouth.     • levothyroxine 88 MCG tablet Take 112 mcg by mouth.     • ASCOMP-CODEINE -24-30 MG Cap      •  liothyronine (CYTOMEL) 25 MCG tablet      • mupirocin (BACTROBAN) 2 % ointment      • Cholecalciferol (VITAMIN D) 2000 UNITS tablet Take  by mouth.     • estradiol (ESTRACE) 0.1 MG/GM vaginal cream 1/2 gram twice weekly per vagina     • Riboflavin-Magnesium-Feverfew 200-180-50 MG TABS Take  by mouth.     • Spirulina 500 MG TABS Take 3,000 mg by mouth.     • vitamin E 1000 UNIT Cap Take 1,000 Units by mouth.     • Diclofenac Potassium 50 MG PACK Take  by mouth.     • fish oil-omega-3 fatty acids 1000 MG Cap Take  by mouth.     • hydrocortisone (CORTEF) 5 MG tablet Take 5 mg by mouth.     • venlafaxine (EFFEXOR-XR) 75 MG 24 hr Cap Take 1 Cap by mouth daily.     • Levothyroxine Sodium 100 MCG CAPS capsule Take 100 mcg by mouth daily.     • Glucosamine-Chondroitin (JOINT SUPPORT OR) Take 1 Tab by mouth 2 (two) times daily.     • CUSTOM FORMULATION -- SEE SIG Take 1 Tab by mouth 2 (two) times daily. migrelife - co q 10, magnesium, riboflavin     • Cyanocobalamin (VITAMIN B-12 OR) Take  by mouth 2 (two) times daily.     • FISH OIL Take 1 Tab by mouth 2 (two) times daily.        PHARMACY to use:[SK1.1T] n/a[SK1.1M]          Pharmacy preference(s) on file:    [facility] [address]    CALL BACK INFO:[SK1.1T] Ok to leave response (including medical information) on answering machine[SK1.1M]  ROUTING:[SK1.1T] Patient's physician/staff[SK1.1M]        PCP: Ami Wooten MD         INS: Payor: OUT OF STATE BLUE SHIELD / Plan: *No Plan* / Product Type: *No Product type* / Note: This is the primary coverage, but no account was found for this location or the patient's primary location.   ADDRESS:  [address] [place] [address][SK1.1T]       Revision History        User Key Date/Time User Provider Type Action    > SK1.2 08/28/18 11:49 AM Nona Hudson Patient  Sign     SK1.1 08/28/18 11:45 AM Nona Hudson Patient      NOELLE Carreno  Manual, T - Template

## 2018-12-12 NOTE — TELEPHONE ENCOUNTER
Outgoing call to patient this morning. Two patient identifies confirmed. Informed patient of prior authorization denial for Frovatriptan 2. 5 mg tablet. Patient was on medication when she establish cared with Dr. Ted Callahan in 2016. According to patient she has previously tried Relpax, Sumatriptan, and Topamax. All provided an inadequate response. Patient currently takes Frovatriptan 2.5 mg tablet and Zolmitriptan 5 mg nasal spray together to manager her migraines.

## 2019-01-05 ENCOUNTER — OFFICE VISIT (OUTPATIENT)
Dept: INTERNAL MEDICINE CLINIC | Age: 64
End: 2019-01-05

## 2019-01-05 VITALS
HEIGHT: 66 IN | HEART RATE: 70 BPM | BODY MASS INDEX: 31.34 KG/M2 | DIASTOLIC BLOOD PRESSURE: 88 MMHG | SYSTOLIC BLOOD PRESSURE: 143 MMHG | TEMPERATURE: 97.7 F | OXYGEN SATURATION: 99 % | WEIGHT: 195 LBS | RESPIRATION RATE: 18 BRPM

## 2019-01-05 DIAGNOSIS — Z76.0 MEDICATION REFILL: ICD-10-CM

## 2019-01-05 DIAGNOSIS — G89.4 CHRONIC PAIN SYNDROME: Primary | Chronic | ICD-10-CM

## 2019-01-05 RX ORDER — OXYCODONE AND ACETAMINOPHEN 7.5; 325 MG/1; MG/1
1 TABLET ORAL
Qty: 100 TAB | Refills: 0 | Status: SHIPPED | OUTPATIENT
Start: 2019-01-05 | End: 2019-02-19 | Stop reason: SDUPTHER

## 2019-01-05 RX ORDER — FENTANYL 50 UG/1
PATCH TRANSDERMAL
COMMUNITY
End: 2019-01-05

## 2019-01-05 RX ORDER — TIZANIDINE HYDROCHLORIDE 4 MG/1
4 CAPSULE, GELATIN COATED ORAL
Qty: 60 CAP | Refills: 5 | Status: SHIPPED | OUTPATIENT
Start: 2019-01-05 | End: 2019-04-04 | Stop reason: SDUPTHER

## 2019-01-05 RX ORDER — FROVATRIPTAN SUCCINATE 2.5 MG/1
TABLET, FILM COATED ORAL
COMMUNITY
End: 2019-07-15 | Stop reason: SDUPTHER

## 2019-01-05 RX ORDER — FENTANYL 75 UG/H
PATCH TRANSDERMAL
COMMUNITY
End: 2019-01-05

## 2019-01-05 NOTE — PROGRESS NOTES
ROOM # 4    Cris Wiley presents today for   Chief Complaint   Patient presents with    Medication Refill       Cris Wiley preferred language for health care discussion is english/other. Is someone accompanying this pt? no    Is the patient using any DME equipment during OV? no    Depression Screening:  PHQ over the last two weeks 9/19/2017 7/22/2016   PHQ Not Done - Active Diagnosis of Depression or Bipolar Disorder   Little interest or pleasure in doing things Nearly every day -   Feeling down, depressed, irritable, or hopeless Nearly every day -   Total Score PHQ 2 6 -       Learning Assessment:  Learning Assessment 7/22/2016   PRIMARY LEARNER Patient   HIGHEST LEVEL OF EDUCATION - PRIMARY LEARNER  SOME COLLEGE   PRIMARY LANGUAGE ENGLISH   LEARNER PREFERENCE PRIMARY DEMONSTRATION   ANSWERED BY TINO Ga   RELATIONSHIP SELF       Abuse Screening:  Abuse Screening Questionnaire 8/7/2018 12/6/2017 7/22/2016   Do you ever feel afraid of your partner? N N N   Are you in a relationship with someone who physically or mentally threatens you? N N N   Is it safe for you to go home? Maddi William       Fall Risk  Fall Risk Assessment, last 12 mths 11/7/2018 10/16/2018 8/7/2018   Able to walk? Yes Yes Yes   Fall in past 12 months? Yes Yes No   Fall with injury? Yes Yes -   Number of falls in past 12 months 2 1 -   Fall Risk Score 3 2 -       Health Maintenance reviewed and discussed per provider. Yes    Cris Wiley is due for   Health Maintenance Due   Topic Date Due    DTaP/Tdap/Td series (1 - Tdap) 06/29/1974    Shingrix Vaccine Age 50> (1 of 2) 06/29/2003    FOBT Q 1 YEAR AGE 50-75  12/08/2017    GLAUCOMA SCREENING Q2Y  06/29/2018    Bone Densitometry (Dexa) Screening  06/29/2018    MEDICARE YEARLY EXAM  12/07/2018     Please order/place referral if appropriate. Advance Directive:  1. Do you have an advance directive in place? Patient Reply: no    2.  If not, would you like material regarding how to put one in place? Patient Reply: no    Coordination of Care:  1. Have you been to the ER, urgent care clinic since your last visit? Hospitalized since your last visit? yes    2. Have you seen or consulted any other health care providers outside of the [address] [place] [address] since your last visit? Include any pap smears or colon screening.  Dr. Reyes(neurologist)

## 2019-01-05 NOTE — PROGRESS NOTES
HISTORY OF PRESENT ILLNESS  Jef Ingram is a 70 y.o. female. Visit Vitals  /88 (BP 1 Location: Right arm, BP Patient Position: Sitting)   Pulse 70   Temp 97.7 °F (36.5 °C) (Oral)   Resp 18   Ht 5' 6\" (1.676 m)   Wt 195 lb (88.5 kg)   LMP  (LMP Unknown)   SpO2 99%   BMI 31.47 kg/m²       Pt here for med refill    Recently she had a TIA while visiting in [place]. She says they \"did nothing\" for her. She says she went in for \"bug bites\" but was acting strangely. She was in [place] a month. Got home here on Christmas Day night. She did see Dr. Preeti Gamble (neuro) here    But she is here today for pain meds. She was referred by someone to Dr. Joyce Shetty who wanted her to go to PT. And he does not write pain meds. She is agreeable to trying a little lower dose of her percocet. She will go back to him  She is living with her son's girlfriend while he is deployed to [facility]. Medication Refill   The history is provided by the patient. Review of Systems   Constitutional: Negative. Musculoskeletal: Positive for back pain, joint pain and myalgias. Psychiatric/Behavioral: The patient is nervous/anxious. Physical Exam   Constitutional: She is oriented to person, place, and time. She appears well-developed and well-nourished. No distress. Neurological: She is alert and oriented to person, place, and time. Skin: Skin is warm and dry. Psychiatric: She has a normal mood and affect. Nursing note and vitals reviewed. ASSESSMENT and PLAN    ICD-10-CM ICD-9-CM    1. Chronic pain syndrome G89.4 338.4 oxyCODONE-acetaminophen (PERCOCET 7.5) 7.5-325 mg per tablet   2. Medication refill Z76.0 V68.1 oxyCODONE-acetaminophen (PERCOCET 7.5) 7.5-325 mg per tablet       Will refill lower dose of percocet today. She will f/u with Dr. Joyce Shetty.      She will f/u with neurology re TIA sxs    Pt to provide info regarding her hospital visit to obtain her records from [place]    F/u one month for next refills

## 2019-01-30 DIAGNOSIS — Z76.0 MEDICATION REFILL: ICD-10-CM

## 2019-01-30 NOTE — TELEPHONE ENCOUNTER
Patient request, last OV 1/5/19, no future appt scheduled. Requested Prescriptions     Pending Prescriptions Disp Refills    ALPRAZolam (XANAX) 0.5 mg tablet 270 Tab 1     Sig: Take 3 Tabs by mouth three (3) times daily as needed for Anxiety.  Max Daily Amount: 4.5 mg.

## 2019-01-31 NOTE — TELEPHONE ENCOUNTER
PCP: Arabella Ulrich MD    Last appt: 1/5/2019  No future appointments. Requested Prescriptions     Pending Prescriptions Disp Refills    ALPRAZolam (XANAX) 0.5 mg tablet 270 Tab 1     Sig: Take 3 Tabs by mouth three (3) times daily as needed for Anxiety. Max Daily Amount: 4.5 mg.       Request for a 30 or 90 day supply? Provider Discretion    Pharmacy: Print    Other Comments:   printed and placed in PCP medication refill review folder.      Last UDS date: 08/07/2018  Pain contract signed: N/A

## 2019-02-01 RX ORDER — ALPRAZOLAM 0.5 MG/1
1.5 TABLET ORAL
Qty: 270 TAB | Refills: 1 | OUTPATIENT
Start: 2019-02-01

## 2019-02-01 NOTE — TELEPHONE ENCOUNTER
Outgoing call to patient this morning. Two patient identifiers confirmed. Informed patient per provider that she should have an active refill. Patient verbalized understanding.

## 2019-02-04 ENCOUNTER — OFFICE VISIT (OUTPATIENT)
Dept: INTERNAL MEDICINE CLINIC | Age: 64
End: 2019-02-04

## 2019-02-04 ENCOUNTER — TELEPHONE (OUTPATIENT)
Dept: INTERNAL MEDICINE CLINIC | Age: 64
End: 2019-02-04

## 2019-02-04 VITALS
OXYGEN SATURATION: 97 % | HEART RATE: 71 BPM | DIASTOLIC BLOOD PRESSURE: 83 MMHG | RESPIRATION RATE: 12 BRPM | SYSTOLIC BLOOD PRESSURE: 144 MMHG | WEIGHT: 195.2 LBS | TEMPERATURE: 98.1 F | BODY MASS INDEX: 31.37 KG/M2 | HEIGHT: 66 IN

## 2019-02-04 DIAGNOSIS — Z76.0 MEDICATION REFILL: ICD-10-CM

## 2019-02-04 DIAGNOSIS — L98.9 SKIN LESION: Primary | ICD-10-CM

## 2019-02-04 DIAGNOSIS — K04.7 DENTAL INFECTION: ICD-10-CM

## 2019-02-04 RX ORDER — DOXYCYCLINE 100 MG/1
100 CAPSULE ORAL 2 TIMES DAILY
Qty: 20 CAP | Refills: 0 | Status: SHIPPED | OUTPATIENT
Start: 2019-02-04 | End: 2019-02-14

## 2019-02-04 RX ORDER — MUPIROCIN CALCIUM 20 MG/G
CREAM TOPICAL 2 TIMES DAILY
Qty: 15 G | Refills: 0 | Status: SHIPPED | OUTPATIENT
Start: 2019-02-04 | End: 2019-04-15

## 2019-02-04 RX ORDER — ALPRAZOLAM 0.5 MG/1
1.5 TABLET ORAL
Qty: 270 TAB | Refills: 1 | Status: SHIPPED | OUTPATIENT
Start: 2019-02-04 | End: 2019-04-04 | Stop reason: SDUPTHER

## 2019-02-04 NOTE — PATIENT INSTRUCTIONS

## 2019-02-04 NOTE — TELEPHONE ENCOUNTER
Pharmacy requesting Prior Auth for [facility]    Document scanned to media.

## 2019-02-04 NOTE — PROGRESS NOTES
Grant Hemphill is a 70 y.o. female (: 1955) presenting to address:    Chief Complaint   Patient presents with    Rash     Pt complains of skin sores on chin that patient noticed a month ago after bridge broke       Vitals:    19 1401   BP: 144/83   Pulse: 71   Resp: 12   Temp: 98.1 °F (36.7 °C)   TempSrc: Oral   SpO2: 97%   Weight: 195 lb 3.2 oz (88.5 kg)   Height: 5' 6\" (1.676 m)   PainSc:   9   PainLoc: Mouth       Hearing/Vision:   No exam data present    Learning Assessment:     Learning Assessment 2016   PRIMARY LEARNER Patient   HIGHEST LEVEL OF EDUCATION - PRIMARY LEARNER  SOME COLLEGE   PRIMARY LANGUAGE ENGLISH   LEARNER PREFERENCE PRIMARY DEMONSTRATION   ANSWERED BY TINO Ga   RELATIONSHIP SELF     Depression Screening:     PHQ over the last two weeks 2017   PHQ Not Done -   Little interest or pleasure in doing things Nearly every day   Feeling down, depressed, irritable, or hopeless Nearly every day   Total Score PHQ 2 6     Fall Risk Assessment:     Fall Risk Assessment, last 12 mths 2019   Able to walk? Yes   Fall in past 12 months? Yes   Fall with injury? Yes   Number of falls in past 12 months 3   Fall Risk Score 4     Abuse Screening:     Abuse Screening Questionnaire 2018   Do you ever feel afraid of your partner? N   Are you in a relationship with someone who physically or mentally threatens you? N   Is it safe for you to go home? Y     Coordination of Care Questionaire:   1. Have you been to the ER, urgent care clinic since your last visit? Hospitalized since your last visit? YES    2. Have you seen or consulted any other health care providers outside of the [address] [place] [address] since your last visit? Include any pap smears or colon screening. NO    Advanced Directive:   1. Do you have an Advanced Directive? NO    2. Would you like information on Advanced Directives?  NO

## 2019-02-04 NOTE — PROGRESS NOTES
HISTORY OF PRESENT ILLNESS  Flaquito Lezama is a 70 y.o. female. Visit Vitals  /83   Pulse 71   Temp 98.1 °F (36.7 °C) (Oral)   Resp 12   Ht 5' 6\" (1.676 m)   Wt 195 lb 3.2 oz (88.5 kg)   LMP  (LMP Unknown)   SpO2 97%   BMI 31.51 kg/m²       Pt lost a bridge and had a dental infection. She went to a dentist who told her she needs to see an oral surgeon but she can not find one that she can afford. I suggested she check with . She has a couple of skin lesions on her chin that are concerning. Review of Systems   Constitutional: Negative for chills and fever. Physical Exam   Constitutional: She is oriented to person, place, and time. She appears well-developed and well-nourished. No distress. HENT:   Nose:       Mouth/Throat:       extremely poor dentition. Some gingival swelling along lower right teeth   Neurological: She is alert and oriented to person, place, and time. Skin: Skin is warm and dry. She is not diaphoretic. Psychiatric: She has a normal mood and affect. Nursing note and vitals reviewed. ASSESSMENT and PLAN    ICD-10-CM ICD-9-CM    1. Skin lesion L98.9 709.9 doxycycline (VIBRAMYCIN) 100 mg capsule      mupirocin calcium (BACTROBAN) 2 % topical cream   2. Dental infection K04.7 522.4 doxycycline (VIBRAMYCIN) 100 mg capsule   3. Medication refill Z76.0 V68.1 ALPRAZolam (XANAX) 0.5 mg tablet       Will add doxycycline and bactroban for the skin lesion on her chin    Suggested she try  for reference for oral surgeon    Refill xanax    Incidentally,  Discussed BMI/weight, lifestyle, diet and exercise. Discussed effect on blood pressure, blood sugar, and joints especially  Focus on limiting white carbs, portion control, and moving more.       F/u 2-3 months for [facility]

## 2019-02-05 NOTE — TELEPHONE ENCOUNTER
Prior authorization for Zolmitriptan initiated via CoverMyMeds. [id number];ECQKYS:[id number]; Review Type:Prior Auth; Coverage Start Date:01/06/2019; Coverage End Date:12/31/2099; Outgoing call to [employer].  Pharmacy staff aware of approval.

## 2019-02-19 DIAGNOSIS — G89.4 CHRONIC PAIN SYNDROME: Chronic | ICD-10-CM

## 2019-02-19 DIAGNOSIS — Z76.0 MEDICATION REFILL: ICD-10-CM

## 2019-02-19 NOTE — TELEPHONE ENCOUNTER
Requested Prescriptions     Pending Prescriptions Disp Refills    oxyCODONE-acetaminophen (PERCOCET 7.5) 7.5-325 mg per tablet 100 Tab 0     Sig: Take 1 Tab by mouth every four (4) hours as needed for Pain. Max Daily Amount: 6 Tabs.

## 2019-02-20 RX ORDER — OXYCODONE AND ACETAMINOPHEN 7.5; 325 MG/1; MG/1
1 TABLET ORAL
Qty: 100 TAB | Refills: 0 | Status: SHIPPED | OUTPATIENT
Start: 2019-02-20 | End: 2019-04-05 | Stop reason: SDUPTHER

## 2019-02-20 NOTE — TELEPHONE ENCOUNTER
PCP: Mary Taveras MD    Last appt: 2/4/2019  Future Appointments   Date Time Provider Cory Simentali   4/15/2019  4:15 PM Dwight Yanez MD [facility] SCHED       Requested Prescriptions     Pending Prescriptions Disp Refills    oxyCODONE-acetaminophen (PERCOCET 7.5) 7.5-325 mg per tablet 100 Tab 0     Sig: Take 1 Tab by mouth every four (4) hours as needed for Pain. Max Daily Amount: 6 Tabs. Request for a 30 or 90 day supply? Provider Discretion    Pharmacy: Print     Other Comments:   printed and placed in PCP medication refill review folder.      Last UDS date: 08/07/2018  Pain contract signed:

## 2019-04-04 DIAGNOSIS — Z76.0 MEDICATION REFILL: ICD-10-CM

## 2019-04-04 RX ORDER — TIZANIDINE HYDROCHLORIDE 4 MG/1
4 CAPSULE, GELATIN COATED ORAL
Qty: 60 CAP | Refills: 5 | Status: SHIPPED | OUTPATIENT
Start: 2019-04-04 | End: 2019-07-29 | Stop reason: SDUPTHER

## 2019-04-04 NOTE — TELEPHONE ENCOUNTER
Requested Prescriptions     Pending Prescriptions Disp Refills    tiZANidine (ZANAFLEX) 4 mg capsule 60 Cap 5     Sig: Take 1 Cap by mouth two (2) times daily as needed.  Indications: muscle spasm

## 2019-04-04 NOTE — TELEPHONE ENCOUNTER
Requested Prescriptions     Pending Prescriptions Disp Refills    ALPRAZolam (XANAX) 0.5 mg tablet 270 Tab 1     Sig: Take 3 Tabs by mouth three (3) times daily as needed for Anxiety. Max Daily Amount: 4.5 mg.  Indications: anxious

## 2019-04-05 DIAGNOSIS — Z76.0 MEDICATION REFILL: ICD-10-CM

## 2019-04-05 DIAGNOSIS — G89.4 CHRONIC PAIN SYNDROME: Chronic | ICD-10-CM

## 2019-04-05 RX ORDER — ALPRAZOLAM 0.5 MG/1
1.5 TABLET ORAL
Qty: 270 TAB | Refills: 1 | Status: SHIPPED | OUTPATIENT
Start: 2019-04-05 | End: 2019-05-15 | Stop reason: SDUPTHER

## 2019-04-05 RX ORDER — OXYCODONE AND ACETAMINOPHEN 7.5; 325 MG/1; MG/1
1 TABLET ORAL
Qty: 100 TAB | Refills: 0 | Status: SHIPPED | OUTPATIENT
Start: 2019-04-05 | End: 2019-04-15 | Stop reason: SDUPTHER

## 2019-04-05 NOTE — TELEPHONE ENCOUNTER
Attempted to contact patient at  number, no answer. Lvm for patient to return call to office at [phone]. Will continue to try to contact patient.

## 2019-04-05 NOTE — TELEPHONE ENCOUNTER
I have printed the xanax as she may not be able to transfer it to a new pharmacy    I will also write the percocet for this time    She will need to pick these up

## 2019-04-05 NOTE — TELEPHONE ENCOUNTER
Patient contacted at home number. 2 patient identifiers confirmed. Patient informed of below. Patient verbalized understanding. No other questions at this time.

## 2019-04-05 NOTE — TELEPHONE ENCOUNTER
PCP: Devon Reddy MD    Last appt: 4/1/2019  Future Appointments   Date Time Provider Cory Daniels   4/15/2019  4:15 PM Yolanda Yanez MD [facility]       Requested Prescriptions     Pending Prescriptions Disp Refills    ALPRAZolam (XANAX) 0.5 mg tablet 270 Tab 1     Sig: Take 3 Tabs by mouth three (3) times daily as needed for Anxiety. Max Daily Amount: 4.5 mg. Indications: anxious       Request for a 30 or 90 day supply? Provider Discretion    [facility]    Other Comments:   printed and placed in PCP medication refill review folder.      Last UDS date: 08/07/2018  Pain contract signed: none

## 2019-04-15 ENCOUNTER — HOSPITAL ENCOUNTER (OUTPATIENT)
Dept: LAB | Age: 64
Discharge: HOME | End: 2019-04-15
Payer: MEDICARE

## 2019-04-15 ENCOUNTER — OFFICE VISIT (OUTPATIENT)
Dept: INTERNAL MEDICINE CLINIC | Age: 64
End: 2019-04-15

## 2019-04-15 VITALS
HEART RATE: 61 BPM | OXYGEN SATURATION: 96 % | HEIGHT: 66 IN | DIASTOLIC BLOOD PRESSURE: 76 MMHG | RESPIRATION RATE: 18 BRPM | BODY MASS INDEX: 30.86 KG/M2 | SYSTOLIC BLOOD PRESSURE: 135 MMHG | TEMPERATURE: 98.5 F | WEIGHT: 192 LBS

## 2019-04-15 DIAGNOSIS — G98.8 NEUROLOGICAL DISORDER: ICD-10-CM

## 2019-04-15 DIAGNOSIS — G89.4 CHRONIC PAIN SYNDROME: Chronic | ICD-10-CM

## 2019-04-15 DIAGNOSIS — Z79.891 ENCOUNTER FOR LONG-TERM USE OF OPIATE ANALGESIC: ICD-10-CM

## 2019-04-15 DIAGNOSIS — Z00.00 MEDICARE ANNUAL WELLNESS VISIT, SUBSEQUENT: Primary | ICD-10-CM

## 2019-04-15 DIAGNOSIS — Z12.11 SCREEN FOR COLON CANCER: ICD-10-CM

## 2019-04-15 DIAGNOSIS — Z76.0 MEDICATION REFILL: ICD-10-CM

## 2019-04-15 DIAGNOSIS — Z87.898 H/O: PITUITARY TUMOR: ICD-10-CM

## 2019-04-15 DIAGNOSIS — F39 MOOD DISORDER (HCC): ICD-10-CM

## 2019-04-15 DIAGNOSIS — R41.3 MEMORY IMPAIRMENT: Chronic | ICD-10-CM

## 2019-04-15 LAB
ALBUMIN SERPL-MCNC: 3.8 G/DL (ref 3.4–5)
ALBUMIN/GLOB SERPL: 1.6 {RATIO} (ref 0.8–1.7)
ALP SERPL-CCNC: 89 U/L (ref 45–117)
ALT SERPL-CCNC: 30 U/L (ref 13–56)
ANION GAP SERPL CALC-SCNC: 5 MMOL/L (ref 3–18)
AST SERPL-CCNC: 18 U/L (ref 15–37)
BILIRUB SERPL-MCNC: 0.6 MG/DL (ref 0.2–1)
BUN SERPL-MCNC: 13 MG/DL (ref 7–18)
BUN/CREAT SERPL: 13 (ref 12–20)
CALCIUM SERPL-MCNC: 8.6 MG/DL (ref 8.5–10.1)
CHLORIDE SERPL-SCNC: 111 MMOL/L (ref 100–108)
CO2 SERPL-SCNC: 28 MMOL/L (ref 21–32)
CREAT SERPL-MCNC: 0.99 MG/DL (ref 0.6–1.3)
FOLATE SERPL-MCNC: 10.5 NG/ML (ref 3.1–17.5)
GLOBULIN SER CALC-MCNC: 2.4 G/DL (ref 2–4)
GLUCOSE SERPL-MCNC: 90 MG/DL (ref 74–99)
MAGNESIUM SERPL-MCNC: 2.3 MG/DL (ref 1.6–2.6)
POTASSIUM SERPL-SCNC: 4.1 MMOL/L (ref 3.5–5.5)
PROT SERPL-MCNC: 6.2 G/DL (ref 6.4–8.2)
SODIUM SERPL-SCNC: 144 MMOL/L (ref 136–145)
T4 FREE SERPL-MCNC: 0.7 NG/DL (ref 0.7–1.5)
TSH SERPL DL<=0.05 MIU/L-ACNC: 0.98 UIU/ML (ref 0.36–3.74)
VIT B12 SERPL-MCNC: 1528 PG/ML (ref 211–911)

## 2019-04-15 PROCEDURE — 84439 ASSAY OF FREE THYROXINE: CPT

## 2019-04-15 PROCEDURE — 84146 ASSAY OF PROLACTIN: CPT

## 2019-04-15 PROCEDURE — G0480 DRUG TEST DEF 1-7 CLASSES: HCPCS

## 2019-04-15 PROCEDURE — 80053 COMPREHEN METABOLIC PANEL: CPT

## 2019-04-15 PROCEDURE — 83735 ASSAY OF MAGNESIUM: CPT

## 2019-04-15 PROCEDURE — 36415 COLL VENOUS BLD VENIPUNCTURE: CPT

## 2019-04-15 PROCEDURE — 82607 VITAMIN B-12: CPT

## 2019-04-15 RX ORDER — OXYCODONE AND ACETAMINOPHEN 7.5; 325 MG/1; MG/1
1 TABLET ORAL
Qty: 100 TAB | Refills: 0 | Status: SHIPPED | OUTPATIENT
Start: 2019-05-04 | End: 2019-05-15 | Stop reason: SDUPTHER

## 2019-04-15 RX ORDER — VENLAFAXINE HYDROCHLORIDE 75 MG/1
CAPSULE, EXTENDED RELEASE ORAL
Qty: 270 CAP | Refills: 5 | Status: SHIPPED | OUTPATIENT
Start: 2019-04-15 | End: 2019-07-29 | Stop reason: SDUPTHER

## 2019-04-15 NOTE — PATIENT INSTRUCTIONS
Medicare Wellness Visit, Female     The best way to live healthy is to have a lifestyle where you eat a well-balanced diet, exercise regularly, limit alcohol use, and quit all forms of tobacco/nicotine, if applicable. Regular preventive services are another way to keep healthy. Preventive services (vaccines, screening tests, monitoring & exams) can help personalize your care plan, which helps you manage your own care. Screening tests can find health problems at the earliest stages, when they are easiest to treat. [facility] follows the current, evidence-based guidelines published by the [facility] Yemi Holley ([facility]) when recommending preventive services for our patients. Because we follow these guidelines, sometimes recommendations change over time as research supports it. (For example, mammograms used to be recommended annually. Even though Medicare will still pay for an annual mammogram, the newer guidelines recommend a mammogram every two years for women of average risk.)  Of course, you and your doctor may decide to screen more often for some diseases, based on your risk and your health status. Preventive services for you include:  - Medicare offers their members a free annual wellness visit, which is time for you and your primary care provider to discuss and plan for your preventive service needs. Take advantage of this benefit every year!  -All adults over the age of 72 should receive the recommended pneumonia vaccines. Current USPSTF guidelines recommend a series of two vaccines for the best pneumonia protection.   -All adults should have a flu vaccine yearly and a tetanus vaccine every 10 years. All adults age 61 and older should receive a shingles vaccine once in their lifetime.    -A bone mass density test is recommended when a woman turns 65 to screen for osteoporosis. This test is only recommended one time, as a screening.  Some providers will use this same test as a disease monitoring tool if you already have osteoporosis. -All adults age 38-68 who are overweight should have a diabetes screening test once every three years.   -Other screening tests and preventive services for persons with diabetes include: an eye exam to screen for diabetic retinopathy, a kidney function test, a foot exam, and stricter control over your cholesterol.   -Cardiovascular screening for adults with routine risk involves an electrocardiogram (ECG) at intervals determined by your doctor.   -Colorectal cancer screenings should be done for adults age 54-65 with no increased risk factors for colorectal cancer. There are a number of acceptable methods of screening for this type of cancer. Each test has its own benefits and drawbacks. Discuss with your doctor what is most appropriate for you during your annual wellness visit. The different tests include: colonoscopy (considered the best screening method), a fecal occult blood test, a fecal DNA test, and sigmoidoscopy. -Breast cancer screenings are recommended every other year for women of normal risk, age 54-69.  -Cervical cancer screenings for women over age 72 are only recommended with certain risk factors.   -All adults born between [facility] should be screened once for Hepatitis C.      Here is a list of your current Health Maintenance items (your personalized list of preventive services) with a due date:  Health Maintenance Due   Topic Date Due    Shingles Vaccine (1 of 2) 06/29/2003    Stool testing for trace blood  12/08/2017    Glaucoma Screening   06/29/2018    Bone Mineral Density   06/29/2018    Annual Well Visit  12/07/2018

## 2019-04-15 NOTE — PROGRESS NOTES
HISTORY OF PRESENT ILLNESS  Jess Donis is a 70 y.o. female. Visit Vitals  /76 (BP 1 Location: Right arm, BP Patient Position: Sitting)   Pulse 61   Temp 98.5 °F (36.9 °C) (Oral)   Resp 18   Ht 5' 6\" (1.676 m)   Wt 192 lb (87.1 kg)   LMP  (LMP Unknown)   SpO2 96%   BMI 30.99 kg/m²       Since last visit pt was in the hospital ER with ? Stroke like sxs. They told her she overdosed on her meds, but she denies this. She thinks she may have had a mini-stroke. Says they did not look for anything else. But the notes state she was \"fine\" but pt states she has trouble walking and her memory is worse that before this happened. The history in the chart regarding percocet from [place] is itself suspicious. They did not write down where or when filled or the doctor's name. Pt states she protects her meds and does not borrow or use anyone else's meds  We filled #100 on 2/20/19. Just filled another on 4/6/19      Review of Systems   Constitutional: Negative. Cardiovascular: Negative. Neurological: Positive for dizziness. Negative for headaches. Psychiatric/Behavioral: Positive for memory loss. The patient is nervous/anxious. Physical Exam   Constitutional: She is oriented to person, place, and time. She appears well-developed and well-nourished. No distress. Cardiovascular: Normal rate. Pulmonary/Chest: Effort normal.   Neurological: She is alert and oriented to person, place, and time. She exhibits normal muscle tone. Coordination normal.   Chronic facial asymmetry   Skin: Skin is warm and dry. She is not diaphoretic. Psychiatric: She has a normal mood and affect. Nursing note and vitals reviewed. ASSESSMENT and PLAN    ICD-10-CM ICD-9-CM                  3. Chronic pain syndrome Y77.4 023.2 METABOLIC PANEL, COMPREHENSIVE      MAGNESIUM      VITAMIN B12 & FOLATE      oxyCODONE-acetaminophen (PERCOCET 7.5) 7.5-325 mg per tablet   4.  Memory impairment Y83.8 664.81 METABOLIC PANEL, COMPREHENSIVE      VITAMIN B12 & FOLATE      MRI BRAIN WO CONT   5. H/O: pituitary tumor W29.194 I64.62 METABOLIC PANEL, COMPREHENSIVE      TSH AND FREE T4      PROLACTIN   6. Mood disorder (HCC)  F39 296.90 TSH AND FREE T4   7. Neurological disorder P01.6 546.9 METABOLIC PANEL, COMPREHENSIVE      TSH AND FREE T4      MAGNESIUM      VITAMIN B12 & FOLATE      PROLACTIN      MRI BRAIN WO CONT   8. Encounter for long-term use of opiate analgesic Z79.891 V58.69 TOXASSURE SELECT 13 (MW)   9. Medication refill Z76.0 V68.1 oxyCODONE-acetaminophen (PERCOCET 7.5) 7.5-325 mg per tablet      venlafaxine-SR (EFFEXOR-XR) 75 mg capsule       Reviewed ER note  Perhaps they should have done a CT or MRI and not just assumed she overdosed on meds.  She has omplicated history  Will order MRI leyla in light of previous brain lesions followed by neurology    Update lab    UDS update    F/u one month

## 2019-04-15 NOTE — PROGRESS NOTES
ROOM # 5    Maxim Shipley presents today for   Chief Complaint   Patient presents with    Annual Wellness Visit       Maxim Shipley preferred language for health care discussion is english/other. Is someone accompanying this pt? no    Is the patient using any DME equipment during [address]? no    Depression Screening:  3 most recent PHQ Screens 9/19/2017 7/22/2016   PHQ Not Done - Active Diagnosis of Depression or Bipolar Disorder   Little interest or pleasure in doing things Nearly every day -   Feeling down, depressed, irritable, or hopeless Nearly every day -   Total Score PHQ 2 6 -       Learning Assessment:  Learning Assessment 7/22/2016   PRIMARY LEARNER Patient   HIGHEST LEVEL OF EDUCATION - PRIMARY LEARNER  SOME COLLEGE   PRIMARY LANGUAGE ENGLISH   LEARNER PREFERENCE PRIMARY DEMONSTRATION   ANSWERED BY TINO Ga   RELATIONSHIP SELF       Abuse Screening:  Abuse Screening Questionnaire 4/15/2019 8/7/2018 12/6/2017 7/22/2016   Do you ever feel afraid of your partner? N N N N   Are you in a relationship with someone who physically or mentally threatens you? N N N N   Is it safe for you to go home? Hope Angelucci       Fall Risk  Fall Risk Assessment, last 12 mths 2/4/2019 11/7/2018 10/16/2018 8/7/2018   Able to walk? Yes Yes Yes Yes   Fall in past 12 months? Yes Yes Yes No   Fall with injury? Yes Yes Yes -   Number of falls in past 12 months 3 2 1 -   Fall Risk Score 4 3 2 -       Health Maintenance reviewed and discussed per provider. Yes    Maxim Shipley is due for   Health Maintenance Due   Topic Date Due    Shingrix Vaccine Age 50> (1 of 2) 06/29/2003    FOBT Q 1 YEAR AGE 50-75  12/08/2017    GLAUCOMA SCREENING Q2Y  06/29/2018    Bone Densitometry (Dexa) Screening  06/29/2018    MEDICARE YEARLY EXAM  12/07/2018     Please order/place referral if appropriate. Advance Directive:  1. Do you have an advance directive in place? Patient Reply: no    2.  If not, would you like material regarding how to put one in place? Patient Reply: no    Coordination of Care:  1. Have you been to the ER, urgent care clinic since your last visit? Hospitalized since your last visit? no    2. Have you seen or consulted any other health care providers outside of the [address] [place] [address] since your last visit? Include any pap smears or colon screening.  Dr. Byrd Cheese
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
## 2019-04-16 LAB — PROLACTIN SERPL-MCNC: 7.5 NG/ML

## 2019-04-18 LAB — DRUGS UR: NORMAL

## 2019-04-24 ENCOUNTER — TELEPHONE (OUTPATIENT)
Dept: INTERNAL MEDICINE CLINIC | Age: 64
End: 2019-04-24

## 2019-04-24 NOTE — TELEPHONE ENCOUNTER
2 pt identifiers confirmed. Pt states that she believes she has a sinus infection and has some amoxicillin from when she goes to the dentist. Informed pt that I cannot advise her to take medication. Offered to schedule appointment with PA for this Friday 4/26/19. Pt states that she will go to an urgent care.

## 2019-05-07 ENCOUNTER — TELEPHONE (OUTPATIENT)
Dept: INTERNAL MEDICINE CLINIC | Age: 64
End: 2019-05-07

## 2019-05-07 DIAGNOSIS — G93.9 BRAIN LESION: ICD-10-CM

## 2019-05-07 DIAGNOSIS — R93.0 ABNORMAL MRI OF HEAD: ICD-10-CM

## 2019-05-15 ENCOUNTER — OFFICE VISIT (OUTPATIENT)
Dept: INTERNAL MEDICINE CLINIC | Age: 64
End: 2019-05-15

## 2019-05-15 VITALS
HEIGHT: 66 IN | HEART RATE: 66 BPM | WEIGHT: 191 LBS | SYSTOLIC BLOOD PRESSURE: 135 MMHG | RESPIRATION RATE: 17 BRPM | TEMPERATURE: 97.7 F | BODY MASS INDEX: 30.7 KG/M2 | DIASTOLIC BLOOD PRESSURE: 78 MMHG | OXYGEN SATURATION: 99 %

## 2019-05-15 DIAGNOSIS — M54.2 NECK PAIN: ICD-10-CM

## 2019-05-15 DIAGNOSIS — M54.5 CHRONIC LOW BACK PAIN, UNSPECIFIED BACK PAIN LATERALITY, WITH SCIATICA PRESENCE UNSPECIFIED: ICD-10-CM

## 2019-05-15 DIAGNOSIS — G89.29 CHRONIC LOW BACK PAIN, UNSPECIFIED BACK PAIN LATERALITY, WITH SCIATICA PRESENCE UNSPECIFIED: ICD-10-CM

## 2019-05-15 DIAGNOSIS — Z76.0 MEDICATION REFILL: ICD-10-CM

## 2019-05-15 DIAGNOSIS — G89.4 CHRONIC PAIN SYNDROME: Chronic | ICD-10-CM

## 2019-05-15 DIAGNOSIS — R35.0 URINARY FREQUENCY: Primary | ICD-10-CM

## 2019-05-15 PROBLEM — E66.01 SEVERE OBESITY (BMI 35.0-39.9) WITH COMORBIDITY (HCC): Status: RESOLVED | Noted: 2018-03-31 | Resolved: 2019-05-15

## 2019-05-15 RX ORDER — DICLOFENAC SODIUM 10 MG/G
GEL TOPICAL 4 TIMES DAILY
Qty: 100 G | Refills: 5 | Status: SHIPPED | OUTPATIENT
Start: 2019-05-15 | End: 2019-05-17 | Stop reason: SDUPTHER

## 2019-05-15 RX ORDER — OXYCODONE AND ACETAMINOPHEN 7.5; 325 MG/1; MG/1
1 TABLET ORAL
Qty: 100 TAB | Refills: 0 | Status: SHIPPED | OUTPATIENT
Start: 2019-06-01 | End: 2019-07-01

## 2019-05-15 RX ORDER — OXYCODONE AND ACETAMINOPHEN 7.5; 325 MG/1; MG/1
1 TABLET ORAL
Qty: 100 TAB | Refills: 0 | Status: SHIPPED | OUTPATIENT
Start: 2019-06-30 | End: 2019-07-15 | Stop reason: SDUPTHER

## 2019-05-15 RX ORDER — CETIRIZINE HCL 10 MG
10 TABLET ORAL DAILY
Qty: 90 TAB | Refills: 4 | Status: SHIPPED | OUTPATIENT
Start: 2019-05-15 | End: 2019-05-17 | Stop reason: SDUPTHER

## 2019-05-15 RX ORDER — ALPRAZOLAM 0.5 MG/1
1.5 TABLET ORAL
Qty: 270 TAB | Refills: 1 | Status: SHIPPED | OUTPATIENT
Start: 2019-06-30 | End: 2019-07-15 | Stop reason: SDUPTHER

## 2019-05-15 RX ORDER — DICLOFENAC SODIUM 75 MG/1
75 TABLET, DELAYED RELEASE ORAL 2 TIMES DAILY
Qty: 180 TAB | Refills: 1 | Status: SHIPPED | OUTPATIENT
Start: 2019-05-15 | End: 2019-05-17 | Stop reason: SDUPTHER

## 2019-05-15 NOTE — PROGRESS NOTES
HISTORY OF PRESENT ILLNESS  Ceferino Coronado is a 70 y.o. female. Visit Vitals  /78 (BP 1 Location: Right arm, BP Patient Position: Sitting)   Pulse 66   Temp 97.7 °F (36.5 °C) (Oral)   Resp 17   Ht 5' 6\" (1.676 m)   Wt 191 lb (86.6 kg)   LMP  (LMP Unknown)   SpO2 99%   BMI 30.83 kg/m²       BP good today  Weight stable    Got her Section 8 approved so hopefully will be moving out of the hotel and into her own place. Was seen 4/29/19 at [facility] for back pain and frequency. Says she could not stop voiding.,  The CT there found to stable left kidney stones. No obstruction    She went to the pharmacy and got detrol patches which helped her voiding a lot    She will seeing Dr. Genet Fonseca tomorrow  Medication Refill   The history is provided by the patient (pt with chronic pain leyla back and neck. Has been maintained on opiate medication. . Due for refills). This is a new problem. Associated symptoms include headaches. Review of Systems   Constitutional: Negative. Genitourinary: Positive for frequency and urgency. Musculoskeletal: Positive for back pain, joint pain and neck pain. Neurological: Positive for headaches. Negative for focal weakness and weakness. Physical Exam   Constitutional: She is oriented to person, place, and time. She appears well-developed and well-nourished. No distress. Cardiovascular: Normal rate and regular rhythm. Pulmonary/Chest: Effort normal and breath sounds normal.   Musculoskeletal: She exhibits no edema. Exaggerated response to palpation of back muscles   Neurological: She is alert and oriented to person, place, and time. Skin: Skin is warm and dry. She is not diaphoretic. Psychiatric: She has a normal mood and affect. Nursing note and vitals reviewed. ASSESSMENT and PLAN    ICD-10-CM ICD-9-CM    1. Urinary frequency R35.0 788.41    2. Neck pain M54.2 723.1 diclofenac EC (VOLTAREN) 75 mg EC tablet      diclofenac (VOLTAREN) 1 % gel   3. Chronic low back pain, unspecified back pain laterality, with sciatica presence unspecified M54.5 724.2 diclofenac EC (VOLTAREN) 75 mg EC tablet    G89.29 338.29 diclofenac (VOLTAREN) 1 % gel      oxyCODONE-acetaminophen (PERCOCET 7.5) 7.5-325 mg per tablet      oxyCODONE-acetaminophen (PERCOCET 7.5) 7.5-325 mg per tablet   4. Chronic pain syndrome G89.4 338.4 oxyCODONE-acetaminophen (PERCOCET 7.5) 7.5-325 mg per tablet      oxyCODONE-acetaminophen (PERCOCET 7.5) 7.5-325 mg per tablet   5. Medication refill Z76.0 V68.1 oxyCODONE-acetaminophen (PERCOCET 7.5) 7.5-325 mg per tablet      oxyCODONE-acetaminophen (PERCOCET 7.5) 7.5-325 mg per tablet      ALPRAZolam (XANAX) 0.5 mg tablet      cetirizine (ZYRTEC) 10 mg tablet         Refill meds as noted  UDS up to date    F/u with urology for the urinary sxs.  As detrol OTC patches helped this is likely overactive bladder rather than related to her back     F/u 2 months for next refills

## 2019-05-15 NOTE — PROGRESS NOTES
ROOM # 4  Identified pt with two pt identifiers(name and ). Reviewed record in preparation for visit and have obtained necessary documentation. Chief Complaint   Patient presents with    Pain (Chronic)     1 month fu       Tico Nunez preferred language for health care discussion is english/other. Is the patient using any DME equipment during OV? NO    Tico Nunez is due for:  Health Maintenance Due   Topic    Shingrix Vaccine Age 50> (1 of 2)    FOBT Q 1 YEAR AGE 54-65     GLAUCOMA SCREENING Q2Y     Bone Densitometry (Dexa) Screening      Health Maintenance reviewed and discussed per provider  Please order/place referral if appropriate. Advance Directive:  1. Do you have an advance directive in place? Patient Reply: NO    2. If not, would you like material regarding how to put one in place? NO    Coordination of Care:  1. Have you been to the ER, urgent care clinic since your last visit? Hospitalized since your last visit? NO    2. Have you seen or consulted any other health care providers outside of the [address] [place] [address] since your last visit? Include any pap smears or colon screening. NO    Patient is accompanied by self I have received verbal consent from Tico Loami to discuss any/all medical information while they are present in the room.     Learning Assessment:  Learning Assessment 2016   PRIMARY LEARNER Patient   HIGHEST LEVEL OF EDUCATION - PRIMARY LEARNER  SOME COLLEGE   PRIMARY LANGUAGE ENGLISH   LEARNER PREFERENCE PRIMARY DEMONSTRATION   ANSWERED BY TINO Ga   RELATIONSHIP SELF     Depression Screening:  3 most recent PHQ Screens 2017   PHQ Not Done - Active Diagnosis of Depression or Bipolar Disorder   Little interest or pleasure in doing things Nearly every day -   Feeling down, depressed, irritable, or hopeless Nearly every day -   Total Score PHQ 2 6 -     Abuse Screening:  Abuse Screening Questionnaire 4/15/2019 2018 2017 2016 Do you ever feel afraid of your partner? N N N N   Are you in a relationship with someone who physically or mentally threatens you? N N N N   Is it safe for you to go home? Valentino Curry     Fall Risk  Fall Risk Assessment, last 12 mths 2/4/2019 11/7/2018 10/16/2018 8/7/2018   Able to walk? Yes Yes Yes Yes   Fall in past 12 months? Yes Yes Yes No   Fall with injury?  Yes Yes Yes -   Number of falls in past 12 months 3 2 1 -   Fall Risk Score 4 3 2 -

## 2019-05-17 DIAGNOSIS — G89.29 CHRONIC LOW BACK PAIN, UNSPECIFIED BACK PAIN LATERALITY, WITH SCIATICA PRESENCE UNSPECIFIED: ICD-10-CM

## 2019-05-17 DIAGNOSIS — M54.5 CHRONIC LOW BACK PAIN, UNSPECIFIED BACK PAIN LATERALITY, WITH SCIATICA PRESENCE UNSPECIFIED: ICD-10-CM

## 2019-05-17 DIAGNOSIS — Z76.0 MEDICATION REFILL: ICD-10-CM

## 2019-05-17 DIAGNOSIS — M54.2 NECK PAIN: ICD-10-CM

## 2019-05-17 RX ORDER — DICLOFENAC SODIUM 10 MG/G
GEL TOPICAL 4 TIMES DAILY
Qty: 100 G | Refills: 5 | Status: SHIPPED | OUTPATIENT
Start: 2019-05-17 | End: 2019-05-20 | Stop reason: SDUPTHER

## 2019-05-17 RX ORDER — DICLOFENAC SODIUM 75 MG/1
75 TABLET, DELAYED RELEASE ORAL 2 TIMES DAILY
Qty: 180 TAB | Refills: 1 | Status: SHIPPED | OUTPATIENT
Start: 2019-05-17 | End: 2019-05-22 | Stop reason: SDUPTHER

## 2019-05-17 RX ORDER — CETIRIZINE HCL 10 MG
10 TABLET ORAL DAILY
Qty: 90 TAB | Refills: 4 | Status: SHIPPED | OUTPATIENT
Start: 2019-05-17 | End: 2019-05-21 | Stop reason: SDUPTHER

## 2019-05-17 NOTE — TELEPHONE ENCOUNTER
Requested Prescriptions     Pending Prescriptions Disp Refills    diclofenac (VOLTAREN) 1 % gel 100 g 5     Sig: Apply  to affected area four (4) times daily.  diclofenac EC (VOLTAREN) 75 mg EC tablet 180 Tab 1     Sig: Take 1 Tab by mouth two (2) times a day.  cetirizine (ZYRTEC) 10 mg tablet 90 Tab 4     Sig: Take 1 Tab by mouth daily.

## 2019-05-17 NOTE — TELEPHONE ENCOUNTER
Please return call to pharmacy needs clarification on Voltaren gel Rx that was sent over please return call when available [phone]

## 2019-05-20 DIAGNOSIS — G89.29 CHRONIC LOW BACK PAIN, UNSPECIFIED BACK PAIN LATERALITY, WITH SCIATICA PRESENCE UNSPECIFIED: ICD-10-CM

## 2019-05-20 DIAGNOSIS — M54.5 CHRONIC LOW BACK PAIN, UNSPECIFIED BACK PAIN LATERALITY, WITH SCIATICA PRESENCE UNSPECIFIED: ICD-10-CM

## 2019-05-20 DIAGNOSIS — M54.2 NECK PAIN: ICD-10-CM

## 2019-05-20 RX ORDER — DICLOFENAC SODIUM 10 MG/G
2 GEL TOPICAL 4 TIMES DAILY
Qty: 100 G | Refills: 5 | Status: SHIPPED | OUTPATIENT
Start: 2019-05-20 | End: 2019-05-21 | Stop reason: SDUPTHER

## 2019-05-21 DIAGNOSIS — Z76.0 MEDICATION REFILL: ICD-10-CM

## 2019-05-21 DIAGNOSIS — M54.5 CHRONIC LOW BACK PAIN, UNSPECIFIED BACK PAIN LATERALITY, WITH SCIATICA PRESENCE UNSPECIFIED: ICD-10-CM

## 2019-05-21 DIAGNOSIS — M54.2 NECK PAIN: ICD-10-CM

## 2019-05-21 DIAGNOSIS — G89.29 CHRONIC LOW BACK PAIN, UNSPECIFIED BACK PAIN LATERALITY, WITH SCIATICA PRESENCE UNSPECIFIED: ICD-10-CM

## 2019-05-21 RX ORDER — DICLOFENAC SODIUM 10 MG/G
2 GEL TOPICAL 4 TIMES DAILY
Qty: 100 G | Refills: 5 | Status: SHIPPED | OUTPATIENT
Start: 2019-05-21 | End: 2019-07-29 | Stop reason: SDUPTHER

## 2019-05-21 RX ORDER — CETIRIZINE HCL 10 MG
10 TABLET ORAL DAILY
Qty: 90 TAB | Refills: 4 | Status: SHIPPED | OUTPATIENT
Start: 2019-05-21 | End: 2019-07-29 | Stop reason: SDUPTHER

## 2019-05-22 DIAGNOSIS — M54.2 NECK PAIN: ICD-10-CM

## 2019-05-22 DIAGNOSIS — M54.5 CHRONIC LOW BACK PAIN, UNSPECIFIED BACK PAIN LATERALITY, WITH SCIATICA PRESENCE UNSPECIFIED: ICD-10-CM

## 2019-05-22 DIAGNOSIS — G89.29 CHRONIC LOW BACK PAIN, UNSPECIFIED BACK PAIN LATERALITY, WITH SCIATICA PRESENCE UNSPECIFIED: ICD-10-CM

## 2019-05-22 RX ORDER — DICLOFENAC SODIUM 75 MG/1
75 TABLET, DELAYED RELEASE ORAL 2 TIMES DAILY
Qty: 180 TAB | Refills: 1 | Status: SHIPPED | OUTPATIENT
Start: 2019-05-22 | End: 2019-07-29 | Stop reason: SDUPTHER

## 2019-05-22 NOTE — TELEPHONE ENCOUNTER
Requested Prescriptions     Pending Prescriptions Disp Refills    diclofenac EC (VOLTAREN) 75 mg EC tablet 180 Tab 1     Sig: Take 1 Tab by mouth two (2) times a day.    90 supply

## 2019-07-15 ENCOUNTER — OFFICE VISIT (OUTPATIENT)
Dept: INTERNAL MEDICINE CLINIC | Age: 64
End: 2019-07-15

## 2019-07-15 DIAGNOSIS — Z76.0 MEDICATION REFILL: ICD-10-CM

## 2019-07-15 DIAGNOSIS — E87.6 HYPOKALEMIA: ICD-10-CM

## 2019-07-15 DIAGNOSIS — M54.5 CHRONIC LOW BACK PAIN, UNSPECIFIED BACK PAIN LATERALITY, WITH SCIATICA PRESENCE UNSPECIFIED: ICD-10-CM

## 2019-07-15 DIAGNOSIS — G89.29 CHRONIC LOW BACK PAIN, UNSPECIFIED BACK PAIN LATERALITY, WITH SCIATICA PRESENCE UNSPECIFIED: ICD-10-CM

## 2019-07-15 DIAGNOSIS — G89.4 CHRONIC PAIN SYNDROME: Chronic | ICD-10-CM

## 2019-07-15 NOTE — TELEPHONE ENCOUNTER
Requested Prescriptions     Pending Prescriptions Disp Refills    ALPRAZolam (XANAX) 0.5 mg tablet 270 Tab 1     Sig: Take 3 Tabs by mouth three (3) times daily as needed for Anxiety. Max Daily Amount: 4.5 mg. Indications: anxious    oxyCODONE-acetaminophen (PERCOCET 7.5) 7.5-325 mg per tablet 100 Tab 0     Sig: Take 1 Tab by mouth every four (4) hours as needed for Pain for up to 30 days. Max Daily Amount: 6 Tabs.  potassium chloride (K-DUR, KLOR-CON) 20 mEq tablet 90 Tab 0     Sig: Take 1 Tab by mouth daily. Indications: low amount of potassium in the blood    furosemide (LASIX) 20 mg tablet 30 Tab 5     Sig: Take 1 Tab by mouth daily.     frovatriptan (FROVA) 2.5 mg tablet

## 2019-07-17 RX ORDER — POTASSIUM CHLORIDE 20 MEQ/1
20 TABLET, EXTENDED RELEASE ORAL DAILY
Qty: 90 TAB | Refills: 0 | Status: SHIPPED | OUTPATIENT
Start: 2019-07-17 | End: 2019-07-29 | Stop reason: SDUPTHER

## 2019-07-17 RX ORDER — FROVATRIPTAN SUCCINATE 2.5 MG/1
TABLET, FILM COATED ORAL
Qty: 90 TAB | Refills: 0 | Status: SHIPPED | OUTPATIENT
Start: 2019-07-17 | End: 2019-07-29 | Stop reason: SDUPTHER

## 2019-07-17 RX ORDER — FUROSEMIDE 20 MG/1
20 TABLET ORAL DAILY
Qty: 90 TAB | Refills: 0 | Status: SHIPPED | OUTPATIENT
Start: 2019-07-17 | End: 2019-07-29 | Stop reason: SDUPTHER

## 2019-07-17 RX ORDER — OXYCODONE AND ACETAMINOPHEN 7.5; 325 MG/1; MG/1
1 TABLET ORAL
Qty: 100 TAB | Refills: 0 | Status: SHIPPED | OUTPATIENT
Start: 2019-07-17 | End: 2019-07-29 | Stop reason: SDUPTHER

## 2019-07-17 RX ORDER — ALPRAZOLAM 0.5 MG/1
1.5 TABLET ORAL
Qty: 270 TAB | Refills: 0 | Status: SHIPPED | OUTPATIENT
Start: 2019-07-17 | End: 2019-07-29 | Stop reason: SDUPTHER

## 2019-07-17 NOTE — TELEPHONE ENCOUNTER
She missed her appt.   Will send 90 d only on non controlled drugs    Refill 30 day of the percocet and xanax until she can be seen    RXs printed for

## 2019-07-17 NOTE — TELEPHONE ENCOUNTER
PCP: Julio C Wade MD    Last appt: 7/15/2019  Future Appointments   Date Time Provider Cory Daniels   7/29/2019 12:00 PM Chava Yanez MD [facility] SCHED       Requested Prescriptions     Pending Prescriptions Disp Refills    ALPRAZolam (XANAX) 0.5 mg tablet 270 Tab 1     Sig: Take 3 Tabs by mouth three (3) times daily as needed for Anxiety. Max Daily Amount: 4.5 mg. Indications: anxious    oxyCODONE-acetaminophen (PERCOCET 7.5) 7.5-325 mg per tablet 100 Tab 0     Sig: Take 1 Tab by mouth every four (4) hours as needed for Pain for up to 30 days. Max Daily Amount: 6 Tabs.  potassium chloride (K-DUR, KLOR-CON) 20 mEq tablet 90 Tab 0     Sig: Take 1 Tab by mouth daily. Indications: low amount of potassium in the blood    furosemide (LASIX) 20 mg tablet 30 Tab 5     Sig: Take 1 Tab by mouth daily.  frovatriptan (FROVA) 2.5 mg tablet         Request for a 30 or 90 day supply? Provider Discretion    [facility]    Other Comments:   printed and placed in PCP medication refill review folder.      Last UDS date: 04/15/2019  Pain contract signed: none

## 2019-07-29 ENCOUNTER — OFFICE VISIT (OUTPATIENT)
Dept: INTERNAL MEDICINE CLINIC | Age: 64
End: 2019-07-29

## 2019-07-29 VITALS
HEIGHT: 66 IN | TEMPERATURE: 98.6 F | RESPIRATION RATE: 20 BRPM | WEIGHT: 195 LBS | OXYGEN SATURATION: 97 % | BODY MASS INDEX: 31.34 KG/M2 | HEART RATE: 74 BPM | DIASTOLIC BLOOD PRESSURE: 84 MMHG | SYSTOLIC BLOOD PRESSURE: 128 MMHG

## 2019-07-29 DIAGNOSIS — Z76.0 MEDICATION REFILL: ICD-10-CM

## 2019-07-29 DIAGNOSIS — G89.4 CHRONIC PAIN SYNDROME: Chronic | ICD-10-CM

## 2019-07-29 DIAGNOSIS — M89.9 DISORDER OF BONE: ICD-10-CM

## 2019-07-29 DIAGNOSIS — E87.6 HYPOKALEMIA: ICD-10-CM

## 2019-07-29 DIAGNOSIS — M54.2 NECK PAIN: ICD-10-CM
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
## 2019-07-29 DIAGNOSIS — M85.9 DISORDER OF BONE DENSITY AND STRUCTURE, UNSPECIFIED: ICD-10-CM

## 2019-07-29 DIAGNOSIS — G89.29 CHRONIC LOW BACK PAIN, UNSPECIFIED BACK PAIN LATERALITY, WITH SCIATICA PRESENCE UNSPECIFIED: Primary | ICD-10-CM

## 2019-07-29 DIAGNOSIS — M54.5 CHRONIC LOW BACK PAIN, UNSPECIFIED BACK PAIN LATERALITY, WITH SCIATICA PRESENCE UNSPECIFIED: Primary | ICD-10-CM

## 2019-07-29 RX ORDER — FUROSEMIDE 20 MG/1
20 TABLET ORAL DAILY
Qty: 90 TAB | Refills: 4 | Status: SHIPPED | OUTPATIENT
Start: 2019-07-29 | End: 2019-11-06 | Stop reason: SDUPTHER

## 2019-07-29 RX ORDER — MONTELUKAST SODIUM 10 MG/1
10 TABLET ORAL DAILY
Qty: 90 TAB | Refills: 4 | Status: SHIPPED | OUTPATIENT
Start: 2019-07-29 | End: 2020-09-28

## 2019-07-29 RX ORDER — CETIRIZINE HCL 10 MG
10 TABLET ORAL DAILY
Qty: 90 TAB | Refills: 4 | Status: SHIPPED | OUTPATIENT
Start: 2019-07-29 | End: 2020-08-17

## 2019-07-29 RX ORDER — DICLOFENAC SODIUM 75 MG/1
75 TABLET, DELAYED RELEASE ORAL 2 TIMES DAILY
Qty: 180 TAB | Refills: 4 | Status: ON HOLD | OUTPATIENT
Start: 2019-07-29 | End: 2020-06-05

## 2019-07-29 RX ORDER — LIDOCAINE 50 MG/G
PATCH TOPICAL
Qty: 90 EACH | Refills: 5 | Status: SHIPPED | OUTPATIENT
Start: 2019-07-29 | End: 2020-11-13

## 2019-07-29 RX ORDER — POTASSIUM CHLORIDE 20 MEQ/1
20 TABLET, EXTENDED RELEASE ORAL DAILY
Qty: 90 TAB | Refills: 4 | Status: SHIPPED | OUTPATIENT
Start: 2019-07-29 | End: 2019-11-06 | Stop reason: SDUPTHER

## 2019-07-29 RX ORDER — OMEPRAZOLE 40 MG/1
40 CAPSULE, DELAYED RELEASE ORAL DAILY
Qty: 90 CAP | Refills: 4 | Status: SHIPPED | OUTPATIENT
Start: 2019-07-29 | End: 2020-11-13

## 2019-07-29 RX ORDER — ZOLPIDEM TARTRATE 12.5 MG/1
TABLET, FILM COATED, EXTENDED RELEASE ORAL
COMMUNITY
End: 2019-07-29 | Stop reason: SDUPTHER

## 2019-07-29 RX ORDER — OXYCODONE AND ACETAMINOPHEN 7.5; 325 MG/1; MG/1
1 TABLET ORAL
Qty: 360 TAB | Refills: 0 | Status: SHIPPED | OUTPATIENT
Start: 2019-08-15 | End: 2019-09-18 | Stop reason: SDUPTHER

## 2019-07-29 RX ORDER — BISMUTH SUBSALICYLATE 262 MG
1 TABLET,CHEWABLE ORAL DAILY
Qty: 90 TAB | Refills: 4 | Status: SHIPPED | OUTPATIENT
Start: 2019-07-29

## 2019-07-29 RX ORDER — LANOLIN ALCOHOL/MO/W.PET/CERES
1000 CREAM (GRAM) TOPICAL DAILY
Qty: 90 TAB | Refills: 4 | Status: SHIPPED | OUTPATIENT
Start: 2019-07-29

## 2019-07-29 RX ORDER — METHYLPREDNISOLONE 4 MG/1
TABLET ORAL
Qty: 1 DOSE PACK | Refills: 0 | Status: SHIPPED | OUTPATIENT
Start: 2019-07-29 | End: 2019-09-16 | Stop reason: SDUPTHER

## 2019-07-29 RX ORDER — TIZANIDINE HYDROCHLORIDE 4 MG/1
4 CAPSULE, GELATIN COATED ORAL
Qty: 60 CAP | Refills: 5 | Status: SHIPPED | OUTPATIENT
Start: 2019-07-29 | End: 2019-11-06 | Stop reason: SDUPTHER

## 2019-07-29 RX ORDER — DICLOFENAC SODIUM 10 MG/G
2 GEL TOPICAL 4 TIMES DAILY
Qty: 100 G | Refills: 5 | Status: SHIPPED | OUTPATIENT
Start: 2019-07-29 | End: 2019-09-09 | Stop reason: SDUPTHER

## 2019-07-29 RX ORDER — PROMETHAZINE HYDROCHLORIDE 25 MG/1
25 TABLET ORAL
Qty: 60 TAB | Refills: 4 | Status: SHIPPED | OUTPATIENT
Start: 2019-07-29 | End: 2019-11-06 | Stop reason: SDUPTHER

## 2019-07-29 RX ORDER — TRIAMCINOLONE ACETONIDE 0.25 MG/G
CREAM TOPICAL 2 TIMES DAILY
Qty: 15 G | Refills: 5 | Status: SHIPPED | OUTPATIENT
Start: 2019-07-29 | End: 2019-09-16 | Stop reason: SDUPTHER

## 2019-07-29 RX ORDER — VENLAFAXINE HYDROCHLORIDE 75 MG/1
CAPSULE, EXTENDED RELEASE ORAL
Qty: 270 CAP | Refills: 4 | Status: ON HOLD | OUTPATIENT
Start: 2019-07-29 | End: 2020-06-05

## 2019-07-29 RX ORDER — ZOLPIDEM TARTRATE 12.5 MG/1
TABLET, FILM COATED, EXTENDED RELEASE ORAL
Qty: 90 TAB | Refills: 1 | Status: SHIPPED | OUTPATIENT
Start: 2019-07-29 | End: 2020-02-12

## 2019-07-29 RX ORDER — FROVATRIPTAN SUCCINATE 2.5 MG/1
TABLET, FILM COATED ORAL
Qty: 90 TAB | Refills: 4 | Status: SHIPPED | OUTPATIENT
Start: 2019-07-29 | End: 2019-09-09 | Stop reason: SDUPTHER

## 2019-07-29 RX ORDER — ALPRAZOLAM 0.5 MG/1
1 TABLET ORAL
Qty: 540 TAB | Refills: 1 | Status: SHIPPED | OUTPATIENT
Start: 2019-08-15 | End: 2019-11-18 | Stop reason: SDUPTHER

## 2019-07-29 RX ORDER — ACYCLOVIR 400 MG/1
400 TABLET ORAL 3 TIMES DAILY
Qty: 90 TAB | Refills: 4 | Status: SHIPPED | OUTPATIENT
Start: 2019-07-29 | End: 2019-11-06 | Stop reason: SDUPTHER

## 2019-07-29 NOTE — PROGRESS NOTES
ROOM # 5    Emily Mir presents today for   Chief Complaint   Patient presents with    Pain (Chronic)    Neck Pain       Emily Mir preferred language for health care discussion is english/other. Is someone accompanying this pt? no    Is the patient using any DME equipment during [address]? no    Depression Screening:  3 most recent PHQ Screens 9/19/2017 7/22/2016   PHQ Not Done - Active Diagnosis of Depression or Bipolar Disorder   Little interest or pleasure in doing things Nearly every day -   Feeling down, depressed, irritable, or hopeless Nearly every day -   Total Score PHQ 2 6 -       Learning Assessment:  Learning Assessment 7/22/2016   PRIMARY LEARNER Patient   HIGHEST LEVEL OF EDUCATION - PRIMARY LEARNER  SOME COLLEGE   PRIMARY LANGUAGE ENGLISH   LEARNER PREFERENCE PRIMARY DEMONSTRATION   ANSWERED BY TINO Ga   RELATIONSHIP SELF       Abuse Screening:  Abuse Screening Questionnaire 4/15/2019 8/7/2018 12/6/2017 7/22/2016   Do you ever feel afraid of your partner? N N N N   Are you in a relationship with someone who physically or mentally threatens you? N N N N   Is it safe for you to go home? Kristopher Leo       Fall Risk  Fall Risk Assessment, last 12 mths 2/4/2019 11/7/2018 10/16/2018 8/7/2018   Able to walk? Yes Yes Yes Yes   Fall in past 12 months? Yes Yes Yes No   Fall with injury? Yes Yes Yes -   Number of falls in past 12 months 3 2 1 -   Fall Risk Score 4 3 2 -       Health Maintenance reviewed and discussed per provider. Yes    Emily Mir is due for   Health Maintenance Due   Topic Date Due    FOBT Q 1 YEAR AGE 50-75  12/08/2017    GLAUCOMA SCREENING Q2Y  06/29/2018    Bone Densitometry (Dexa) Screening  06/29/2018     Please order/place referral if appropriate. Advance Directive:  1. Do you have an advance directive in place? Patient Reply: no    2. If not, would you like material regarding how to put one in place? Patient Reply: no    Coordination of Care:  1.  Have you been to the ER, urgent care clinic since your last visit? Hospitalized since your last visit? no    2. Have you seen or consulted any other health care providers outside of the [address] [place] [address] since your last visit? Include any pap smears or colon screening.  no

## 2019-08-22 NOTE — PROGRESS NOTES
HISTORY OF PRESENT ILLNESS  Delvin Owens is a 75 y.o. female. Visit Vitals  /84 (BP 1 Location: Right arm, BP Patient Position: Sitting)   Pulse 74   Temp 98.6 °F (37 °C) (Oral)   Resp 20   Ht 5' 6\" (1.676 m)   Wt 195 lb (88.5 kg)   LMP  (LMP Unknown)   SpO2 97%   BMI 31.47 kg/m²       She has moved into an apartment attached to a house. It was a converted garage. She is very happy there. It has a yard and she was out in the yard yesterday and developed some diarrhea and vomiting. She did go in and took a shower and began to feel better. She also got into some poison ivy. She has been taking benadryl and touching with alcohol. Back Pain    The history is provided by the patient. This is a chronic problem. The current episode started more than 1 week ago. The problem has not changed since onset. The pain is present in the lumbar spine. The quality of the pain is described as aching and similar to previous episodes. The pain does not radiate. The pain is moderate. The symptoms are aggravated by twisting and certain positions. Poison Ivy/Poison Oak/Poison [place]ac Exposure    The history is provided by the patient. This is a new problem. The current episode started more than 2 days ago. The problem has not changed since onset. The problem is associated with plant contact. There has been no fever. The pain is mild. Associated symptoms include blisters and itching. She has tried nothing for the symptoms. Review of Systems   Constitutional: Negative. Musculoskeletal: Positive for back pain. Skin: Positive for itching. Physical Exam   Constitutional: She is oriented to person, place, and time. She appears well-developed and well-nourished. No distress. Cardiovascular: Normal rate and regular rhythm. Pulmonary/Chest: Effort normal and breath sounds normal.   Musculoskeletal: She exhibits no edema. Neurological: She is alert and oriented to person, place, and time.    Skin: Skin is warm and dry. She is not diaphoretic. She has multiple scattered red papules with central punta. No vesicles and not in groupings. These appear more like insect bites than contact reaction from poison ivy   Psychiatric: She has a normal mood and affect. Nursing note and vitals reviewed. ASSESSMENT and PLAN    ICD-10-CM ICD-9-CM    1. Chronic low back pain, unspecified back pain laterality, with sciatica presence unspecified M54.5 724.2 oxyCODONE-acetaminophen (PERCOCET 7.5) 7.5-325 mg per tablet    G89.29 338.29 diclofenac EC (VOLTAREN) 75 mg EC tablet      diclofenac (VOLTAREN) 1 % gel   2. Chronic pain syndrome G89.4 338.4 oxyCODONE-acetaminophen (PERCOCET 7.5) 7.5-325 mg per tablet   3. Insect bite, unspecified site, initial encounter W57. XXXA 919.4 methylPREDNISolone (MEDROL DOSEPACK) 4 mg tablet     E906.4 triamcinolone acetonide (KENALOG) 0.025 % topical cream   4. Hypokalemia E87.6 276.8 potassium chloride (K-DUR, KLOR-CON) 20 mEq tablet   5. Neck pain M54.2 723.1 diclofenac EC (VOLTAREN) 75 mg EC tablet      diclofenac (VOLTAREN) 1 % gel   6. Medication refill Z76.0 V68.1 zolpidem CR (AMBIEN CR) 12.5 mg tablet      ALPRAZolam (XANAX) 0.5 mg tablet      oxyCODONE-acetaminophen (PERCOCET 7.5) 7.5-325 mg per tablet      potassium chloride (K-DUR, KLOR-CON) 20 mEq tablet      cetirizine (ZYRTEC) 10 mg tablet      venlafaxine-SR (EFFEXOR-XR) 75 mg capsule      acyclovir (ZOVIRAX) 400 mg tablet      omeprazole (PRILOSEC) 40 mg capsule      multivitamin (ONE A DAY) tablet      cyanocobalamin (VITAMIN B-12) 1,000 mcg tablet   7. Disorder of bone density and structure, unspecified M85.9 733.90 DEXA BONE DENSITY STUDY AXIAL   8. Disorder of bone  M89.9 733.90 DEXA BONE DENSITY STUDY AXIAL     Chronic back pain    UDS up to date  Refills as noted    Insect bites--? Chiggers, mites, spider. From working out in the yard. Discussed BMI/weight, lifestyle, diet and exercise.   Discussed effect on blood pressure, blood sugar, and joints especially  Focus on limiting white carbs, portion control, and moving more.     F/u 3 months for Med refills LLQ

## 2019-09-09 DIAGNOSIS — M54.2 NECK PAIN: ICD-10-CM

## 2019-09-09 DIAGNOSIS — G89.29 CHRONIC LOW BACK PAIN, UNSPECIFIED BACK PAIN LATERALITY, WITH SCIATICA PRESENCE UNSPECIFIED: ICD-10-CM

## 2019-09-09 DIAGNOSIS — M54.5 CHRONIC LOW BACK PAIN, UNSPECIFIED BACK PAIN LATERALITY, WITH SCIATICA PRESENCE UNSPECIFIED: ICD-10-CM

## 2019-09-09 RX ORDER — DICLOFENAC SODIUM 10 MG/G
2 GEL TOPICAL 4 TIMES DAILY
Qty: 100 G | Refills: 5 | Status: SHIPPED | OUTPATIENT
Start: 2019-09-09 | End: 2019-09-16 | Stop reason: SDUPTHER

## 2019-09-09 RX ORDER — FROVATRIPTAN SUCCINATE 2.5 MG/1
TABLET, FILM COATED ORAL
Qty: 90 TAB | Refills: 4 | Status: SHIPPED | OUTPATIENT
Start: 2019-09-09 | End: 2020-11-13

## 2019-09-09 NOTE — TELEPHONE ENCOUNTER
Requested Prescriptions     Pending Prescriptions Disp Refills    frovatriptan (FROVA) 2.5 mg tablet 90 Tab 4     Sig: Take one tablet by mouth daily    diclofenac (VOLTAREN) 1 % gel 100 g 5     Sig: Apply 2 g to affected area four (4) times daily.

## 2019-09-16 ENCOUNTER — TELEPHONE (OUTPATIENT)
Dept: INTERNAL MEDICINE CLINIC | Age: 64
End: 2019-09-16

## 2019-09-16 DIAGNOSIS — M54.2 NECK PAIN: ICD-10-CM

## 2019-09-16 DIAGNOSIS — Z76.0 MEDICATION REFILL: ICD-10-CM
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
## 2019-09-16 DIAGNOSIS — M54.5 CHRONIC LOW BACK PAIN, UNSPECIFIED BACK PAIN LATERALITY, WITH SCIATICA PRESENCE UNSPECIFIED: ICD-10-CM

## 2019-09-16 DIAGNOSIS — G89.29 CHRONIC LOW BACK PAIN, UNSPECIFIED BACK PAIN LATERALITY, WITH SCIATICA PRESENCE UNSPECIFIED: ICD-10-CM

## 2019-09-16 RX ORDER — TRIAMCINOLONE ACETONIDE 0.25 MG/G
CREAM TOPICAL 2 TIMES DAILY
Qty: 15 G | Refills: 5 | Status: SHIPPED | OUTPATIENT
Start: 2019-09-16 | End: 2022-10-20

## 2019-09-16 RX ORDER — DICLOFENAC SODIUM 10 MG/G
2 GEL TOPICAL 4 TIMES DAILY
Qty: 100 G | Refills: 5 | Status: SHIPPED | OUTPATIENT
Start: 2019-09-16 | End: 2019-11-06 | Stop reason: SDUPTHER

## 2019-09-16 RX ORDER — ALBUTEROL SULFATE 90 UG/1
2 AEROSOL, METERED RESPIRATORY (INHALATION)
Qty: 1 INHALER | Refills: 5 | Status: SHIPPED | OUTPATIENT
Start: 2019-09-16 | End: 2019-12-02 | Stop reason: SDUPTHER

## 2019-09-16 RX ORDER — METHYLPREDNISOLONE 4 MG/1
TABLET ORAL
Qty: 1 DOSE PACK | Refills: 0 | Status: SHIPPED | OUTPATIENT
Start: 2019-09-16 | End: 2019-11-18

## 2019-09-18 ENCOUNTER — OFFICE VISIT (OUTPATIENT)
Dept: INTERNAL MEDICINE CLINIC | Age: 64
End: 2019-09-18

## 2019-09-18 ENCOUNTER — HOSPITAL ENCOUNTER (OUTPATIENT)
Dept: LAB | Age: 64
Discharge: HOME | End: 2019-09-18
Payer: MEDICARE

## 2019-09-18 VITALS
SYSTOLIC BLOOD PRESSURE: 144 MMHG | HEIGHT: 66 IN | DIASTOLIC BLOOD PRESSURE: 84 MMHG | RESPIRATION RATE: 20 BRPM | OXYGEN SATURATION: 99 % | WEIGHT: 204 LBS | TEMPERATURE: 97.5 F | HEART RATE: 73 BPM | BODY MASS INDEX: 32.78 KG/M2

## 2019-09-18 DIAGNOSIS — Z12.39 SCREENING FOR BREAST CANCER: ICD-10-CM

## 2019-09-18 DIAGNOSIS — Z79.891 ENCOUNTER FOR LONG-TERM USE OF OPIATE ANALGESIC: ICD-10-CM

## 2019-09-18 DIAGNOSIS — Z12.11 SCREEN FOR COLON CANCER: ICD-10-CM

## 2019-09-18 DIAGNOSIS — M54.5 CHRONIC LOW BACK PAIN, UNSPECIFIED BACK PAIN LATERALITY, WITH SCIATICA PRESENCE UNSPECIFIED: Primary | ICD-10-CM

## 2019-09-18 DIAGNOSIS — G89.29 CHRONIC LOW BACK PAIN, UNSPECIFIED BACK PAIN LATERALITY, WITH SCIATICA PRESENCE UNSPECIFIED: Primary | ICD-10-CM

## 2019-09-18 DIAGNOSIS — Z76.0 MEDICATION REFILL: ICD-10-CM

## 2019-09-18 DIAGNOSIS — G89.4 CHRONIC PAIN SYNDROME: ICD-10-CM

## 2019-09-18 DIAGNOSIS — M89.9 DISORDER OF BONE: ICD-10-CM

## 2019-09-18 DIAGNOSIS — I10 ESSENTIAL HYPERTENSION: ICD-10-CM

## 2019-09-18 DIAGNOSIS — Z23 ENCOUNTER FOR IMMUNIZATION: ICD-10-CM

## 2019-09-18 PROCEDURE — G0480 DRUG TEST DEF 1-7 CLASSES: HCPCS

## 2019-09-18 RX ORDER — OXYCODONE AND ACETAMINOPHEN 7.5; 325 MG/1; MG/1
1 TABLET ORAL
Qty: 120 TAB | Refills: 0 | Status: SHIPPED | OUTPATIENT
Start: 2019-09-18 | End: 2019-11-18 | Stop reason: SDUPTHER

## 2019-09-18 RX ORDER — OXYCODONE AND ACETAMINOPHEN 7.5; 325 MG/1; MG/1
1 TABLET ORAL
COMMUNITY
End: 2019-09-18 | Stop reason: SDUPTHER

## 2019-09-18 RX ORDER — OXYCODONE AND ACETAMINOPHEN 7.5; 325 MG/1; MG/1
1 TABLET ORAL
Status: CANCELLED | OUTPATIENT
Start: 2019-09-18

## 2019-09-18 RX ORDER — OXYCODONE AND ACETAMINOPHEN 7.5; 325 MG/1; MG/1
1 TABLET ORAL
Qty: 120 TAB | Refills: 0 | Status: SHIPPED | OUTPATIENT
Start: 2019-09-18 | End: 2019-10-18

## 2019-09-18 NOTE — PROGRESS NOTES
ROOM # 4    Emily Mir presents today for   Chief Complaint   Patient presents with    Pain (Chronic)    Back Pain       Emily Mir preferred language for health care discussion is english/other. Is someone accompanying this pt? no    Is the patient using any DME equipment during [address]? no    Depression Screening:  3 most recent PHQ Screens 9/19/2017 7/22/2016   PHQ Not Done - Active Diagnosis of Depression or Bipolar Disorder   Little interest or pleasure in doing things Nearly every day -   Feeling down, depressed, irritable, or hopeless Nearly every day -   Total Score PHQ 2 6 -       Learning Assessment:  Learning Assessment 7/22/2016   PRIMARY LEARNER Patient   HIGHEST LEVEL OF EDUCATION - PRIMARY LEARNER  SOME COLLEGE   PRIMARY LANGUAGE ENGLISH   LEARNER PREFERENCE PRIMARY DEMONSTRATION   ANSWERED BY TINO Ga   RELATIONSHIP SELF       Abuse Screening:  Abuse Screening Questionnaire 4/15/2019 8/7/2018 12/6/2017 7/22/2016   Do you ever feel afraid of your partner? N N N N   Are you in a relationship with someone who physically or mentally threatens you? N N N N   Is it safe for you to go home? Kristopher Leo       Fall Risk  Fall Risk Assessment, last 12 mths 2/4/2019 11/7/2018 10/16/2018 8/7/2018   Able to walk? Yes Yes Yes Yes   Fall in past 12 months? Yes Yes Yes No   Fall with injury? Yes Yes Yes -   Number of falls in past 12 months 3 2 1 -   Fall Risk Score 4 3 2 -           Health Maintenance reviewed and discussed per provider. Yes    Emily Mir is due for   Health Maintenance Due   Topic Date Due    FOBT Q 1 YEAR AGE 50-75  12/08/2017    GLAUCOMA SCREENING Q2Y  06/29/2018    Bone Densitometry (Dexa) Screening  06/29/2018    Influenza Age 9 to Adult  08/01/2019    Pneumococcal 65+ years (2 of 2 - PPSV23) 08/07/2019     Please order/place referral if appropriate. Advance Directive:  1. Do you have an advance directive in place? Patient Reply: no    2.  If not, would you like material regarding how to put one in place? Patient Reply: no    Coordination of Care:  1. Have you been to the ER, urgent care clinic since your last visit? Hospitalized since your last visit? Yes     2. Have you seen or consulted any other health care providers outside of the [address] [place] [address] since your last visit? Include any pap smears or colon screening.  no

## 2019-09-18 NOTE — PROGRESS NOTES
HISTORY OF PRESENT ILLNESS  Kenneth Sargent is a 75 y.o. female. Visit Vitals  /84 (BP 1 Location: Right arm, BP Patient Position: Sitting)   Pulse 73   Temp 97.5 °F (36.4 °C) (Oral)   Resp 20   Ht 5' 6\" (1.676 m)   Wt 204 lb (92.5 kg)   LMP  (LMP Unknown)   SpO2 99%   BMI 32.93 kg/m²       chronic back pain on percocet  Back Pain    The history is provided by the patient. This is a chronic problem. The current episode started more than 1 week ago. The problem has not changed since onset. The pain is present in the lumbar spine. The pain is moderate. The symptoms are aggravated by bending, twisting and certain positions. Associated symptoms include tingling. Pertinent negatives include no chest pain. Review of Systems   Constitutional: Negative. Respiratory: Negative for shortness of breath. Cardiovascular: Negative for chest pain and palpitations. Musculoskeletal: Positive for back pain, joint pain and myalgias. Skin:        Recurrent rashes/bites on arms. Pt went to ER and was told it was detergent (it does not look like that)   Neurological: Positive for tingling and sensory change. Psychiatric/Behavioral: The patient is nervous/anxious. Physical Exam   Constitutional: She is oriented to person, place, and time. She appears well-developed and well-nourished. No distress. Cardiovascular: Normal rate and regular rhythm. Musculoskeletal: She exhibits no edema. Neurological: She is alert and oriented to person, place, and time. Skin: Skin is warm. Psychiatric: She has a normal mood and affect. Nursing note and vitals reviewed. ASSESSMENT and PLAN    ICD-10-CM ICD-9-CM    1. Chronic low back pain, unspecified back pain laterality, with sciatica presence unspecified M54.5 724.2 oxyCODONE-acetaminophen (PERCOCET 7.5) 7.5-325 mg per tablet    G89.29 338.29 oxyCODONE-acetaminophen (PERCOCET 7.5) 7.5-325 mg per tablet      DEXA BONE DENSITY STUDY AXIAL   2.  Chronic pain syndrome G89.4 338.4 oxyCODONE-acetaminophen (PERCOCET 7.5) 7.5-325 mg per tablet      oxyCODONE-acetaminophen (PERCOCET 7.5) 7.5-325 mg per tablet   3. Essential hypertension I10 401.9    4. Medication refill Z76.0 V68.1 oxyCODONE-acetaminophen (PERCOCET 7.5) 7.5-325 mg per tablet   5. Encounter for immunization Z23 V03.89 ADMIN PNEUMOCOCCAL VACCINE      PNEUMOCOCCAL POLYSACCHARIDE VACCINE, 23-VALENT, ADULT OR IMMUNOSUPPRESSED PT DOSE,   6. Screening for breast cancer Z12.31 V76.10 KIMBERLY MAMMO BI SCREENING INCL CAD   7. Disorder of bone  M89.9 733.90 DEXA BONE DENSITY STUDY AXIAL   8. Screen for colon cancer Z12.11 V76.51 OCCULT BLOOD IMMUNOASSAY,DIAGNOSTIC   9. Encounter for long-term use of opiate analgesic Z79.891 V58.69 TOXASSURE SELECT 13 (MW)     Refill pain meds today  UDS update today   reviewed and appropriate activity noted. No adverse activity noted. Incidentally needs mammogram and BMD. The BMD was ordered previously but pt states no one called her about this.    stool card ordered. There was an old order still active--pt swears she sent this back, but there is no record of this.     F/u 2 months for next med refills

## 2019-09-25 ENCOUNTER — HOSPITAL ENCOUNTER (OUTPATIENT)
Dept: LAB | Age: 64
Discharge: HOME | End: 2019-09-25
Payer: MEDICARE

## 2019-09-25 DIAGNOSIS — Z12.11 SCREEN FOR COLON CANCER: ICD-10-CM

## 2019-09-25 LAB — DRUGS UR: NORMAL

## 2019-09-25 PROCEDURE — 82274 ASSAY TEST FOR BLOOD FECAL: CPT

## 2019-09-27 LAB — HEMOCCULT STL QL IA: POSITIVE

## 2019-10-01 DIAGNOSIS — R19.5 HEME + STOOL: Primary | ICD-10-CM

## 2019-10-02 NOTE — PROGRESS NOTES
Please advise her that her stool sample tested positive for blood.  I am going to refer her for a colonoscopy

## 2019-10-02 NOTE — PROGRESS NOTES
Pt has appt scheduled with Dr. Zorita Holter for 10/7/19, no referral is needed just results from occult will fax to his office.

## 2019-10-02 NOTE — PROGRESS NOTES
Call made to pt, pt was advised of results. Pt verbalized understanding and is in agreement with treatment plan. Pt would like to go to GI specialist that she saw some years ago.  Pt will callback with name

## 2019-11-04 ENCOUNTER — HOSPITAL ENCOUNTER (OUTPATIENT)
Dept: GENERAL RADIOLOGY | Age: 64
Discharge: HOME | End: 2019-11-04
Attending: INTERNAL MEDICINE
Payer: MEDICARE

## 2019-11-04 ENCOUNTER — HOSPITAL ENCOUNTER (OUTPATIENT)
Dept: MAMMOGRAPHY | Age: 64
Discharge: HOME | End: 2019-11-04
Attending: INTERNAL MEDICINE
Payer: MEDICARE

## 2019-11-04 DIAGNOSIS — G89.29 CHRONIC LOW BACK PAIN: ICD-10-CM

## 2019-11-04 DIAGNOSIS — M89.9 DISORDER OF BONE: ICD-10-CM

## 2019-11-04 DIAGNOSIS — M54.50 CHRONIC LOW BACK PAIN: ICD-10-CM

## 2019-11-04 DIAGNOSIS — Z12.31 ENCOUNTER FOR SCREENING MAMMOGRAM FOR BREAST CANCER: ICD-10-CM

## 2019-11-04 DIAGNOSIS — Z12.39 SCREENING FOR BREAST CANCER: ICD-10-CM

## 2019-11-04 PROCEDURE — 77080 DXA BONE DENSITY AXIAL: CPT

## 2019-11-04 PROCEDURE — 77063 BREAST TOMOSYNTHESIS BI: CPT

## 2019-11-06 DIAGNOSIS — Z76.0 MEDICATION REFILL: ICD-10-CM

## 2019-11-06 DIAGNOSIS — G89.29 CHRONIC LOW BACK PAIN: ICD-10-CM

## 2019-11-06 DIAGNOSIS — M54.2 NECK PAIN: ICD-10-CM

## 2019-11-06 DIAGNOSIS — M54.50 CHRONIC LOW BACK PAIN: ICD-10-CM

## 2019-11-06 DIAGNOSIS — E87.6 HYPOKALEMIA: ICD-10-CM

## 2019-11-06 NOTE — TELEPHONE ENCOUNTER
Requested Prescriptions     Pending Prescriptions Disp Refills    diclofenac (VOLTAREN) 1 % gel 100 g 5     Sig: Apply 2 g to affected area four (4) times daily.  acyclovir (ZOVIRAX) 400 mg tablet 90 Tab 4     Sig: Take 1 Tab by mouth three (3) times daily. Indications: a herpes simplex infection    promethazine (PHENERGAN) 25 mg tablet 60 Tab 4     Sig: Take 1 Tab by mouth every six (6) hours as needed for Nausea.  tiZANidine (ZANAFLEX) 4 mg capsule 60 Cap 5     Sig: Take 1 Cap by mouth two (2) times daily as needed (muscle spasm). Indications: muscle spasm    ZOLMitriptan (ZOMIG) 5 mg nasal solution 1 Container 5     Si Spray by Nasal route once as needed for Migraine for up to 1 dose.

## 2019-11-10 RX ORDER — ACYCLOVIR 400 MG/1
400 TABLET ORAL 3 TIMES DAILY
Qty: 90 TAB | Refills: 4 | Status: SHIPPED | OUTPATIENT
Start: 2019-11-10 | End: 2019-12-02 | Stop reason: SDUPTHER

## 2019-11-10 RX ORDER — POTASSIUM CHLORIDE 20 MEQ/1
TABLET, EXTENDED RELEASE ORAL
Qty: 90 TAB | Refills: 4 | Status: ON HOLD | OUTPATIENT
Start: 2019-11-10 | End: 2020-06-05

## 2019-11-10 RX ORDER — PROMETHAZINE HYDROCHLORIDE 25 MG/1
25 TABLET ORAL
Qty: 60 TAB | Refills: 4 | Status: SHIPPED | OUTPATIENT
Start: 2019-11-10 | End: 2019-12-02 | Stop reason: SDUPTHER

## 2019-11-10 RX ORDER — TIZANIDINE HYDROCHLORIDE 4 MG/1
4 CAPSULE, GELATIN COATED ORAL
Qty: 60 CAP | Refills: 5 | Status: SHIPPED | OUTPATIENT
Start: 2019-11-10 | End: 2019-12-02 | Stop reason: SDUPTHER

## 2019-11-10 RX ORDER — DICLOFENAC SODIUM 10 MG/G
2 GEL TOPICAL 4 TIMES DAILY
Qty: 100 G | Refills: 5 | Status: SHIPPED | OUTPATIENT
Start: 2019-11-10 | End: 2019-11-18 | Stop reason: SDUPTHER

## 2019-11-10 RX ORDER — ZOLMITRIPTAN 5 MG/1
1 SPRAY NASAL
Qty: 1 CONTAINER | Refills: 5 | Status: SHIPPED | OUTPATIENT
Start: 2019-11-10 | End: 2019-11-10

## 2019-11-10 RX ORDER — FUROSEMIDE 20 MG/1
TABLET ORAL
Qty: 90 TAB | Refills: 4 | Status: ON HOLD | OUTPATIENT
Start: 2019-11-10 | End: 2020-06-05

## 2019-11-18 ENCOUNTER — OFFICE VISIT (OUTPATIENT)
Dept: INTERNAL MEDICINE CLINIC | Age: 64
End: 2019-11-18

## 2019-11-18 ENCOUNTER — HOSPITAL ENCOUNTER (OUTPATIENT)
Dept: LAB | Age: 64
Discharge: HOME | End: 2019-11-18
Payer: MEDICARE

## 2019-11-18 VITALS
HEART RATE: 80 BPM | TEMPERATURE: 97.5 F | OXYGEN SATURATION: 99 % | HEIGHT: 66 IN | WEIGHT: 220 LBS | DIASTOLIC BLOOD PRESSURE: 89 MMHG | SYSTOLIC BLOOD PRESSURE: 125 MMHG | BODY MASS INDEX: 35.36 KG/M2 | RESPIRATION RATE: 20 BRPM

## 2019-11-18 DIAGNOSIS — M25.561 CHRONIC PAIN OF RIGHT KNEE: Primary | ICD-10-CM

## 2019-11-18 DIAGNOSIS — G89.4 CHRONIC PAIN SYNDROME: ICD-10-CM

## 2019-11-18 DIAGNOSIS — E66.01 SEVERE OBESITY (HCC): ICD-10-CM

## 2019-11-18 DIAGNOSIS — M54.50 CHRONIC LOW BACK PAIN, UNSPECIFIED BACK PAIN LATERALITY, UNSPECIFIED WHETHER SCIATICA PRESENT: ICD-10-CM

## 2019-11-18 DIAGNOSIS — K29.70 GASTRITIS WITHOUT BLEEDING, UNSPECIFIED CHRONICITY, UNSPECIFIED GASTRITIS TYPE: ICD-10-CM

## 2019-11-18 DIAGNOSIS — M85.80 OSTEOPENIA, UNSPECIFIED LOCATION: ICD-10-CM

## 2019-11-18 DIAGNOSIS — R30.0 DYSURIA: ICD-10-CM

## 2019-11-18 DIAGNOSIS — M89.9 DISORDER OF BONE: ICD-10-CM

## 2019-11-18 DIAGNOSIS — Z79.891 LONG TERM (CURRENT) USE OF OPIATE ANALGESIC: ICD-10-CM

## 2019-11-18 DIAGNOSIS — Z87.898 H/O: PITUITARY TUMOR: ICD-10-CM

## 2019-11-18 DIAGNOSIS — M54.50 CHRONIC LOW BACK PAIN: ICD-10-CM

## 2019-11-18 DIAGNOSIS — R63.5 WEIGHT GAIN: ICD-10-CM

## 2019-11-18 DIAGNOSIS — M54.2 NECK PAIN: ICD-10-CM

## 2019-11-18 DIAGNOSIS — G89.29 CHRONIC LOW BACK PAIN, UNSPECIFIED BACK PAIN LATERALITY, UNSPECIFIED WHETHER SCIATICA PRESENT: ICD-10-CM

## 2019-11-18 DIAGNOSIS — G89.29 CHRONIC PAIN OF RIGHT KNEE: Primary | ICD-10-CM

## 2019-11-18 DIAGNOSIS — G89.29 CHRONIC LOW BACK PAIN: ICD-10-CM

## 2019-11-18 DIAGNOSIS — Z76.0 MEDICATION REFILL: ICD-10-CM

## 2019-11-18 LAB
25(OH)D3 SERPL-MCNC: 28.8 NG/ML (ref 30–100)
ALBUMIN SERPL-MCNC: 3.8 G/DL (ref 3.4–5)
ALBUMIN/GLOB SERPL: 1.3 {RATIO} (ref 0.8–1.7)
ALP SERPL-CCNC: 64 U/L (ref 45–117)
ALT SERPL-CCNC: 33 U/L (ref 13–56)
ANION GAP SERPL CALC-SCNC: 5 MMOL/L (ref 3–18)
APPEARANCE UR: CLEAR
AST SERPL-CCNC: 16 U/L (ref 10–38)
BASOPHILS # BLD: 0 K/UL (ref 0–0.1)
BASOPHILS NFR BLD: 1 % (ref 0–2)
BILIRUB SERPL-MCNC: 0.3 MG/DL (ref 0.2–1)
BILIRUB UR QL: NEGATIVE
BUN SERPL-MCNC: 17 MG/DL (ref 7–18)
BUN/CREAT SERPL: 17 (ref 12–20)
CALCIUM SERPL-MCNC: 9.1 MG/DL (ref 8.5–10.1)
CHLORIDE SERPL-SCNC: 105 MMOL/L (ref 100–111)
CO2 SERPL-SCNC: 29 MMOL/L (ref 21–32)
COLOR UR: YELLOW
CREAT SERPL-MCNC: 0.98 MG/DL (ref 0.6–1.3)
DIFFERENTIAL METHOD BLD: ABNORMAL
EOSINOPHIL # BLD: 0.2 K/UL (ref 0–0.4)
EOSINOPHIL NFR BLD: 4 % (ref 0–5)
ERYTHROCYTE [DISTWIDTH] IN BLOOD BY AUTOMATED COUNT: 13.3 % (ref 11.6–14.5)
GLOBULIN SER CALC-MCNC: 2.9 G/DL (ref 2–4)
GLUCOSE SERPL-MCNC: 93 MG/DL (ref 74–99)
GLUCOSE UR STRIP.AUTO-MCNC: NEGATIVE MG/DL
HCT VFR BLD AUTO: 38 % (ref 35–45)
HGB BLD-MCNC: 11.9 G/DL (ref 12–16)
HGB UR QL STRIP: NEGATIVE
KETONES UR QL STRIP.AUTO: NEGATIVE MG/DL
LEUKOCYTE ESTERASE UR QL STRIP.AUTO: NEGATIVE
LYMPHOCYTES # BLD: 2.2 K/UL (ref 0.9–3.6)
LYMPHOCYTES NFR BLD: 38 % (ref 21–52)
MCH RBC QN AUTO: 31.5 PG (ref 24–34)
MCHC RBC AUTO-ENTMCNC: 31.3 G/DL (ref 31–37)
MCV RBC AUTO: 100.5 FL (ref 74–97)
MONOCYTES # BLD: 0.7 K/UL (ref 0.05–1.2)
MONOCYTES NFR BLD: 13 % (ref 3–10)
NEUTS SEG # BLD: 2.5 K/UL (ref 1.8–8)
NEUTS SEG NFR BLD: 44 % (ref 40–73)
NITRITE UR QL STRIP.AUTO: NEGATIVE
PH UR STRIP: 5 [PH] (ref 5–8)
PLATELET # BLD AUTO: 312 K/UL (ref 135–420)
PMV BLD AUTO: 11.5 FL (ref 9.2–11.8)
POTASSIUM SERPL-SCNC: 4 MMOL/L (ref 3.5–5.5)
PROT SERPL-MCNC: 6.7 G/DL (ref 6.4–8.2)
PROT UR STRIP-MCNC: NEGATIVE MG/DL
RBC # BLD AUTO: 3.78 M/UL (ref 4.2–5.3)
SODIUM SERPL-SCNC: 139 MMOL/L (ref 136–145)
SP GR UR REFRACTOMETRY: 1.01 (ref 1–1.03)
T4 FREE SERPL-MCNC: 0.8 NG/DL (ref 0.7–1.5)
TSH SERPL DL<=0.05 MIU/L-ACNC: 3.78 UIU/ML (ref 0.36–3.74)
UROBILINOGEN UR QL STRIP.AUTO: 0.2 EU/DL (ref 0.2–1)
WBC # BLD AUTO: 5.7 K/UL (ref 4.6–13.2)

## 2019-11-18 PROCEDURE — 36415 COLL VENOUS BLD VENIPUNCTURE: CPT

## 2019-11-18 PROCEDURE — 87086 URINE CULTURE/COLONY COUNT: CPT

## 2019-11-18 PROCEDURE — 81003 URINALYSIS AUTO W/O SCOPE: CPT

## 2019-11-18 PROCEDURE — 82306 VITAMIN D 25 HYDROXY: CPT

## 2019-11-18 PROCEDURE — 80053 COMPREHEN METABOLIC PANEL: CPT

## 2019-11-18 PROCEDURE — 84146 ASSAY OF PROLACTIN: CPT

## 2019-11-18 PROCEDURE — 85025 COMPLETE CBC W/AUTO DIFF WBC: CPT

## 2019-11-18 PROCEDURE — 84439 ASSAY OF FREE THYROXINE: CPT

## 2019-11-18 RX ORDER — OXYCODONE AND ACETAMINOPHEN 7.5; 325 MG/1; MG/1
1 TABLET ORAL
Qty: 360 TAB | Refills: 0 | Status: SHIPPED | OUTPATIENT
Start: 2019-11-18 | End: 2020-02-17 | Stop reason: SDUPTHER

## 2019-11-18 RX ORDER — DICLOFENAC SODIUM 10 MG/G
2 GEL TOPICAL 4 TIMES DAILY
Qty: 600 G | Refills: 3 | Status: SHIPPED | OUTPATIENT
Start: 2019-11-18 | End: 2020-08-17

## 2019-11-18 RX ORDER — ALPRAZOLAM 0.5 MG/1
1 TABLET ORAL
Qty: 540 TAB | Refills: 1 | Status: SHIPPED | OUTPATIENT
Start: 2019-11-18 | End: 2020-05-08 | Stop reason: SDUPTHER

## 2019-11-18 NOTE — PROGRESS NOTES
HISTORY OF PRESENT ILLNESS  Regulo Blackman is a 75 y.o. female. Visit Vitals  /89 (BP 1 Location: Right arm, BP Patient Position: Sitting)   Pulse 80   Temp 97.5 °F (36.4 °C) (Oral)   Resp 20   Ht 5' 6\" (1.676 m)   Wt 220 lb (99.8 kg)   LMP  (LMP Unknown)   SpO2 99%   BMI 35.51 kg/m²       Since last visit, she has seen GI. She had mild gastritis. She had poor prep on her colonoscopy but what was seen was OK. It was recommended to do a 2 day prep and repeat in the future. She also has had her mammogram which was normal. And her BMD revealed osteopenia. She has seen ortho as well. And had an MRI on her knee    She is due for her pain meds for her back and neck  She is seeing a specialist for her back right now  Joint Pain    The history is provided by the patient. This is a chronic problem. The current episode started more than 1 week ago. The problem occurs daily. The problem has not changed since onset. Pain location: multiple area including spine and knees. Associated symptoms include back pain. She has tried OTC pain medications and arthritis medications (percocet) for the symptoms. The treatment provided moderate relief. Review of Systems   Constitutional: Negative. Weight gain     Respiratory: Negative. Cardiovascular: Positive for leg swelling. Negative for chest pain and palpitations. Genitourinary: Positive for dysuria (intermittent pain with voiding. Has notice an odor change as well. ). Musculoskeletal: Positive for back pain and joint pain. Neurological: Negative for dizziness and headaches. Physical Exam   Constitutional: She is oriented to person, place, and time. She appears well-developed and well-nourished. No distress. Cardiovascular: Normal rate and regular rhythm. Pulmonary/Chest: Effort normal and breath sounds normal.   Musculoskeletal: She exhibits no edema. Neurological: She is alert and oriented to person, place, and time.    Skin: Skin is warm and dry. She is not diaphoretic. Psychiatric: She has a normal mood and affect. Nursing note and vitals reviewed. ASSESSMENT and PLAN    ICD-10-CM ICD-9-CM    1. Chronic pain of right knee M25.561 719.46     G89.29 338.29    2. Severe obesity (HCC) E66.01 278.01 TSH AND FREE T4   3. Gastritis without bleeding, unspecified chronicity, unspecified gastritis type K29.70 535.50 CBC WITH AUTOMATED DIFF   4. Long term (current) use of opiate analgesic Z79.891 V58.69    5. Chronic low back pain, unspecified back pain laterality, unspecified whether sciatica present M54.5 724.2     G89.29 338.29    6. Osteopenia, unspecified location M85.80 733.90 TSH AND FREE T4      VITAMIN D, 25 HYDROXY      PROLACTIN   7. Disorder of bone  M89.9 733.90 VITAMIN D, 25 HYDROXY   8. H/O: pituitary tumor Z87.898 V12.29 TSH AND FREE T4      METABOLIC PANEL, COMPREHENSIVE      PROLACTIN   9. Dysuria R30.0 788. 1 URINALYSIS W/ RFLX MICROSCOPIC      CULTURE, URINE   10. Chronic low back pain M54.5 724.2 oxyCODONE-acetaminophen (PERCOCET 7.5) 7.5-325 mg per tablet    G89.29 338.29 diclofenac (VOLTAREN) 1 % gel   11. Chronic pain syndrome G89.4 338.4 oxyCODONE-acetaminophen (PERCOCET 7.5) 7.5-325 mg per tablet   12. Weight gain R63.5 783.1 TSH AND FREE T4   13. Neck pain M54.2 723.1 diclofenac (VOLTAREN) 1 % gel   14. Medication refill Z76.0 V68.1 ALPRAZolam (XANAX) 0.5 mg tablet      oxyCODONE-acetaminophen (PERCOCET 7.5) 7.5-325 mg per tablet       Reviewed recent records and test results    Wt up another 16# in spite of normal thyroid function    Will update all lab  H/o pituitary tumor. Lab has been normal until now. She is concerned re \"lesions\" on her brain. Last MRI called these microvascular changes    Refills as noted. Chronic joint pain. Sees ortho and neurosurgery  UDS up to date  Discussed filling 90 d or percocet.  She MUST guard these appropriately (she says she has a safe) and I will not replace lost or stolen meds.    Recheck urine due to odor and occasional pain when voiding    F/u 3 month for next refills

## 2019-11-18 NOTE — PROGRESS NOTES
ROOM # 4    Arlyn Vegas presents today for   Chief Complaint   Patient presents with    Pain (Chronic)       Arlyn Vegas preferred language for health care discussion is english/other. Is someone accompanying this pt? no    Is the patient using any DME equipment during [address]? no    Depression Screening:  3 most recent PHQ Screens 9/19/2017 7/22/2016   PHQ Not Done - Active Diagnosis of Depression or Bipolar Disorder   Little interest or pleasure in doing things Nearly every day -   Feeling down, depressed, irritable, or hopeless Nearly every day -   Total Score PHQ 2 6 -       Learning Assessment:  Learning Assessment 7/22/2016   PRIMARY LEARNER Patient   HIGHEST LEVEL OF EDUCATION - PRIMARY LEARNER  SOME COLLEGE   PRIMARY LANGUAGE ENGLISH   LEARNER PREFERENCE PRIMARY DEMONSTRATION   ANSWERED BY TINO Ga   RELATIONSHIP SELF       Abuse Screening:  Abuse Screening Questionnaire 4/15/2019 8/7/2018 12/6/2017 7/22/2016   Do you ever feel afraid of your partner? N N N N   Are you in a relationship with someone who physically or mentally threatens you? N N N N   Is it safe for you to go home? Radha Damon       Fall Risk  Fall Risk Assessment, last 12 mths 2/4/2019 11/7/2018 10/16/2018 8/7/2018   Able to walk? Yes Yes Yes Yes   Fall in past 12 months? Yes Yes Yes No   Fall with injury? Yes Yes Yes -   Number of falls in past 12 months 3 2 1 -   Fall Risk Score 4 3 2 -           Health Maintenance reviewed and discussed per provider. Yes    Arlyn Vegas is due for   Health Maintenance Due   Topic Date Due    GLAUCOMA SCREENING Q2Y  06/29/2018     Please order/place referral if appropriate. Advance Directive:  1. Do you have an advance directive in place? Patient Reply: no    2. If not, would you like material regarding how to put one in place? Patient Reply: no    Coordination of Care:  1. Have you been to the ER, urgent care clinic since your last visit? Hospitalized since your last visit? no    2.  Have you seen or consulted any other health care providers outside of the [address] [place] [address] since your last visit? Include any pap smears or colon screening.  Dr. Vinod Hauser

## 2019-11-19 LAB — PROLACTIN SERPL-MCNC: 15.4 NG/ML

## 2019-11-20 LAB
BACTERIA SPEC CULT: NORMAL
SERVICE CMNT-IMP: NORMAL

## 2019-12-02 DIAGNOSIS — H91.93 DECREASED HEARING OF BOTH EARS: ICD-10-CM

## 2019-12-02 DIAGNOSIS — J01.10 SUBACUTE FRONTAL SINUSITIS: ICD-10-CM

## 2019-12-02 DIAGNOSIS — R22.0 FACIAL SWELLING: ICD-10-CM

## 2019-12-02 DIAGNOSIS — Z76.0 MEDICATION REFILL: ICD-10-CM

## 2019-12-02 NOTE — TELEPHONE ENCOUNTER
Requested Prescriptions     Pending Prescriptions Disp Refills    tiZANidine (ZANAFLEX) 4 mg capsule 60 Cap 5     Sig: Take 1 Cap by mouth two (2) times daily as needed (muscle spasm). Indications: muscle spasm    acyclovir (ZOVIRAX) 400 mg tablet 90 Tab 4     Sig: Take 1 Tab by mouth three (3) times daily. Indications: a herpes simplex infection    promethazine (PHENERGAN) 25 mg tablet 60 Tab 4     Sig: Take 1 Tab by mouth every six (6) hours as needed for Nausea.  albuterol (PROVENTIL HFA, VENTOLIN HFA, PROAIR HFA) 90 mcg/actuation inhaler 1 Inhaler 5     Sig: Take 2 Puffs by inhalation every four (4) hours as needed for Wheezing. Indications: bronchospasm    fluticasone propionate (FLONASE) 50 mcg/actuation nasal spray 1 Bottle 1     Si Sprays by Both Nostrils route daily.  Indications: inflammation of the nose due to an allergy

## 2019-12-03 RX ORDER — ALBUTEROL SULFATE 90 UG/1
2 AEROSOL, METERED RESPIRATORY (INHALATION)
Qty: 1 INHALER | Refills: 5 | Status: SHIPPED | OUTPATIENT
Start: 2019-12-03 | End: 2020-11-13 | Stop reason: SDUPTHER

## 2019-12-03 RX ORDER — PROMETHAZINE HYDROCHLORIDE 25 MG/1
25 TABLET ORAL
Qty: 60 TAB | Refills: 4 | Status: SHIPPED | OUTPATIENT
Start: 2019-12-03 | End: 2020-04-01 | Stop reason: SDUPTHER

## 2019-12-03 RX ORDER — TIZANIDINE HYDROCHLORIDE 4 MG/1
4 CAPSULE, GELATIN COATED ORAL
Qty: 60 CAP | Refills: 5 | Status: SHIPPED | OUTPATIENT
Start: 2019-12-03 | End: 2020-04-01 | Stop reason: SDUPTHER

## 2019-12-03 RX ORDER — ACYCLOVIR 400 MG/1
400 TABLET ORAL 3 TIMES DAILY
Qty: 90 TAB | Refills: 4 | Status: ON HOLD | OUTPATIENT
Start: 2019-12-03 | End: 2020-06-05

## 2019-12-03 RX ORDER — FLUTICASONE PROPIONATE 50 MCG
2 SPRAY, SUSPENSION (ML) NASAL DAILY
Qty: 1 BOTTLE | Refills: 1 | Status: SHIPPED | OUTPATIENT
Start: 2019-12-03 | End: 2020-03-27

## 2020-02-12 ENCOUNTER — PATIENT OUTREACH (OUTPATIENT)
Dept: INTERNAL MEDICINE CLINIC | Age: 65
End: 2020-02-12

## 2020-02-12 DIAGNOSIS — Z76.0 MEDICATION REFILL: ICD-10-CM

## 2020-02-12 RX ORDER — ZOLPIDEM TARTRATE 12.5 MG/1
TABLET, FILM COATED, EXTENDED RELEASE ORAL
Qty: 90 TAB | Refills: 1 | Status: SHIPPED | OUTPATIENT
Start: 2020-02-12 | End: 2020-05-28 | Stop reason: SDUPTHER

## 2020-02-12 NOTE — PROGRESS NOTES
Hospital Discharge Follow-Up      Date/Time:  2/12/2020 2:52 PM    Patient was admitted to [facility] on 2/9/20 and discharged on 2/11/20 for Left Facial droop, TIA. The physician discharge summary was available at the time of outreach. CTN attempt to contact patient via telephone on 2/12/20 for post hospital follow up. Unable to reach. Left message on voicemail with office contact information. No Patient medical information left on message.     [facility] follow up appointment(s):   Future Appointments   Date Time Provider Cory Daniels   2/17/2020 11:30 AM Sujey Zamora MD [address] [place] [address]   2/26/2020  3:00 PM Sujey Zamora MD [address] [place] [address]   4/1/2020  9:00 AM Ayesha Morris DPT [facility]

## 2020-02-13 ENCOUNTER — PATIENT OUTREACH (OUTPATIENT)
Dept: INTERNAL MEDICINE CLINIC | Age: 65
End: 2020-02-13

## 2020-02-13 NOTE — PROGRESS NOTES
Hospital Discharge Follow-Up      Date/Time:  2020 11:36 PM    Patient was admitted to [facility] on 20 and discharged on 20 for Left Facial droop, TIA. The physician discharge summary was available at the time of outreach. Top Challenges reviewed with the provider   None noted. Advance Care Planning:   Does patient have an Advance Directive:  not on file        Method of communication with provider :none    Was this a readmission? no   Patient stated reason for the readmission: n/a    Care Transition Nurse (CTN) contacted the patient by telephone to perform post hospital discharge assessment. Verified name and  with patient as identifiers. Provided introduction to self, and explanation of the CTN role. Patient states that she is doing well except that she is feeling tired. Patient denied CP, SOB, difficulty breathing, fever, chills, worsening weakness and worsening of facial drooping. Patient denied headaches or migraine at this time. Patient states that she lives with her BF  in law suite. Patient states that she has a walker and cane. Patient states that she can walk with the use of walker. Patient states that she has good support from her BF. Patient states that she is taking all her medications as prescribed. Patient daughter in law will take her to her appt. Patient states that [facility] PT and OT will see her today. Patient states that she doesn't have any problem of getting her medications. Patient declined medrec at this time. Strongly encouraged to review medrec with Home health and at PCP office visit. Patient states that she will do so. Patient reminded of her upcoming appt with PCP. Patient reminded that there is a physician on call 24 hours a day / 7 days a week (M-F 5pm to 8am and from Friday 5pm until Monday 8a for the weekend) should the patient have questions or concerns.   Patient reminded to call 911 if situation is emergent ( such as chest pain, shortness of breath, unstoppable bleeding, feeling of passing out,  worsening of symptoms)or patient feels the situation is emergent. Pt verbalizes understanding. Patient received hospital discharge instructions. CTN reviewed discharge instructions and red flags with patient who verbalized understanding. Patient given an opportunity to ask questions and does not have any further questions or concerns at this time. The patient agrees to contact the PCP office for questions related to their healthcare. CTN provided contact information for future reference. Disease Specific:   N/A    Patients top risk factors for readmission:  none noted at this time. Home Health orders at discharge: PT, [facility]: [facility]   Date of initial visit: 2/13/20    Durable Medical Equipment ordered at discharge: none    Medication(s):   New Medications at Discharge:  Reviewed with Pt. START taking these medications as per Dr. Demetrice Dolan of [facility]    Janny   aspirin 81 mg PO CHEW Take 1 Tab by Mouth Once a Day. Qty: 90 Tab, Refills: 2           Changed Medications at Discharge:reviewed with Pt. CONTINUE these medications which have CHANGED as per Dr. Demetrice Dolan of [facility]  Janny   atorvastatin (LIPITOR) 40 mg PO TABS   Take 1 Tab by Mouth Every Night at Bedtime. Qty: 90 Tab, Refills: 2     Discontinued Medications at Discharge: none noted at this time. Medication reconciliation was not performed with patient, Pt. Declined. There were no barriers to obtaining medications identified at this time. Referral to Pharm D needed: no     Current Outpatient Medications   Medication Sig    zolpidem CR (AMBIEN CR) 12.5 mg tablet take 1 tablet by mouth nightly    trospium (SANCTURA) 20 mg tablet Take 1 Tab by mouth Before breakfast and dinner.  tiZANidine (ZANAFLEX) 4 mg capsule Take 1 Cap by mouth two (2) times daily as needed (muscle spasm).  Indications: muscle spasm    acyclovir (ZOVIRAX) 400 mg tablet Take 1 Tab by mouth three (3) times daily. Indications: a herpes simplex infection    promethazine (PHENERGAN) 25 mg tablet Take 1 Tab by mouth every six (6) hours as needed for Nausea.  albuterol (PROVENTIL HFA, VENTOLIN HFA, PROAIR HFA) 90 mcg/actuation inhaler Take 2 Puffs by inhalation every four (4) hours as needed for Wheezing. Indications: bronchospasm    fluticasone propionate (FLONASE) 50 mcg/actuation nasal spray 2 Sprays by Both Nostrils route daily. Indications: inflammation of the nose due to an allergy    ALPRAZolam (XANAX) 0.5 mg tablet Take 2 Tabs by mouth three (3) times daily as needed for Anxiety. Max Daily Amount: 3 mg. Indications: anxious    oxyCODONE-acetaminophen (PERCOCET 7.5) 7.5-325 mg per tablet Take 1 Tab by mouth every four (4) hours as needed for Pain for up to 90 days. Max Daily Amount: 6 Tabs. Indications: pain, chronic pain    diclofenac (VOLTAREN) 1 % gel Apply 2 g to affected area four (4) times daily.  furosemide (LASIX) 20 mg tablet TAKE 1 TABLET DAILY    potassium chloride (K-DUR, KLOR-CON) 20 mEq tablet TAKE 1 TABLET DAILY FOR LOW AMOUNT OF POTASSIUM IN THE BLOOD    triamcinolone acetonide (KENALOG) 0.025 % topical cream Apply  to affected area two (2) times a day. use thin layer    frovatriptan (FROVA) 2.5 mg tablet Take one tablet by mouth daily    diclofenac EC (VOLTAREN) 75 mg EC tablet Take 1 Tab by mouth two (2) times a day.  cetirizine (ZYRTEC) 10 mg tablet Take 1 Tab by mouth daily.  montelukast (SINGULAIR) 10 mg tablet Take 1 Tab by mouth daily.  lidocaine (LIDODERM) 5 % Apply one daily    venlafaxine-SR (EFFEXOR-XR) 75 mg capsule Take 3 capsules by mouth daily  Indications: Anxiousness associated with Depression    omeprazole (PRILOSEC) 40 mg capsule Take 1 Cap by mouth daily.  multivitamin (ONE A DAY) tablet Take 1 Tab by mouth daily.     cyanocobalamin (VITAMIN B-12) 1,000 mcg tablet Take 1 Tab by mouth daily.  naloxone 2 mg/actuation spry Use 1 spray intranasally into 1 nostril. Use a new Narcan nasal spray for subsequent doses and administer into alternating nostrils. May repeat every 2 to 3 minutes as needed. No current facility-administered medications for this visit. There are no discontinued medications. [facility] follow up appointment(s):   Future Appointments   Date Time Provider Cory Daniels   2/17/2020 11:30 AM MD Chanelle Boateng [facility] [address]   2/26/2020  3:00 PM MD Chanelle Boateng [place] [id number]   4/1/2020  9:00 AM Therese Subramanian DPT [facility]      Non-[facility] follow up appointment(s): none noted at this time. [facility]:  out of service area       Goals      Prevent complications post hospitalization. Patient will attend f/u appt with PCP. Patient will continue to take all medications as prescribed. Patient will call CTN and/or PCP office for any questions and/or concerns.  Understands red flags post discharge. Patient will go to the nearest emergency room for chest pain, shortness of breath, returning of symptoms that brought her to the emergency room and/or worsening of symptoms. Patient will contact PCP office for any questions or concerns related to healthcare.     Patient will call for immediate help like calling 911 for the ff s/s stroke;   Numbness, weakness, or paralysis of the face, arm, or leg especially on one side of the body   Difficulty speaking or understanding speech   Sudden blurred or decreased vision in one or both eyes   Sudden dizziness or loss of balance   Sudden severe headache             [facility] follow up appointment(s):   Future Appointments   Date Time Provider Cory Daniels   2/17/2020 11:30 AM MD Chanelle Boateng [facility] [address]   2/26/2020  3:00 PM MD Chanelle Boateng [place] [address]   4/1/2020  9:00 AM Therese Subramanian DPT [facility]

## 2020-02-17 ENCOUNTER — HOSPITAL ENCOUNTER (OUTPATIENT)
Dept: LAB | Age: 65
Discharge: HOME | End: 2020-02-17
Payer: MEDICARE

## 2020-02-17 ENCOUNTER — OFFICE VISIT (OUTPATIENT)
Dept: INTERNAL MEDICINE CLINIC | Age: 65
End: 2020-02-17

## 2020-02-17 VITALS
BODY MASS INDEX: 33.91 KG/M2 | SYSTOLIC BLOOD PRESSURE: 101 MMHG | DIASTOLIC BLOOD PRESSURE: 71 MMHG | HEIGHT: 66 IN | WEIGHT: 211 LBS | TEMPERATURE: 98.6 F | HEART RATE: 87 BPM | OXYGEN SATURATION: 95 % | RESPIRATION RATE: 20 BRPM

## 2020-02-17 DIAGNOSIS — R30.0 DYSURIA: ICD-10-CM

## 2020-02-17 DIAGNOSIS — Z79.891 LONG TERM (CURRENT) USE OF OPIATE ANALGESIC: ICD-10-CM

## 2020-02-17 DIAGNOSIS — M54.50 CHRONIC LOW BACK PAIN: ICD-10-CM

## 2020-02-17 DIAGNOSIS — E66.01 SEVERE OBESITY (HCC): ICD-10-CM

## 2020-02-17 DIAGNOSIS — F39 MOOD DISORDER (HCC): ICD-10-CM

## 2020-02-17 DIAGNOSIS — G45.9 TIA (TRANSIENT ISCHEMIC ATTACK): ICD-10-CM

## 2020-02-17 DIAGNOSIS — Z76.0 MEDICATION REFILL: ICD-10-CM

## 2020-02-17 DIAGNOSIS — G89.29 CHRONIC LOW BACK PAIN: ICD-10-CM

## 2020-02-17 DIAGNOSIS — Z09 HOSPITAL DISCHARGE FOLLOW-UP: Primary | ICD-10-CM

## 2020-02-17 DIAGNOSIS — G89.4 CHRONIC PAIN SYNDROME: ICD-10-CM

## 2020-02-17 LAB
APPEARANCE UR: CLEAR
BACTERIA URNS QL MICRO: NEGATIVE /HPF
BILIRUB UR QL: NEGATIVE
COLOR UR: YELLOW
EPITH CASTS URNS QL MICRO: ABNORMAL /LPF (ref 0–5)
GLUCOSE UR STRIP.AUTO-MCNC: NEGATIVE MG/DL
HGB UR QL STRIP: NEGATIVE
HYALINE CASTS URNS QL MICRO: ABNORMAL /LPF (ref 0–2)
KETONES UR QL STRIP.AUTO: ABNORMAL MG/DL
LEUKOCYTE ESTERASE UR QL STRIP.AUTO: ABNORMAL
MUCOUS THREADS URNS QL MICRO: ABNORMAL /LPF
NITRITE UR QL STRIP.AUTO: NEGATIVE
PH UR STRIP: 5.5 [PH] (ref 5–8)
PROT UR STRIP-MCNC: NEGATIVE MG/DL
RBC #/AREA URNS HPF: NEGATIVE /HPF (ref 0–5)
SP GR UR REFRACTOMETRY: 1.02 (ref 1–1.03)
UROBILINOGEN UR QL STRIP.AUTO: 1 EU/DL (ref 0.2–1)
WBC URNS QL MICRO: ABNORMAL /HPF (ref 0–4)

## 2020-02-17 PROCEDURE — G0480 DRUG TEST DEF 1-7 CLASSES: HCPCS

## 2020-02-17 PROCEDURE — 87086 URINE CULTURE/COLONY COUNT: CPT

## 2020-02-17 PROCEDURE — 81001 URINALYSIS AUTO W/SCOPE: CPT

## 2020-02-17 RX ORDER — OXYCODONE AND ACETAMINOPHEN 7.5; 325 MG/1; MG/1
1 TABLET ORAL
Qty: 360 TAB | Refills: 0 | Status: SHIPPED | OUTPATIENT
Start: 2020-02-17 | End: 2020-05-28 | Stop reason: SDUPTHER

## 2020-02-17 RX ORDER — GUAIFENESIN 100 MG/5ML
81 LIQUID (ML) ORAL DAILY
COMMUNITY
Start: 2020-02-11

## 2020-02-17 RX ORDER — ATORVASTATIN CALCIUM 40 MG/1
40 TABLET, FILM COATED ORAL
COMMUNITY
Start: 2020-02-11 | End: 2020-11-12 | Stop reason: SDUPTHER

## 2020-02-17 NOTE — PROGRESS NOTES
ROOM # 5    Maxim Shipley presents today for   Chief Complaint   Patient presents with    Pain (Chronic)    Medication Refill       Maxim Shipley preferred language for health care discussion is english/other. Is someone accompanying this pt? no    Is the patient using any DME equipment during [address]? cane    Depression Screening:  3 most recent PHQ Screens 9/19/2017 7/22/2016   PHQ Not Done - Active Diagnosis of Depression or Bipolar Disorder   Little interest or pleasure in doing things Nearly every day -   Feeling down, depressed, irritable, or hopeless Nearly every day -   Total Score PHQ 2 6 -       Learning Assessment:  Learning Assessment 7/22/2016   PRIMARY LEARNER Patient   HIGHEST LEVEL OF EDUCATION - PRIMARY LEARNER  SOME COLLEGE   PRIMARY LANGUAGE ENGLISH   LEARNER PREFERENCE PRIMARY DEMONSTRATION   ANSWERED BY TINO Ga   RELATIONSHIP SELF       Abuse Screening:  Abuse Screening Questionnaire 4/15/2019 8/7/2018 12/6/2017 7/22/2016   Do you ever feel afraid of your partner? N N N N   Are you in a relationship with someone who physically or mentally threatens you? N N N N   Is it safe for you to go home? Hope Angelucci       Fall Risk  Fall Risk Assessment, last 12 mths 2/4/2019 11/7/2018 10/16/2018 8/7/2018   Able to walk? Yes Yes Yes Yes   Fall in past 12 months? Yes Yes Yes No   Fall with injury? Yes Yes Yes -   Number of falls in past 12 months 3 2 1 -   Fall Risk Score 4 3 2 -           Health Maintenance reviewed and discussed per provider. Yes    Maxim Shipley is due for   Health Maintenance Due   Topic Date Due    DTaP/Tdap/Td series (1 - Tdap) 06/29/1966    GLAUCOMA SCREENING Q2Y  06/29/2018     Please order/place referral if appropriate. Advance Directive:  1. Do you have an advance directive in place? Patient Reply: no    2. If not, would you like material regarding how to put one in place? Patient Reply: no    Coordination of Care:  1.  Have you been to the ER, urgent care clinic since your last visit? Hospitalized since your last visit? no    2. Have you seen or consulted any other health care providers outside of the [address] [place] [address] since your last visit? Include any pap smears or colon screening.  no

## 2020-02-17 NOTE — PROGRESS NOTES
HISTORY OF PRESENT ILLNESS  Lisandra Clayton is a 75 y.o. female. Visit Vitals  /71 (BP 1 Location: Right arm, BP Patient Position: Sitting)   Pulse 87   Temp 98.6 °F (37 °C) (Oral)   Resp 20   Ht 5' 6\" (1.676 m)   Wt 211 lb (95.7 kg)   LMP  (LMP Unknown)   SpO2 95%   BMI 34.06 kg/m²       Was in [facility] for TIA from 2/9/20-2/11-20. They increased her lipitor to 40 mg from 10 mg. From [facility]:  TIA presented with a facial droop slurred speech and right-sided weakness. Denies headache today  - MRI head with no acute changes, chronic small vessel changes  - PVL Carotid arteries normal extracranial carotid and vertebral duplex examination  -Echo with EF 66%, mild LVH, mildly sclerotic sclerotic mitral valve, mild mitral regurgitation, negative bubble study  - ASA started during this admission, cont statin    Went home with Home Health. Will be having speech path and physical therapy  She may need some help setting up her medications        Medication Refill   The history is provided by the patient. This is a new problem. Associated symptoms include headaches (off and on. Some may be rebound. Some are migraine). Pertinent negatives include no chest pain and no shortness of breath (no more than usual). Hospital Follow Up   The history is provided by the patient (see comments). This is a new problem. Associated symptoms include headaches (off and on. Some may be rebound. Some are migraine). Pertinent negatives include no chest pain and no shortness of breath (no more than usual). Review of Systems   Constitutional: Negative for chills and fever. Respiratory: Negative for shortness of breath (no more than usual). Cardiovascular: Negative for chest pain. Genitourinary: Positive for dysuria and hematuria. Neurological: Positive for focal weakness and headaches (off and on. Some may be rebound. Some are migraine). Physical Exam  Vitals signs and nursing note reviewed.    Constitutional: Appearance: Normal appearance. She is well-developed. HENT:      Head: Normocephalic and atraumatic. Eyes:      Extraocular Movements: Extraocular movements intact. Pupils: Pupils are equal, round, and reactive to light. Cardiovascular:      Rate and Rhythm: Normal rate and regular rhythm. Pulmonary:      Effort: Pulmonary effort is normal.      Breath sounds: Normal breath sounds. Skin:     General: Skin is warm and dry. Neurological:      Mental Status: She is alert and oriented to person, place, and time. Cranial Nerves: No cranial nerve deficit. Motor: Weakness (slightly weaker RLE. BUE equal) present. Psychiatric:         Mood and Affect: Mood normal.         ASSESSMENT and PLAN    ICD-10-CM ICD-9-CM    1. Hospital discharge follow-up Z09 V67.59    2. TIA (transient ischemic attack) G45.9 435.9    3. Dysuria R30.0 788. 1 URINALYSIS W/ RFLX MICROSCOPIC      CULTURE, URINE   4. Chronic low back pain M54.5 724.2 oxyCODONE-acetaminophen (PERCOCET 7.5) 7.5-325 mg per tablet    G89.29 338.29    5. Chronic pain syndrome G89.4 338.4 oxyCODONE-acetaminophen (PERCOCET 7.5) 7.5-325 mg per tablet   6. Mood disorder (HCC) F39 296.90     stable   7. Severe obesity ([address] E66.01 278.01    8. Long term (current) use of opiate analgesic Z79.891 V58.69 TOXASSURE SELECT 13 (MW)      naloxone 2 mg/actuation spry   9. Medication refill Z76.0 V68.1 oxyCODONE-acetaminophen (PERCOCET 7.5) 7.5-325 mg per tablet      naloxone 2 mg/actuation spry       Available hospital/ER record reviewed    Will check urine due to her dysuria      Chronic neck and joint pain. Knee and lumbar region. Has been maintained on opiate medication. No prior adverse activity noted. Due for UDS  Refill as noted    Order for BP kit    Discussed BMI/weight, lifestyle, diet and exercise. Discussed effect on blood pressure, blood sugar, and joints especially  Focus on limiting white carbs, portion control, and moving more.   Wt is down some from last visit  Can not exercise much      F/u 6 weeks for reheck

## 2020-02-18 LAB
BACTERIA SPEC CULT: NORMAL
SERVICE CMNT-IMP: NORMAL

## 2020-02-21 LAB — DRUGS UR: NORMAL

## 2020-02-25 ENCOUNTER — TELEPHONE (OUTPATIENT)
Dept: INTERNAL MEDICINE CLINIC | Age: 65
End: 2020-02-25

## 2020-02-25 RX ORDER — ACETAMINOPHEN 500 MG
TABLET ORAL
Qty: 1 KIT | Refills: 1 | Status: SHIPPED | OUTPATIENT
Start: 2020-02-25

## 2020-02-25 NOTE — TELEPHONE ENCOUNTER
Pt would like to know if can get dental surgery *dental implants*   With her medical history background

## 2020-03-06 ENCOUNTER — PATIENT OUTREACH (OUTPATIENT)
Dept: CASE MANAGEMENT | Age: 65
End: 2020-03-06

## 2020-03-09 ENCOUNTER — PATIENT OUTREACH (OUTPATIENT)
Dept: CASE MANAGEMENT | Age: 65
End: 2020-03-09

## 2020-03-09 NOTE — Clinical Note
Pallavi Reyes, Patient presented to  [facility]  3/3/20 for Abd Pain, diarrhea. Patient reported that she wasn't given any paperwork or prescriptions from the hospital and that she was quickly discharge. Upon review of records noted ED prescribed Bentyl and loperamide which Pt. Pharmacy   didn't received anything from the [address]. Is requesting for Bentyl as she states that she still having some Abdominal cramping. [facility] CTN instructed Pt. To call her GI Dr. For f/u. Please advise.  Thank you Madeleine [phone]

## 2020-03-09 NOTE — PROGRESS NOTES
Hospital Discharge Follow-Up      Date/Time:  3/9/2020  4:17 PM    Patient was admitted to [facility] on 2/9/20 and discharged on 2/11/20 for Left Facial droop, TIA. The physician discharge summary was available at the time of outreach. Patient states that her stomach cramping is better than the last time, however Pt states that she is still experiencing some cramping at times. Patient denied CP, SOB, fever, chills, nausea, vomiting and diarrhea. Patient reported to CTN that she forgot to call her Pharmacy to follow up on medications. CTN strongly advised Pt. To call her Pharmacy to check if the hospitalist send her medication. CTN gave Pt. Pharmacy Phone number . Pt. Thanked us for this. CTN reminded Pt. Of her upcoming appt with JASWINDER Matias. Patient states that she is  aware of this appt. Patient states that home health still following. No questions, concerns, needs and/or assistance at this time as per Pt. Patient thanked us for follow up call and ended the call.

## 2020-03-10 ENCOUNTER — PATIENT OUTREACH (OUTPATIENT)
Dept: CASE MANAGEMENT | Age: 65
End: 2020-03-10

## 2020-03-10 ENCOUNTER — TELEPHONE (OUTPATIENT)
Dept: INTERNAL MEDICINE CLINIC | Age: 65
End: 2020-03-10

## 2020-03-10 NOTE — PROGRESS NOTES
Hospital Discharge Follow-Up      Date/Time:  3/10/2020  4:13 PM    Patient was admitted to [facility] on 2/9/20 and discharged on 2/11/20 for Left Facial droop, TIA. The physician discharge summary was available at the time of outreach. Patient states that she is okay. Patient reported that she cancelled her PCP Appt for tomorrow because she is over it and just want to wait it out and \"let it's course. \". CTN strongly advised Pt. To be seen by a provider, Pt. Strongly declined and states that she already made arrangement to see her dentist.   Cristel Wells asked if she called her GI doctor, Patient states that she did and she actually missed GI office call twice. Strongly advised Pt. To call the her GI office back, Patient stated \" I don't want to bother them. They dont like to be bothered. \" Strongly advised Pt. To call GI doctor tomorrow, Patient stated \" Thank you for your help, Im really fine. \"     CTN Given Pt. Red flags such as going to urgent care or emergency room if her symptoms are worst. Patients she knows. No further questions, concerns, needs and/or assistance at this time. Patient kept the conversation short.

## 2020-03-13 ENCOUNTER — TELEPHONE (OUTPATIENT)
Dept: INTERNAL MEDICINE CLINIC | Age: 65
End: 2020-03-13

## 2020-03-25 ENCOUNTER — VIRTUAL VISIT (OUTPATIENT)
Dept: INTERNAL MEDICINE CLINIC | Age: 65
End: 2020-03-25

## 2020-03-25 DIAGNOSIS — T78.40XA ALLERGIC REACTION, INITIAL ENCOUNTER: Primary | ICD-10-CM

## 2020-03-25 NOTE — PROGRESS NOTES
Pt called in with allergy sxs. At this time she is in the ER for allergic reaction. Swelling, itching of arm. It is hot and red. She is not aware of what set it     AS she is in the ER at this moment, this visit will be closed.

## 2020-03-26 ENCOUNTER — TELEPHONE (OUTPATIENT)
Dept: INTERNAL MEDICINE CLINIC | Age: 65
End: 2020-03-26

## 2020-03-26 NOTE — TELEPHONE ENCOUNTER
???  Her BP was worse at the ER yesterday and she made no mention of this. I suspect this is all anxiety related. Agree with monitoring her BP. We can add medication if needed at some point.

## 2020-03-26 NOTE — TELEPHONE ENCOUNTER
2 pt identifiers confirmed. Pt states that she took her BP this morning approximately 10 mins ago and it was 145/101. Pt reports feeling tired and having a headache which she rates an 8 or 9 on pain scale. Pt is concerned because she has history of TIAs. I informed pt to continue to monitor BP, at least 3 more readings and if it is still elevated to go to ER. She would also like to make provider aware of what's going on. Pt verbalized understanding. No other questions at this time.

## 2020-03-27 DIAGNOSIS — H91.93 DECREASED HEARING OF BOTH EARS: ICD-10-CM

## 2020-03-27 DIAGNOSIS — J01.10 SUBACUTE FRONTAL SINUSITIS: ICD-10-CM

## 2020-03-27 DIAGNOSIS — R22.0 FACIAL SWELLING: ICD-10-CM

## 2020-03-27 RX ORDER — FLUTICASONE PROPIONATE 50 MCG
SPRAY, SUSPENSION (ML) NASAL
Qty: 16 G | Refills: 11 | Status: ON HOLD | OUTPATIENT
Start: 2020-03-27 | End: 2020-06-05

## 2020-03-31 ENCOUNTER — TELEPHONE (OUTPATIENT)
Dept: INTERNAL MEDICINE CLINIC | Age: 65
End: 2020-03-31

## 2020-03-31 NOTE — TELEPHONE ENCOUNTER
Mook from [facility] called and what diagnoses to the medication Tizanidine and some others.  She also has questions on other medications please call [phone] ext [phone]

## 2020-03-31 NOTE — TELEPHONE ENCOUNTER
Pt has asked for a refill on her tizanidine  I last wrote for #60 on 12/2/19 with 5 refills. Somehow, it got sent from [facility] for a 90 d supply on 2/1/20. But she went to the pharmacy telling them she was completely out--the 90 supply should not run out for another month  My guess is she is using it more than twice a day. The pharmacist does not feel comfortable refilling it.   I advised them not to fill it under the circumstances

## 2020-04-01 RX ORDER — TIZANIDINE HYDROCHLORIDE 4 MG/1
4 CAPSULE, GELATIN COATED ORAL
Qty: 60 CAP | Refills: 5 | Status: SHIPPED | OUTPATIENT
Start: 2020-04-01 | End: 2020-05-28 | Stop reason: SDUPTHER

## 2020-04-01 RX ORDER — PROMETHAZINE HYDROCHLORIDE 25 MG/1
25 TABLET ORAL
Qty: 60 TAB | Refills: 4 | Status: SHIPPED | OUTPATIENT
Start: 2020-04-01 | End: 2020-11-13 | Stop reason: SDUPTHER

## 2020-04-01 NOTE — TELEPHONE ENCOUNTER
Requested Prescriptions     Pending Prescriptions Disp Refills    tiZANidine (ZANAFLEX) 4 mg capsule 60 Cap 5     Sig: Take 1 Cap by mouth two (2) times daily as needed (muscle spasm). Indications: muscle spasm    promethazine (PHENERGAN) 25 mg tablet 60 Tab 4     Sig: Take 1 Tab by mouth every six (6) hours as needed for Nausea.

## 2020-04-03 ENCOUNTER — TELEPHONE (OUTPATIENT)
Dept: INTERNAL MEDICINE CLINIC | Age: 65
End: 2020-04-03

## 2020-04-03 NOTE — TELEPHONE ENCOUNTER
Patient stated she received one out the two medication that was requested on 04/01 patient contacted the pharmacy and they stated it's  out there hands since it was ship out pt requesting the Rx Teola Fee to be sent to her [facility]

## 2020-04-09 NOTE — TELEPHONE ENCOUNTER
Patient is calling to find out if she can get another refill of the Tizanidine. Patient states according to the pharmacy the medication was delivered but she never received it. Patient states she is having to take extra pain medication to help with the pain but it is not working. Patient is also concerned about the poison ivy. States it has not radha away and she has had some swelling in the affected sites.

## 2020-04-09 NOTE — TELEPHONE ENCOUNTER
2 pt identifiers confirmed. Apologized and explained to pt Dr. Jose Arboleda refilled Tizanidine on 4/1/20 with 5 refills. Informed pt that she would have to wait until next refill is due.

## 2020-04-15 ENCOUNTER — TELEPHONE (OUTPATIENT)
Dept: INTERNAL MEDICINE CLINIC | Age: 65
End: 2020-04-15

## 2020-04-15 NOTE — TELEPHONE ENCOUNTER
Spoke with patient and she stated doctor didn't really talk about rash because he was more concerned with her breathing. He prescribed prednisone. Patient worried about rash    I transferred her to front office to schedule the VV    I did explain how it works.

## 2020-04-15 NOTE — TELEPHONE ENCOUNTER
What did the ER give her? What did they think it was? What is she putting on it?   She needs a virtual visit

## 2020-04-15 NOTE — TELEPHONE ENCOUNTER
Patient is calling in stating the poison ivy/oak rash is not going away and she is all swollen and itchy. Patient has been taking Benedryl but not giving much relief. What should she do now?

## 2020-04-17 ENCOUNTER — VIRTUAL VISIT (OUTPATIENT)
Dept: INTERNAL MEDICINE CLINIC | Age: 65
End: 2020-04-17

## 2020-04-17 DIAGNOSIS — R21 RASH: Primary | ICD-10-CM

## 2020-04-17 DIAGNOSIS — L29.9 ITCHING: ICD-10-CM

## 2020-04-17 RX ORDER — PERMETHRIN 50 MG/G
CREAM TOPICAL
Qty: 60 G | Refills: 1 | Status: SHIPPED | OUTPATIENT
Start: 2020-04-17 | End: 2021-05-20

## 2020-04-17 RX ORDER — FLUCONAZOLE 200 MG/1
200 TABLET ORAL DAILY
Qty: 7 TAB | Refills: 0 | Status: SHIPPED | OUTPATIENT
Start: 2020-04-17 | End: 2020-04-24

## 2020-04-17 NOTE — PROGRESS NOTES
Unable to connect via video    Nasima Rivera is a 75 y.o. female evaluated via telephone on 4/17/2020. Consent:  She and/or health care decision maker is aware that that she may receive a bill for this telephone service, depending on her insurance coverage, and has provided verbal consent to proceed: Yes      Documentation:  I communicated with the patient and/or health care decision maker about skin and rash and itching. Details of this discussion including any medical advice provided: possible causes of rash      I affirm this is a Patient Initiated Episode with an Established Patient who has not had a related appointment within my department in the past 7 days or scheduled within the next 24 hours. Total Time: minutes: 5-10 minutes    Note: not billable if this call serves to triage the patient into an appointment for the relevant concern      Candance Bode, MD     Assessment & Plan:   Diagnoses and all orders for this visit:    1. Rash  -     fluconazole (DIFLUCAN) 200 mg tablet; Take 1 Tab by mouth daily for 7 days. FDA advises cautious prescribing of oral fluconazole in pregnancy. -     permethrin (ACTICIN) 5 % topical cream; apply sparingly from chin to toes at bedtime and leave on 8 hours, then wash off    2. Itching  -     fluconazole (DIFLUCAN) 200 mg tablet; Take 1 Tab by mouth daily for 7 days. FDA advises cautious prescribing of oral fluconazole in pregnancy. -     permethrin (ACTICIN) 5 % topical cream; apply sparingly from chin to toes at bedtime and leave on 8 hours, then wash off      The breast areas may be yeast. The prednisone she has had this winter may have raised her blood sugar (she also says her BP has been up)  Will add diflucan    It is also possible that she got chiggers or some other mite when she fell in the bushes. Will add night permethrin treatment.   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 female who was seen for No chief complaint on file. A week ago was outdoors and fell into some bushes. She now has a rash. On the legs--both legs below and on the knees. Small bumps. \"Knots\" not blistering  Also around her ankles  ? Also in her head    She also had some on her face and back of neck- - little tiny bumps that popped    Has tried some head and shoulders    She has not been feeling well and was seen at the ER but the rash was not addressed. She was having some respiratory sxs. Was told it was not COVID19  She was given some prednisone. States her breathing is better    No recent antibiotics? Then under her breasts there is a different rash--red and spots and is a bit hot    Rash    The history is provided by the patient. This is a new problem. The current episode started more than 1 week ago. Associated symptoms include itching. Prior to Admission medications    Medication Sig Start Date End Date Taking? Authorizing Provider   fluconazole (DIFLUCAN) 200 mg tablet Take 1 Tab by mouth daily for 7 days. FDA advises cautious prescribing of oral fluconazole in pregnancy. 4/17/20 4/24/20 Yes Ted Shelton MD   permethrin (ACTICIN) 5 % topical cream apply sparingly from chin to toes at bedtime and leave on 8 hours, then wash off 4/17/20  Yes Melly Yanez MD   tiZANidine (ZANAFLEX) 4 mg capsule Take 1 Cap by mouth two (2) times daily as needed (muscle spasm). Indications: muscle spasm 4/1/20   Ted Shelton MD   promethazine (PHENERGAN) 25 mg tablet Take 1 Tab by mouth every six (6) hours as needed for Nausea.  4/1/20   Ted Shelton MD   fluticasone propionate (FLONASE) 50 mcg/actuation nasal spray USE 2 SPRAYS IN [facility] NOSTRIL DAILY FOR INFLAMMATION OF THE NOSE DUE TO AN ALLERGY 3/27/20   Ted Shelton MD   Blood Pressure Test Kit-Large kit CHECK blood pressure twice a day as directed by prescriber 2/25/20   Levon Boas, MD   oxyCODONE-acetaminophen (PERCOCET 7.5) 7.5-325 mg per tablet Take 1 Tab by mouth every four (4) hours as needed for Pain for up to 90 days. Max Daily Amount: 6 Tabs. Indications: pain, chronic pain 2/17/20 5/17/20  Levon Boas, MD   aspirin 81 mg chewable tablet Take 81 mg by mouth. 2/11/20   Provider, Historical   atorvastatin (LIPITOR) 40 mg tablet Take 40 mg by mouth. 2/11/20   Provider, Historical   naloxone 2 mg/actuation spry Use 1 spray intranasally into 1 nostril. Use a new Narcan nasal spray for subsequent doses and administer into alternating nostrils. May repeat every 2 to 3 minutes as needed. 2/17/20   Levon Boas, MD   zolpidem CR (AMBIEN CR) 12.5 mg tablet take 1 tablet by mouth nightly 2/12/20   Levon Boas, MD   [facility]) 20 mg tablet Take 1 Tab by mouth Before breakfast and dinner. 1/23/20   Tha Brooks MD   acyclovir (ZOVIRAX) 400 mg tablet Take 1 Tab by mouth three (3) times daily. Indications: a herpes simplex infection 12/3/19   Julio Cesar Yanez MD   albuterol (PROVENTIL HFA, VENTOLIN HFA, PROAIR HFA) 90 mcg/actuation inhaler Take 2 Puffs by inhalation every four (4) hours as needed for Wheezing. Indications: bronchospasm 12/3/19   Levon Boas, MD   ALPRAZolam Shelley Lino) 0.5 mg tablet Take 2 Tabs by mouth three (3) times daily as needed for Anxiety. Max Daily Amount: 3 mg. Indications: anxious 11/18/19   Levon Boas, MD   diclofenac (VOLTAREN) 1 % gel Apply 2 g to affected area four (4) times daily.  11/18/19   Levon Boas, MD   furosemide (LASIX) 20 mg tablet TAKE 1 TABLET DAILY 11/10/19   Julio Cesar Yanez MD   potassium chloride (K-DUR, KLOR-CON) 20 mEq tablet TAKE 1 TABLET DAILY FOR LOW AMOUNT OF POTASSIUM IN THE BLOOD 11/10/19   Levon Boas, MD   triamcinolone acetonide (KENALOG) 0.025 % topical cream Apply  to affected area two (2) times a day. use thin layer 9/16/19   Geri Valladares MD   frovatriptan (FROVA) 2.5 mg tablet Take one tablet by mouth daily 9/9/19   Geri Valladares MD   diclofenac EC (VOLTAREN) 75 mg EC tablet Take 1 Tab by mouth two (2) times a day. 7/29/19   Geri Valladares MD   cetirizine (ZYRTEC) 10 mg tablet Take 1 Tab by mouth daily. 7/29/19   Geri Valladares MD   montelukast (SINGULAIR) 10 mg tablet Take 1 Tab by mouth daily. 7/29/19   Geri Valladares MD   lidocaine (LIDODERM) 5 % Apply one daily 7/29/19   Geri Valladares MD   venlafaxine-SR [facility] 75 mg capsule Take 3 capsules by mouth daily  Indications: Anxiousness associated with Depression 7/29/19   Geri Valladares MD   omeprazole (PRILOSEC) 40 mg capsule Take 1 Cap by mouth daily. 7/29/19   Geri Valladares MD   multivitamin (ONE A DAY) tablet Take 1 Tab by mouth daily. 7/29/19   Geri Valladares MD   cyanocobalamin (VITAMIN B-12) 1,000 mcg tablet Take 1 Tab by mouth daily. 7/29/19   Geri Valladares MD     Allergies   Allergen Reactions    Gluten Other (comments)     GI upset      Morphine Rash     Other reaction(s): mild rash/itching    Carisoprodol Other (comments)     Other reaction(s): other/intolerance  Constipaton  Constipaton    Stadol [Butorphanol Tartrate] Other (comments)     Other reaction(s): cardiac dysrhythmia  Increased HR  Made her have palpitations    Ultram [Tramadol] Other (comments)     Other reaction(s): unknown  Unsure- mixed with Stadol and is unsure which med she reacted to. Made her have palpitations           Review of Systems   Constitutional: Negative for chills and fever. Skin: Positive for itching and rash. We discussed the expected course, resolution and complications of the diagnosis(es) in detail. Medication risks, benefits, costs, interactions, and alternatives were discussed as indicated.   I advised her to contact the office if her condition worsens, changes or fails to improve as anticipated. She expressed understanding with the diagnosis(es) and plan. Darlene Durant is a 75 y.o. female being evaluated by a phone visit encounter for concerns as above. A caregiver was present when appropriate. Due to this being a TeleHealth encounter ([facility] emergency), evaluation of the following organ systems was limited: Vitals/Constitutional/EENT/Resp/CV/GI//MS/Neuro/Skin/Heme-Lymph-Imm. Pursuant to the emergency declaration under the [address] [place] [address], [facility] waiver authority and the [employer] and [employer] Act, this Virtual  Visit was conducted, with patient's (and/or legal guardian's) consent, to reduce the patient's risk of exposure to COVID-19 and provide necessary medical care. Services were provided through a video synchronous discussion virtually to substitute for in-person clinic visit. Patient and provider were located at their individual homes.         Jamison Castle MD

## 2020-05-08 DIAGNOSIS — Z76.0 MEDICATION REFILL: ICD-10-CM

## 2020-05-08 NOTE — TELEPHONE ENCOUNTER
Pharmacy fax request for a 90 day Rx. Last OV 4/17/20, next scheduled 5/28/20. Requested Prescriptions     Pending Prescriptions Disp Refills    ALPRAZolam (XANAX) 0.5 mg tablet 540 Tab 1     Sig: Take 2 Tabs by mouth three (3) times daily as needed for Anxiety. Max Daily Amount: 3 mg.  Indications: anxious

## 2020-05-11 RX ORDER — ALPRAZOLAM 0.5 MG/1
1 TABLET ORAL
Qty: 540 TAB | Refills: 1 | Status: SHIPPED | OUTPATIENT
Start: 2020-05-11 | End: 2020-08-17 | Stop reason: SDUPTHER

## 2020-05-28 ENCOUNTER — OFFICE VISIT (OUTPATIENT)
Dept: INTERNAL MEDICINE CLINIC | Age: 65
End: 2020-05-28

## 2020-05-28 VITALS
WEIGHT: 231 LBS | BODY MASS INDEX: 37.12 KG/M2 | RESPIRATION RATE: 17 BRPM | SYSTOLIC BLOOD PRESSURE: 130 MMHG | OXYGEN SATURATION: 98 % | TEMPERATURE: 97 F | DIASTOLIC BLOOD PRESSURE: 90 MMHG | HEART RATE: 94 BPM | HEIGHT: 66 IN

## 2020-05-28 DIAGNOSIS — G89.4 CHRONIC PAIN SYNDROME: ICD-10-CM

## 2020-05-28 DIAGNOSIS — R60.0 PEDAL EDEMA: ICD-10-CM

## 2020-05-28 DIAGNOSIS — G89.29 CHRONIC PAIN OF RIGHT KNEE: ICD-10-CM

## 2020-05-28 DIAGNOSIS — M54.50 CHRONIC LOW BACK PAIN: Primary | ICD-10-CM

## 2020-05-28 DIAGNOSIS — M25.561 CHRONIC PAIN OF RIGHT KNEE: ICD-10-CM

## 2020-05-28 DIAGNOSIS — Z76.0 MEDICATION REFILL: ICD-10-CM

## 2020-05-28 DIAGNOSIS — G89.29 CHRONIC LOW BACK PAIN: Primary | ICD-10-CM

## 2020-05-28 RX ORDER — TIZANIDINE HYDROCHLORIDE 4 MG/1
4 CAPSULE, GELATIN COATED ORAL 3 TIMES DAILY
Qty: 270 CAP | Refills: 3 | Status: SHIPPED | OUTPATIENT
Start: 2020-05-28 | End: 2020-10-23 | Stop reason: SDUPTHER

## 2020-05-28 RX ORDER — ZOLPIDEM TARTRATE 12.5 MG/1
TABLET, FILM COATED, EXTENDED RELEASE ORAL
Qty: 90 TAB | Refills: 1 | Status: SHIPPED | OUTPATIENT
Start: 2020-05-28 | End: 2020-11-12 | Stop reason: SDUPTHER

## 2020-05-28 RX ORDER — OXYCODONE AND ACETAMINOPHEN 7.5; 325 MG/1; MG/1
1 TABLET ORAL
Qty: 360 TAB | Refills: 0 | Status: SHIPPED | OUTPATIENT
Start: 2020-05-28 | End: 2020-08-17 | Stop reason: SDUPTHER

## 2020-05-28 NOTE — PROGRESS NOTES
ROOM # 5  Identified pt with two pt identifiers(name and ). Reviewed record in preparation for visit and have obtained necessary documentation. Chief Complaint   Patient presents with    Neurologic Problem     7 wk fu       Carter Real preferred language for health care discussion is english/other. Is the patient using any DME equipment during OV? NO    Carter Real is due for:  Health Maintenance Due   Topic    GLAUCOMA SCREENING Q2Y     Medicare Yearly Exam      Health Maintenance reviewed and discussed per provider  Please order/place referral if appropriate. Advance Directive:  1. Do you have an advance directive in place? Patient Reply: NO    2. If not, would you like material regarding how to put one in place? NO    Coordination of Care:  1. Have you been to the ER, urgent care clinic since your last visit? Hospitalized since your last visit? NO    2. Have you seen or consulted any other health care providers outside of the [address] [place] [address] since your last visit? Include any pap smears or colon screening. NO    Patient is accompanied by self I have received verbal consent from Carter Real to discuss any/all medical information while they are present in the room. Learning Assessment:  Learning Assessment 2016   PRIMARY LEARNER Patient   HIGHEST LEVEL OF EDUCATION - PRIMARY LEARNER  SOME COLLEGE   PRIMARY LANGUAGE ENGLISH   LEARNER PREFERENCE PRIMARY DEMONSTRATION   ANSWERED BY TINO Ga   RELATIONSHIP SELF     Depression Screening:  3 most recent PHQ Screens 2017   PHQ Not Done - Active Diagnosis of Depression or Bipolar Disorder   Little interest or pleasure in doing things Nearly every day -   Feeling down, depressed, irritable, or hopeless Nearly every day -   Total Score PHQ 2 6 -     Abuse Screening:  Abuse Screening Questionnaire 4/15/2019 2018 2017 2016   Do you ever feel afraid of your partner?  N N N N   Are you in a relationship with someone who physically or mentally threatens you? N N N N   Is it safe for you to go home? Teodora Carter     Fall Risk  Fall Risk Assessment, last 12 mths 2/4/2019 11/7/2018 10/16/2018 8/7/2018   Able to walk? Yes Yes Yes Yes   Fall in past 12 months? Yes Yes Yes No   Fall with injury?  Yes Yes Yes -   Number of falls in past 12 months 3 2 1 -   Fall Risk Score 4 3 2 -

## 2020-05-28 NOTE — PROGRESS NOTES
HISTORY OF PRESENT ILLNESS  Francisco Lopez is a 75 y.o. female. Visit Vitals  /90 (BP 1 Location: Right arm, BP Patient Position: Sitting)   Pulse 94   Temp 97 °F (36.1 °C) (Temporal)   Resp 17   Ht 5' 6\" (1.676 m)   Wt 231 lb (104.8 kg)   LMP  (LMP Unknown)   SpO2 98%   BMI 37.28 kg/m²       No new TIA sxs since last visit    She had home care for 6 weeks. This has now ended    Was seen in March at the ER with bronchitis  Was seen in April with atypical chest pain. Each time lab, EKG and CXR were fine    She did see sleep clinic and will be having her CPAP updated. Has a neurology appt in July (for her headaches)      Current Outpatient Medications:  oxyCODONE-acetaminophen (PERCOCET 7.5) 7.5-325 mg per tablet, Take 1 Tab by mouth every four (4) hours as needed for Pain for up to 90 days. Max Daily Amount: 6 Tabs. Indications: pain, chronic pain  zolpidem CR (AMBIEN CR) 12.5 mg tablet, take 1 tablet by mouth nightly  tiZANidine (ZANAFLEX) 4 mg capsule, Take 1 Cap by mouth three (3) times daily. Indications: muscle spasm  ALPRAZolam (XANAX) 0.5 mg tablet, Take 2 Tabs by mouth three (3) times daily as needed for Anxiety. Max Daily Amount: 3 mg. Indications: anxious  rOPINIRole (REQUIP) 1 mg tablet, Take 1.5 Tabs by mouth two (2) times a day. Indications: restless legs syndrome, an extreme discomfort in the calf muscles when sitting or lying down  permethrin (ACTICIN) 5 % topical cream, apply sparingly from chin to toes at bedtime and leave on 8 hours, then wash off  promethazine (PHENERGAN) 25 mg tablet, Take 1 Tab by mouth every six (6) hours as needed for Nausea. fluticasone propionate (FLONASE) 50 mcg/actuation nasal spray, USE 2 SPRAYS IN EACH NOSTRIL DAILY FOR INFLAMMATION OF THE NOSE DUE TO AN ALLERGY  Blood Pressure Test Kit-Large kit, CHECK blood pressure twice a day as directed by prescriber  aspirin 81 mg chewable tablet, Take 81 mg by mouth.   atorvastatin (LIPITOR) 40 mg tablet, Take 40 mg by mouth.  naloxone 2 mg/actuation spry, Use 1 spray intranasally into 1 nostril. Use a new Narcan nasal spray for subsequent doses and administer into alternating nostrils. May repeat every 2 to 3 minutes as needed. trospium (SANCTURA) 20 mg tablet, Take 1 Tab by mouth Before breakfast and dinner. acyclovir (ZOVIRAX) 400 mg tablet, Take 1 Tab by mouth three (3) times daily. Indications: a herpes simplex infection  albuterol (PROVENTIL HFA, VENTOLIN HFA, PROAIR HFA) 90 mcg/actuation inhaler, Take 2 Puffs by inhalation every four (4) hours as needed for Wheezing. Indications: bronchospasm  diclofenac (VOLTAREN) 1 % gel, Apply 2 g to affected area four (4) times daily. furosemide (LASIX) 20 mg tablet, TAKE 1 TABLET DAILY  potassium chloride (K-DUR, KLOR-CON) 20 mEq tablet, TAKE 1 TABLET DAILY FOR LOW AMOUNT OF POTASSIUM IN THE BLOOD  triamcinolone acetonide (KENALOG) 0.025 % topical cream, Apply  to affected area two (2) times a day. use thin layer  frovatriptan (FROVA) 2.5 mg tablet, Take one tablet by mouth daily  diclofenac EC (VOLTAREN) 75 mg EC tablet, Take 1 Tab by mouth two (2) times a day. cetirizine (ZYRTEC) 10 mg tablet, Take 1 Tab by mouth daily. montelukast (SINGULAIR) 10 mg tablet, Take 1 Tab by mouth daily. lidocaine (LIDODERM) 5 %, Apply one daily  venlafaxine-SR (EFFEXOR-XR) 75 mg capsule, Take 3 capsules by mouth daily  Indications: Anxiousness associated with Depression  omeprazole (PRILOSEC) 40 mg capsule, Take 1 Cap by mouth daily. multivitamin (ONE A DAY) tablet, Take 1 Tab by mouth daily. cyanocobalamin (VITAMIN B-12) 1,000 mcg tablet, Take 1 Tab by mouth daily. Neurologic Problem   The history is provided by the patient. This is a chronic problem. The current episode started more than 1 week ago. The problem has not changed since onset. Associated symptoms include headaches. Pertinent negatives include no chest pain. Back Pain    The history is provided by the patient.  This is a chronic problem. The current episode started more than 1 week ago. The problem has not changed since onset. The problem occurs daily. The pain is associated with no known injury. The pain is present in the lumbar spine. Associated symptoms include headaches. Pertinent negatives include no chest pain and no fever. Knee Pain   The history is provided by the patient. This is a chronic problem. The current episode started more than 1 week ago. The problem occurs daily. The problem has not changed since onset. Associated symptoms include headaches. Pertinent negatives include no chest pain. The symptoms are aggravated by walking and standing. The symptoms are relieved by medications. Review of Systems   Constitutional: Negative for chills and fever. Cardiovascular: Negative for chest pain and palpitations. Musculoskeletal: Positive for back pain and joint pain. Neurological: Positive for headaches. Negative for dizziness. Physical Exam  Vitals signs and nursing note reviewed. Constitutional:       Appearance: She is well-developed. Cardiovascular:      Rate and Rhythm: Normal rate and regular rhythm. Pulmonary:      Effort: Pulmonary effort is normal.      Breath sounds: Normal breath sounds. Musculoskeletal:      Comments: Right knee fully extends. No redness or swelling   Skin:     General: Skin is warm and dry. Neurological:      General: No focal deficit present. Mental Status: She is alert and oriented to person, place, and time. Psychiatric:         Mood and Affect: Mood normal.         ASSESSMENT and PLAN    ICD-10-CM ICD-9-CM    1. Chronic low back pain M54.5 724.2 oxyCODONE-acetaminophen (PERCOCET 7.5) 7.5-325 mg per tablet    G89.29 338.29    2. Chronic pain syndrome G89.4 338.4 oxyCODONE-acetaminophen (PERCOCET 7.5) 7.5-325 mg per tablet   3.  Medication refill Z76.0 V68.1 oxyCODONE-acetaminophen (PERCOCET 7.5) 7.5-325 mg per tablet      zolpidem CR (AMBIEN CR) 12.5 mg tablet tiZANidine (ZANAFLEX) 4 mg capsule   4. Chronic pain of right knee M25.561 719.46     G89.29 338.29    5. Pedal edema R60.0 782.3         reviewed and appropriate activity noted. No adverse activity noted.     UDS up to date    F/u 3 months for next refills and MWV

## 2020-06-05 ENCOUNTER — HOSPITAL ENCOUNTER (INPATIENT)
Age: 65
LOS: 2 days | Discharge: HOME HEALTH | DRG: 557 | End: 2020-06-07
Attending: EMERGENCY MEDICINE | Admitting: INTERNAL MEDICINE
Payer: MEDICARE

## 2020-06-05 ENCOUNTER — APPOINTMENT (OUTPATIENT)
Dept: GENERAL RADIOLOGY | Age: 65
DRG: 557 | End: 2020-06-05
Attending: PHYSICIAN ASSISTANT
Payer: MEDICARE

## 2020-06-05 DIAGNOSIS — R74.8 ELEVATED LIVER ENZYMES: ICD-10-CM

## 2020-06-05 DIAGNOSIS — T39.1X1A ACCIDENTAL ACETAMINOPHEN OVERDOSE, INITIAL ENCOUNTER: ICD-10-CM

## 2020-06-05 DIAGNOSIS — N17.9 AKI (ACUTE KIDNEY INJURY) (HCC): ICD-10-CM

## 2020-06-05 DIAGNOSIS — M62.82 NON-TRAUMATIC RHABDOMYOLYSIS: Primary | ICD-10-CM

## 2020-06-05 DIAGNOSIS — R94.31 QT PROLONGATION: ICD-10-CM

## 2020-06-05 PROBLEM — G93.41 METABOLIC ENCEPHALOPATHY: Status: ACTIVE | Noted: 2020-06-05

## 2020-06-05 PROBLEM — E87.6 HYPOKALEMIA: Status: ACTIVE | Noted: 2020-06-05

## 2020-06-05 PROBLEM — K76.89 LIVER DYSFUNCTION: Status: ACTIVE | Noted: 2020-06-05

## 2020-06-05 LAB
ALBUMIN SERPL-MCNC: 3.3 G/DL (ref 3.4–5)
ALBUMIN SERPL-MCNC: 3.6 G/DL (ref 3.4–5)
ALBUMIN/GLOB SERPL: 1.5 {RATIO} (ref 0.8–1.7)
ALBUMIN/GLOB SERPL: 1.5 {RATIO} (ref 0.8–1.7)
ALP SERPL-CCNC: 55 U/L (ref 45–117)
ALP SERPL-CCNC: 57 U/L (ref 45–117)
ALT SERPL-CCNC: 106 U/L (ref 13–56)
ALT SERPL-CCNC: 72 U/L (ref 13–56)
AMPHET UR QL SCN: NEGATIVE
ANION GAP SERPL CALC-SCNC: 5 MMOL/L (ref 3–18)
ANION GAP SERPL CALC-SCNC: 9 MMOL/L (ref 3–18)
APAP SERPL-MCNC: 7 UG/ML (ref 10–30)
APPEARANCE UR: CLEAR
AST SERPL-CCNC: 198 U/L (ref 10–38)
AST SERPL-CCNC: 240 U/L (ref 10–38)
BACTERIA URNS QL MICRO: NEGATIVE /HPF
BARBITURATES UR QL SCN: NEGATIVE
BASOPHILS # BLD: 0 K/UL (ref 0–0.1)
BASOPHILS NFR BLD: 0 % (ref 0–2)
BENZODIAZ UR QL: POSITIVE
BILIRUB SERPL-MCNC: 0.5 MG/DL (ref 0.2–1)
BILIRUB SERPL-MCNC: 0.6 MG/DL (ref 0.2–1)
BILIRUB UR QL: NEGATIVE
BUN SERPL-MCNC: 24 MG/DL (ref 7–18)
BUN SERPL-MCNC: 29 MG/DL (ref 7–18)
BUN/CREAT SERPL: 12 (ref 12–20)
BUN/CREAT SERPL: 13 (ref 12–20)
CALCIUM SERPL-MCNC: 7.9 MG/DL (ref 8.5–10.1)
CALCIUM SERPL-MCNC: 8.6 MG/DL (ref 8.5–10.1)
CANNABINOIDS UR QL SCN: NEGATIVE
CHLORIDE SERPL-SCNC: 100 MMOL/L (ref 100–111)
CHLORIDE SERPL-SCNC: 108 MMOL/L (ref 100–111)
CK MB CFR SERPL CALC: 1.4 % (ref 0–4)
CK MB SERPL-MCNC: 104 NG/ML (ref 5–25)
CK SERPL-CCNC: 7441 U/L (ref 26–192)
CO2 SERPL-SCNC: 26 MMOL/L (ref 21–32)
CO2 SERPL-SCNC: 27 MMOL/L (ref 21–32)
COCAINE UR QL SCN: NEGATIVE
COLOR UR: YELLOW
CREAT SERPL-MCNC: 1.8 MG/DL (ref 0.6–1.3)
CREAT SERPL-MCNC: 2.51 MG/DL (ref 0.6–1.3)
DIFFERENTIAL METHOD BLD: ABNORMAL
EOSINOPHIL # BLD: 0.3 K/UL (ref 0–0.4)
EOSINOPHIL NFR BLD: 3 % (ref 0–5)
EPITH CASTS URNS QL MICRO: NORMAL /LPF (ref 0–5)
ERYTHROCYTE [DISTWIDTH] IN BLOOD BY AUTOMATED COUNT: 13.7 % (ref 11.6–14.5)
ETHANOL SERPL-MCNC: <3 MG/DL (ref 0–3)
GLOBULIN SER CALC-MCNC: 2.2 G/DL (ref 2–4)
GLOBULIN SER CALC-MCNC: 2.4 G/DL (ref 2–4)
GLUCOSE SERPL-MCNC: 116 MG/DL (ref 74–99)
GLUCOSE SERPL-MCNC: 121 MG/DL (ref 74–99)
GLUCOSE UR STRIP.AUTO-MCNC: NEGATIVE MG/DL
HCT VFR BLD AUTO: 33.6 % (ref 35–45)
HDSCOM,HDSCOM: ABNORMAL
HGB BLD-MCNC: 11.3 G/DL (ref 12–16)
HGB UR QL STRIP: ABNORMAL
KETONES UR QL STRIP.AUTO: NEGATIVE MG/DL
LEUKOCYTE ESTERASE UR QL STRIP.AUTO: NEGATIVE
LYMPHOCYTES # BLD: 2.8 K/UL (ref 0.9–3.6)
LYMPHOCYTES NFR BLD: 32 % (ref 21–52)
MAGNESIUM SERPL-MCNC: 2.4 MG/DL (ref 1.6–2.6)
MCH RBC QN AUTO: 33.1 PG (ref 24–34)
MCHC RBC AUTO-ENTMCNC: 33.6 G/DL (ref 31–37)
MCV RBC AUTO: 98.5 FL (ref 74–97)
METHADONE UR QL: NEGATIVE
MONOCYTES # BLD: 0.8 K/UL (ref 0.05–1.2)
MONOCYTES NFR BLD: 9 % (ref 3–10)
NEUTS SEG # BLD: 4.8 K/UL (ref 1.8–8)
NEUTS SEG NFR BLD: 56 % (ref 40–73)
NITRITE UR QL STRIP.AUTO: NEGATIVE
OPIATES UR QL: POSITIVE
PCP UR QL: NEGATIVE
PH UR STRIP: 5 [PH] (ref 5–8)
PLATELET # BLD AUTO: 215 K/UL (ref 135–420)
PMV BLD AUTO: 9.6 FL (ref 9.2–11.8)
POTASSIUM SERPL-SCNC: 3.2 MMOL/L (ref 3.5–5.5)
POTASSIUM SERPL-SCNC: 3.5 MMOL/L (ref 3.5–5.5)
PROT SERPL-MCNC: 5.5 G/DL (ref 6.4–8.2)
PROT SERPL-MCNC: 6 G/DL (ref 6.4–8.2)
PROT UR STRIP-MCNC: NEGATIVE MG/DL
RBC # BLD AUTO: 3.41 M/UL (ref 4.2–5.3)
RBC #/AREA URNS HPF: NORMAL /HPF (ref 0–5)
SALICYLATES SERPL-MCNC: 3.8 MG/DL (ref 2.8–20)
SALICYLATES SERPL-MCNC: 6.7 MG/DL (ref 2.8–20)
SODIUM SERPL-SCNC: 136 MMOL/L (ref 136–145)
SODIUM SERPL-SCNC: 139 MMOL/L (ref 136–145)
SP GR UR REFRACTOMETRY: 1.01 (ref 1–1.03)
TROPONIN I SERPL-MCNC: 0.03 NG/ML (ref 0–0.04)
UROBILINOGEN UR QL STRIP.AUTO: 1 EU/DL (ref 0.2–1)
WBC # BLD AUTO: 8.8 K/UL (ref 4.6–13.2)
WBC URNS QL MICRO: NORMAL /HPF (ref 0–4)

## 2020-06-05 PROCEDURE — 82550 ASSAY OF CK (CPK): CPT

## 2020-06-05 PROCEDURE — 80053 COMPREHEN METABOLIC PANEL: CPT

## 2020-06-05 PROCEDURE — 83735 ASSAY OF MAGNESIUM: CPT

## 2020-06-05 PROCEDURE — 65660000000 HC RM CCU STEPDOWN

## 2020-06-05 PROCEDURE — 81001 URINALYSIS AUTO W/SCOPE: CPT

## 2020-06-05 PROCEDURE — 96361 HYDRATE IV INFUSION ADD-ON: CPT

## 2020-06-05 PROCEDURE — 85025 COMPLETE CBC W/AUTO DIFF WBC: CPT

## 2020-06-05 PROCEDURE — 74011000258 HC RX REV CODE- 258: Performed by: EMERGENCY MEDICINE

## 2020-06-05 PROCEDURE — 74011250636 HC RX REV CODE- 250/636: Performed by: PHYSICIAN ASSISTANT

## 2020-06-05 PROCEDURE — 74011250637 HC RX REV CODE- 250/637: Performed by: INTERNAL MEDICINE

## 2020-06-05 PROCEDURE — 77030021352 HC CBL LD SYS DISP COVD -B

## 2020-06-05 PROCEDURE — 93005 ELECTROCARDIOGRAM TRACING: CPT

## 2020-06-05 PROCEDURE — 99285 EMERGENCY DEPT VISIT HI MDM: CPT

## 2020-06-05 PROCEDURE — 96374 THER/PROPH/DIAG INJ IV PUSH: CPT

## 2020-06-05 PROCEDURE — 74011250636 HC RX REV CODE- 250/636: Performed by: EMERGENCY MEDICINE

## 2020-06-05 PROCEDURE — 74011000258 HC RX REV CODE- 258: Performed by: INTERNAL MEDICINE

## 2020-06-05 PROCEDURE — 71045 X-RAY EXAM CHEST 1 VIEW: CPT

## 2020-06-05 PROCEDURE — 80307 DRUG TEST PRSMV CHEM ANLYZR: CPT

## 2020-06-05 PROCEDURE — 74011250636 HC RX REV CODE- 250/636: Performed by: INTERNAL MEDICINE

## 2020-06-05 RX ORDER — HEPARIN SODIUM 5000 [USP'U]/ML
5000 INJECTION, SOLUTION INTRAVENOUS; SUBCUTANEOUS EVERY 8 HOURS
Status: DISCONTINUED | OUTPATIENT
Start: 2020-06-05 | End: 2020-06-07 | Stop reason: HOSPADM

## 2020-06-05 RX ORDER — POTASSIUM CHLORIDE 20 MEQ/1
40 TABLET, EXTENDED RELEASE ORAL
Status: COMPLETED | OUTPATIENT
Start: 2020-06-05 | End: 2020-06-05

## 2020-06-05 RX ORDER — FUROSEMIDE 20 MG/1
20 TABLET ORAL DAILY
COMMUNITY
End: 2020-11-12 | Stop reason: SDUPTHER

## 2020-06-05 RX ORDER — POTASSIUM CHLORIDE 20 MEQ/1
20 TABLET, EXTENDED RELEASE ORAL DAILY
Status: DISCONTINUED | OUTPATIENT
Start: 2020-06-06 | End: 2020-06-07 | Stop reason: HOSPADM

## 2020-06-05 RX ORDER — HYDROXYZINE 25 MG/1
25 TABLET, FILM COATED ORAL
COMMUNITY
End: 2022-10-20

## 2020-06-05 RX ORDER — FLUTICASONE PROPIONATE 50 MCG
2 SPRAY, SUSPENSION (ML) NASAL DAILY
COMMUNITY
End: 2020-11-13 | Stop reason: SDUPTHER

## 2020-06-05 RX ORDER — POTASSIUM CHLORIDE 20 MEQ/1
20 TABLET, EXTENDED RELEASE ORAL DAILY
COMMUNITY
End: 2021-03-29

## 2020-06-05 RX ORDER — VENLAFAXINE HYDROCHLORIDE 75 MG/1
225 CAPSULE, EXTENDED RELEASE ORAL DAILY
COMMUNITY
End: 2020-10-25

## 2020-06-05 RX ORDER — GUAIFENESIN 100 MG/5ML
81 LIQUID (ML) ORAL DAILY
Status: DISCONTINUED | OUTPATIENT
Start: 2020-06-06 | End: 2020-06-07 | Stop reason: HOSPADM

## 2020-06-05 RX ORDER — ATORVASTATIN CALCIUM 20 MG/1
40 TABLET, FILM COATED ORAL
Status: DISCONTINUED | OUTPATIENT
Start: 2020-06-05 | End: 2020-06-07 | Stop reason: HOSPADM

## 2020-06-05 RX ORDER — PANTOPRAZOLE SODIUM 40 MG/1
40 TABLET, DELAYED RELEASE ORAL
Status: DISCONTINUED | OUTPATIENT
Start: 2020-06-06 | End: 2020-06-07 | Stop reason: HOSPADM

## 2020-06-05 RX ORDER — VENLAFAXINE HYDROCHLORIDE 75 MG/1
225 CAPSULE, EXTENDED RELEASE ORAL DAILY
Status: DISCONTINUED | OUTPATIENT
Start: 2020-06-06 | End: 2020-06-07 | Stop reason: HOSPADM

## 2020-06-05 RX ORDER — NALOXONE HYDROCHLORIDE 1 MG/ML
1 INJECTION INTRAMUSCULAR; INTRAVENOUS; SUBCUTANEOUS ONCE
Status: COMPLETED | OUTPATIENT
Start: 2020-06-05 | End: 2020-06-05

## 2020-06-05 RX ORDER — ROPINIROLE 1 MG/1
1.5 TABLET, FILM COATED ORAL 2 TIMES DAILY
Status: DISCONTINUED | OUTPATIENT
Start: 2020-06-05 | End: 2020-06-07 | Stop reason: HOSPADM

## 2020-06-05 RX ORDER — ACYCLOVIR 800 MG/1
400 TABLET ORAL 3 TIMES DAILY
Status: DISCONTINUED | OUTPATIENT
Start: 2020-06-05 | End: 2020-06-05

## 2020-06-05 RX ORDER — MONTELUKAST SODIUM 10 MG/1
10 TABLET ORAL DAILY
Status: DISCONTINUED | OUTPATIENT
Start: 2020-06-06 | End: 2020-06-07 | Stop reason: HOSPADM

## 2020-06-05 RX ORDER — SODIUM CHLORIDE 450 MG/100ML
150 INJECTION, SOLUTION INTRAVENOUS CONTINUOUS
Status: DISCONTINUED | OUTPATIENT
Start: 2020-06-05 | End: 2020-06-07 | Stop reason: HOSPADM

## 2020-06-05 RX ORDER — IPRATROPIUM BROMIDE AND ALBUTEROL SULFATE 2.5; .5 MG/3ML; MG/3ML
3 SOLUTION RESPIRATORY (INHALATION)
Status: DISCONTINUED | OUTPATIENT
Start: 2020-06-05 | End: 2020-06-07 | Stop reason: HOSPADM

## 2020-06-05 RX ORDER — FLUTICASONE PROPIONATE 50 MCG
2 SPRAY, SUSPENSION (ML) NASAL DAILY
Status: DISCONTINUED | OUTPATIENT
Start: 2020-06-06 | End: 2020-06-07 | Stop reason: HOSPADM

## 2020-06-05 RX ADMIN — ROPINIROLE HYDROCHLORIDE 1.5 MG: 1 TABLET, FILM COATED ORAL at 23:47

## 2020-06-05 RX ADMIN — ATORVASTATIN CALCIUM 40 MG: 20 TABLET, FILM COATED ORAL at 21:52

## 2020-06-05 RX ADMIN — SODIUM CHLORIDE 150 ML/HR: 450 INJECTION, SOLUTION INTRAVENOUS at 23:36

## 2020-06-05 RX ADMIN — ACETYLCYSTEINE 15000 MG: 200 INJECTION, SOLUTION INTRAVENOUS at 18:13

## 2020-06-05 RX ADMIN — ACETYLCYSTEINE 5000 MG: 200 INJECTION, SOLUTION INTRAVENOUS at 18:51

## 2020-06-05 RX ADMIN — ACETYLCYSTEINE 10000 MG: 200 INJECTION, SOLUTION INTRAVENOUS at 22:51

## 2020-06-05 RX ADMIN — SODIUM CHLORIDE 1000 ML: 900 INJECTION, SOLUTION INTRAVENOUS at 12:54

## 2020-06-05 RX ADMIN — SODIUM CHLORIDE 1000 ML: 900 INJECTION, SOLUTION INTRAVENOUS at 13:06

## 2020-06-05 RX ADMIN — HEPARIN SODIUM 5000 UNITS: 5000 INJECTION INTRAVENOUS; SUBCUTANEOUS at 21:51

## 2020-06-05 RX ADMIN — NALOXONE HYDROCHLORIDE 1 MG: 1 INJECTION PARENTERAL at 12:28

## 2020-06-05 RX ADMIN — POTASSIUM CHLORIDE 40 MEQ: 1500 TABLET, EXTENDED RELEASE ORAL at 21:51

## 2020-06-05 NOTE — ED TRIAGE NOTES
Took percocet and trazodone   Driving   Pulled over into the [facility] and called 911 for back pain. EMS found patient altered with respiratory  depression   1mg narcan given nasally.    Patient arrives alert and oriented
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
## 2020-06-05 NOTE — PROGRESS NOTES
Pharmacy Monitoring/Intervention - Dosing    Acetylcystine has been adjusted to:     IV:    21-hour regimen: Consists of 3 doses; total dose delivered: 300 mg/kg    Loading dose: 150 mg/kg (maximum: 15 g) infused over 1 hour    Second dose: 50 mg/kg (maximum: 5 g) infused over 4 hours    Third dose: 100 mg/kg (maximum: 10 g) infused over 16 hours    Recent Labs     20  1557 20  1158   CREA 1.80* 2.51*   BUN 24* 29*   WBC  --  8.8     Temp (24hrs), Av.4 °F (36.9 °C), Min:98.4 °F (36.9 °C), Max:98.4 °F (36.9 °C)      Estimated Creatinine Clearance: 37.5 mL/min (A) (based on SCr of 1.8 mg/dL (H)). Estimated Creatinine Clearance (using IBW): 28.8 mL/min    NOTE: This information is to be used to assist in clinical efficiency but is not a substitute for clinical judgement. Pharmacy will follow daily and adjust medications as appropriate for renal function and/or serum levels.   Thank you,  Yecenia Funes   [phone]

## 2020-06-06 LAB
ALBUMIN SERPL-MCNC: 3 G/DL (ref 3.4–5)
ALBUMIN SERPL-MCNC: 3.1 G/DL (ref 3.4–5)
ALBUMIN/GLOB SERPL: 1.2 {RATIO} (ref 0.8–1.7)
ALBUMIN/GLOB SERPL: 1.3 {RATIO} (ref 0.8–1.7)
ALP SERPL-CCNC: 48 U/L (ref 45–117)
ALP SERPL-CCNC: 49 U/L (ref 45–117)
ALT SERPL-CCNC: 96 U/L (ref 13–56)
ALT SERPL-CCNC: 99 U/L (ref 13–56)
ANION GAP SERPL CALC-SCNC: 8 MMOL/L (ref 3–18)
AST SERPL-CCNC: 178 U/L (ref 10–38)
AST SERPL-CCNC: 202 U/L (ref 10–38)
ATRIAL RATE: 61 BPM
ATRIAL RATE: 68 BPM
BASOPHILS # BLD: 0 K/UL (ref 0–0.1)
BASOPHILS NFR BLD: 0 % (ref 0–2)
BILIRUB DIRECT SERPL-MCNC: 0.2 MG/DL (ref 0–0.2)
BILIRUB DIRECT SERPL-MCNC: 0.2 MG/DL (ref 0–0.2)
BILIRUB SERPL-MCNC: 0.6 MG/DL (ref 0.2–1)
BILIRUB SERPL-MCNC: 0.7 MG/DL (ref 0.2–1)
BUN SERPL-MCNC: 14 MG/DL (ref 7–18)
BUN/CREAT SERPL: 15 (ref 12–20)
CALCIUM SERPL-MCNC: 8.5 MG/DL (ref 8.5–10.1)
CALCULATED P AXIS, ECG09: 36 DEGREES
CALCULATED P AXIS, ECG09: 37 DEGREES
CALCULATED R AXIS, ECG10: 49 DEGREES
CALCULATED R AXIS, ECG10: 55 DEGREES
CALCULATED T AXIS, ECG11: 41 DEGREES
CALCULATED T AXIS, ECG11: 45 DEGREES
CHLORIDE SERPL-SCNC: 109 MMOL/L (ref 100–111)
CK SERPL-CCNC: 6859 U/L (ref 26–192)
CO2 SERPL-SCNC: 26 MMOL/L (ref 21–32)
CREAT SERPL-MCNC: 0.92 MG/DL (ref 0.6–1.3)
DIAGNOSIS, 93000: NORMAL
DIAGNOSIS, 93000: NORMAL
DIFFERENTIAL METHOD BLD: ABNORMAL
EOSINOPHIL # BLD: 0.3 K/UL (ref 0–0.4)
EOSINOPHIL NFR BLD: 4 % (ref 0–5)
ERYTHROCYTE [DISTWIDTH] IN BLOOD BY AUTOMATED COUNT: 14 % (ref 11.6–14.5)
GLOBULIN SER CALC-MCNC: 2.4 G/DL (ref 2–4)
GLOBULIN SER CALC-MCNC: 2.5 G/DL (ref 2–4)
GLUCOSE SERPL-MCNC: 95 MG/DL (ref 74–99)
HCT VFR BLD AUTO: 34.4 % (ref 35–45)
HGB BLD-MCNC: 11.3 G/DL (ref 12–16)
INR PPP: 1.2 (ref 0.8–1.2)
LYMPHOCYTES # BLD: 2.2 K/UL (ref 0.9–3.6)
LYMPHOCYTES NFR BLD: 33 % (ref 21–52)
MAGNESIUM SERPL-MCNC: 2.1 MG/DL (ref 1.6–2.6)
MCH RBC QN AUTO: 32.5 PG (ref 24–34)
MCHC RBC AUTO-ENTMCNC: 32.8 G/DL (ref 31–37)
MCV RBC AUTO: 98.9 FL (ref 74–97)
MONOCYTES # BLD: 1.1 K/UL (ref 0.05–1.2)
MONOCYTES NFR BLD: 16 % (ref 3–10)
NEUTS SEG # BLD: 3.1 K/UL (ref 1.8–8)
NEUTS SEG NFR BLD: 47 % (ref 40–73)
P-R INTERVAL, ECG05: 202 MS
P-R INTERVAL, ECG05: 218 MS
PHOSPHATE SERPL-MCNC: 2.7 MG/DL (ref 2.5–4.9)
PLATELET # BLD AUTO: 229 K/UL (ref 135–420)
PMV BLD AUTO: 10.8 FL (ref 9.2–11.8)
POTASSIUM SERPL-SCNC: 3 MMOL/L (ref 3.5–5.5)
PROT SERPL-MCNC: 5.5 G/DL (ref 6.4–8.2)
PROT SERPL-MCNC: 5.5 G/DL (ref 6.4–8.2)
PROTHROMBIN TIME: 15 SEC (ref 11.5–15.2)
Q-T INTERVAL, ECG07: 498 MS
Q-T INTERVAL, ECG07: 516 MS
QRS DURATION, ECG06: 86 MS
QRS DURATION, ECG06: 90 MS
QTC CALCULATION (BEZET), ECG08: 519 MS
QTC CALCULATION (BEZET), ECG08: 529 MS
RBC # BLD AUTO: 3.48 M/UL (ref 4.2–5.3)
SODIUM SERPL-SCNC: 143 MMOL/L (ref 136–145)
VENTRICULAR RATE, ECG03: 61 BPM
VENTRICULAR RATE, ECG03: 68 BPM
WBC # BLD AUTO: 6.6 K/UL (ref 4.6–13.2)

## 2020-06-06 PROCEDURE — 84100 ASSAY OF PHOSPHORUS: CPT

## 2020-06-06 PROCEDURE — 36415 COLL VENOUS BLD VENIPUNCTURE: CPT

## 2020-06-06 PROCEDURE — 85025 COMPLETE CBC W/AUTO DIFF WBC: CPT

## 2020-06-06 PROCEDURE — 85610 PROTHROMBIN TIME: CPT

## 2020-06-06 PROCEDURE — 74011250637 HC RX REV CODE- 250/637: Performed by: HOSPITALIST

## 2020-06-06 PROCEDURE — 74011000258 HC RX REV CODE- 258: Performed by: INTERNAL MEDICINE

## 2020-06-06 PROCEDURE — 74011250637 HC RX REV CODE- 250/637: Performed by: INTERNAL MEDICINE

## 2020-06-06 PROCEDURE — 82550 ASSAY OF CK (CPK): CPT

## 2020-06-06 PROCEDURE — 80076 HEPATIC FUNCTION PANEL: CPT

## 2020-06-06 PROCEDURE — 65660000000 HC RM CCU STEPDOWN

## 2020-06-06 PROCEDURE — 74011250636 HC RX REV CODE- 250/636: Performed by: INTERNAL MEDICINE

## 2020-06-06 PROCEDURE — 83735 ASSAY OF MAGNESIUM: CPT

## 2020-06-06 PROCEDURE — 80048 BASIC METABOLIC PNL TOTAL CA: CPT

## 2020-06-06 RX ORDER — POTASSIUM CHLORIDE 20 MEQ/1
40 TABLET, EXTENDED RELEASE ORAL
Status: COMPLETED | OUTPATIENT
Start: 2020-06-06 | End: 2020-06-06

## 2020-06-06 RX ORDER — ACETYLCYSTEINE 200 MG/ML
SOLUTION ORAL; RESPIRATORY (INHALATION)
Status: DISCONTINUED
Start: 2020-06-06 | End: 2020-06-06 | Stop reason: WASHOUT

## 2020-06-06 RX ORDER — ACETYLCYSTEINE 200 MG/ML
INJECTION INTRAVENOUS
Status: DISPENSED
Start: 2020-06-06 | End: 2020-06-06

## 2020-06-06 RX ADMIN — ATORVASTATIN CALCIUM 40 MG: 20 TABLET, FILM COATED ORAL at 21:10

## 2020-06-06 RX ADMIN — SODIUM CHLORIDE 150 ML/HR: 450 INJECTION, SOLUTION INTRAVENOUS at 21:34

## 2020-06-06 RX ADMIN — HEPARIN SODIUM 5000 UNITS: 5000 INJECTION INTRAVENOUS; SUBCUTANEOUS at 13:14

## 2020-06-06 RX ADMIN — VENLAFAXINE HYDROCHLORIDE 225 MG: 75 CAPSULE, EXTENDED RELEASE ORAL at 09:19

## 2020-06-06 RX ADMIN — POTASSIUM CHLORIDE 40 MEQ: 1500 TABLET, EXTENDED RELEASE ORAL at 13:13

## 2020-06-06 RX ADMIN — MONTELUKAST 10 MG: 10 TABLET, FILM COATED ORAL at 09:19

## 2020-06-06 RX ADMIN — POTASSIUM CHLORIDE 20 MEQ: 1500 TABLET, EXTENDED RELEASE ORAL at 09:20

## 2020-06-06 RX ADMIN — FLUTICASONE PROPIONATE 2 SPRAY: 50 SPRAY, METERED NASAL at 13:05

## 2020-06-06 RX ADMIN — PANTOPRAZOLE SODIUM 40 MG: 40 TABLET, DELAYED RELEASE ORAL at 06:59

## 2020-06-06 RX ADMIN — HEPARIN SODIUM 5000 UNITS: 5000 INJECTION INTRAVENOUS; SUBCUTANEOUS at 06:59

## 2020-06-06 RX ADMIN — ROPINIROLE HYDROCHLORIDE 1.5 MG: 1 TABLET, FILM COATED ORAL at 09:17

## 2020-06-06 RX ADMIN — HEPARIN SODIUM 5000 UNITS: 5000 INJECTION INTRAVENOUS; SUBCUTANEOUS at 21:10

## 2020-06-06 RX ADMIN — SODIUM CHLORIDE 150 ML/HR: 450 INJECTION, SOLUTION INTRAVENOUS at 06:59

## 2020-06-06 RX ADMIN — ROPINIROLE HYDROCHLORIDE 1.5 MG: 1 TABLET, FILM COATED ORAL at 17:52

## 2020-06-06 RX ADMIN — ASPIRIN 81 MG 81 MG: 81 TABLET ORAL at 09:19

## 2020-06-06 NOTE — PROGRESS NOTES
Problem: Falls - Risk of  Goal: *Absence of Falls  Description: Document Shukri Means Fall Risk and appropriate interventions in the flowsheet.   6/6/2020 0620 by Alexandria Florentino  Outcome: Progressing Towards Goal  Note: Fall Risk Interventions:  Mobility Interventions: Patient to call before getting OOB, Bed/chair exit alarm         Medication Interventions: Patient to call before getting OOB, Teach patient to arise slowly    Elimination Interventions: Call light in reach, Patient to call for help with toileting needs    History of Falls Interventions: Bed/chair exit alarm, Door open when patient unattended      6/6/2020 0617 by Alexandria Florentino  Outcome: Progressing Towards Goal  Note: Fall Risk Interventions:  Mobility Interventions: Patient to call before getting OOB, Bed/chair exit alarm         Medication Interventions: Patient to call before getting OOB, Teach patient to arise slowly    Elimination Interventions: Call light in reach, Patient to call for help with toileting needs    History of Falls Interventions: Bed/chair exit alarm, Door open when patient unattended         Problem: Patient Education: Go to Patient Education Activity  Goal: Patient/Family Education  Outcome: Progressing Towards Goal     Problem: Deep Venous Thrombosis - Risk of  Goal: *Knowledge of prescribed medications  Outcome: Progressing Towards Goal

## 2020-06-06 NOTE — PROGRESS NOTES
conducted an initial consultation and Spiritual Assessment for Nasima Rivera, who is a 75 y.o.,female. Patients Primary Language is: [place]. According to the patients EMR Muslim Affiliation is: Orthodoxy. The reason the Patient came to the hospital is:   Patient Active Problem List    Diagnosis Date Noted    Rhabdomyolysis 06/05/2020    Liver dysfunction 06/05/2020    Hypokalemia 29/58/6977    Metabolic encephalopathy 25/30/8837    Prolonged Q-T interval on ECG 06/05/2020    DILCIA (acute kidney injury) ([facility] [address] 06/05/2020    Severe obesity ([facility] [address] 11/18/2019    Osteopenia 11/18/2019    Gastrointestinal disorder     Gastritis 11/13/2019    Neurological disorder     Chronic low back pain 08/07/2018    Long term (current) use of opiate analgesic 01/18/2018    Memory deficit 01/18/2018    Essential hypertension 12/06/2017    Chronic pain of right knee 11/09/2017    Dyslipidemia 09/19/2017    H/O: pituitary tumor     Vaginal discharge 03/03/2017    Lower abdominal pain 03/03/2017    History of gonorrhea 03/03/2017    Acute cystitis with hematuria 03/03/2017    Restless legs syndrome (RLS) 07/22/2016    Chronic pain syndrome 07/22/2016    Memory impairment     Depressive disorder         The  provided the following Interventions:  Initiated a relationship of care and support. Explored issues of jeff, spirituality and/or Quaker needs while hospitalized. Listened empathically. Provided chaplaincy education. Provided information about Spiritual Care Services. Offered prayer and assurance of continued prayers on patient's behalf. Chart reviewed. The following outcomes were achieved:  Patient shared some information about their medical narrative and spiritual journey/beliefs. Patient processed feeling about current hospitalization. Patient expressed gratitude for the 's visit.     Assessment:  Patient did not indicate any spiritual or Quaker issues which require Spiritual Care Services interventions at this time. Patient does not have any Christian/cultural needs that will affect patients preferences in health care. Plan:  Chaplains will continue to follow and will provide pastoral care on an as needed or requested basis.  recommends bedside caregivers page  on duty if patient shows signs of acute spiritual or emotional distress.     [address] [facility]   Staff [address] [facility]   [phone]

## 2020-06-06 NOTE — PROGRESS NOTES
Problem: Falls - Risk of  Goal: *Absence of Falls  Description: Document Clydene Bone Fall Risk and appropriate interventions in the flowsheet.   Outcome: Progressing Towards Goal  Note: Fall Risk Interventions:  Mobility Interventions: Patient to call before getting OOB, Bed/chair exit alarm         Medication Interventions: Patient to call before getting OOB, Teach patient to arise slowly    Elimination Interventions: Call light in reach, Patient to call for help with toileting needs    History of Falls Interventions: Bed/chair exit alarm, Door open when patient unattended         Problem: Patient Education: Go to Patient Education Activity  Goal: Patient/Family Education  Outcome: Progressing Towards Goal

## 2020-06-06 NOTE — PROGRESS NOTES
Pharmacy Monitoring - PTA Medication Reconciliation     Medication List reviewed by Pharmacist.     Information Obtained from: Patient  Provider associated with MedRec review and admission orders: Cecilia    Total Number of Gold Standard Meds: 9    Total Number of Medications ordered inpatient that were the SAME as the gold standard Med Rec: 9  (If the difference is INTENTIONAL, document medication name and clinical reason below)     Albuterol switched to Nebulizer Solution   Omeprazole switched to Pantoprazole  Pain, Muscle relaxer medications not ordered      Unintentional Discrepancies: Were there any unintentional discrepancies between the gold standard and the admission order? If yes, document number AND reason below. (Includes: Dose, Frequency, Additional med, Duplication, Duration, Omission, Route, Substitution, Formulation)       Medication & Reasons: (delete any lines not needed, copy and add if more required)    Medication 1:   PTA: Omeprazole 40mg QD  Ordered: Pantoprazole 40mg BID  Discrepancy type (select all that apply): Frequency    Medication 2:   PTA: Potassium 20mEq qd  Ordered: Potassium 10mEq qd   Discrepancy type (select all that apply): Dose    Medication 3:   PTA: Venlafaxine XR 75mg, 3 capsules QD  Ordered: Venlafaxine 37.5mg, 3 capsules qd   Discrepancy type (select all that apply): Dose      All discrepancies were corrected. PTA medication list has been updated.  Please use as you see fit  Jhonny
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
## 2020-06-06 NOTE — ED NOTES
193took patient to restroom in wheelchair, wobbly on feet due to leg issues, per patient. Had a bowel movement. Denies nausea, but states she is in pain. 2000  Currently on phone with friend    2008  Called unit to give report for patient being admitted to 2219. Faizan given report. 2030  Took patient to room 2219 on stretcher with cardiac monitor attached. She was able to ambulate to her bed once in room 2219. Primary nurse Ada Hare met us in the room.

## 2020-06-06 NOTE — PROGRESS NOTES
Internal Medicine Progress Note    Patient's Name: Rick Tran Date: 6/5/2020  Length of Stay: 1      Assessment/Plan     Active Hospital Problems    Diagnosis Date Noted    Rhabdomyolysis 06/05/2020    Liver dysfunction 06/05/2020    Hypokalemia 01/08/9201    Metabolic encephalopathy 27/49/6648    Prolonged Q-T interval on ECG 06/05/2020    DILCIA (acute kidney injury) ([facility] [address] 06/05/2020    Severe obesity ([facility] [address] 11/18/2019    Chronic low back pain 08/07/2018    Long term (current) use of opiate analgesic 01/18/2018    Essential hypertension 12/06/2017    Dyslipidemia 09/19/2017    Restless legs syndrome (RLS) 07/22/2016    Chronic pain syndrome 07/22/2016    Depressive disorder      - CPK coming down  - Etiology of rhabdo unclear, possible medication induced  - Cont IVFs  - DILCIA resolved  - Replete lytes  - Recheck EKG for QTc  - Cont acceptable home medications for chronic conditions   - DVT protocol    I have personally reviewed all pertinent labs and films that have officially resulted over the last 24 hours. I have personally checked for all pending labs that are awaiting final results.     Subjective     Pt s/e @ bedside  No major events overnight  Feeling better  Denies CP or SOB    Objective     Visit Vitals  /78 (BP 1 Location: Right arm, BP Patient Position: At rest)   Pulse 78   Temp 97.9 °F (36.6 °C)   Resp 20   Ht 5' 6\" (1.676 m)   Wt 110.9 kg (244 lb 6.4 oz)   SpO2 98%   Breastfeeding No   BMI 39.45 kg/m²       Physical Exam:  General Appearance: NAD, conversant  Lungs: CTA with normal respiratory effort  CV: RRR, no m/r/g  Abdomen: soft, non-tender, normal bowel sounds  Extremities: no cyanosis, no peripheral edema  Neuro: No focal deficits, motor/sensory intact    Lab/Data Reviewed:  BMP:   Lab Results   Component Value Date/Time     06/06/2020 04:35 AM    K 3.0 (L) 06/06/2020 04:35 AM     06/06/2020 04:35 AM    CO2 26 06/06/2020 04:35 AM    AGAP 8 06/06/2020 04:35 AM    GLU 95 06/06/2020 04:35 AM    BUN 14 06/06/2020 04:35 AM    CREA 0.92 06/06/2020 04:35 AM    GFRAA >60 06/06/2020 04:35 AM    GFRNA >60 06/06/2020 04:35 AM     CBC:   Lab Results   Component Value Date/Time    WBC 6.6 06/06/2020 04:35 AM    HGB 11.3 (L) 06/06/2020 04:35 AM    HCT 34.4 (L) 06/06/2020 04:35 AM     06/06/2020 04:35 AM       Imaging Reviewed:  [facility] Chest Port    Result Date: 6/5/2020  EXAM: XR CHEST PORT CLINICAL INDICATION/HISTORY: possible overdose -Additional: None COMPARISON: 7/11/2012 TECHNIQUE: Portable frontal view of the chest _______________ FINDINGS: SUPPORT DEVICES: None. HEART AND MEDIASTINUM: Cardiomediastinal silhouette within normal limits. LUNGS AND PLEURAL SPACES: No dense consolidation, large effusion or pneumothorax. _______________     IMPRESSION: No acute cardiopulmonary abnormality.       Medications Reviewed:  Current Facility-Administered Medications   Medication Dose Route Frequency    acetylcysteine (ACETADOTE) 200 mg/mL (20 %) injection        albuterol-ipratropium (DUO-NEB) 2.5 MG-0.5 MG/3 ML  3 mL Nebulization Q4H PRN    aspirin chewable tablet 81 mg  81 mg Oral DAILY    atorvastatin (LIPITOR) tablet 40 mg  40 mg Oral QHS    fluticasone propionate (FLONASE) 50 mcg/actuation nasal spray 2 Spray  2 Spray Both Nostrils DAILY    montelukast (SINGULAIR) tablet 10 mg  10 mg Oral DAILY    pantoprazole (PROTONIX) tablet 40 mg  40 mg Oral ACB    potassium chloride (K-DUR, KLOR-CON) SR tablet 20 mEq  20 mEq Oral DAILY    rOPINIRole (REQUIP) tablet 1.5 mg  1.5 mg Oral BID    venlafaxine-SR (EFFEXOR-XR) capsule 225 mg  225 mg Oral DAILY    heparin (porcine) injection 5,000 Units  5,000 Units SubCUTAneous Q8H    0.45% sodium chloride infusion  150 mL/hr IntraVENous CONTINUOUS    acetylcysteine (ACETADOTE) 10,000 mg in dextrose 5% 1,000 mL infusion  10,000 mg IntraVENous ONCE           Estelle Casarez, DO  Internal Medicine, Hospitalist  Pager: [facility]

## 2020-06-06 NOTE — PROGRESS NOTES
Problem: Discharge Planning  Goal: *Discharge to safe environment  Outcome: Progressing Towards Goal     Plan: home    Reason for Admission:   Rhabdo / Possible drug overdose                   RUR Score: 13                    Plan for utilizing home health: Will assess for needs. PCP:First and Last name:  Dr. Gabriela Leahy   Name of Practice:    Are you a current patient: Yes/No: Yes   Approximate date of last visit:  States this past week   Can you participate in a virtual visit with your PCP:                     Current Advanced Directive/Advance Care Plan:  States she did one @ [facility].                          Transition of Care Plan:     Home with out-pt follow up. Chart reviewed. Met with pt, verified all demographics. States has MCR/ ins. KOFFI Robertson, dtr-n-law: [phone]. Lives with her boy friend who will pick her up @ discharge. Has c-pap, no other DME. States fairly independent with ADL's prior to admit, states boy friend assists her as needed. Will cont to follow for any needs. Veronica ThomasRN,ext [phone]. Patient has designated her sig other to participate in his/her discharge plan and to receive any needed information. Name: Noa Nieves  Address:  Phone number:  [phone]    Care Management Interventions  PCP Verified by CM: Yes(Dr. David Prather, was seen this past week)  Palliative Care Criteria Met (RRAT>21 & CHF Dx)?: No  Mode of Transport at Discharge: Other (see comment)(sig other)  Transition of Care Consult (CM Consult): Discharge Planning  Discharge Durable Medical Equipment: No  Physical Therapy Consult: Yes  Occupational Therapy Consult: No  Speech Therapy Consult: No  Current Support Network:  Other(sig other)  Confirm Follow Up Transport: Family  Discharge Location  Discharge Placement: Home

## 2020-06-06 NOTE — ROUTINE PROCESS
0730  -- Bedside, Verbal and Written shift change report received by Don Null (oncoming nurse) by Faizan(offgoing nurse). Allergy band placed on pt's wrist. Report included the following information SBAR, Kardex, Intake/Output, MAR and Recent Results. 0810-Assessment completed    0919  -- AM  medications administered, pt tolerated with ease, will continue to monitor. 1200 -- Shift reassessment, pt condition unchanged, will continue to monitor. 1315-pm lab drawn   1600 --  Shift reassessment, pt condition unchanged, will continue to monitor. 1730-call received from Poison control, no further treatment needed. 2000 -- Bedside, Verbal and Written shift change report given to Jaclyn(oncoming nurse) by Don Null (offgoing nurse). Report included the following information SBAR, Kardex, Intake/Output, MAR and Recent Results. Skin assessment completed.

## 2020-06-06 NOTE — PROGRESS NOTES
Problem: Falls - Risk of  Goal: *Absence of Falls  Description: Document Bard Reynoso Fall Risk and appropriate interventions in the flowsheet.   Outcome: Progressing Towards Goal  Note: Fall Risk Interventions:  Mobility Interventions: Patient to call before getting OOB         Medication Interventions: Patient to call before getting OOB    Elimination Interventions: Patient to call for help with toileting needs, Call light in reach    History of Falls Interventions: Door open when patient unattended         Problem: Anxiety  Goal: *Alleviation of anxiety  Outcome: Progressing Towards Goal     Problem: Pain  Goal: *Control of Pain  Outcome: Progressing Towards Goal

## 2020-06-07 ENCOUNTER — HOME HEALTH ADMISSION (OUTPATIENT)
Dept: HOME HEALTH SERVICES | Facility: HOME HEALTH | Age: 65
End: 2020-06-07
Payer: MEDICARE

## 2020-06-07 VITALS
OXYGEN SATURATION: 95 % | DIASTOLIC BLOOD PRESSURE: 113 MMHG | HEART RATE: 71 BPM | HEIGHT: 66 IN | SYSTOLIC BLOOD PRESSURE: 139 MMHG | BODY MASS INDEX: 39.28 KG/M2 | TEMPERATURE: 98.2 F | WEIGHT: 244.4 LBS | RESPIRATION RATE: 18 BRPM

## 2020-06-07 LAB
ANION GAP SERPL CALC-SCNC: 3 MMOL/L (ref 3–18)
BUN SERPL-MCNC: 6 MG/DL (ref 7–18)
BUN/CREAT SERPL: 9 (ref 12–20)
CALCIUM SERPL-MCNC: 8.6 MG/DL (ref 8.5–10.1)
CHLORIDE SERPL-SCNC: 111 MMOL/L (ref 100–111)
CK SERPL-CCNC: 4420 U/L (ref 26–192)
CO2 SERPL-SCNC: 29 MMOL/L (ref 21–32)
CREAT SERPL-MCNC: 0.65 MG/DL (ref 0.6–1.3)
GLUCOSE SERPL-MCNC: 94 MG/DL (ref 74–99)
POTASSIUM SERPL-SCNC: 3.6 MMOL/L (ref 3.5–5.5)
SODIUM SERPL-SCNC: 143 MMOL/L (ref 136–145)

## 2020-06-07 PROCEDURE — 97162 PT EVAL MOD COMPLEX 30 MIN: CPT

## 2020-06-07 PROCEDURE — 74011000258 HC RX REV CODE- 258: Performed by: INTERNAL MEDICINE

## 2020-06-07 PROCEDURE — 74011250637 HC RX REV CODE- 250/637: Performed by: INTERNAL MEDICINE

## 2020-06-07 PROCEDURE — 74011250636 HC RX REV CODE- 250/636: Performed by: INTERNAL MEDICINE

## 2020-06-07 PROCEDURE — 80048 BASIC METABOLIC PNL TOTAL CA: CPT

## 2020-06-07 PROCEDURE — 82550 ASSAY OF CK (CPK): CPT

## 2020-06-07 PROCEDURE — 36415 COLL VENOUS BLD VENIPUNCTURE: CPT

## 2020-06-07 RX ADMIN — PANTOPRAZOLE SODIUM 40 MG: 40 TABLET, DELAYED RELEASE ORAL at 08:39

## 2020-06-07 RX ADMIN — VENLAFAXINE HYDROCHLORIDE 225 MG: 75 CAPSULE, EXTENDED RELEASE ORAL at 08:39

## 2020-06-07 RX ADMIN — POTASSIUM CHLORIDE 20 MEQ: 1500 TABLET, EXTENDED RELEASE ORAL at 08:40

## 2020-06-07 RX ADMIN — ASPIRIN 81 MG 81 MG: 81 TABLET ORAL at 08:39

## 2020-06-07 RX ADMIN — SODIUM CHLORIDE 150 ML/HR: 450 INJECTION, SOLUTION INTRAVENOUS at 10:55

## 2020-06-07 RX ADMIN — ROPINIROLE HYDROCHLORIDE 1.5 MG: 1 TABLET, FILM COATED ORAL at 08:39

## 2020-06-07 RX ADMIN — HEPARIN SODIUM 5000 UNITS: 5000 INJECTION INTRAVENOUS; SUBCUTANEOUS at 04:21

## 2020-06-07 RX ADMIN — SODIUM CHLORIDE 150 ML/HR: 450 INJECTION, SOLUTION INTRAVENOUS at 03:55

## 2020-06-07 RX ADMIN — MONTELUKAST 10 MG: 10 TABLET, FILM COATED ORAL at 08:40

## 2020-06-07 RX ADMIN — FLUTICASONE PROPIONATE 2 SPRAY: 50 SPRAY, METERED NASAL at 08:44

## 2020-06-07 NOTE — PROGRESS NOTES
-- Bedside, Verbal and Written shift change report given to [facility] (oncoming nurse) by Clayton Sommers RN (offgoing nurse). Report included the following information SBAR, Kardex, Intake/Output, MAR and Recent Results. Pt is bed, alert. Frequently used items within reach       2110-- PM medications administered, pt tolerated with ease, will continue to monitor. 2336-- Shift reassessment, pt condition unchanged, will continue to monitor. 6244 --  Shift reassessment, pt sleeping between care, will continue to monitor. -- Bedside, Verbal and Written shift change report given to -Argentina ARTHUR (oncoming nurse) by Angie Gay RN (offgoing nurse). Report included the following information SBAR, Kardex, Intake/Output, MAR and Recent Results. Skin assessment completed.

## 2020-06-07 NOTE — PROGRESS NOTES
Problem: Falls - Risk of  Goal: *Absence of Falls  Description: Document Dorota Landrum Fall Risk and appropriate interventions in the flowsheet.   Outcome: Progressing Towards Goal  Note: Fall Risk Interventions:  Mobility Interventions: Communicate number of staff needed for ambulation/transfer         Medication Interventions: Evaluate medications/consider consulting pharmacy, Patient to call before getting OOB    Elimination Interventions: Call light in reach, Patient to call for help with toileting needs    History of Falls Interventions: Evaluate medications/consider consulting pharmacy, Door open when patient unattended         Problem: Patient Education: Go to Patient Education Activity  Goal: Patient/Family Education  Outcome: Progressing Towards Goal     Problem: Anxiety  Goal: *Alleviation of anxiety  Outcome: Progressing Towards Goal     Problem: Patient Education: Go to Patient Education Activity  Goal: Patient/Family Education  Outcome: Progressing Towards Goal     Problem: Deep Venous Thrombosis - Risk of  Goal: *Absence of deep venous thrombosis signs and symptoms(Stroke Metric)  Outcome: Progressing Towards Goal  Goal: *Knowledge of prescribed medications  Outcome: Progressing Towards Goal  Goal: *Absence of bleeding  Outcome: Progressing Towards Goal     Problem: Discharge Planning  Goal: *Discharge to safe environment  Outcome: Progressing Towards Goal  Goal: *Knowledge of medication management  Outcome: Progressing Towards Goal  Goal: *Knowledge of discharge instructions  Outcome: Progressing Towards Goal     Problem: Pain  Goal: *Control of Pain  Outcome: Progressing Towards Goal     Problem: Discharge Planning  Goal: *Discharge to safe environment  Outcome: Progressing Towards Goal

## 2020-06-07 NOTE — PROGRESS NOTES
Problem: Discharge Planning  Goal: *Discharge to safe environment  Outcome: Resolved/Met      Home health    Pt would like home health, orders entered. Met with pt, A Home Health Provider list has been given to the patient and/or patient representative. Patient and/or patient representative has signed the [place] of Choice selecting [facility] as their preference agency and a copy given. Both Home Health Provider list and Freedom of Choice have been placed on the chart. Referral entered in epic & called to [facility]. Veronica Thomas RN,ext [phone]. Care Management Interventions  PCP Verified by CM: Yes(Dr. Vinod Pepe, was seen this past week)  Palliative Care Criteria Met (RRAT>21 & CHF Dx)?: No  Mode of Transport at Discharge: Other (see comment)(sig other)  Transition of Care Consult (CM Consult): [address]: Yes  Discharge Durable Medical Equipment: No  Physical Therapy Consult: Yes  Occupational Therapy Consult: No  Speech Therapy Consult: No  Current Support Network:  Other(sig other)  Confirm Follow Up Transport: Family  The Plan for Transition of Care is Related to the Following Treatment Goals : skilled nursing & PT  The Patient and/or Patient Representative was Provided with a Choice of Provider and Agrees with the Discharge Plan?: Yes  Name of the Patient Representative Who was Provided with a Choice of Provider and Agrees with the Discharge Plan: Aicha Cool  Freedom of Choice List was Provided with Basic Dialogue that Supports the Patient's Individualized Plan of Care/Goals, Treatment Preferences and Shares the Quality Data Associated with the Providers?: Yes  Discharge Location  Discharge Placement: Home with home health([facility])  \
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
## 2020-06-07 NOTE — ROUTINE PROCESS
Received verbal bedside report from [place], [place], [facility]. Pt awake, alert and oriented x 4. No signs of distress noted at this time. Call bell within reach, bed in the lowest locked position. 1130 - Tele - psych consult started. Monitor in pt's room, Psych MD online.  Pt sitting in bedside recliner speaking with MD.

## 2020-06-07 NOTE — DISCHARGE SUMMARY
Internal Medicine Discharge Summary        Patient: Madie Mixon    YOB: 1955    Age:  75 y.o. Admit Date: 6/5/2020    Discharge Date: 6/7/2020    LOS:  LOS: 2 days     Discharge To: Home    Consults: Psychiatry    Admission Diagnoses: Rhabdomyolysis [M62.82]    Discharge Diagnoses:    Problem List as of 6/7/2020 Date Reviewed: 5/28/2020          Codes Class Noted - Resolved    * (Principal) Rhabdomyolysis ICD-10-CM: M62.82  ICD-9-CM: 728.88  6/5/2020 - Present        Liver dysfunction ICD-10-CM: K76.89  ICD-9-CM: 573.9  6/5/2020 - Present        Hypokalemia ICD-10-CM: E87.6  ICD-9-CM: 276.8  6/5/2020 - Present        Metabolic encephalopathy HVS-94-TW: G93.41  ICD-9-CM: 348.31  6/5/2020 - Present        Prolonged Q-T interval on ECG ICD-10-CM: R94.31  ICD-9-CM: 794.31  6/5/2020 - Present        DILCIA (acute kidney injury) ([facility] [address] ICD-10-CM: N17.9  ICD-9-CM: 584.9  6/5/2020 - Present        Severe obesity ([facility] [address] ICD-10-CM: E66.01  ICD-9-CM: 278.01  11/18/2019 - Present        Gastrointestinal disorder ICD-10-CM: K92.9  ICD-9-CM: 569.9  Unknown - Present        Osteopenia ICD-10-CM: M85.80  ICD-9-CM: 733.90  11/18/2019 - Present        Gastritis ICD-10-CM: K29.70  ICD-9-CM: 535.50  11/13/2019 - Present    Overview Signed 11/18/2019 12:00 PM by Ilene Meckel, MD     by EGD             Neurological disorder ICD-10-CM: G98.8  ICD-9-CM: 349.9  Unknown - Present    Overview Signed 4/15/2019  4:34 PM by Ilene Meckel, MD     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  Dr. Demetrice King             Chronic low back pain ICD-10-CM: M54.5, G89.29  ICD-9-CM: 724.2, 338.29  8/7/2018 - Present        Long term (current) use of opiate analgesic ICD-10-CM: Z79.891  ICD-9-CM: V58.69  1/18/2018 - Present        Memory deficit ICD-10-CM: R41.3  ICD-9-CM: 780.93  1/18/2018 - Present        Essential hypertension (Chronic) ICD-10-CM: I10  ICD-9-CM: 401.9  12/6/2017 - Present        Chronic pain of right knee ICD-10-CM: M25.561, G89.29  ICD-9-CM: 719.46, 338.29  11/9/2017 - Present        H/O: pituitary tumor ICD-10-CM: Z87.898  ICD-9-CM: V12.29  Unknown - Present        Dyslipidemia (Chronic) ICD-10-CM: E78.5  ICD-9-CM: 272.4  9/19/2017 - Present        Vaginal discharge ICD-10-CM: N89.8  ICD-9-CM: 623.5  3/3/2017 - Present        Lower abdominal pain ICD-10-CM: R10.30  ICD-9-CM: 789.09  3/3/2017 - Present        History of gonorrhea ICD-10-CM: Z86.19  ICD-9-CM: V12.09  3/3/2017 - Present        Acute cystitis with hematuria ICD-10-CM: N30.01  ICD-9-CM: 595.0  3/3/2017 - Present        Restless legs syndrome (RLS) (Chronic) ICD-10-CM: G25.81  ICD-9-CM: 333.94  7/22/2016 - Present        Memory impairment (Chronic) ICD-10-CM: R41.3  ICD-9-CM: 780.93  Unknown - Present        Depressive disorder (Chronic) ICD-10-CM: F32.9  ICD-9-CM: 311  Unknown - Present        Chronic pain syndrome (Chronic) ICD-10-CM: G89.4  ICD-9-CM: 338.4  7/22/2016 - Present        RESOLVED: Severe obesity (BMI 35.0-39. 9) with comorbidity ([address] ICD-10-CM: E66.01  ICD-9-CM: 278.01  3/31/2018 - 5/15/2019        RESOLVED: Skin rash ICD-10-CM: R21  ICD-9-CM: 782.1  9/22/2016 - 8/7/2018        RESOLVED: Sore throat ICD-10-CM: J02.9  ICD-9-CM: 462  9/22/2016 - 8/7/2018        RESOLVED: Fever and chills ICD-10-CM: R50.9  ICD-9-CM: 780.60  9/22/2016 - 8/7/2018        RESOLVED: Body aches ICD-10-CM: R52  ICD-9-CM: 780.96  9/22/2016 - 8/7/2018        RESOLVED: Allergic reaction ICD-10-CM: T78.40XA  ICD-9-CM: 995.3  9/22/2016 - 8/7/2018              Discharge Condition:  Improved    Procedures: None         HPI: Ms. Kristina Leger is a 75 y.o.  female who is admitted for rhabdomyolysis. Ms. Kristina Leger with past medical history as noted below , presented to the Emergency Department today  complaining of above. Triage and ER notes noted.   Patient reports chronic pain stated that she had rods in her back and she did not overdose her medications, specifically the pain medicines. She report took a lot of Prisca-Everetts. ER MD reported to me there was suspicion of Percocet and trazodone overdose but not sure a month, patient denied that to me. She had Narcan at the field by EMS which she responded to bit better. And she had another dose in the ER where she improved again. She told the ER MD that she called EMS for being thirsty. EMS noted the slurred speech pinpoint pupils and confused that were giving Narcan and she improved. Patient was clear to me there is no intentional drug overdose and she is not overdosed on her medications. Drug screen is ordered. Poison control involved and they elected to start her on acetylcysteine for possible Tylenol overdose although Tylenol level was normal    Hospital Course:    #   Rhabdomyolysis (2020). Admit telemetry bed. Aggressive hydration. Strict input and output. Daily CK. Trending down (less <5000) and patient tolerating diet. Unclear etiology if it is recurrent falls which she denies or medications. Possibly took too may muscle relaxers?     # Liver dysfunction. 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 Continue daily serial exams.     # Possible Tylenol overdose. Patient denied medicine overdose. [address] involved. Because of increased level of LFT they recommended to start acetylcysteine in the ER,   Tylenol level was normal Acetylcysteine stopped.     # DILCIA. Seems prerenal.  Aggressive hydration. Serial labs. Monitor strict input and output. Monitor Renal function and other labs as indicated. Avoid nephrotoxins , iv Contrast, NSAID. Renally dosing medications. Resolved with IV fluids        #Hypokalemia replete and trend. # Metabolic encephalopathy. Seems back to her baseline. We will continue monitoring       #History of depressive disorder (). We will consult tele-psych for her. She was tearful in the ER when mentioned 1 of her grandchildren who  from a tumor.  Psych recommended cont current meds but consider decreasing xanax/ambien after d/w PCP    The rest of the patient's chronic conditions were managed appropriately during their admission. They were medically stable at the time of discharge.     Visit Vitals  BP (!) 139/113 (BP 1 Location: Right arm, BP Patient Position: At rest)   Pulse 71   Temp 98.2 °F (36.8 °C)   Resp 18   Ht 5' 6\" (1.676 m)   Wt 110.9 kg (244 lb 6.4 oz)   SpO2 95%   Breastfeeding No   BMI 39.45 kg/m²       Physical Exam at Discharge:  General Appearance: NAD, conversant  HENT: normocephalic/atraumatic, moist mucus membranes  Lungs: CTA with normal respiratory effort  CV: RRR, no m/r/g  Abdomen: soft, non-tender, normal bowel sounds  Extremities: no cyanosis, no peripheral edema  Neuro: moves all extremities, no focal deficits  Psych: appropriate affect, alert and oriented to person, place and time    Labs Prior to Discharge:  Labs: Results:       Chemistry Recent Labs     06/07/20 0518 06/06/20  1307 06/06/20  0435 06/05/20  1557   GLU 94  --  95 116*     --  143 139   K 3.6  --  3.0* 3.2*     --  109 108   CO2 29  --  26 26   BUN 6*  --  14 24*   CREA 0.65  --  0.92 1.80*   CA 8.6  --  8.5 7.9*   AGAP 3  --  8 5   BUCR 9*  --  15 13   AP  --  48 49 55   TP  --  5.5* 5.5* 5.5*   ALB  --  3.1* 3.0* 3.3*   GLOB  --  2.4 2.5 2.2   AGRAT  --  1.3 1.2 1.5      CBC w/Diff Recent Labs     06/06/20 0435 06/05/20  1158   WBC 6.6 8.8   RBC 3.48* 3.41*   HGB 11.3* 11.3*   HCT 34.4* 33.6*    215   GRANS 47 56   LYMPH 33 32   EOS 4 3      Cardiac Enzymes Recent Labs     06/07/20  0518 06/06/20 0435 06/05/20  1158   CPK 4,420* 6,859* 7,441*   CKND1  --   --  1.4      Coagulation Recent Labs     06/06/20  0435   PTP 15.0   INR 1.2       Lipid Panel Lab Results   Component Value Date/Time    Cholesterol, total 186 01/18/2018 04:12 PM    HDL Cholesterol 71 (H) 01/18/2018 04:12 PM    LDL, calculated 96.6 01/18/2018 04:12 PM    VLDL, calculated 18.4 01/18/2018 04:12 PM    Triglyceride 92 01/18/2018 04:12 PM    CHOL/HDL Ratio 2.6 01/18/2018 04:12 PM      BNP No results for input(s): BNPP in the last 72 hours. Liver Enzymes Recent Labs     06/06/20  1307   TP 5.5*   ALB 3.1*   AP 48      Thyroid Studies Lab Results   Component Value Date/Time    TSH 3.78 (H) 11/18/2019 12:20 PM            Significant Imaging:  Xr Chest Port    Result Date: 6/5/2020  EXAM: XR CHEST PORT CLINICAL INDICATION/HISTORY: possible overdose -Additional: None COMPARISON: 7/11/2012 TECHNIQUE: Portable frontal view of the chest _______________ FINDINGS: SUPPORT DEVICES: None. HEART AND MEDIASTINUM: Cardiomediastinal silhouette within normal limits. LUNGS AND PLEURAL SPACES: No dense consolidation, large effusion or pneumothorax. _______________     IMPRESSION: No acute cardiopulmonary abnormality. Discharge Medications:     Current Discharge Medication List      CONTINUE these medications which have NOT CHANGED    Details   potassium chloride (K-DUR, KLOR-CON) 20 mEq tablet Take 20 mEq by mouth daily. venlafaxine-SR (EFFEXOR-XR) 75 mg capsule Take 225 mg by mouth daily. fluticasone propionate (FLONASE) 50 mcg/actuation nasal spray 2 Sprays by Both Nostrils route daily. furosemide (LASIX) 20 mg tablet Take 20 mg by mouth daily. hydrOXYzine HCL (ATARAX) 25 mg tablet Take 25 mg by mouth every four (4) hours as needed for Itching. oxyCODONE-acetaminophen (PERCOCET 7.5) 7.5-325 mg per tablet Take 1 Tab by mouth every four (4) hours as needed for Pain for up to 90 days. Max Daily Amount: 6 Tabs. Indications: pain, chronic pain  Qty: 360 Tab, Refills: 0    Associated Diagnoses: Chronic low back pain; Chronic pain syndrome; Medication refill      zolpidem CR (AMBIEN CR) 12.5 mg tablet take 1 tablet by mouth nightly  Qty: 90 Tab, Refills: 1    Associated Diagnoses: Medication refill      tiZANidine (ZANAFLEX) 4 mg capsule Take 1 Cap by mouth three (3) times daily. Indications: muscle spasm  Qty: 270 Cap, Refills: 3    Associated Diagnoses: Medication refill      ALPRAZolam (XANAX) 0.5 mg tablet Take 2 Tabs by mouth three (3) times daily as needed for Anxiety. Max Daily Amount: 3 mg. Indications: anxious  Qty: 540 Tab, Refills: 1    Associated Diagnoses: Medication refill      rOPINIRole (REQUIP) 1 mg tablet Take 1.5 Tabs by mouth two (2) times a day. Indications: restless legs syndrome, an extreme discomfort in the calf muscles when sitting or lying down  Qty: 180 Tab, Refills: 1    Associated Diagnoses: Medication refill      permethrin (ACTICIN) 5 % topical cream apply sparingly from chin to toes at bedtime and leave on 8 hours, then wash off  Qty: 60 g, Refills: 1    Associated Diagnoses: Rash; Itching      promethazine (PHENERGAN) 25 mg tablet Take 1 Tab by mouth every six (6) hours as needed for Nausea. Qty: 60 Tab, Refills: 4      Blood Pressure Test Kit-Large kit CHECK blood pressure twice a day as directed by prescriber  Qty: 1 Kit, Refills: 1      aspirin 81 mg chewable tablet Take 81 mg by mouth daily. atorvastatin (LIPITOR) 40 mg tablet Take 40 mg by mouth nightly. naloxone 2 mg/actuation spry Use 1 spray intranasally into 1 nostril. Use a new Narcan nasal spray for subsequent doses and administer into alternating nostrils. May repeat every 2 to 3 minutes as needed. Qty: 1 Device, Refills: prn    Associated Diagnoses: Medication refill; Long term (current) use of opiate analgesic      trospium (SANCTURA) 20 mg tablet Take 1 Tab by mouth Before breakfast and dinner. Qty: 60 Tab, Refills: 6      albuterol (PROVENTIL HFA, VENTOLIN HFA, PROAIR HFA) 90 mcg/actuation inhaler Take 2 Puffs by inhalation every four (4) hours as needed for Wheezing. Indications: bronchospasm  Qty: 1 Inhaler, Refills: 5    Associated Diagnoses: Medication refill      diclofenac (VOLTAREN) 1 % gel Apply 2 g to affected area four (4) times daily.   Qty: 600 g, Refills: 3 Associated Diagnoses: Neck pain; Chronic low back pain      triamcinolone acetonide (KENALOG) 0.025 % topical cream Apply  to affected area two (2) times a day. use thin layer  Qty: 15 g, Refills: 5    Associated Diagnoses: Insect bite, unspecified site, initial encounter      frovatriptan (FROVA) 2.5 mg tablet Take one tablet by mouth daily  Qty: 90 Tab, Refills: 4      cetirizine (ZYRTEC) 10 mg tablet Take 1 Tab by mouth daily. Qty: 90 Tab, Refills: 4    Associated Diagnoses: Medication refill      montelukast (SINGULAIR) 10 mg tablet Take 1 Tab by mouth daily. Qty: 90 Tab, Refills: 4      lidocaine (LIDODERM) 5 % Apply one daily  Qty: 90 Each, Refills: 5      omeprazole (PRILOSEC) 40 mg capsule Take 1 Cap by mouth daily. Qty: 90 Cap, Refills: 4    Associated Diagnoses: Medication refill      multivitamin (ONE A DAY) tablet Take 1 Tab by mouth daily. Qty: 90 Tab, Refills: 4    Associated Diagnoses: Medication refill      cyanocobalamin (VITAMIN B-12) 1,000 mcg tablet Take 1 Tab by mouth daily. Qty: 90 Tab, Refills: 4    Associated Diagnoses: Medication refill             Activity: Activity as tolerated    Diet: Resume previous diet    Wound Care: None needed    Follow-up:   Please follow up with your PCP within 7 days to discuss your recent hospitalization. Patient to arrange.          Total time spent including time spent on final examination and discharge discussion, discharge documentation and records reviewed and medication reconciliation: > 30 minutes    Ari Cobos DO  Internal Medicine, Hospitalist  Pager: [facility]
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
## 2020-06-07 NOTE — DISCHARGE INSTRUCTIONS
Patient Education        Rhabdomyolysis: Care Instructions  Your Care Instructions     When you have rhabdomyolysis (say \"dmc-vas-kc-AH-magalys-suss\"), dying muscle cells cause toxins to build up in the blood. If not treated, it can cause life-threatening damage to the body's organs. It can be caused by many things, such as severe muscle injury, some medicines (like statins), the flu, and certain blood infections. Symptoms may include weak muscles, pain, stiffness, fever, and nausea. Your urine may also be dark. You will get treatment in the hospital. If possible, the doctor will stop the cause of muscle cell death. The doctor will take steps to protect your organs. You may have to stop taking certain medicines if they are the cause of the problem. You will also get treatment to help the kidneys remove the toxins from your blood. This includes plenty of fluids. You may get fluids through a vein (by IV). You may also need dialysis. Follow-up care is a key part of your treatment and safety. Be sure to make and go to all appointments, and call your doctor if you are having problems. It's also a good idea to know your test results and keep a list of the medicines you take. How can you care for yourself at home? · Take pain medicines exactly as directed. ? If the doctor gave you a prescription medicine for pain, take it as prescribed. ? If you are not taking a prescription pain medicine, ask your doctor if you can take an over-the-counter medicine. · Talk to your doctor about whether you need to stop taking any medicines. Follow your doctor's instructions about stopping medicines. · Drink plenty of fluids, enough so that your urine is light yellow or clear like water. If you have kidney, heart, or liver disease and have to limit fluids, talk with your doctor before you increase the amount of fluids you drink. When should you call for help?    Call your doctor now or seek immediate medical care if:  · You have new or worse muscle pain. · You have less urine than normal or no urine. · You have new swelling in your arms or feet. · You have blood in your urine. Watch closely for changes in your health, and be sure to contact your doctor if you do not get better as expected. Where can you learn more? Go to http://carly-jose.info/  Enter F129 in the search box to learn more about \"Rhabdomyolysis: Care Instructions. \"  Current as of: August 12, 2019               Content Version: 12.5  © 4986-6875 [employer]. Care instructions adapted under license by [employer] (which disclaims liability or warranty for this information). If you have questions about a medical condition or this instruction, always ask your healthcare professional. [address] any warranty or liability for your use of this information. Patient Education        Learning About Relief for Back Pain  What is back tension and strain? Back strain happens when you overstretch, or pull, a muscle in your back. You may hurt your back in an accident or when you exercise or lift something. Most back pain will get better with rest and time. You can take care of yourself at home to help your back heal.  What can you do first to relieve back pain? When you first feel back pain, try these steps:  · Walk. Take a short walk (10 to 20 minutes) on a level surface (no slopes, hills, or stairs) every 2 to 3 hours. Walk only distances you can manage without pain, especially leg pain. · Relax. Find a comfortable position for rest. Some people are comfortable on the floor or a medium-firm bed with a small pillow under their head and another under their knees. Some people prefer to lie on their side with a pillow between their knees. Don't stay in one position for too long. · Try heat or ice.  Try using a heating pad on a low or medium setting, or take a warm shower, for 15 to 20 minutes every 2 to 3 hours. Or you can buy single-use heat wraps that last up to 8 hours. You can also try an ice pack for 10 to 15 minutes every 2 to 3 hours. You can use an ice pack or a bag of frozen vegetables wrapped in a thin towel. There is not strong evidence that either heat or ice will help, but you can try them to see if they help. You may also want to try switching between heat and cold. · Take pain medicine exactly as directed. ¨ If the doctor gave you a prescription medicine for pain, take it as prescribed. ¨ If you are not taking a prescription pain medicine, ask your doctor if you can take an over-the-counter medicine. What else can you do? · Stretch and exercise. Exercises that increase flexibility may relieve your pain and make it easier for your muscles to keep your spine in a good, neutral position. And don't forget to keep walking. · Do self-massage. You can use self-massage to unwind after work or school or to energize yourself in the morning. You can easily massage your feet, hands, or neck. Self-massage works best if you are in comfortable clothes and are sitting or lying in a comfortable position. Use oil or lotion to massage bare skin. · Reduce stress. Back pain can lead to a vicious [place]: Distress about the pain tenses the muscles in your back, which in turn causes more pain. Learn how to relax your mind and your muscles to lower your stress. Where can you learn more? Go to http://carly-jose.info/. Enter B542 in the search box to learn more about \"Learning About Relief for Back Pain. \"  Current as of: March 21, 2017  Content Version: 11.5  © 3398-5361 [employer]. Care instructions adapted under license by [employer] (which disclaims liability or warranty for this information).  If you have questions about a medical condition or this instruction, always ask your healthcare professional. [address] any warranty or liability for your use of this information. Patient Education        Chronic Pain: Care Instructions  Your Care Instructions     Chronic pain is pain that lasts a long time (months or even years) and may or may not have a clear cause. It is different from acute pain, which usually does have a clear cause--like an injury or illness--and gets better over time. Chronic pain:  · Lasts over time but may vary from day to day. · Does not go away despite efforts to end it. · May disrupt your sleep and lead to fatigue. · May cause depression or anxiety. · May make your muscles tense, causing more pain. · Can disrupt your work, hobbies, home life, and relationships with friends and family. Chronic pain is a very real condition. It is not just in your head. Treatment can help and usually includes several methods used together, such as medicines, physical therapy, exercise, and other treatments. Learning how to relax and changing negative thought patterns can also help you cope. Chronic pain is complex. Taking an active role in your treatment will help you better manage your pain. Tell your doctor if you have trouble dealing with your pain. You may have to try several things before you find what works best for you. Follow-up care is a key part of your treatment and safety. Be sure to make and go to all appointments, and call your doctor if you are having problems. It's also a good idea to know your test results and keep a list of the medicines you take. How can you care for yourself at home? · Pace yourself. Break up large jobs into smaller tasks. Save harder tasks for days when you have less pain, or go back and forth between hard tasks and easier ones. Take rest breaks. · Relax, and reduce stress. Relaxation techniques such as deep breathing or meditation can help. · Keep moving. Gentle, daily exercise can help reduce pain over the long run. Try low- or no-impact exercises such as walking, swimming, and stationary biking.  Do stretches to stay flexible. · Try heat, cold packs, and massage. · Get enough sleep. Chronic pain can make you tired and drain your energy. Talk with your doctor if you have trouble sleeping because of pain. · Think positive. Your thoughts can affect your pain level. Do things that you enjoy to distract yourself when you have pain instead of focusing on the pain. See a movie, read a book, listen to music, or spend time with a friend. · If you think you are depressed, talk to your doctor about treatment. · Keep a daily pain diary. Record how your moods, thoughts, sleep patterns, activities, and medicine affect your pain. You may find that your pain is worse during or after certain activities or when you are feeling a certain emotion. Having a record of your pain can help you and your doctor find the best ways to treat your pain. · Take pain medicines exactly as directed. ? If the doctor gave you a prescription medicine for pain, take it as prescribed. ? If you are not taking a prescription pain medicine, ask your doctor if you can take an over-the-counter medicine. Reducing constipation caused by pain medicine  · Include fruits, vegetables, beans, and whole grains in your diet each day. These foods are high in fiber. · Drink plenty of fluids, enough so that your urine is light yellow or clear like water. If you have kidney, heart, or liver disease and have to limit fluids, talk with your doctor before you increase the amount of fluids you drink. · If your doctor recommends it, get more exercise. Walking is a good choice. Bit by bit, increase the amount you walk every day. Try for at least 30 minutes on most days of the week. · Schedule time each day for a bowel movement. A daily routine may help. Take your time and do not strain when having a bowel movement. When should you call for help? Call your doctor now or seek immediate medical care if:  · Your pain gets worse or is out of control.   · You feel down or blue, or you do not enjoy things like you once did. You may be depressed, which is common in people with chronic pain. Depression can be treated. · You have vomiting or cramps for more than 2 hours. Watch closely for changes in your health, and be sure to contact your doctor if:  · You cannot sleep because of pain. · You are very worried or anxious about your pain. · You have trouble taking your pain medicine. · You have any concerns about your pain medicine. · You have trouble with bowel movements, such as:  ? No bowel movement in 3 days. ? Blood in the anal area, in your stool, or on the toilet paper. ? Diarrhea for more than 24 hours. Where can you learn more? Go to http://carly-jose.info/  Enter N004 in the search box to learn more about \"Chronic Pain: Care Instructions. \"  Current as of: November 20, 2019               Content Version: 12.5  © 6237-7280 [employer]. Care instructions adapted under license by [employer] (which disclaims liability or warranty for this information). If you have questions about a medical condition or this instruction, always ask your healthcare professional. [place] [address] any warranty or liability for your use of this information. Patient Education        Depression and Chronic Disease: Care Instructions  Your Care Instructions     A chronic disease is one that you have for a long time. Some chronic diseases can be controlled, but they usually cannot be cured. Depression is common in people with chronic diseases, but it often goes unnoticed. Many people have concerns about seeking treatment for a mental health problem. You may think it's a sign of weakness, or you don't want people to know about it. It's important to overcome these reasons for not seeking treatment. Treating depression or anxiety is good for your health. Follow-up care is a key part of your treatment and safety.  Be sure to make and go to all appointments, and call your doctor if you are having problems. It's also a good idea to know your test results and keep a list of the medicines you take. How can you care for yourself at home? Watch for symptoms of depression  The symptoms of depression are often subtle at first. You may think they are caused by your disease rather than depression. Or you may think it is normal to be depressed when you have a chronic disease. If you are depressed you may:  · Feel sad or hopeless. · Feel guilty or worthless. · Not enjoy the things you used to enjoy. · Feel hopeless, as though life is not worth living. · Have trouble thinking or remembering. · Have low energy, and you may not eat or sleep well. · Pull away from others. · Think often about death or killing yourself. (Keep the numbers for these national suicide hotlines: 4-354-840-TALK [1-521.113.7603] and 8-627-IASXTDB [1-826.464.3797]. )  Get treatment  By treating your depression, you can feel more hopeful and have more energy. If you feel better, you may take better care of yourself, so your health may improve. · Talk to your doctor if you have any changes in mood during treatment for your disease. · Ask your doctor for help. Counseling, antidepressant medicine, or a combination of the two can help most people with depression. Often a combination works best. Counseling can also help you cope with having a chronic disease. When should you call for help? [id number] anytime you think you may need emergency care. For example, call if:  · You feel like hurting yourself or someone else. · Someone you know has depression and is about to attempt or is attempting suicide. Call your doctor now or seek immediate medical care if:  · You hear voices. · Someone you know has depression and:  ? Starts to give away his or her possessions. ? Uses illegal drugs or drinks alcohol heavily.   ? Talks or writes about death, including writing suicide notes or talking about guns, knives, or pills. ? Starts to spend a lot of time alone. ? Acts very aggressively or suddenly appears calm. Watch closely for changes in your health, and be sure to contact your doctor if:  · You do not get better as expected. Where can you learn more? Go to http://carly-jose.info/  Enter A548 in the search box to learn more about \"Depression and Chronic Disease: Care Instructions. \"  Current as of: January 31, 2020               Content Version: 12.5  © 0851-4796 [employer]. Care instructions adapted under license by [employer] (which disclaims liability or warranty for this information). If you have questions about a medical condition or this instruction, always ask your healthcare professional. [place] [address] any warranty or liability for your use of this information. Patient Education        Learning About Diuretics for High Blood Pressure  Overview  Diuretics help to lower blood pressure. This reduces your risk of a heart attack and stroke. It also reduces your risk of kidney disease. Diuretics cause your kidneys to remove sodium and water. They also relax the blood vessel walls. These help lower your blood pressure. Examples  · Chlorthalidone  · Hydrochlorothiazide  Possible side effects  There are some common side effects. They are:  · Too little potassium. · Feeling dizzy. · Rash. · Urinating a lot. · High blood sugar. (But this is not common.)  You may have other side effects. Check the information that comes with your medicine. What to know about taking this medicine  · You may take other medicines for blood pressure. Diuretics can help those work better. They can also prevent extra fluid in your body. · You may need to take potassium pills. Ask your doctor about this. · You may need blood tests to check your kidneys and your potassium level. · Take your medicines exactly as prescribed. Call your doctor if you think you are having a problem with your medicine. · Check with your doctor or pharmacist before you use any other medicines. This includes over-the-counter medicines. Make sure your doctor knows all of the medicines, vitamins, herbal products, and supplements you take. Taking some medicines together can cause problems. Where can you learn more? Go to http://carly-jose.info/  Enter N940 in the search box to learn more about \"Learning About Diuretics for High Blood Pressure. \"  Current as of: December 16, 2019               Content Version: 12.5  © 7703-9778 [employer]. Care instructions adapted under license by [employer] (which disclaims liability or warranty for this information). If you have questions about a medical condition or this instruction, always ask your healthcare professional. [address] any warranty or liability for your use of this information. Patient armband removed and shredded    I have reviewed discharge instructions with the patient. The patient verbalized understanding. DISCHARGE SUMMARY from Nurse    PATIENT INSTRUCTIONS:    After general anesthesia or intravenous sedation, for 24 hours or while taking prescription Narcotics:  · Limit your activities  · Do not drive and operate hazardous machinery  · Do not make important personal or business decisions  · Do  not drink alcoholic beverages  · If you have not urinated within 8 hours after discharge, please contact your surgeon on call.     Report the following to your surgeon:  · Excessive pain, swelling, redness or odor of or around the surgical area  · Temperature over 100.5  · Nausea and vomiting lasting longer than 4 hours or if unable to take medications  · Any signs of decreased circulation or nerve impairment to extremity: change in color, persistent  numbness, tingling, coldness or increase pain  · Any questions    What to do at Home:  Recommended activity: Activity as tolerated    If you experience any of the following symptoms Fever, chills, chest pain, nausea/vomiting, please follow up with your PCP or call 911. *  Please give a list of your current medications to your Primary Care Provider. *  Please update this list whenever your medications are discontinued, doses are      changed, or new medications (including over-the-counter products) are added. *  Please carry medication information at all times in case of emergency situations. These are general instructions for a healthy lifestyle:    No smoking/ No tobacco products/ Avoid exposure to second hand smoke  Surgeon General's Warning:  Quitting smoking now greatly reduces serious risk to your health. Obesity, smoking, and sedentary lifestyle greatly increases your risk for illness    A healthy diet, regular physical exercise & weight monitoring are important for maintaining a healthy lifestyle    You may be retaining fluid if you have a history of heart failure or if you experience any of the following symptoms:  Weight gain of 3 pounds or more overnight or 5 pounds in a week, increased swelling in our hands or feet or shortness of breath while lying flat in bed. Please call your doctor as soon as you notice any of these symptoms; do not wait until your next office visit. The discharge information has been reviewed with the patient. The patient verbalized understanding. Discharge medications reviewed with the patient and appropriate educational materials and side effects teaching were provided.   ___________________________________________________________________________________________________________________________________

## 2020-06-08 ENCOUNTER — HOME CARE VISIT (OUTPATIENT)
Dept: SCHEDULING | Facility: HOME HEALTH | Age: 65
End: 2020-06-08
Payer: MEDICARE

## 2020-06-08 VITALS
SYSTOLIC BLOOD PRESSURE: 142 MMHG | TEMPERATURE: 96.8 F | HEART RATE: 67 BPM | DIASTOLIC BLOOD PRESSURE: 90 MMHG | OXYGEN SATURATION: 98 %

## 2020-06-08 PROCEDURE — 400013 HH SOC

## 2020-06-08 PROCEDURE — 3331090001 HH PPS REVENUE CREDIT

## 2020-06-08 PROCEDURE — 3331090002 HH PPS REVENUE DEBIT

## 2020-06-08 PROCEDURE — G0151 HHCP-SERV OF PT,EA 15 MIN: HCPCS

## 2020-06-09 ENCOUNTER — HOME CARE VISIT (OUTPATIENT)
Dept: HOME HEALTH SERVICES | Facility: HOME HEALTH | Age: 65
End: 2020-06-09
Payer: MEDICARE

## 2020-06-09 ENCOUNTER — HOME CARE VISIT (OUTPATIENT)
Dept: SCHEDULING | Facility: HOME HEALTH | Age: 65
End: 2020-06-09
Payer: MEDICARE

## 2020-06-09 PROCEDURE — 3331090002 HH PPS REVENUE DEBIT

## 2020-06-09 PROCEDURE — G0299 HHS/HOSPICE OF RN EA 15 MIN: HCPCS

## 2020-06-09 PROCEDURE — 3331090001 HH PPS REVENUE CREDIT

## 2020-06-10 PROCEDURE — 3331090002 HH PPS REVENUE DEBIT

## 2020-06-10 PROCEDURE — 3331090001 HH PPS REVENUE CREDIT

## 2020-06-11 ENCOUNTER — HOME CARE VISIT (OUTPATIENT)
Dept: SCHEDULING | Facility: HOME HEALTH | Age: 65
End: 2020-06-11
Payer: MEDICARE

## 2020-06-11 DIAGNOSIS — L08.9 SKIN INFECTION: Primary | ICD-10-CM

## 2020-06-11 PROCEDURE — 3331090001 HH PPS REVENUE CREDIT

## 2020-06-11 PROCEDURE — G0157 HHC PT ASSISTANT EA 15: HCPCS

## 2020-06-11 PROCEDURE — 3331090002 HH PPS REVENUE DEBIT

## 2020-06-11 RX ORDER — CEPHALEXIN 500 MG/1
500 CAPSULE ORAL 4 TIMES DAILY
Qty: 40 CAP | Refills: 0 | Status: SHIPPED | OUTPATIENT
Start: 2020-06-11 | End: 2021-07-30 | Stop reason: SDUPTHER

## 2020-06-11 RX ORDER — MUPIROCIN CALCIUM 20 MG/G
CREAM TOPICAL 2 TIMES DAILY
Qty: 15 G | Refills: 0 | Status: SHIPPED | OUTPATIENT
Start: 2020-06-11 | End: 2022-10-20

## 2020-06-11 NOTE — TELEPHONE ENCOUNTER
Dandy Solano from [facility] called and stated patient Lt toenail came off and it is painful, red, Yellow drainage   Pain scale 7/10  She would like a medication sent to pharmacy  He b/p is up as well 178/95

## 2020-06-12 ENCOUNTER — HOME CARE VISIT (OUTPATIENT)
Dept: SCHEDULING | Facility: HOME HEALTH | Age: 65
End: 2020-06-12
Payer: MEDICARE

## 2020-06-12 VITALS
SYSTOLIC BLOOD PRESSURE: 142 MMHG | HEART RATE: 74 BPM | OXYGEN SATURATION: 98 % | WEIGHT: 233.5 LBS | TEMPERATURE: 97.2 F | DIASTOLIC BLOOD PRESSURE: 92 MMHG | BODY MASS INDEX: 37.69 KG/M2 | RESPIRATION RATE: 18 BRPM

## 2020-06-12 PROCEDURE — 3331090001 HH PPS REVENUE CREDIT

## 2020-06-12 PROCEDURE — 3331090002 HH PPS REVENUE DEBIT

## 2020-06-12 PROCEDURE — G0299 HHS/HOSPICE OF RN EA 15 MIN: HCPCS

## 2020-06-13 PROCEDURE — 3331090002 HH PPS REVENUE DEBIT

## 2020-06-13 PROCEDURE — 3331090001 HH PPS REVENUE CREDIT

## 2020-06-14 VITALS
OXYGEN SATURATION: 95 % | HEART RATE: 88 BPM | DIASTOLIC BLOOD PRESSURE: 95 MMHG | RESPIRATION RATE: 18 BRPM | TEMPERATURE: 98 F | SYSTOLIC BLOOD PRESSURE: 178 MMHG

## 2020-06-14 PROCEDURE — 3331090001 HH PPS REVENUE CREDIT

## 2020-06-14 PROCEDURE — 3331090002 HH PPS REVENUE DEBIT

## 2020-06-15 PROCEDURE — 3331090002 HH PPS REVENUE DEBIT

## 2020-06-15 PROCEDURE — 3331090001 HH PPS REVENUE CREDIT

## 2020-06-16 ENCOUNTER — HOME CARE VISIT (OUTPATIENT)
Dept: SCHEDULING | Facility: HOME HEALTH | Age: 65
End: 2020-06-16
Payer: MEDICARE

## 2020-06-16 VITALS
HEART RATE: 73 BPM | SYSTOLIC BLOOD PRESSURE: 150 MMHG | TEMPERATURE: 98 F | DIASTOLIC BLOOD PRESSURE: 85 MMHG | RESPIRATION RATE: 18 BRPM | OXYGEN SATURATION: 96 %

## 2020-06-16 PROCEDURE — G0299 HHS/HOSPICE OF RN EA 15 MIN: HCPCS

## 2020-06-16 PROCEDURE — 3331090002 HH PPS REVENUE DEBIT

## 2020-06-16 PROCEDURE — G0157 HHC PT ASSISTANT EA 15: HCPCS

## 2020-06-16 PROCEDURE — 3331090001 HH PPS REVENUE CREDIT

## 2020-06-17 PROCEDURE — 3331090001 HH PPS REVENUE CREDIT

## 2020-06-17 PROCEDURE — 3331090002 HH PPS REVENUE DEBIT

## 2020-06-18 ENCOUNTER — HOME CARE VISIT (OUTPATIENT)
Dept: SCHEDULING | Facility: HOME HEALTH | Age: 65
End: 2020-06-18
Payer: MEDICARE

## 2020-06-18 VITALS
OXYGEN SATURATION: 98 % | SYSTOLIC BLOOD PRESSURE: 160 MMHG | RESPIRATION RATE: 16 BRPM | HEART RATE: 72 BPM | DIASTOLIC BLOOD PRESSURE: 90 MMHG | TEMPERATURE: 98.4 F

## 2020-06-18 PROCEDURE — 3331090001 HH PPS REVENUE CREDIT

## 2020-06-18 PROCEDURE — 3331090002 HH PPS REVENUE DEBIT

## 2020-06-18 PROCEDURE — G0157 HHC PT ASSISTANT EA 15: HCPCS

## 2020-06-19 ENCOUNTER — HOME CARE VISIT (OUTPATIENT)
Dept: SCHEDULING | Facility: HOME HEALTH | Age: 65
End: 2020-06-19
Payer: MEDICARE

## 2020-06-19 VITALS
HEART RATE: 81 BPM | DIASTOLIC BLOOD PRESSURE: 94 MMHG | OXYGEN SATURATION: 94 % | RESPIRATION RATE: 20 BRPM | TEMPERATURE: 98.3 F | SYSTOLIC BLOOD PRESSURE: 136 MMHG

## 2020-06-19 VITALS
OXYGEN SATURATION: 97 % | RESPIRATION RATE: 17 BRPM | HEART RATE: 91 BPM | SYSTOLIC BLOOD PRESSURE: 145 MMHG | DIASTOLIC BLOOD PRESSURE: 91 MMHG | TEMPERATURE: 97.9 F

## 2020-06-19 PROCEDURE — 3331090001 HH PPS REVENUE CREDIT

## 2020-06-19 PROCEDURE — G0299 HHS/HOSPICE OF RN EA 15 MIN: HCPCS

## 2020-06-19 PROCEDURE — 3331090002 HH PPS REVENUE DEBIT

## 2020-06-20 PROCEDURE — 3331090002 HH PPS REVENUE DEBIT

## 2020-06-20 PROCEDURE — 3331090001 HH PPS REVENUE CREDIT

## 2020-06-21 PROCEDURE — 3331090002 HH PPS REVENUE DEBIT

## 2020-06-21 PROCEDURE — 3331090001 HH PPS REVENUE CREDIT

## 2020-06-22 PROCEDURE — 3331090001 HH PPS REVENUE CREDIT

## 2020-06-22 PROCEDURE — 3331090002 HH PPS REVENUE DEBIT

## 2020-06-23 ENCOUNTER — HOME CARE VISIT (OUTPATIENT)
Dept: SCHEDULING | Facility: HOME HEALTH | Age: 65
End: 2020-06-23
Payer: MEDICARE

## 2020-06-23 ENCOUNTER — TELEPHONE (OUTPATIENT)
Dept: INTERNAL MEDICINE CLINIC | Age: 65
End: 2020-06-23

## 2020-06-23 PROCEDURE — G0157 HHC PT ASSISTANT EA 15: HCPCS

## 2020-06-23 PROCEDURE — G0299 HHS/HOSPICE OF RN EA 15 MIN: HCPCS

## 2020-06-23 PROCEDURE — 3331090001 HH PPS REVENUE CREDIT

## 2020-06-23 PROCEDURE — 3331090002 HH PPS REVENUE DEBIT

## 2020-06-23 NOTE — TELEPHONE ENCOUNTER
Priya MATHEW with [address] [place] [address] is calling in to let you know when she went to see the patient, the patient states she has been having sharp chest pain that last 20 min or so 3 to 4 times a week. Please contact the patient and advise the correct course of action.

## 2020-06-24 VITALS
OXYGEN SATURATION: 95 % | SYSTOLIC BLOOD PRESSURE: 140 MMHG | RESPIRATION RATE: 16 BRPM | TEMPERATURE: 98.9 F | DIASTOLIC BLOOD PRESSURE: 86 MMHG | HEART RATE: 88 BPM

## 2020-06-24 PROCEDURE — 3331090001 HH PPS REVENUE CREDIT

## 2020-06-24 PROCEDURE — 3331090002 HH PPS REVENUE DEBIT

## 2020-06-24 NOTE — TELEPHONE ENCOUNTER
Pt is always complaining of pain somewhere. She was seen in the ER in May for the same complaint of sharp chest pain and evaluation was unremarkable. Sharp pains without any other sxs do not worry me.    She should make a f/u regis sooner than the one she has in August

## 2020-06-25 ENCOUNTER — HOME CARE VISIT (OUTPATIENT)
Dept: SCHEDULING | Facility: HOME HEALTH | Age: 65
End: 2020-06-25
Payer: MEDICARE

## 2020-06-25 PROCEDURE — 3331090002 HH PPS REVENUE DEBIT

## 2020-06-25 PROCEDURE — 3331090001 HH PPS REVENUE CREDIT

## 2020-06-25 PROCEDURE — G0157 HHC PT ASSISTANT EA 15: HCPCS

## 2020-06-26 PROCEDURE — 3331090002 HH PPS REVENUE DEBIT

## 2020-06-26 PROCEDURE — 3331090001 HH PPS REVENUE CREDIT

## 2020-06-27 PROCEDURE — 3331090001 HH PPS REVENUE CREDIT

## 2020-06-27 PROCEDURE — 3331090002 HH PPS REVENUE DEBIT

## 2020-06-28 VITALS
TEMPERATURE: 98.4 F | RESPIRATION RATE: 18 BRPM | HEART RATE: 83 BPM | OXYGEN SATURATION: 97 % | SYSTOLIC BLOOD PRESSURE: 154 MMHG | DIASTOLIC BLOOD PRESSURE: 90 MMHG

## 2020-06-28 PROCEDURE — 3331090001 HH PPS REVENUE CREDIT

## 2020-06-28 PROCEDURE — 3331090002 HH PPS REVENUE DEBIT

## 2020-06-29 VITALS
DIASTOLIC BLOOD PRESSURE: 90 MMHG | RESPIRATION RATE: 16 BRPM | OXYGEN SATURATION: 94 % | HEART RATE: 67 BPM | SYSTOLIC BLOOD PRESSURE: 138 MMHG | TEMPERATURE: 98.1 F

## 2020-06-29 PROCEDURE — 3331090002 HH PPS REVENUE DEBIT

## 2020-06-29 PROCEDURE — 3331090001 HH PPS REVENUE CREDIT

## 2020-06-30 PROCEDURE — 3331090002 HH PPS REVENUE DEBIT

## 2020-06-30 PROCEDURE — 3331090001 HH PPS REVENUE CREDIT

## 2020-07-01 PROCEDURE — 3331090002 HH PPS REVENUE DEBIT

## 2020-07-01 PROCEDURE — 3331090001 HH PPS REVENUE CREDIT

## 2020-07-02 ENCOUNTER — HOME CARE VISIT (OUTPATIENT)
Dept: SCHEDULING | Facility: HOME HEALTH | Age: 65
End: 2020-07-02
Payer: MEDICARE

## 2020-07-02 VITALS
OXYGEN SATURATION: 98 % | DIASTOLIC BLOOD PRESSURE: 100 MMHG | TEMPERATURE: 97.7 F | SYSTOLIC BLOOD PRESSURE: 170 MMHG | HEART RATE: 69 BPM

## 2020-07-02 PROCEDURE — 3331090002 HH PPS REVENUE DEBIT

## 2020-07-02 PROCEDURE — G0151 HHCP-SERV OF PT,EA 15 MIN: HCPCS

## 2020-07-02 PROCEDURE — 3331090001 HH PPS REVENUE CREDIT

## 2020-08-17 DIAGNOSIS — G89.4 CHRONIC PAIN SYNDROME: ICD-10-CM

## 2020-08-17 DIAGNOSIS — M54.50 CHRONIC LOW BACK PAIN: ICD-10-CM

## 2020-08-17 DIAGNOSIS — M54.2 NECK PAIN: ICD-10-CM

## 2020-08-17 DIAGNOSIS — G89.29 CHRONIC LOW BACK PAIN: ICD-10-CM

## 2020-08-17 DIAGNOSIS — Z76.0 MEDICATION REFILL: ICD-10-CM

## 2020-08-17 RX ORDER — DICLOFENAC SODIUM 10 MG/G
GEL TOPICAL
Qty: 600 G | Refills: 3 | Status: SHIPPED | OUTPATIENT
Start: 2020-08-17

## 2020-08-17 RX ORDER — CETIRIZINE HCL 10 MG
TABLET ORAL
Qty: 90 TAB | Refills: 3 | Status: SHIPPED | OUTPATIENT
Start: 2020-08-17 | End: 2022-05-10 | Stop reason: SDUPTHER

## 2020-08-17 NOTE — TELEPHONE ENCOUNTER
Patient request for refill. Last OV 5/28/20, next scheduled 8/31/20. Requested Prescriptions     Pending Prescriptions Disp Refills    oxyCODONE-acetaminophen (PERCOCET 7.5) 7.5-325 mg per tablet 360 Tab 0     Sig: Take 1 Tab by mouth every four (4) hours as needed for Pain for up to 90 days. Max Daily Amount: 6 Tabs. Indications: pain, chronic pain    ALPRAZolam (XANAX) 0.5 mg tablet 540 Tab 1     Sig: Take 2 Tabs by mouth three (3) times daily as needed for Anxiety. Max Daily Amount: 3 mg.  Indications: anxious

## 2020-08-18 RX ORDER — ALPRAZOLAM 0.5 MG/1
1 TABLET ORAL
Qty: 540 TAB | Refills: 1 | Status: SHIPPED | OUTPATIENT
Start: 2020-08-18 | End: 2020-11-12 | Stop reason: SDUPTHER

## 2020-08-18 RX ORDER — OXYCODONE AND ACETAMINOPHEN 7.5; 325 MG/1; MG/1
1 TABLET ORAL
Qty: 360 TAB | Refills: 0 | Status: SHIPPED | OUTPATIENT
Start: 2020-08-28 | End: 2020-08-20 | Stop reason: SDUPTHER

## 2020-08-20 DIAGNOSIS — M54.50 CHRONIC LOW BACK PAIN: ICD-10-CM

## 2020-08-20 DIAGNOSIS — G89.4 CHRONIC PAIN SYNDROME: ICD-10-CM

## 2020-08-20 DIAGNOSIS — Z76.0 MEDICATION REFILL: ICD-10-CM

## 2020-08-20 DIAGNOSIS — G89.29 CHRONIC LOW BACK PAIN: ICD-10-CM

## 2020-08-20 RX ORDER — OXYCODONE AND ACETAMINOPHEN 7.5; 325 MG/1; MG/1
1 TABLET ORAL
Qty: 360 TAB | Refills: 0 | Status: SHIPPED | OUTPATIENT
Start: 2020-08-28 | End: 2020-11-12 | Stop reason: SDUPTHER

## 2020-08-20 NOTE — TELEPHONE ENCOUNTER
Patient states [facility] not [facility]. Asking that [facility]. States she will  the [facility]anax at [facility] since she is out. Requested Prescriptions     Pending Prescriptions Disp Refills    oxyCODONE-acetaminophen (PERCOCET 7.5) 7.5-325 mg per tablet 360 Tab 0     Sig: Take 1 Tab by mouth every four (4) hours as needed for Pain for up to 90 days. Max Daily Amount: 6 Tabs.  Indications: pain, chronic pain

## 2020-09-28 DIAGNOSIS — Z76.0 MEDICATION REFILL: ICD-10-CM

## 2020-09-28 RX ORDER — MONTELUKAST SODIUM 10 MG/1
TABLET ORAL
Qty: 90 TAB | Refills: 3 | Status: SHIPPED | OUTPATIENT
Start: 2020-09-28 | End: 2020-11-12 | Stop reason: SDUPTHER

## 2020-09-28 RX ORDER — DICLOFENAC SODIUM 75 MG/1
TABLET, DELAYED RELEASE ORAL
Qty: 180 TAB | Refills: 3 | Status: SHIPPED | OUTPATIENT
Start: 2020-09-28 | End: 2021-09-30

## 2020-09-28 RX ORDER — ROPINIROLE 1 MG/1
TABLET, FILM COATED ORAL
Qty: 180 TAB | Refills: 5 | Status: SHIPPED | OUTPATIENT
Start: 2020-09-28 | End: 2021-06-29

## 2020-10-23 DIAGNOSIS — Z76.0 MEDICATION REFILL: ICD-10-CM

## 2020-10-23 DIAGNOSIS — G89.4 CHRONIC PAIN SYNDROME: ICD-10-CM

## 2020-10-23 DIAGNOSIS — M54.50 CHRONIC LOW BACK PAIN: ICD-10-CM

## 2020-10-23 DIAGNOSIS — G89.29 CHRONIC LOW BACK PAIN: ICD-10-CM

## 2020-10-23 RX ORDER — OXYCODONE AND ACETAMINOPHEN 7.5; 325 MG/1; MG/1
1 TABLET ORAL
Qty: 360 TAB | Refills: 0 | OUTPATIENT
Start: 2020-10-23 | End: 2021-01-21

## 2020-10-23 RX ORDER — BENZONATATE 200 MG/1
200 CAPSULE ORAL
Qty: 90 CAP | Refills: 1 | Status: SHIPPED | OUTPATIENT
Start: 2020-10-23 | End: 2020-11-13 | Stop reason: SDUPTHER

## 2020-10-23 RX ORDER — TIZANIDINE HYDROCHLORIDE 4 MG/1
4 CAPSULE, GELATIN COATED ORAL 3 TIMES DAILY
Qty: 270 CAP | Refills: 3 | Status: SHIPPED | OUTPATIENT
Start: 2020-10-23 | End: 2021-03-29

## 2020-10-23 RX ORDER — ALPRAZOLAM 0.5 MG/1
1 TABLET ORAL
Qty: 540 TAB | Refills: 1 | OUTPATIENT
Start: 2020-10-23

## 2020-10-23 NOTE — TELEPHONE ENCOUNTER
Pt has requested refills a month too soon  Percocet last filled 90 d on 8/28/20 and xanax 90 d on 8/19/20

## 2020-10-25 RX ORDER — VENLAFAXINE HYDROCHLORIDE 75 MG/1
CAPSULE, EXTENDED RELEASE ORAL
Qty: 270 CAP | Refills: 3 | Status: SHIPPED | OUTPATIENT
Start: 2020-10-25 | End: 2022-10-04 | Stop reason: SDUPTHER

## 2020-11-12 ENCOUNTER — OFFICE VISIT (OUTPATIENT)
Dept: INTERNAL MEDICINE CLINIC | Age: 65
End: 2020-11-12
Payer: MEDICARE

## 2020-11-12 ENCOUNTER — HOSPITAL ENCOUNTER (OUTPATIENT)
Dept: LAB | Age: 65
Discharge: HOME | End: 2020-11-12
Payer: MEDICARE

## 2020-11-12 VITALS
RESPIRATION RATE: 20 BRPM | HEIGHT: 66 IN | TEMPERATURE: 97.7 F | SYSTOLIC BLOOD PRESSURE: 128 MMHG | OXYGEN SATURATION: 98 % | DIASTOLIC BLOOD PRESSURE: 85 MMHG | BODY MASS INDEX: 35.52 KG/M2 | WEIGHT: 221 LBS | HEART RATE: 86 BPM

## 2020-11-12 DIAGNOSIS — Z91.89 ENCOUNTER FOR HEPATITIS C VIRUS SCREENING TEST FOR HIGH RISK PATIENT: ICD-10-CM

## 2020-11-12 DIAGNOSIS — Z11.59 ENCOUNTER FOR HEPATITIS C VIRUS SCREENING TEST FOR HIGH RISK PATIENT: ICD-10-CM

## 2020-11-12 DIAGNOSIS — Z79.891 LONG TERM (CURRENT) USE OF OPIATE ANALGESIC: ICD-10-CM

## 2020-11-12 DIAGNOSIS — G89.4 CHRONIC PAIN SYNDROME: ICD-10-CM

## 2020-11-12 DIAGNOSIS — F19.20 POLYSUBSTANCE (EXCLUDING OPIOIDS) DEPENDENCE (HCC): ICD-10-CM

## 2020-11-12 DIAGNOSIS — M54.50 CHRONIC LOW BACK PAIN: ICD-10-CM

## 2020-11-12 DIAGNOSIS — Z76.0 MEDICATION REFILL: ICD-10-CM

## 2020-11-12 DIAGNOSIS — L98.9 SKIN LESION: ICD-10-CM

## 2020-11-12 DIAGNOSIS — R55 SYNCOPE, UNSPECIFIED SYNCOPE TYPE: Primary | ICD-10-CM

## 2020-11-12 DIAGNOSIS — R55 SYNCOPE, UNSPECIFIED SYNCOPE TYPE: ICD-10-CM

## 2020-11-12 DIAGNOSIS — G89.29 CHRONIC LOW BACK PAIN: ICD-10-CM

## 2020-11-12 PROCEDURE — 3017F COLORECTAL CA SCREEN DOC REV: CPT | Performed by: INTERNAL MEDICINE

## 2020-11-12 PROCEDURE — G8752 SYS BP LESS 140: HCPCS | Performed by: INTERNAL MEDICINE

## 2020-11-12 PROCEDURE — 3288F FALL RISK ASSESSMENT DOCD: CPT | Performed by: INTERNAL MEDICINE

## 2020-11-12 PROCEDURE — 86803 HEPATITIS C AB TEST: CPT

## 2020-11-12 PROCEDURE — 1090F PRES/ABSN URINE INCON ASSESS: CPT | Performed by: INTERNAL MEDICINE

## 2020-11-12 PROCEDURE — G9899 SCRN MAM PERF RSLTS DOC: HCPCS | Performed by: INTERNAL MEDICINE

## 2020-11-12 PROCEDURE — 36415 COLL VENOUS BLD VENIPUNCTURE: CPT

## 2020-11-12 PROCEDURE — G8754 DIAS BP LESS 90: HCPCS | Performed by: INTERNAL MEDICINE

## 2020-11-12 PROCEDURE — 85025 COMPLETE CBC W/AUTO DIFF WBC: CPT

## 2020-11-12 PROCEDURE — G8399 PT W/DXA RESULTS DOCUMENT: HCPCS | Performed by: INTERNAL MEDICINE

## 2020-11-12 PROCEDURE — 1100F PTFALLS ASSESS-DOCD GE2>/YR: CPT | Performed by: INTERNAL MEDICINE

## 2020-11-12 PROCEDURE — 86780 TREPONEMA PALLIDUM: CPT

## 2020-11-12 PROCEDURE — G8427 DOCREV CUR MEDS BY ELIG CLIN: HCPCS | Performed by: INTERNAL MEDICINE

## 2020-11-12 PROCEDURE — 87491 CHLMYD TRACH DNA AMP PROBE: CPT

## 2020-11-12 PROCEDURE — G0480 DRUG TEST DEF 1-7 CLASSES: HCPCS

## 2020-11-12 PROCEDURE — 99214 OFFICE O/P EST MOD 30 MIN: CPT | Performed by: INTERNAL MEDICINE

## 2020-11-12 PROCEDURE — G8417 CALC BMI ABV UP PARAM F/U: HCPCS | Performed by: INTERNAL MEDICINE

## 2020-11-12 PROCEDURE — G9717 DOC PT DX DEP/BP F/U NT REQ: HCPCS | Performed by: INTERNAL MEDICINE

## 2020-11-12 PROCEDURE — G8536 NO DOC ELDER MAL SCRN: HCPCS | Performed by: INTERNAL MEDICINE

## 2020-11-12 RX ORDER — FUROSEMIDE 20 MG/1
20 TABLET ORAL DAILY
Qty: 90 TAB | Refills: 4 | Status: SHIPPED | OUTPATIENT
Start: 2020-11-12 | End: 2022-06-06

## 2020-11-12 RX ORDER — DOXYCYCLINE 100 MG/1
100 TABLET ORAL 2 TIMES DAILY
Qty: 20 TAB | Refills: 0 | Status: SHIPPED | OUTPATIENT
Start: 2020-11-12 | End: 2020-11-22

## 2020-11-12 RX ORDER — ZOLPIDEM TARTRATE 12.5 MG/1
TABLET, FILM COATED, EXTENDED RELEASE ORAL
Qty: 90 TAB | Refills: 1 | Status: SHIPPED | OUTPATIENT
Start: 2020-11-12 | End: 2021-07-12 | Stop reason: SDUPTHER

## 2020-11-12 RX ORDER — ATORVASTATIN CALCIUM 40 MG/1
40 TABLET, FILM COATED ORAL
Qty: 90 TAB | Refills: 4 | Status: SHIPPED | OUTPATIENT
Start: 2020-11-12 | End: 2022-01-24 | Stop reason: SDUPTHER

## 2020-11-12 RX ORDER — OXYCODONE AND ACETAMINOPHEN 7.5; 325 MG/1; MG/1
1 TABLET ORAL
Qty: 360 TAB | Refills: 0 | Status: SHIPPED | OUTPATIENT
Start: 2020-11-12 | End: 2021-02-24 | Stop reason: SDUPTHER

## 2020-11-12 RX ORDER — ALPRAZOLAM 1 MG/1
1 TABLET ORAL
Qty: 90 TAB | Refills: 5 | Status: SHIPPED | OUTPATIENT
Start: 2020-11-12 | End: 2021-06-29 | Stop reason: SDUPTHER

## 2020-11-12 RX ORDER — MONTELUKAST SODIUM 10 MG/1
10 TABLET ORAL DAILY
Qty: 90 TAB | Refills: 4 | Status: SHIPPED | OUTPATIENT
Start: 2020-11-12

## 2020-11-12 RX ORDER — HYDROCODONE BITARTRATE AND ACETAMINOPHEN 5; 325 MG/1; MG/1
TABLET ORAL
Refills: 0 | Status: CANCELLED | OUTPATIENT
Start: 2020-11-12

## 2020-11-12 RX ORDER — AZITHROMYCIN 500 MG/1
1000 TABLET, FILM COATED ORAL ONCE
Qty: 2 TAB | Refills: 0 | Status: SHIPPED | OUTPATIENT
Start: 2020-11-12 | End: 2020-11-12

## 2020-11-12 NOTE — PROGRESS NOTES
HISTORY OF PRESENT ILLNESS  Ashley Nair is a 77 y.o. female. Visit Vitals  /85 (BP 1 Location: Right arm, BP Patient Position: Sitting)   Pulse 86   Temp 97.7 °F (36.5 °C) (Temporal)   Resp 20   Ht 5' 6\" (1.676 m)   Wt 221 lb (100.2 kg)   LMP  (LMP Unknown)   SpO2 98%   BMI 35.67 kg/m²       She states she has fallen and/or passed out several times since last visit on 5/28/20    Has had to several ER visits or hospitalizations since last visit here    One time she hit her head and bruised her side and cut around her eye. She did not seek medical care at the time. Years ago she had passing out spells and says they never figured out what caused it. She denies soiling herself or biting tongue. Wakes up confused. Does not report any witness description. States her migraines have been \"off the wall\"  She has a neurologist that she has only seen once and can not recall the name. Her boyfriend (who currently is locked up) was advised her tested + for Hep C so she would lie to be tested          Review of Systems   Constitutional: Negative for chills and fever. Cardiovascular: Negative for chest pain. Syncopal episodes   Gastrointestinal: Positive for diarrhea. Neurological: Positive for dizziness and headaches. Physical Exam  Vitals signs and nursing note reviewed. Constitutional:       Appearance: Normal appearance. She is well-developed. Cardiovascular:      Rate and Rhythm: Normal rate and regular rhythm. Pulmonary:      Effort: Pulmonary effort is normal.      Breath sounds: Normal breath sounds. Genitourinary:      Skin:     General: Skin is warm and dry. Neurological:      General: No focal deficit present. Mental Status: She is alert and oriented to person, place, and time. Psychiatric:         Mood and Affect: Mood normal.         Behavior: Behavior normal.      Comments: Thoughts are all over the place today.          ASSESSMENT and PLAN    ICD-10-CM ICD-9-CM    1. Syncope, unspecified syncope type  R55 780.2 REFERRAL TO CARDIOLOGY      CBC WITH AUTOMATED DIFF      REFERRAL TO NEUROLOGY   2. Chronic low back pain  M54.5 724.2 oxyCODONE-acetaminophen (PERCOCET 7.5) 7.5-325 mg per tablet    G89.29 338.29    3. Chronic pain syndrome  G89.4 338.4 oxyCODONE-acetaminophen (PERCOCET 7.5) 7.5-325 mg per tablet   4. Skin lesion  L98.9 709.9 doxycycline (ADOXA) 100 mg tablet      CBC WITH AUTOMATED DIFF      T PALLIDUM AB      CHLAMYDIA/NEISSERIA AMPLIFICATION   5. Medication refill  Z76.0 V68.1 ALPRAZolam (XANAX) 1 mg tablet      zolpidem CR (AMBIEN CR) 12.5 mg tablet      oxyCODONE-acetaminophen (PERCOCET 7.5) 7.5-325 mg per tablet   6. Encounter for hepatitis C virus screening test for high risk patient  Z11.59 V73.89 HCV AB W/RFLX TO DEBO    Z91.89     7. Polysubstance (excluding opioids) dependence ([facility] [address]  F19.20 304.80 TOXASSURE SELECT 13 (MW)   8. Long term (current) use of opiate analgesic  Z79.891 V58.69 TOXASSURE SELECT 13 (MW)       OTC bacitracin to the leg wounds    Add doxycycline for the skin wounds. Zithromax. Update lab inc STD check and Hep C as her boyfriend just found out he has it. Refills as noted. Pt has been taking extra percocet--advised her the RX is for 4 a day and she MUST make them last. Will not fill early   reviewed and appropriate activity noted. No adverse activity noted. Refer to cardiology due to the syncopal spells. ?? Overmedicated. She is not receptive to coming off these medications at this time. Would like to refer to neurology but she does not want to go back to Dr. Maldonado Morton who has been managing her migraines. But he can help with her syncopal spells also. ? Seizures.  Will refer back to him and ask about the syncopal spells    F/u 6 weeks

## 2020-11-12 NOTE — PROGRESS NOTES
ROOM # 6    Denita Conley presents today for   Chief Complaint   Patient presents with    Diarrhea       Denita Conley preferred language for health care discussion is english/other. Is someone accompanying this pt? no    Is the patient using any DME equipment during [address]? no    Depression Screening:  3 most recent PHQ Screens 9/19/2017 7/22/2016   PHQ Not Done - Active Diagnosis of Depression or Bipolar Disorder   Little interest or pleasure in doing things Nearly every day -   Feeling down, depressed, irritable, or hopeless Nearly every day -   Total Score PHQ 2 6 -       Learning Assessment:  Learning Assessment 7/22/2016   PRIMARY LEARNER Patient   HIGHEST LEVEL OF EDUCATION - PRIMARY LEARNER  SOME COLLEGE   PRIMARY LANGUAGE ENGLISH   LEARNER PREFERENCE PRIMARY DEMONSTRATION   ANSWERED BY TINO Ga   RELATIONSHIP SELF       Abuse Screening:  Abuse Screening Questionnaire 4/15/2019 8/7/2018 12/6/2017 7/22/2016   Do you ever feel afraid of your partner? N N N N   Are you in a relationship with someone who physically or mentally threatens you? N N N N   Is it safe for you to go home? Vangie Collazo       Fall Risk  Fall Risk Assessment, last 12 mths 2/4/2019 11/7/2018 10/16/2018 8/7/2018   Able to walk? Yes Yes Yes Yes   Fall in past 12 months? Yes Yes Yes No   Fall with injury? Yes Yes Yes -   Number of falls in past 12 months 3 2 1 -   Fall Risk Score 4 3 2 -           Health Maintenance reviewed and discussed per provider. Yes    Denita Conley is due for   Health Maintenance Due   Topic Date Due    Shingrix Vaccine Age 50> (1 of 2) 06/29/2003    GLAUCOMA SCREENING Q2Y  06/29/2018    Medicare Yearly Exam  04/15/2020    Flu Vaccine (1) 09/01/2020    Breast Cancer Screen Mammogram  11/04/2020     Please order/place referral if appropriate. Advance Directive:  1. Do you have an advance directive in place? Patient Reply: no    2. If not, would you like material regarding how to put one in place? Patient Reply: no    Coordination of Care:  1. Have you been to the ER, urgent care clinic since your last visit? Hospitalized since your last visit? yes    2. Have you seen or consulted any other health care providers outside of the [address] [place] [address] since your last visit? Include any pap smears or colon screening.  no

## 2020-11-13 DIAGNOSIS — Z76.0 MEDICATION REFILL: ICD-10-CM

## 2020-11-13 LAB
BASOPHILS # BLD: 0 K/UL (ref 0–0.1)
BASOPHILS NFR BLD: 0 % (ref 0–2)
DIFFERENTIAL METHOD BLD: ABNORMAL
EOSINOPHIL # BLD: 0.3 K/UL (ref 0–0.4)
EOSINOPHIL NFR BLD: 4 % (ref 0–5)
ERYTHROCYTE [DISTWIDTH] IN BLOOD BY AUTOMATED COUNT: 14.1 % (ref 11.6–14.5)
HCT VFR BLD AUTO: 37.7 % (ref 35–45)
HGB BLD-MCNC: 12.6 G/DL (ref 12–16)
LYMPHOCYTES # BLD: 2.3 K/UL (ref 0.9–3.6)
LYMPHOCYTES NFR BLD: 33 % (ref 21–52)
MCH RBC QN AUTO: 32.3 PG (ref 24–34)
MCHC RBC AUTO-ENTMCNC: 33.4 G/DL (ref 31–37)
MCV RBC AUTO: 96.7 FL (ref 74–97)
MONOCYTES # BLD: 0.5 K/UL (ref 0.05–1.2)
MONOCYTES NFR BLD: 7 % (ref 3–10)
NEUTS SEG # BLD: 3.8 K/UL (ref 1.8–8)
NEUTS SEG NFR BLD: 56 % (ref 40–73)
PLATELET # BLD AUTO: 289 K/UL (ref 135–420)
PMV BLD AUTO: 11.7 FL (ref 9.2–11.8)
RBC # BLD AUTO: 3.9 M/UL (ref 4.2–5.3)
WBC # BLD AUTO: 6.8 K/UL (ref 4.6–13.2)

## 2020-11-13 RX ORDER — LIDOCAINE 50 MG/G
PATCH TOPICAL
Qty: 30 EACH | Refills: 5 | Status: SHIPPED | OUTPATIENT
Start: 2020-11-13 | End: 2022-06-01 | Stop reason: SDUPTHER

## 2020-11-13 RX ORDER — FROVATRIPTAN SUCCINATE 2.5 MG/1
TABLET, FILM COATED ORAL
Qty: 90 TAB | Refills: 3 | Status: SHIPPED | OUTPATIENT
Start: 2020-11-13 | End: 2021-05-20

## 2020-11-13 RX ORDER — OMEPRAZOLE 40 MG/1
CAPSULE, DELAYED RELEASE ORAL
Qty: 90 CAP | Refills: 3 | Status: SHIPPED | OUTPATIENT
Start: 2020-11-13 | End: 2021-07-12 | Stop reason: SDUPTHER

## 2020-11-16 LAB — T PALLIDUM AB SER QL IA: NONREACTIVE

## 2020-11-17 LAB
C TRACH RRNA SPEC QL NAA+PROBE: NEGATIVE
N GONORRHOEA RRNA SPEC QL NAA+PROBE: NEGATIVE
SPECIMEN SOURCE: NORMAL

## 2020-11-23 LAB
DRUGS UR: NORMAL
HCV AB S/CO SERPL IA: <0.1 S/CO RATIO (ref 0–0.9)
HCV AB SERPL QL IA: NORMAL

## 2021-01-20 ENCOUNTER — TELEPHONE (OUTPATIENT)
Dept: INTERNAL MEDICINE CLINIC | Age: 66
End: 2021-01-20

## 2021-01-21 RX ORDER — AMOXICILLIN 500 MG/1
500 CAPSULE ORAL 3 TIMES DAILY
Qty: 30 CAP | Refills: 0 | Status: SHIPPED | OUTPATIENT
Start: 2021-01-21 | End: 2021-01-31

## 2021-02-24 ENCOUNTER — OFFICE VISIT (OUTPATIENT)
Dept: INTERNAL MEDICINE CLINIC | Age: 66
End: 2021-02-24
Payer: MEDICARE

## 2021-02-24 VITALS
OXYGEN SATURATION: 96 % | WEIGHT: 212.2 LBS | SYSTOLIC BLOOD PRESSURE: 176 MMHG | HEART RATE: 69 BPM | RESPIRATION RATE: 18 BRPM | BODY MASS INDEX: 34.1 KG/M2 | DIASTOLIC BLOOD PRESSURE: 96 MMHG | HEIGHT: 66 IN | TEMPERATURE: 96.8 F

## 2021-02-24 DIAGNOSIS — L97.921 ULCERS OF BOTH LOWER EXTREMITIES, LIMITED TO BREAKDOWN OF SKIN (HCC): ICD-10-CM

## 2021-02-24 DIAGNOSIS — Z76.0 MEDICATION REFILL: ICD-10-CM

## 2021-02-24 DIAGNOSIS — Z09 HOSPITAL DISCHARGE FOLLOW-UP: Primary | ICD-10-CM

## 2021-02-24 DIAGNOSIS — G89.29 CHRONIC LOW BACK PAIN: ICD-10-CM

## 2021-02-24 DIAGNOSIS — L30.4 INTERTRIGO: ICD-10-CM

## 2021-02-24 DIAGNOSIS — G89.4 CHRONIC PAIN SYNDROME: ICD-10-CM

## 2021-02-24 DIAGNOSIS — L97.911 ULCERS OF BOTH LOWER EXTREMITIES, LIMITED TO BREAKDOWN OF SKIN (HCC): ICD-10-CM

## 2021-02-24 DIAGNOSIS — M54.50 CHRONIC LOW BACK PAIN: ICD-10-CM

## 2021-02-24 DIAGNOSIS — F19.20 POLYSUBSTANCE (EXCLUDING OPIOIDS) DEPENDENCE (HCC): ICD-10-CM

## 2021-02-24 DIAGNOSIS — F41.1 GENERALIZED ANXIETY DISORDER: ICD-10-CM

## 2021-02-24 PROBLEM — E66.01 SEVERE OBESITY (HCC): Status: RESOLVED | Noted: 2019-11-18 | Resolved: 2021-02-24

## 2021-02-24 PROCEDURE — 99214 OFFICE O/P EST MOD 30 MIN: CPT | Performed by: INTERNAL MEDICINE

## 2021-02-24 PROCEDURE — 1090F PRES/ABSN URINE INCON ASSESS: CPT | Performed by: INTERNAL MEDICINE

## 2021-02-24 PROCEDURE — G8536 NO DOC ELDER MAL SCRN: HCPCS | Performed by: INTERNAL MEDICINE

## 2021-02-24 PROCEDURE — G8427 DOCREV CUR MEDS BY ELIG CLIN: HCPCS | Performed by: INTERNAL MEDICINE

## 2021-02-24 PROCEDURE — G8417 CALC BMI ABV UP PARAM F/U: HCPCS | Performed by: INTERNAL MEDICINE

## 2021-02-24 PROCEDURE — 3017F COLORECTAL CA SCREEN DOC REV: CPT | Performed by: INTERNAL MEDICINE

## 2021-02-24 PROCEDURE — G9899 SCRN MAM PERF RSLTS DOC: HCPCS | Performed by: INTERNAL MEDICINE

## 2021-02-24 PROCEDURE — 3288F FALL RISK ASSESSMENT DOCD: CPT | Performed by: INTERNAL MEDICINE

## 2021-02-24 PROCEDURE — G9717 DOC PT DX DEP/BP F/U NT REQ: HCPCS | Performed by: INTERNAL MEDICINE

## 2021-02-24 PROCEDURE — G8399 PT W/DXA RESULTS DOCUMENT: HCPCS | Performed by: INTERNAL MEDICINE

## 2021-02-24 PROCEDURE — G8755 DIAS BP > OR = 90: HCPCS | Performed by: INTERNAL MEDICINE

## 2021-02-24 PROCEDURE — 1100F PTFALLS ASSESS-DOCD GE2>/YR: CPT | Performed by: INTERNAL MEDICINE

## 2021-02-24 PROCEDURE — G8753 SYS BP > OR = 140: HCPCS | Performed by: INTERNAL MEDICINE

## 2021-02-24 RX ORDER — PRAZOSIN HYDROCHLORIDE 1 MG/1
1 CAPSULE ORAL
COMMUNITY
End: 2021-05-20

## 2021-02-24 RX ORDER — OXYCODONE AND ACETAMINOPHEN 7.5; 325 MG/1; MG/1
1 TABLET ORAL
Qty: 360 TAB | Refills: 0 | Status: SHIPPED | OUTPATIENT
Start: 2021-02-24 | End: 2021-02-25 | Stop reason: SDUPTHER

## 2021-02-24 RX ORDER — CLOTRIMAZOLE AND BETAMETHASONE DIPROPIONATE 10; .64 MG/G; MG/G
CREAM TOPICAL
Qty: 45 G | Refills: 0 | Status: SHIPPED | OUTPATIENT
Start: 2021-02-24 | End: 2021-06-24

## 2021-02-24 RX ORDER — PROPRANOLOL HYDROCHLORIDE 10 MG/1
10 TABLET ORAL 3 TIMES DAILY
COMMUNITY
End: 2021-06-29

## 2021-02-24 RX ORDER — NYSTATIN 100000 [USP'U]/G
POWDER TOPICAL 2 TIMES DAILY
Qty: 60 G | Refills: 5 | Status: SHIPPED | OUTPATIENT
Start: 2021-02-24 | End: 2021-06-29 | Stop reason: SDUPTHER
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
## 2021-02-24 NOTE — PROGRESS NOTES
ROOM # 4  Identified pt with two pt identifiers(name and ). Reviewed record in preparation for visit and have obtained necessary documentation. Chief Complaint   Patient presents with    Dizziness     6 wk fu       Jt Ponce preferred language for health care discussion is english/other. Is the patient using any DME equipment during OV? NO    Demunir Kris is due for:  Health Maintenance Due   Topic    COVID-19 Vaccine (1 of 2)    DTaP/Tdap/Td series (1 - Tdap)    Shingrix Vaccine Age 50> (1 of 2)    GLAUCOMA SCREENING Q2Y     Medicare Yearly Exam     Flu Vaccine (1)    Breast [address] Maintenance reviewed and discussed per provider  Please order/place referral if appropriate. Advance Directive:  1. Do you have an advance directive in place? Patient Reply: NO    2. If not, would you like material regarding how to put one in place? NO    Coordination of Care:  1. Have you been to the ER, urgent care clinic since your last visit? Hospitalized since your last visit? NO    2. Have you seen or consulted any other health care providers outside of the [address] [place] [address] since your last visit? Include any pap smears or colon screening. NO    Patient is accompanied by self I have received verbal consent from Demunir Kris to discuss any/all medical information while they are present in the room.     Learning Assessment:  Learning Assessment 2016   PRIMARY LEARNER Patient   HIGHEST LEVEL OF EDUCATION - PRIMARY LEARNER  SOME COLLEGE   PRIMARY LANGUAGE ENGLISH   LEARNER PREFERENCE PRIMARY DEMONSTRATION   ANSWERED BY TINO Ga   RELATIONSHIP SELF     Depression Screening:  3 most recent [facility] 36 Screens 2021   PHQ Not Done - - Active Diagnosis of Depression or Bipolar Disorder   Little interest or pleasure in doing things Nearly every day Nearly every day -   Feeling down, depressed, irritable, or hopeless Nearly every day Nearly every day -   Total Score PHQ 2 6 6 -     Abuse Screening:  Abuse Screening Questionnaire 4/15/2019 8/7/2018 12/6/2017 7/22/2016   Do you ever feel afraid of your partner? N N N N   Are you in a relationship with someone who physically or mentally threatens you? N N N N   Is it safe for you to go home? Cristy Kramer     Fall Risk  Fall Risk Assessment, last 12 mths 11/12/2020 2/4/2019 11/7/2018 10/16/2018 8/7/2018   Able to walk? Yes Yes Yes Yes Yes   Fall in past 12 months? Yes Yes Yes Yes No   Number of falls in past 12 months 3 3 2 1 -   Fall with injury? 1 1 1 1 -     Recent Travel Screening and Travel History documentation     Travel Screening     Question   Response    In the last month, have you been in contact with someone who was confirmed or suspected to have Coronavirus / COVID-19? Yes    Have you had a COVID-19 viral test in the last 14 days? No    Do you have any of the following new or worsening symptoms? None of these    Have you traveled internationally in the last month?   No      Travel History   Travel since 01/24/21     No documented travel since 01/24/21

## 2021-02-25 ENCOUNTER — TELEPHONE (OUTPATIENT)
Dept: INTERNAL MEDICINE CLINIC | Age: 66
End: 2021-02-25

## 2021-02-25 DIAGNOSIS — Z76.0 MEDICATION REFILL: ICD-10-CM

## 2021-02-25 DIAGNOSIS — M54.50 CHRONIC LOW BACK PAIN: ICD-10-CM

## 2021-02-25 DIAGNOSIS — G89.4 CHRONIC PAIN SYNDROME: ICD-10-CM

## 2021-02-25 DIAGNOSIS — G89.29 CHRONIC LOW BACK PAIN: ICD-10-CM

## 2021-02-25 RX ORDER — OXYCODONE AND ACETAMINOPHEN 7.5; 325 MG/1; MG/1
1 TABLET ORAL
Qty: 120 TAB | Refills: 0 | Status: SHIPPED | OUTPATIENT
Start: 2021-02-25 | End: 2021-04-16 | Stop reason: SDUPTHER

## 2021-02-25 NOTE — TELEPHONE ENCOUNTER
Pharmacy called stating they need to verify the Percocet prescription with you before they can fill it.     [address]

## 2021-02-25 NOTE — TELEPHONE ENCOUNTER
Spoke with pharmacist. They don't usually fill her percocet and not for a 90 day supply (I had asked pt yesterday where to send it and she asked for this [facility]. It has been difficult keeping track of her due to her social situation and homelessness.)  Will refill at [facility] for 30 days only.

## 2021-03-03 ENCOUNTER — TELEPHONE (OUTPATIENT)
Dept: INTERNAL MEDICINE CLINIC | Age: 66
End: 2021-03-03

## 2021-03-03 DIAGNOSIS — R30.0 DYSURIA: Primary | ICD-10-CM

## 2021-03-03 NOTE — TELEPHONE ENCOUNTER
Patient is calling in stating she has a UTI, would chalo to know if you can call something on for the infection and the pain.

## 2021-03-11 ENCOUNTER — PATIENT OUTREACH (OUTPATIENT)
Dept: CASE MANAGEMENT | Age: 66
End: 2021-03-11
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
## 2021-03-19 ENCOUNTER — PATIENT OUTREACH (OUTPATIENT)
Dept: CASE MANAGEMENT | Age: 66
End: 2021-03-19

## 2021-03-19 NOTE — PROGRESS NOTES
.Complex Case Management  Follow-Up       Date/Time:   3/19/2021   2:45 PM       Ambulatory Care Manager ( ACM) contacted patient for Complex Case Management  follow up. Spoke to patient  Introduced self/role and reason for call. Patient reported: She has had her Nuclear Stress test done. After test they placed a tiny heart monitor on her she has to wear it for 2 weeks for any chest pains,SOB, irregular,fast or slow heart rate push the button. Everything was done at Dr Shanika Pena office on [address]. Received COVID19 results from [place] , It was negative. She has her name on the Health Department list for the COVID19 Vaccine. Voicing she is feeling much better. Patient reminded of up coming PCP appointment for 3/25/2021 @ 1:30PM. Fatmata Gill she forgets even after she writes it down,but she will again . Pertinent concerns:None     Specialist appointments since last outreach? Cardiology   If so, specialist and date: For got date ,but is verbalizing she has to waer the heart monitor for 2 weeks. Cardiology notes can not be read in [facility]. Next PCP Appointment: 3/25/2021 @ 1:30 pm Dr Zenia Lacy    Medications:     New medications since last outreach: No  Does patient need refills on any medications: No   Medication changes since last outreach (dose adjustments or discontinued meds): No      Home Health company: N/A         Patient reminded that there are physicians on call 24 hours a day / 7 days a week (M-F 5pm to 8am and from Friday 5pm until Monday 8a for the weekend) should the patient have questions or concerns.

## 2021-03-25 ENCOUNTER — OFFICE VISIT (OUTPATIENT)
Dept: INTERNAL MEDICINE CLINIC | Age: 66
End: 2021-03-25
Payer: MEDICARE

## 2021-03-25 ENCOUNTER — HOSPITAL ENCOUNTER (OUTPATIENT)
Dept: LAB | Age: 66
Discharge: HOME | End: 2021-03-25
Payer: MEDICARE

## 2021-03-25 VITALS
DIASTOLIC BLOOD PRESSURE: 90 MMHG | OXYGEN SATURATION: 94 % | SYSTOLIC BLOOD PRESSURE: 136 MMHG | RESPIRATION RATE: 20 BRPM | HEIGHT: 66 IN | BODY MASS INDEX: 33.59 KG/M2 | WEIGHT: 209 LBS | HEART RATE: 74 BPM | TEMPERATURE: 98 F

## 2021-03-25 DIAGNOSIS — E23.0 HYPOPITUITARISM (HCC): ICD-10-CM

## 2021-03-25 DIAGNOSIS — D35.2 PROLACTINOMA (HCC): ICD-10-CM

## 2021-03-25 DIAGNOSIS — F41.1 GENERALIZED ANXIETY DISORDER: ICD-10-CM

## 2021-03-25 DIAGNOSIS — E55.9 VITAMIN D DEFICIENCY, UNSPECIFIED: ICD-10-CM

## 2021-03-25 DIAGNOSIS — Z12.31 SCREENING MAMMOGRAM FOR HIGH-RISK PATIENT: ICD-10-CM

## 2021-03-25 DIAGNOSIS — Z00.00 MEDICARE ANNUAL WELLNESS VISIT, SUBSEQUENT: Primary | ICD-10-CM

## 2021-03-25 DIAGNOSIS — D68.59 HYPERCOAGULABLE STATE (HCC): ICD-10-CM

## 2021-03-25 DIAGNOSIS — L97.911 ULCERS OF BOTH LOWER EXTREMITIES, LIMITED TO BREAKDOWN OF SKIN (HCC): ICD-10-CM

## 2021-03-25 DIAGNOSIS — L97.921 ULCERS OF BOTH LOWER EXTREMITIES, LIMITED TO BREAKDOWN OF SKIN (HCC): ICD-10-CM

## 2021-03-25 DIAGNOSIS — E78.5 DYSLIPIDEMIA: Chronic | ICD-10-CM

## 2021-03-25 DIAGNOSIS — G89.4 CHRONIC PAIN SYNDROME: ICD-10-CM

## 2021-03-25 DIAGNOSIS — R30.0 DYSURIA: ICD-10-CM

## 2021-03-25 DIAGNOSIS — R55 SYNCOPE, UNSPECIFIED SYNCOPE TYPE: ICD-10-CM

## 2021-03-25 DIAGNOSIS — L98.9 SKIN LESION: ICD-10-CM

## 2021-03-25 DIAGNOSIS — Z79.891 LONG TERM (CURRENT) USE OF OPIATE ANALGESIC: ICD-10-CM

## 2021-03-25 LAB
25(OH)D3 SERPL-MCNC: 23.3 NG/ML (ref 30–100)
ALBUMIN SERPL-MCNC: 4.3 G/DL (ref 3.4–5)
ALBUMIN/GLOB SERPL: 1.4 {RATIO} (ref 0.8–1.7)
ALP SERPL-CCNC: 67 U/L (ref 45–117)
ALT SERPL-CCNC: 30 U/L (ref 13–56)
ANION GAP SERPL CALC-SCNC: 7 MMOL/L (ref 3–18)
APPEARANCE UR: CLEAR
AST SERPL-CCNC: 17 U/L (ref 10–38)
BACTERIA URNS QL MICRO: ABNORMAL /HPF
BASOPHILS # BLD: 0 K/UL (ref 0–0.1)
BASOPHILS NFR BLD: 0 % (ref 0–2)
BILIRUB SERPL-MCNC: 0.6 MG/DL (ref 0.2–1)
BILIRUB UR QL: NEGATIVE
BUN SERPL-MCNC: 12 MG/DL (ref 7–18)
BUN/CREAT SERPL: 14 (ref 12–20)
CALCIUM SERPL-MCNC: 9.1 MG/DL (ref 8.5–10.1)
CALCIUM SERPL-MCNC: 9.3 MG/DL (ref 8.5–10.1)
CHLORIDE SERPL-SCNC: 110 MMOL/L (ref 100–111)
CHOLEST SERPL-MCNC: 175 MG/DL
CO2 SERPL-SCNC: 27 MMOL/L (ref 21–32)
COLOR UR: YELLOW
CREAT SERPL-MCNC: 0.84 MG/DL (ref 0.6–1.3)
DIFFERENTIAL METHOD BLD: ABNORMAL
EOSINOPHIL # BLD: 0.2 K/UL (ref 0–0.4)
EOSINOPHIL NFR BLD: 3 % (ref 0–5)
EPITH CASTS URNS QL MICRO: ABNORMAL /LPF (ref 0–5)
ERYTHROCYTE [DISTWIDTH] IN BLOOD BY AUTOMATED COUNT: 13.1 % (ref 11.6–14.5)
GLOBULIN SER CALC-MCNC: 3.1 G/DL (ref 2–4)
GLUCOSE SERPL-MCNC: 104 MG/DL (ref 74–99)
GLUCOSE UR STRIP.AUTO-MCNC: NEGATIVE MG/DL
HCT VFR BLD AUTO: 38.7 % (ref 35–45)
HDLC SERPL-MCNC: 51 MG/DL (ref 40–60)
HDLC SERPL: 3.4 {RATIO} (ref 0–5)
HGB BLD-MCNC: 13 G/DL (ref 12–16)
HGB UR QL STRIP: NEGATIVE
KETONES UR QL STRIP.AUTO: ABNORMAL MG/DL
LDLC SERPL CALC-MCNC: 104.8 MG/DL (ref 0–100)
LEUKOCYTE ESTERASE UR QL STRIP.AUTO: ABNORMAL
LIPID PROFILE,FLP: ABNORMAL
LYMPHOCYTES # BLD: 3.3 K/UL (ref 0.9–3.6)
LYMPHOCYTES NFR BLD: 41 % (ref 21–52)
MCH RBC QN AUTO: 32.5 PG (ref 24–34)
MCHC RBC AUTO-ENTMCNC: 33.6 G/DL (ref 31–37)
MCV RBC AUTO: 96.8 FL (ref 74–97)
MONOCYTES # BLD: 0.8 K/UL (ref 0.05–1.2)
MONOCYTES NFR BLD: 10 % (ref 3–10)
MUCOUS THREADS URNS QL MICRO: ABNORMAL /LPF
NEUTS SEG # BLD: 3.8 K/UL (ref 1.8–8)
NEUTS SEG NFR BLD: 46 % (ref 40–73)
NITRITE UR QL STRIP.AUTO: NEGATIVE
PH UR STRIP: 5.5 [PH] (ref 5–8)
PLATELET # BLD AUTO: 252 K/UL (ref 135–420)
PMV BLD AUTO: 11.6 FL (ref 9.2–11.8)
POTASSIUM SERPL-SCNC: 3.6 MMOL/L (ref 3.5–5.5)
PROT SERPL-MCNC: 7.4 G/DL (ref 6.4–8.2)
PROT UR STRIP-MCNC: 100 MG/DL
PTH-INTACT SERPL-MCNC: 87.8 PG/ML (ref 18.4–88)
RBC # BLD AUTO: 4 M/UL (ref 4.2–5.3)
SODIUM SERPL-SCNC: 144 MMOL/L (ref 136–145)
SP GR UR REFRACTOMETRY: 1.02 (ref 1–1.03)
T4 FREE SERPL-MCNC: 0.9 NG/DL (ref 0.7–1.5)
TRIGL SERPL-MCNC: 96 MG/DL (ref ?–150)
TSH SERPL DL<=0.05 MIU/L-ACNC: 1.24 UIU/ML (ref 0.36–3.74)
UROBILINOGEN UR QL STRIP.AUTO: 0.2 EU/DL (ref 0.2–1)
VLDLC SERPL CALC-MCNC: 19.2 MG/DL
WBC # BLD AUTO: 8.2 K/UL (ref 4.6–13.2)
WBC URNS QL MICRO: ABNORMAL /HPF (ref 0–5)

## 2021-03-25 PROCEDURE — G8755 DIAS BP > OR = 90: HCPCS | Performed by: INTERNAL MEDICINE

## 2021-03-25 PROCEDURE — 82306 VITAMIN D 25 HYDROXY: CPT

## 2021-03-25 PROCEDURE — 84439 ASSAY OF FREE THYROXINE: CPT

## 2021-03-25 PROCEDURE — 1090F PRES/ABSN URINE INCON ASSESS: CPT | Performed by: INTERNAL MEDICINE

## 2021-03-25 PROCEDURE — 83970 ASSAY OF PARATHORMONE: CPT

## 2021-03-25 PROCEDURE — G9899 SCRN MAM PERF RSLTS DOC: HCPCS | Performed by: INTERNAL MEDICINE

## 2021-03-25 PROCEDURE — G8427 DOCREV CUR MEDS BY ELIG CLIN: HCPCS | Performed by: INTERNAL MEDICINE

## 2021-03-25 PROCEDURE — 36415 COLL VENOUS BLD VENIPUNCTURE: CPT

## 2021-03-25 PROCEDURE — G8417 CALC BMI ABV UP PARAM F/U: HCPCS | Performed by: INTERNAL MEDICINE

## 2021-03-25 PROCEDURE — G8399 PT W/DXA RESULTS DOCUMENT: HCPCS | Performed by: INTERNAL MEDICINE

## 2021-03-25 PROCEDURE — 3017F COLORECTAL CA SCREEN DOC REV: CPT | Performed by: INTERNAL MEDICINE

## 2021-03-25 PROCEDURE — 85025 COMPLETE CBC W/AUTO DIFF WBC: CPT

## 2021-03-25 PROCEDURE — 3288F FALL RISK ASSESSMENT DOCD: CPT | Performed by: INTERNAL MEDICINE

## 2021-03-25 PROCEDURE — 81001 URINALYSIS AUTO W/SCOPE: CPT

## 2021-03-25 PROCEDURE — G8536 NO DOC ELDER MAL SCRN: HCPCS | Performed by: INTERNAL MEDICINE

## 2021-03-25 PROCEDURE — G9717 DOC PT DX DEP/BP F/U NT REQ: HCPCS | Performed by: INTERNAL MEDICINE

## 2021-03-25 PROCEDURE — 99214 OFFICE O/P EST MOD 30 MIN: CPT | Performed by: INTERNAL MEDICINE

## 2021-03-25 PROCEDURE — 80061 LIPID PANEL: CPT

## 2021-03-25 PROCEDURE — G8752 SYS BP LESS 140: HCPCS | Performed by: INTERNAL MEDICINE

## 2021-03-25 PROCEDURE — 80053 COMPREHEN METABOLIC PANEL: CPT

## 2021-03-25 PROCEDURE — G0439 PPPS, SUBSEQ VISIT: HCPCS | Performed by: INTERNAL MEDICINE

## 2021-03-25 PROCEDURE — 1100F PTFALLS ASSESS-DOCD GE2>/YR: CPT | Performed by: INTERNAL MEDICINE
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
## 2021-03-25 NOTE — PROGRESS NOTES
HISTORY OF PRESENT ILLNESS  Rosangela Luo is a 77 y.o. female. Visit Vitals  BP (!) 136/90   Pulse 74   Temp 98 °F (36.7 °C) (Oral)   Resp 20   Ht 5' 6\" (1.676 m)   Wt 209 lb (94.8 kg)   LMP  (LMP Unknown)   SpO2 94%   BMI 33.73 kg/m²       She also has seen cardiology since last visit. Had a stress test which she says were negative. A heart monitor was worn for a week--results are pending. For pre-syncope      She still has lesions on her legs that start as bumps/bites, then ulcerate. She is using all sorts of creams with variable results. Has not seen a derm yet. She also has a small ulcer in her right nares that bleeds off and on  Skin Problem  The history is provided by the patient (see comments). This is a chronic problem. Pertinent negatives include no chest pain and no shortness of breath. Review of Systems   Constitutional: Negative for chills and fever. Respiratory: Negative for shortness of breath. Cardiovascular: Negative for chest pain and palpitations. Musculoskeletal: Positive for joint pain. Skin: Positive for rash. Neurological: Positive for dizziness (pre-syncope. ? prazosin). Psychiatric/Behavioral: Positive for memory loss. The patient is nervous/anxious. Physical Exam  Vitals signs and nursing note reviewed. Constitutional:       Appearance: Normal appearance. She is well-developed. HENT:      Head: Normocephalic and atraumatic. Cardiovascular:      Rate and Rhythm: Normal rate and regular rhythm. Pulmonary:      Effort: Pulmonary effort is normal.      Breath sounds: Normal breath sounds. Musculoskeletal:      Right lower leg: No edema. Left lower leg: No edema. Skin:     General: Skin is warm and dry. Neurological:      General: No focal deficit present. Mental Status: She is alert and oriented to person, place, and time.    Psychiatric:         Mood and Affect: Mood normal.         Behavior: Behavior normal.         ASSESSMENT and PLAN    ICD-10-CM ICD-9-CM                  3. Chronic pain syndrome  G89.4 338.4    4. Syncope, unspecified syncope type  Y31 983.3 METABOLIC PANEL, COMPREHENSIVE      LIPID PANEL      CBC WITH AUTOMATED DIFF   5. Hypopituitarism (HCC)  E23.0 253.2 VITAMIN D, 25 HYDROXY      PTH INTACT   6. Hypercoagulable state ([facility] [address]  D68.59 289.81    7. Prolactinoma ([facility]  H11.5 199.4 METABOLIC PANEL, COMPREHENSIVE      VITAMIN D, 25 HYDROXY      PTH INTACT   8. Generalized anxiety disorder  F41.1 300.02 TSH AND FREE T4   9. Skin lesion  L98.9 709.9    10. Ulcers of both lower extremities, limited to breakdown of skin ([facility])  L97.911 707.10 CBC WITH AUTOMATED DIFF    L97.921     11. Dyslipidemia  E78.5 272.4 LIPID PANEL      TSH AND FREE T4   12. Dysuria  R30.0 788. 1 URINALYSIS W/ RFLX MICROSCOPIC   13. Vitamin D deficiency, unspecified   E55.9 268.9 VITAMIN D, 25 HYDROXY   14.  Long term (current) use of opiate analgesic  Z79.891 V58.69      Discussed COVID vaccine and pt wishes to receive it when available    Update lab    F/u 3 months for anxiety, chol, skin check

## 2021-03-25 NOTE — PROGRESS NOTES
Aundrea Rivas is a 77 y.o. female (: 1955) presenting to address:    Chief Complaint   Patient presents with    Annual Wellness Visit       Vitals:    21 1324   BP: (!) 136/94   Pulse: 74   Resp: 20   Temp: 98 °F (36.7 °C)   TempSrc: Oral   SpO2: 94%   Weight: 209 lb (94.8 kg)   Height: 5' 6\" (1.676 m)       Hearing/Vision:   No exam data present    Learning Assessment:     Learning Assessment 3/25/2021   PRIMARY LEARNER Patient   HIGHEST LEVEL OF EDUCATION - PRIMARY LEARNER  SOME COLLEGE   BARRIERS PRIMARY LEARNER NONE   CO-LEARNER CAREGIVER No   PRIMARY LANGUAGE ENGLISH   LEARNER PREFERENCE PRIMARY PICTURES   ANSWERED BY patient   RELATIONSHIP SELF     Depression Screening:     3 most recent PHQ Screens 3/25/2021   PHQ Not Done -   Little interest or pleasure in doing things Several days   Feeling down, depressed, irritable, or hopeless Not at all   Total Score PHQ 2 1     Fall Risk Assessment:     Fall Risk Assessment, last 12 mths 3/25/2021   Able to walk? Yes   Fall in past 12 months? 1   Number of falls in past 12 months 2   Fall with injury? 1     Abuse Screening:     Abuse Screening Questionnaire 3/25/2021   Do you ever feel afraid of your partner? N   Are you in a relationship with someone who physically or mentally threatens you? N   Is it safe for you to go home? Y     Coordination of Care Questionaire:   1. Have you been to the ER, urgent care clinic since your last visit? Hospitalized since your last visit? NO    2. Have you seen or consulted any other health care providers outside of the [address] [place] [address] since your last visit? Include any pap smears or colon screening. YES    Advanced Directive:   1. Do you have an Advanced Directive? NO    2. Would you like information on Advanced Directives?  NO

## 2021-03-25 NOTE — PATIENT INSTRUCTIONS
Medicare Wellness Visit, Female     The best way to live healthy is to have a lifestyle where you eat a well-balanced diet, exercise regularly, limit alcohol use, and quit all forms of tobacco/nicotine, if applicable.     Regular preventive services are another way to keep healthy. Preventive services (vaccines, screening tests, monitoring & exams) can help personalize your care plan, which helps you manage your own care. Screening tests can find health problems at the earliest stages, when they are easiest to treat.   [facility] follows the current, evidence-based guidelines published by the [place] Preventive Services Task Force (USPSTF) when recommending preventive services for our patients. Because we follow these guidelines, sometimes recommendations change over time as research supports it. (For example, mammograms used to be recommended annually. Even though Medicare will still pay for an annual mammogram, the newer guidelines recommend a mammogram every two years for women of average risk).  Of course, you and your doctor may decide to screen more often for some diseases, based on your risk and your co-morbidities (chronic disease you are already diagnosed with).     Preventive services for you include:  - Medicare offers their members a free annual wellness visit, which is time for you and your primary care provider to discuss and plan for your preventive service needs. Take advantage of this benefit every year!  -All adults over the age of 65 should receive the recommended pneumonia vaccines. Current USPSTF guidelines recommend a series of two vaccines for the best pneumonia protection.   -All adults should have a flu vaccine yearly and a tetanus vaccine every 10 years.   -All adults age 50 and older should receive the shingles vaccines (series of two vaccines).      -All adults age 40-70 who are overweight should have a diabetes screening test once every three years.   -All  adults born between 80 and 1965 should be screened once for Hepatitis C.  -Other screening tests and preventive services for persons with diabetes include: an eye exam to screen for diabetic retinopathy, a kidney function test, a foot exam, and stricter control over your cholesterol.   -Cardiovascular screening for adults with routine risk involves an electrocardiogram (ECG) at intervals determined by your doctor.   -Colorectal cancer screenings should be done for adults age 54-65 with no increased risk factors for colorectal cancer. There are a number of acceptable methods of screening for this type of cancer. Each test has its own benefits and drawbacks. Discuss with your doctor what is most appropriate for you during your annual wellness visit. The different tests include: colonoscopy (considered the best screening method), a fecal occult blood test, a fecal DNA test, and sigmoidoscopy.    -A bone mass density test is recommended when a woman turns 65 to screen for osteoporosis. This test is only recommended one time, as a screening. Some providers will use this same test as a disease monitoring tool if you already have osteoporosis. -Breast cancer screenings are recommended every other year for women of normal risk, age 54-69.  -Cervical cancer screenings for women over age 72 are only recommended with certain risk factors.      Here is a list of your current Health Maintenance items (your personalized list of preventive services) with a due date:  Health Maintenance Due   Topic Date Due    COVID-19 Vaccine (1) Never done    Shingles Vaccine (1 of 2) Never done    Mammogram  11/04/2020

## 2021-03-26 ENCOUNTER — TRANSCRIBE ORDER (OUTPATIENT)
Dept: SCHEDULING | Age: 66
End: 2021-03-26

## 2021-03-26 DIAGNOSIS — Z12.31 VISIT FOR SCREENING MAMMOGRAM: Primary | ICD-10-CM

## 2021-03-28 RX ORDER — ERGOCALCIFEROL 1.25 MG/1
50000 CAPSULE ORAL
Qty: 12 CAP | Refills: 1 | Status: SHIPPED | OUTPATIENT
Start: 2021-03-28 | End: 2021-08-31 | Stop reason: SDUPTHER

## 2021-03-29 DIAGNOSIS — Z76.0 MEDICATION REFILL: ICD-10-CM

## 2021-03-29 RX ORDER — TIZANIDINE HYDROCHLORIDE 4 MG/1
CAPSULE, GELATIN COATED ORAL
Qty: 270 CAP | Refills: 3 | Status: SHIPPED | OUTPATIENT
Start: 2021-03-29 | End: 2021-07-07 | Stop reason: SDUPTHER

## 2021-03-29 RX ORDER — POTASSIUM CHLORIDE 20 MEQ/1
TABLET, EXTENDED RELEASE ORAL
Qty: 90 TAB | Refills: 3 | Status: SHIPPED | OUTPATIENT
Start: 2021-03-29 | End: 2022-03-03

## 2021-03-30 ENCOUNTER — PATIENT OUTREACH (OUTPATIENT)
Dept: CASE MANAGEMENT | Age: 66
End: 2021-03-30

## 2021-03-30 PROBLEM — G40.909 SEIZURE DISORDER (HCC): Status: ACTIVE | Noted: 2021-03-30
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
## 2021-04-09 ENCOUNTER — HOSPITAL ENCOUNTER (OUTPATIENT)
Dept: WOMENS IMAGING | Age: 66
Discharge: HOME | End: 2021-04-09
Attending: INTERNAL MEDICINE
Payer: MEDICARE

## 2021-04-09 DIAGNOSIS — Z12.31 VISIT FOR SCREENING MAMMOGRAM: ICD-10-CM

## 2021-04-09 PROCEDURE — 77063 BREAST TOMOSYNTHESIS BI: CPT

## 2021-04-16 DIAGNOSIS — M54.50 CHRONIC LOW BACK PAIN: ICD-10-CM

## 2021-04-16 DIAGNOSIS — Z76.0 MEDICATION REFILL: ICD-10-CM

## 2021-04-16 DIAGNOSIS — G89.4 CHRONIC PAIN SYNDROME: ICD-10-CM

## 2021-04-16 DIAGNOSIS — G89.29 CHRONIC LOW BACK PAIN: ICD-10-CM

## 2021-04-16 RX ORDER — OXYCODONE AND ACETAMINOPHEN 7.5; 325 MG/1; MG/1
1 TABLET ORAL
Qty: 120 TAB | Refills: 0 | Status: SHIPPED | OUTPATIENT
Start: 2021-04-16 | End: 2021-05-20 | Stop reason: SDUPTHER

## 2021-04-16 NOTE — TELEPHONE ENCOUNTER
For now, just 30 days. There has been some concern about her getting a large supply.    See telephone call from 2/25/21

## 2021-04-16 NOTE — TELEPHONE ENCOUNTER
Patient request for refill. States is it a 30 day supply please send to her local [address] [place] [address], if a 90 day Rx please send to [facility]. Thank you    Requested Prescriptions     Pending Prescriptions Disp Refills    oxyCODONE-acetaminophen (PERCOCET 7.5) 7.5-325 mg per tablet 120 Tab 0     Sig: Take 1 Tab by mouth every four (4) hours as needed for Pain for up to 30 days. Max Daily Amount: 6 Tabs.  Indications: pain, chronic pain

## 2021-04-21 ENCOUNTER — PATIENT OUTREACH (OUTPATIENT)
Dept: CASE MANAGEMENT | Age: 66
End: 2021-04-21

## 2021-05-11 ENCOUNTER — PATIENT OUTREACH (OUTPATIENT)
Dept: CASE MANAGEMENT | Age: 66
End: 2021-05-11

## 2021-05-11 NOTE — PROGRESS NOTES
.Complex Case Management  Follow-Up       Date/Time:   5/11/2021  10:12 AM       Ambulatory Care Manager ( ACM) contacted patient for Complex Case Management  follow up. Spoke to patient  Introduced self/role and reason for call. Patient reported: She is doing fine. Phone woke her up. Pertinent concerns:Denies       Specialist appointments since last outreach? No   If so, specialist and date: N/A    Next PCP Appointment: 6/29/2021    Medications:     New medications since last outreach: No  Does patient need refills on any medications: No   Medication changes since last outreach (dose adjustments or discontinued meds): No      Home Health company: N/A    Barriers to care? None  . Goals Addressed                 This Visit's Progress       Chronic Disease     Coordinate pain management plan for patient. On track     Patient will maintain pain medication as prescribe. UA will be in normal limits for opids       Develops appropriate coping skills. On track     Patient has Anxiety; Xanax 1 mg tid as needed not to exceed 3 mg /24 hours         General     Attends follow up appointments on schedule        Patient will keep all follow up providers appointment. Next f/u 6/29/2021  Neurology F/U 4/13/21 Dr Elver Ward  5/11/2021  Patient kept 3/25/21 appointment with PCP for [facility]          Takes all medications as ordered   On track     Pt will adhere to medication regime   Patient will take medications as prescribed. Post Hospitalization     COMPLETED: Prevent complications post hospitalization. Patient will attend f/u appt with PCP. Patient will continue to take all medications as prescribed. Patient will call [facility] and/or PCP office for any questions and/or concerns. Patient reminded that there are physicians on call 24 hours a day / 7 days a week (M-F 5pm to 8am and from Friday 5pm until Monday 8a for the weekend) should the patient have questions or concerns.

## 2021-05-11 NOTE — PROGRESS NOTES
.Date/Time:  4/21/2021  9:49 AM    Method of communication with patient:phone    [facility] ( [facility]) made out reach to  patient by telephone for Complex Case Management  ( CCM) follow up. Provided introduction to self, and explanation of the Ambulatory Care . Contact at [phone] anytime Monday thru Friday 08:00-4:30.

## 2021-05-20 ENCOUNTER — OFFICE VISIT (OUTPATIENT)
Dept: INTERNAL MEDICINE CLINIC | Age: 66
End: 2021-05-20
Payer: MEDICARE

## 2021-05-20 VITALS
DIASTOLIC BLOOD PRESSURE: 84 MMHG | BODY MASS INDEX: 33.68 KG/M2 | TEMPERATURE: 97.4 F | HEIGHT: 66 IN | HEART RATE: 61 BPM | RESPIRATION RATE: 16 BRPM | OXYGEN SATURATION: 97 % | WEIGHT: 209.6 LBS | SYSTOLIC BLOOD PRESSURE: 168 MMHG

## 2021-05-20 DIAGNOSIS — G89.29 CHRONIC LOW BACK PAIN: ICD-10-CM

## 2021-05-20 DIAGNOSIS — I10 ESSENTIAL HYPERTENSION: Primary | ICD-10-CM

## 2021-05-20 DIAGNOSIS — F39 MOOD DISORDER (HCC): ICD-10-CM

## 2021-05-20 DIAGNOSIS — G40.909 SEIZURE DISORDER (HCC): ICD-10-CM

## 2021-05-20 DIAGNOSIS — M54.50 CHRONIC LOW BACK PAIN: ICD-10-CM

## 2021-05-20 DIAGNOSIS — R29.898 RIGHT LEG WEAKNESS: ICD-10-CM

## 2021-05-20 DIAGNOSIS — Z76.0 MEDICATION REFILL: ICD-10-CM

## 2021-05-20 DIAGNOSIS — G89.4 CHRONIC PAIN SYNDROME: ICD-10-CM

## 2021-05-20 PROCEDURE — G8399 PT W/DXA RESULTS DOCUMENT: HCPCS | Performed by: INTERNAL MEDICINE

## 2021-05-20 PROCEDURE — G8427 DOCREV CUR MEDS BY ELIG CLIN: HCPCS | Performed by: INTERNAL MEDICINE

## 2021-05-20 PROCEDURE — G9717 DOC PT DX DEP/BP F/U NT REQ: HCPCS | Performed by: INTERNAL MEDICINE

## 2021-05-20 PROCEDURE — G9899 SCRN MAM PERF RSLTS DOC: HCPCS | Performed by: INTERNAL MEDICINE

## 2021-05-20 PROCEDURE — G8536 NO DOC ELDER MAL SCRN: HCPCS | Performed by: INTERNAL MEDICINE

## 2021-05-20 PROCEDURE — 1100F PTFALLS ASSESS-DOCD GE2>/YR: CPT | Performed by: INTERNAL MEDICINE

## 2021-05-20 PROCEDURE — 3017F COLORECTAL CA SCREEN DOC REV: CPT | Performed by: INTERNAL MEDICINE

## 2021-05-20 PROCEDURE — G8754 DIAS BP LESS 90: HCPCS | Performed by: INTERNAL MEDICINE

## 2021-05-20 PROCEDURE — G8753 SYS BP > OR = 140: HCPCS | Performed by: INTERNAL MEDICINE

## 2021-05-20 PROCEDURE — 99214 OFFICE O/P EST MOD 30 MIN: CPT | Performed by: INTERNAL MEDICINE

## 2021-05-20 PROCEDURE — 1090F PRES/ABSN URINE INCON ASSESS: CPT | Performed by: INTERNAL MEDICINE

## 2021-05-20 PROCEDURE — G8417 CALC BMI ABV UP PARAM F/U: HCPCS | Performed by: INTERNAL MEDICINE

## 2021-05-20 PROCEDURE — 3288F FALL RISK ASSESSMENT DOCD: CPT | Performed by: INTERNAL MEDICINE

## 2021-05-20 RX ORDER — OXYCODONE AND ACETAMINOPHEN 7.5; 325 MG/1; MG/1
1 TABLET ORAL
Qty: 120 TABLET | Refills: 0 | Status: SHIPPED | OUTPATIENT
Start: 2021-05-20 | End: 2021-08-31 | Stop reason: SDUPTHER

## 2021-05-20 RX ORDER — OXYCODONE AND ACETAMINOPHEN 7.5; 325 MG/1; MG/1
1 TABLET ORAL
Qty: 120 TABLET | Refills: 0 | Status: SHIPPED | OUTPATIENT
Start: 2021-06-18 | End: 2021-06-29 | Stop reason: SDUPTHER

## 2021-05-20 RX ORDER — AMLODIPINE BESYLATE 5 MG/1
5 TABLET ORAL DAILY
Qty: 30 TABLET | Refills: 2 | Status: SHIPPED | OUTPATIENT
Start: 2021-05-20 | End: 2021-08-15

## 2021-05-20 RX ORDER — TROSPIUM CHLORIDE 20 MG/1
20 TABLET, FILM COATED ORAL
Qty: 60 TABLET | Refills: 6 | Status: SHIPPED | OUTPATIENT
Start: 2021-05-20 | End: 2022-04-18

## 2021-05-20 NOTE — PROGRESS NOTES
Reviewed record in preparation for visit and have obtained necessary documentation. Norbert Khan is a 77 y.o.  female presents today for office visit for   Chief Complaint   Patient presents with    Enuresis    Fall   . Pt is not fasting. Patient is accompanied by self. Pt is in Room # [place] preferred language for health care discussion is english/other. Is the patient using any DME equipment during OV? YES - cane    Advance Directive:  1. Do you have an advance directive in place? Patient Reply: NO    2. If not, would you like material regarding how to put one in place? NO    Coordination of Care:  1. Have you been to the ER, urgent care clinic since your last visit? Hospitalized since your last visit? YES, [facility] for fall 5/2021    2. Have you seen or consulted any other health care providers outside of the [address] [place] [address] since your last visit? Include any pap smears or colon screening. YES, Neurology 2021    Upcoming Appts  No    VORB: No orders of the defined types were placed in this encounter.  Kobe Vu MD/Tara Howard , EARNEST    Norbert Khan is due for:   Health Maintenance Due   Topic    COVID-19 Vaccine (1)    Shingrix Vaccine Age 50> (1 of 2)     Health Maintenance reviewed and discussed per provider  Please order/place referral if appropriate.     Requested Prescriptions      No prescriptions requested or ordered in this encounter       Learning Assessment:  Learning Assessment 3/25/2021 7/22/2016   PRIMARY LEARNER Patient Patient   HIGHEST LEVEL OF EDUCATION - PRIMARY LEARNER  SOME COLLEGE SOME COLLEGE   BARRIERS PRIMARY LEARNER NONE -   CO-LEARNER CAREGIVER No -   PRIMARY LANGUAGE ENGLISH ENGLISH   LEARNER PREFERENCE PRIMARY PICTURES DEMONSTRATION   ANSWERED BY patient TINO Ga   RELATIONSHIP SELF SELF     Depression Screening:  3 most recent [facility] Screens 3/25/2021 2/24/2021 9/19/2017 7/22/2016   PHQ Not Done - - - Active Diagnosis of Depression or Bipolar Disorder   Little interest or pleasure in doing things Several days Nearly every day Nearly every day -   Feeling down, depressed, irritable, or hopeless Not at all Nearly every day Nearly every day -   Total Score PHQ 2 1 6 6 -     Abuse Screening:  Abuse Screening Questionnaire 3/25/2021 4/15/2019 8/7/2018 12/6/2017 7/22/2016   Do you ever feel afraid of your partner? N N N N N   Are you in a relationship with someone who physically or mentally threatens you? N N N N N   Is it safe for you to go home? Teri DONG     Fall Risk  Fall Risk Assessment, last 12 mths 5/20/2021 3/25/2021 11/12/2020 2/4/2019 11/7/2018 10/16/2018 8/7/2018   Able to walk? Yes Yes Yes Yes Yes Yes Yes   Fall in past 12 months? 1 1 Yes Yes Yes Yes No   Do you feel unsteady? 1 - - - - - -   Are you worried about falling 1 - - - - - -   Is TUG test greater than 12 seconds? 0 - - - - - -   Is the gait abnormal? 1 - - - - - -   Number of falls in past 12 months 2 2 3 3 2 1 -   Fall with injury?  1 1 1 1 1 1 -     Recent Travel Screening and Travel History documentation     Travel Screening      No screening recorded since 05/19/21 0000      Travel History   Travel since 04/20/21     No documented travel since 04/20/21

## 2021-05-20 NOTE — PROGRESS NOTES
HISTORY OF PRESENT ILLNESS  Richardo Carrel is a 77 y.o. female. BP (!) 168/84 (BP 1 Location: Right arm, BP Patient Position: Sitting, BP Cuff Size: Adult)   Pulse 61   Temp 97.4 °F (36.3 °C) (Temporal)   Resp 16   Ht 5' 6\" (1.676 m)   Wt 209 lb 9.6 oz (95.1 kg)   LMP  (LMP Unknown)   SpO2 97%   BMI 33.83 kg/m²     Had her first  COVID vax (is a little late on the second). On 5/15/21 she ended up at the ER after a fall. She is not sure if she passed out completely. She stumbled into a hole while taking out the trash. They thought she may have had a seizure because she soiled herself with urine. But she has chronic bladder leakage so the fall may have just caused an incontinence episode. She did not bite her tongue. She says it was NOT a seizure. She has a h/o same and knows what they are. They gave her an RX for keppra which she has not taken. Enuresis  The history is provided by the patient. This is a chronic problem. The current episode started more than 1 week ago. The problem occurs daily. The problem has not changed since onset. Fall  The history is provided by the patient (pt has recurrent problems with gait and balance. ). Review of Systems   Constitutional: Negative. Genitourinary: Positive for enuresis. Enuresis   Musculoskeletal: Positive for falls and joint pain. Psychiatric/Behavioral: The patient is nervous/anxious. Physical Exam  Vitals and nursing note reviewed. Constitutional:       Appearance: Normal appearance. She is well-developed. HENT:      Head: Normocephalic and atraumatic. Cardiovascular:      Rate and Rhythm: Normal rate and regular rhythm. Pulmonary:      Effort: Pulmonary effort is normal.      Breath sounds: Normal breath sounds. Musculoskeletal:      Right lower leg: No edema. Left lower leg: No edema. Skin:     General: Skin is warm and dry. Neurological:      General: No focal deficit present.       Mental Status: She is alert and oriented to person, place, and time. Psychiatric:         Mood and Affect: Mood normal.         Behavior: Behavior normal.         ASSESSMENT and PLAN    ICD-10-CM ICD-9-CM    1. Essential hypertension  I10 401.9 amLODIPine (NORVASC) 5 mg tablet   2. Seizure disorder ([address]  G40.909 345.90    3. Mood disorder (HCC)  F39 296.90    4. Chronic low back pain  M54.5 724.2 oxyCODONE-acetaminophen (PERCOCET 7.5) 7.5-325 mg per tablet    G89.29 338.29 oxyCODONE-acetaminophen (PERCOCET 7.5) 7.5-325 mg per tablet   5. Chronic pain syndrome  G89.4 338.4 oxyCODONE-acetaminophen (PERCOCET 7.5) 7.5-325 mg per tablet      oxyCODONE-acetaminophen (PERCOCET 7.5) 7.5-325 mg per tablet   6. Right leg weakness  R29.898 729.89    7. Medication refill  Z76.0 V68.1 oxyCODONE-acetaminophen (PERCOCET 7.5) 7.5-325 mg per tablet      oxyCODONE-acetaminophen (PERCOCET 7.5) 7.5-325 mg per tablet       Available hospital/ER record reviewed  I do not thinks she had a seizure. She also was also tired after mowing the lawn when she stumbled after he knee gave out. She did not have the strength to get up. She has right knee and hip problems and probably stumbled    Stop minipres--she got very dizzy on this. Start amlodipine for BP    Will try propranolol for the RLS and BP    Resume the vesicare for her bladder--she had stopped it when her sxs improved. Update controlled substance contract.     F/u as appointed June 29

## 2021-05-20 NOTE — LETTER
Name:Vandana Ga EFN:[birth year] MR #:[id number] Office:[facility] Page 1 of 5 CONTROLLED SUBSTANCE AGREEMENT I may be prescribed medications that are controlled substances as part  of my treatment plan for management of my medical condition(s). The goal of my treatment plan is to maintain and/or improve my health and wellbeing. Because controlled substances have an increased risk of abuse or harm, continual re-evaluation is needed determine if the goals of my treatment plan are being met for my safety and the safety of others. I, Della Uribe  am entering into this Controlled Substance Agreement with my provider, __________________________________ at [address] . I understand that successful treatment requires mutual trust and honesty between me and my provider. I understand that there are state and federal laws and regulations which apply to the medications that my provider may prescribe that must be followed. I understand there are risks and benefits ts of taking the medicines that my provider may prescribe. I understand and agree that following this Agreement is necessary in continuing my provider-patient relationship and success of my treatment plan. As a part of my treatment plan, I agree to the following: COMMUNICATION: 
 
1. I will communicate fully with my provider about my medical condition(s), including the effect on my daily life and how well my medications are helping. I will tell my provider all of the medications that I take for any reason, including medications I receive from another health care provider, and will notify my provider about all issues, problems or concerns, including any side effects, which may be related to my medications. I understand that this information allows my provider to adjust my treatment plan to help manage my medical condition.  I understand that this information will become part of my permanent medical record. 2. I will notify my provider if I have a history of alcohol/drug misuse/addiction or if I have had treatment for alcohol/drug addiction in the past, or if I have a new problem with or concern about alcohol/drug use/addiction, because this increases the likelihood of high risk behaviors and may lead to serious medical conditions. 3. Females Only: I will notify my provider if I am or become pregnant, or if I intend to become pregnant, or if I intend to breastfeed. I understand that communication of these issues with my provider is important, due to possible effects my medication could have on an unborn fetus or breastfeeding child. Initials_____ Name:.Mai Ga HCI:[birth year] MR #:[id number] Office:[facility] Page 2 of 5 MISUSE OF MEDICATIONS / DRUGS: 
 
1. I agree to take all controlled substances as prescribed, and will not misuse or abuse any controlled substances prescribed by my provider. For my safety, I will not increase the amount of medicine I take without first talking with and getting permission from my provider. 2. If I have a medical emergency, another health care provider may prescribe me medication. If I seek emergency treatment, I will notify my provider within seventy-two (72) hours. 3. I understand that my provider may discuss my use and/or possible misuse/abuse of controlled substances and alcohol, as appropriate, with any health care provider involved in my care, pharmacist or legal authority. ILLEGAL DRUGS: 
 
1. I will not use illegal drugs of any kind, including but not limited to marijuana, heroin, cocaine, or any prescription drug which is not prescribed to me. DRUG DIVERSION / PRESCRIPTION FRAUD: 
 
1. I will not share, sell, trade, give away, or otherwise misuse my prescriptions or medications. 2. I will not alter any prescriptions provided to me by my provider.  
 
SINGLE PROVIDER: 
 
1. I agree that all controlled substances that I take will be prescribed only by my provider (or his/her covering provider) under this Agreement. This agreement does not prevent me from seeking emergency medical treatment or receiving pain management related to a surgery. PROTECTING MEDICATIONS: 
 
1. I am responsible for keeping my prescriptions and medications in a safe and secure place including safeguarding them from loss or theft. I understand that lost, stolen or damaged/destroyed prescriptions or medications will not be replaced. Initials____ Name:.Mai Ga NST:0[birth year]08/[birth year]247 MR #:[id number] Office:[facility] Page 3 of 5 PRESCRIPTION RENEWALS/REFILLS: 
 
1. I will follow my controlled substance medication schedule as prescribed by my provider. 2. I understand and agree that I will make any requests for renewals or refills of my prescriptions only at the time of an office visit or during my providers regular office hours subject to the prescription refill requirements of the individual practice. 3. I understand that my provider may not call in prescriptions for controlled substances to my pharmacy. 4. I understand that my provider may adjust or discontinue these medications as deemed appropriate for my medical treatment plan. This Agreement does not guarantee the prescription of controlled medications. 5. I agree that if my medications are adjusted or discontinued, I will properly dispose of any remaining medications. I understand that I will be required to dispose of any remaining controlled medications prior to being provided with any prescriptions for other controlled medications. 6. I understand that the renewal of my prescription depends on my medical condition, my consistent participation, and my adherence with my treatment plan and this Agreement.  
 
7. I understand that if I do not keep an appointment with my provider, I may not receive a renewal or refill for my controlled substance medication. PRESCRIPTION MONITORING / DRUG TESTIN. I understand that my provider may require me to provide urine, saliva or blood for testing at any time. I understand that this testing will be used to monitor for safety and adherence with my treatment plan and this Agreement. 2. I understand that my provider may ask me to provide an observed urine specimen, which means that a nurse or other health care provider may watch me provide urine, and I agree to cooperate if I am asked to provide an observed specimen. 3. I understand that if I do not provide urine, saliva or blood samples within two (2) hours of my providers request, or other timeframe decided by my provider, my treatment plan could be changed, or my prescriptions and medications may be changed or ended. 4. I understand that urine, saliva and blood test results will be a part of my permanent medical record. Initials_____ Name:.Mai Ga TYE: MR #:[id number] Office:[facility] Page 4 of 5 
 
5. I understand that my provider is required to obtain a copy of my State Prescription Monitoring Program () Report at any time in order to safely prescribe medications. 6. I will bring all of my prescribed controlled substance medications in their original bottles to all of my scheduled appointments. 7. I understand that my provider may ask me to come to the practice with all of my prescribed medications for a random pill count at any time. I agree to cooperate if I am asked to come in for a random pill count. I understand that if I do not arrive in the timeframe decided by my provider, my treatment plan could be changed, or my prescriptions and medications may be changed or ended.  
 
COOPERATION WITH INVESTIGATIONS: 
 
1. I authorize my provider and my pharmacy to cooperate fully with any local, state, or federal law enforcement agency in the investigation of any possible misuse, sale, or other diversion of my controlled substance prescriptions or medications. RISKS: 
 
1. I understand that my level of consciousness may be affected from the use of controlled substances, and I understand that there are risks, benefits, effects and potential alternatives (including no treatment) to the medications that my provider has prescribed. 2. I understand that I may become drowsy, tired, dizzy, constipated, and sick to my stomach, or have changes in my mood or in my sleep while taking my medications. I have talked with my provider about these possible side effects, risks, benefits, and alternative treatments, and my provider has answered all of my questions. 3. I understand that I should not suddenly stop taking my medications without first speaking with my provider. I understand that if I suddenly stop taking my medications, I may experience nausea, vomiting, sweating,anxiety, sleeplessness, itching or other uncomfortable feelings. 4. I will not take my medications with alcohol of any kind, including beer, wine or liquor. I understand that drinking alcohol with my medications increases the chances of side effects, including breathing problems or even death. 5. I understand that if I have a history of alcoholism or other drug addiction I may be at increased risk of addiction to my medications. Signs of addiction might include craving, compulsive use, and continued use despite harm. Since addiction is a disease, I understand my provider may decide to change my medications and refer me to appropriate treatment services. I understand that this information would become part of my permanent medical record. Initials_____ Name:.Mai NICOLE Lizettchioma QZJ:[birth year]17 MR #:[id number] Office:[facility] Page 5 of 5  
 
 
6.  Females only: Children born to women who regularly take controlled substances are likely to have physical problems and suffer withdrawal symptoms at birth. If I am of child-bearing age, I understand that I should use safe and effective birth control while taking any controlled substances to avoid the impact of medications on an unborn fetus or  child. I agree to notify my provider immediately if I should become pregnant so that my treatment plan can be adjusted. 7. Males only: I understand that chronic use of controlled substances has been associated with low testosterone levels in males which may affect my mood, stamina, sexual desire, and general health. I understand that my provider may order the appropriate laboratory test to determine my testosterone level,and I agree to this testing. ADHERENCE: 
 
1. I understand that if I do not adhere to this Agreement in any way, my provider may change my prescriptions, stop prescribing controlled substances or end our provider-patient relationship. 2. If my provider decides to stop prescribing medication, or decides to end our provider-patient relationship,my provider may require that I taper my medications slowly. If necessary, my provider may also provide a prescription for other medications to treat my withdrawal symptoms. UNDERSTANDING THIS AGREEMENT: 
 
I understand that my provider may adjust or stop my prescriptions for controlled substances based on my medical condition and my treatment plan. I understand that this Agreement does not guarantee that I will be prescribed medications or controlled substances. I understand that controlled substances may be just one part 
of my treatment plan. My initial on each page and my signature below shows that I have read each page of this Agreement, I have had an opportunity to ask questions, and all of my questions have been answered to my satisfaction by my provider.  
 
By signing below, I agree to comply with this Agreement, and I understand that if I do not follow the Agreements listed above, my provider may stop 
 
_________________________________________  Date/Time 5/20/2021 2:21 PM   
             (Patient Signature) 
 
________________________________________    Date/Time 5/20/2021 2:21 PM 
 (Parent or Guardian Signature if <18 yrs) 
 
_________________________________________ Date/Time 5/20/2021 2:21 PM 
 (Provider Signature)

## 2021-05-25 ENCOUNTER — PATIENT OUTREACH (OUTPATIENT)
Dept: CASE MANAGEMENT | Age: 66
End: 2021-05-25

## 2021-06-16 ENCOUNTER — PATIENT OUTREACH (OUTPATIENT)
Dept: CASE MANAGEMENT | Age: 66
End: 2021-06-16

## 2021-06-16 NOTE — PROGRESS NOTES
.Date/Time:  6/16/2021 1:40 PM    Method of communication with patient:phone    [facility] ( [facility]) made out reach to  patient by telephone for F/U Complex Case Management  ( CCM). Provided introduction to self, and explanation of the Ambulatory Care . Contact at [phone] anytime Monday thru Friday 08:00-4:30.   [facility] noted ED visit 5/15/2021 for Syncope and F/U with Dr Rebeca Miller 5/20/2021
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
## 2021-06-29 ENCOUNTER — OFFICE VISIT (OUTPATIENT)
Dept: INTERNAL MEDICINE CLINIC | Age: 66
End: 2021-06-29
Payer: MEDICARE

## 2021-06-29 VITALS
DIASTOLIC BLOOD PRESSURE: 88 MMHG | HEART RATE: 98 BPM | WEIGHT: 212 LBS | TEMPERATURE: 97 F | BODY MASS INDEX: 34.07 KG/M2 | OXYGEN SATURATION: 96 % | RESPIRATION RATE: 18 BRPM | SYSTOLIC BLOOD PRESSURE: 137 MMHG | HEIGHT: 66 IN

## 2021-06-29 DIAGNOSIS — I10 ESSENTIAL HYPERTENSION: Primary | ICD-10-CM

## 2021-06-29 DIAGNOSIS — G89.29 CHRONIC LOW BACK PAIN: ICD-10-CM

## 2021-06-29 DIAGNOSIS — G25.81 RLS (RESTLESS LEGS SYNDROME): ICD-10-CM

## 2021-06-29 DIAGNOSIS — G40.909 SEIZURE DISORDER (HCC): ICD-10-CM

## 2021-06-29 DIAGNOSIS — Z76.0 MEDICATION REFILL: ICD-10-CM

## 2021-06-29 DIAGNOSIS — M54.50 CHRONIC LOW BACK PAIN: ICD-10-CM

## 2021-06-29 DIAGNOSIS — G89.4 CHRONIC PAIN SYNDROME: ICD-10-CM

## 2021-06-29 DIAGNOSIS — F39 MOOD DISORDER (HCC): ICD-10-CM

## 2021-06-29 DIAGNOSIS — L30.4 INTERTRIGO: ICD-10-CM

## 2021-06-29 PROCEDURE — G8417 CALC BMI ABV UP PARAM F/U: HCPCS | Performed by: INTERNAL MEDICINE

## 2021-06-29 PROCEDURE — G8754 DIAS BP LESS 90: HCPCS | Performed by: INTERNAL MEDICINE

## 2021-06-29 PROCEDURE — 3017F COLORECTAL CA SCREEN DOC REV: CPT | Performed by: INTERNAL MEDICINE

## 2021-06-29 PROCEDURE — G8427 DOCREV CUR MEDS BY ELIG CLIN: HCPCS | Performed by: INTERNAL MEDICINE

## 2021-06-29 PROCEDURE — G9899 SCRN MAM PERF RSLTS DOC: HCPCS | Performed by: INTERNAL MEDICINE

## 2021-06-29 PROCEDURE — 1100F PTFALLS ASSESS-DOCD GE2>/YR: CPT | Performed by: INTERNAL MEDICINE

## 2021-06-29 PROCEDURE — 3288F FALL RISK ASSESSMENT DOCD: CPT | Performed by: INTERNAL MEDICINE

## 2021-06-29 PROCEDURE — G8536 NO DOC ELDER MAL SCRN: HCPCS | Performed by: INTERNAL MEDICINE

## 2021-06-29 PROCEDURE — G8399 PT W/DXA RESULTS DOCUMENT: HCPCS | Performed by: INTERNAL MEDICINE

## 2021-06-29 PROCEDURE — 99214 OFFICE O/P EST MOD 30 MIN: CPT | Performed by: INTERNAL MEDICINE

## 2021-06-29 PROCEDURE — G9717 DOC PT DX DEP/BP F/U NT REQ: HCPCS | Performed by: INTERNAL MEDICINE

## 2021-06-29 PROCEDURE — 1090F PRES/ABSN URINE INCON ASSESS: CPT | Performed by: INTERNAL MEDICINE

## 2021-06-29 PROCEDURE — G8752 SYS BP LESS 140: HCPCS | Performed by: INTERNAL MEDICINE

## 2021-06-29 RX ORDER — NYSTATIN 100000 [USP'U]/G
POWDER TOPICAL 2 TIMES DAILY
Qty: 60 G | Refills: 5 | Status: SHIPPED | OUTPATIENT
Start: 2021-06-29 | End: 2022-01-24 | Stop reason: SDUPTHER

## 2021-06-29 RX ORDER — ROPINIROLE 2 MG/1
2 TABLET, FILM COATED ORAL 2 TIMES DAILY
Qty: 180 TABLET | Refills: 1 | Status: SHIPPED | OUTPATIENT
Start: 2021-06-29 | End: 2021-12-25

## 2021-06-29 RX ORDER — BUTALBITAL, ACETAMINOPHEN AND CAFFEINE 50; 325; 40 MG/1; MG/1; MG/1
TABLET ORAL
Refills: 0 | COMMUNITY
Start: 2021-05-07 | End: 2021-08-31 | Stop reason: SDUPTHER

## 2021-06-29 RX ORDER — OXYCODONE AND ACETAMINOPHEN 7.5; 325 MG/1; MG/1
1 TABLET ORAL
Qty: 120 TABLET | Refills: 0 | Status: SHIPPED | OUTPATIENT
Start: 2021-06-29 | End: 2021-08-31 | Stop reason: SDUPTHER

## 2021-06-29 RX ORDER — OXYCODONE AND ACETAMINOPHEN 7.5; 325 MG/1; MG/1
1 TABLET ORAL
Qty: 120 TABLET | Refills: 0 | Status: SHIPPED | OUTPATIENT
Start: 2021-07-28 | End: 2021-08-27

## 2021-06-29 RX ORDER — ALPRAZOLAM 1 MG/1
1 TABLET ORAL
Qty: 90 TABLET | Refills: 5 | Status: SHIPPED | OUTPATIENT
Start: 2021-06-29 | End: 2022-01-04
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
## 2021-06-29 NOTE — PROGRESS NOTES
Reviewed record in preparation for visit and have obtained necessary documentation. Angela Valderrama is a 74 y.o.  female presents today for office visit for   Chief Complaint   Patient presents with    Anxiety    Cholesterol Problem    Skin Problem    Memory Loss   . Pt is not fasting. Patient is accompanied by self. I have received verbal consent from Angela Valderrama to discuss any/all medical information while they are present in the room. Pt is in Room # [place] preferred language for health care discussion is english/other. Is the patient using any DME equipment during OV? YES    Advance Directive:  1. Do you have an advance directive in place? Patient Reply: NO    2. If not, would you like material regarding how to put one in place? NO    Coordination of Care:  1. Have you been to the ER, urgent care clinic since your last visit? Hospitalized since your last visit? NO    2. Have you seen or consulted any other health care providers outside of the [address] [place] [address] since your last visit? Include any pap smears or colon screening. NO    Upcoming Appts  Yes 7/2021 Neurology      Angela Valderrama is due for:   Health Maintenance Due   Topic    Shingrix Vaccine Age 50> (1 of 2)     Health Maintenance reviewed and discussed per provider  Please order/place referral if appropriate. Requested Prescriptions     Pending Prescriptions Disp Refills    ALPRAZolam (XANAX) 1 mg tablet 90 Tablet 5     Sig: Take 1 Tablet by mouth three (3) times daily as needed for Anxiety. Max Daily Amount: 3 mg. Indications: anxious    oxyCODONE-acetaminophen (PERCOCET 7.5) 7.5-325 mg per tablet 120 Tablet 0     Sig: Take 1 Tablet by mouth every four (4) hours as needed for Pain for up to 30 days. Max Daily Amount: 6 Tablets.  Indications: pain, chronic pain       Learning Assessment:  Learning Assessment 3/25/2021 7/22/2016   PRIMARY LEARNER Patient Patient   HIGHEST LEVEL OF EDUCATION - PRIMARY LEARNER  SOME COLLEGE SOME COLLEGE   BARRIERS PRIMARY LEARNER NONE -   CO-LEARNER CAREGIVER No -   PRIMARY LANGUAGE ENGLISH ENGLISH   LEARNER PREFERENCE PRIMARY PICTURES DEMONSTRATION   ANSWERED BY patient TINO Ga   RELATIONSHIP SELF SELF     Depression Screening:  3 most recent [facility] Screens 3/25/2021 2/24/2021 9/19/2017 7/22/2016   PHQ Not Done - - - Active Diagnosis of Depression or Bipolar Disorder   Little interest or pleasure in doing things Several days Nearly every day Nearly every day -   Feeling down, depressed, irritable, or hopeless Not at all Nearly every day Nearly every day -   Total Score PHQ 2 1 6 6 -     Abuse Screening:  Abuse Screening Questionnaire 3/25/2021 4/15/2019 8/7/2018 12/6/2017 7/22/2016   Do you ever feel afraid of your partner? N N N N N   Are you in a relationship with someone who physically or mentally threatens you? N N N N N   Is it safe for you to go home? Sudha DONG     Fall Risk  Fall Risk Assessment, last 12 mths 5/20/2021 3/25/2021 11/12/2020 2/4/2019 11/7/2018 10/16/2018 8/7/2018   Able to walk? Yes Yes Yes Yes Yes Yes Yes   Fall in past 12 months? 1 1 Yes Yes Yes Yes No   Do you feel unsteady? 1 - - - - - -   Are you worried about falling 1 - - - - - -   Is TUG test greater than 12 seconds? 0 - - - - - -   Is the gait abnormal? 1 - - - - - -   Number of falls in past 12 months 2 2 3 3 2 1 -   Fall with injury? 1 1 1 1 1 1 -     Recent Travel Screening and Travel History documentation     Travel Screening     Question   Response    In the last month, have you been in contact with someone who was confirmed or suspected to have Coronavirus / COVID-19? No / Unsure    Have you had a COVID-19 viral test in the last 14 days? No    Do you have any of the following new or worsening symptoms? None of these    Have you traveled internationally or domestically in the last month?   No      Travel History   Travel since 05/29/21     No documented travel since 05/29/21

## 2021-07-07 ENCOUNTER — PATIENT OUTREACH (OUTPATIENT)
Dept: CASE MANAGEMENT | Age: 66
End: 2021-07-07

## 2021-07-07 DIAGNOSIS — Z76.0 MEDICATION REFILL: ICD-10-CM

## 2021-07-07 RX ORDER — TIZANIDINE HYDROCHLORIDE 4 MG/1
CAPSULE, GELATIN COATED ORAL
Qty: 270 CAPSULE | Refills: 3 | Status: SHIPPED | OUTPATIENT
Start: 2021-07-07 | End: 2021-07-07 | Stop reason: SDUPTHER

## 2021-07-07 RX ORDER — TIZANIDINE HYDROCHLORIDE 4 MG/1
CAPSULE, GELATIN COATED ORAL
Qty: 270 CAPSULE | Refills: 3 | Status: SHIPPED | OUTPATIENT
Start: 2021-07-07 | End: 2022-09-04

## 2021-07-07 NOTE — TELEPHONE ENCOUNTER
Good afternoon Dr Morgan Zavala. Patient requesting refill for Tizanidine/ Zanaflex 4 mg ,1 tab three times a day for muscle spasm. Last office visit 6/29/2021. Last Filled 3/29/2021 with [facility].

## 2021-07-07 NOTE — PROGRESS NOTES
.Complex Case Management  Follow-Up       Date/Time:   7/7/2021  1:33PM       Ambulatory Care Manager ( ACM) contacted patient for Complex Case Management  follow up. Spoke to patient  Introduced self/role and reason for call. Patient reported: She is doing as well as expected with all of her ailments. She is not home now. Medication refills she needs Zanaflex for her muscle aches. [facility]. ACM checked and last script was on 3/29/2021 for 90 days with 3 refills. Patient said she is not thinking clearly lately. She will check her medications when she gets home to see how much there is,but she knows about the Zanaflex for sure. Pertinent concerns:Requesting something for nausea \" That pill that you place under your tongue. The hospital gave her some\" ACM said it sounds like Zofran , patient said yes that is the name. ACM said that since Dr Jas Stein has not order that medication before and when you saw on her 6/29 and did not mention anything about being nauseous she needs to call the office and tell the nurse. Patient said okay she goes back in 2 months and will ask her then. Specialist appointments since last outreach? Yes   If so, specialist and date: [facility] 7/2/2021    Next PCP Appointment: 8/31/2021    Medications:     New medications since last outreach: No  Does patient need refills on any medications: Yes, Zanaflex 4 mg , take 1 three times a day as needed muscle spasms  Medication changes since last outreach (dose adjustments or discontinued meds): No      Home Health company: N/A    Barriers to care? None        Patient reminded that there are physicians on call 24 hours a day / 7 days a week (M-F 5pm to 8am and from Friday 5pm until Monday 8a for the weekend) should the patient have questions or concerns.

## 2021-07-12 ENCOUNTER — PATIENT OUTREACH (OUTPATIENT)
Dept: CASE MANAGEMENT | Age: 66
End: 2021-07-12

## 2021-07-12 DIAGNOSIS — Z76.0 MEDICATION REFILL: ICD-10-CM

## 2021-07-12 RX ORDER — ZOLPIDEM TARTRATE 12.5 MG/1
TABLET, FILM COATED, EXTENDED RELEASE ORAL
Qty: 90 TABLET | Refills: 1 | Status: SHIPPED | OUTPATIENT
Start: 2021-07-12 | End: 2022-03-03 | Stop reason: SDUPTHER

## 2021-07-12 RX ORDER — OMEPRAZOLE 40 MG/1
CAPSULE, DELAYED RELEASE ORAL
Qty: 90 CAPSULE | Refills: 3 | Status: SHIPPED | OUTPATIENT
Start: 2021-07-12 | End: 2021-07-12

## 2021-07-12 RX ORDER — ZOLPIDEM TARTRATE 12.5 MG/1
TABLET, FILM COATED, EXTENDED RELEASE ORAL
Qty: 90 TABLET | Refills: 1 | Status: SHIPPED | OUTPATIENT
Start: 2021-07-12 | End: 2021-07-12

## 2021-07-12 RX ORDER — OMEPRAZOLE 40 MG/1
CAPSULE, DELAYED RELEASE ORAL
Qty: 90 CAPSULE | Refills: 3 | Status: SHIPPED | OUTPATIENT
Start: 2021-07-12 | End: 2022-05-10 | Stop reason: SDUPTHER

## 2021-07-12 NOTE — PROGRESS NOTES
Care Transitions Initial Call    Call within 2 business days of discharge: Yes     Patient: Arron Joe Patient : 1955 MRN: [id number]    Hospital Problem List  TIA (transient ischemic attack   Stroke-like symptoms   Migraine headache   CVA, old, facial weakness      Was this an external facility discharge? Yes, 7/10/21 Discharge Facility: [address] [place] [address] to be reviewed by the provider   Additional needs identified to be addressed with provider: yes  medications- patient needs refills on 2 different medicaton refill request sent         Method of communication with provider : chart routing    Discussed COVID-19 related testing which was not done at this time. Test results were not done. Patient informed of results, if available? N/A     Advance Care Planning:   Does patient have an Advance Directive: not on file. Inpatient Readmission Risk score: No data recorded  Was this a readmission? no   Patient stated reason for the admission: stroke like symptoms    Patients top risk factors for readmission: medical condition-multiple medical problems   Interventions to address risk factors: Scheduled appointment with PCP-Renata and Obtained and reviewed discharge summary and/or continuity of care documents    Care Transition Nurse (CTN) contacted the patient by telephone to perform post hospital discharge assessment. Verified name and  with patient as identifiers. Provided introduction to self, and explanation of the CTN role. CTN reviewed discharge instructions, medical action plan and red flags with patient who verbalized understanding. Were discharge instructions available to patient? yes. Reviewed appropriate site of care based on symptoms and resources available to patient including: PCP, Specialist and CTN. Patient given an opportunity to ask questions and does not have any further questions or concerns at this time.  The patient agrees to contact the PCP office for questions related to their healthcare. Medication reconciliation was performed with patient, who verbalizes understanding of administration of home medications. Advised obtaining a 90-day supply of all daily and as-needed medications. Referral to Pharm D needed: no     Home Health/Outpatient orders at discharge: none      Durable Medical Equipment ordered at discharge: None      Covid Risk Education    Educated patient about risk for severe COVID-19 due to risk factors according to CDC guidelines. CTN reviewed discharge instructions, medical action plan and red flag symptoms with the patient who verbalized understanding. Discussed COVID vaccination status: yes. Education provided on COVID-19 vaccination as appropriate. Discussed exposure protocols and quarantine with CDC Guidelines. Patient was given an opportunity to verbalize any questions and concerns and agrees to contact CTN or health care provider for questions related to their healthcare. Was patient discharged with a pulse oximeter? no. Discussed and confirmed pulse oximeter discharge instructions and when to notify provider or seek emergency care. Discussed follow-up appointments. If no appointment was previously scheduled, appointment scheduling offered: yes. Is follow up appointment scheduled within 7 days of discharge? CTN sent JAMES appointment request to PCP. [facility] follow up appointment(s):   Future Appointments   Date Time Provider Cory Daniels   8/31/2021 10:00 AM Dane Cerna MD [facility]   4/11/2022 12:00 PM [facility] K[facility] [address] [facility] [address]     [id number] Skye Hansen follow up appointment(s): Orthopedics [address] 8/5/21   [place] [facility] 8/25/21    Plan for follow-up call in 5-7 days based on severity of symptoms and risk factors. Plan for next call: follow up appointment-was patient able to get sooner neurology appointment  CTN provided contact information for future needs.     Goals Addressed    None

## 2021-07-29 ENCOUNTER — TELEPHONE (OUTPATIENT)
Dept: INTERNAL MEDICINE CLINIC | Age: 66
End: 2021-07-29

## 2021-07-29 DIAGNOSIS — L08.9 SKIN INFECTION: ICD-10-CM

## 2021-07-29 NOTE — TELEPHONE ENCOUNTER
Pt states she went to a walk-in clinic last week for a sinus infection and was prescribed a Z-Pack 250 mg but it gave her no relief. Requesting something stronger.  States she has received something in the past but does not remember the name

## 2021-07-30 RX ORDER — CEPHALEXIN 500 MG/1
500 CAPSULE ORAL 4 TIMES DAILY
Qty: 40 CAPSULE | Refills: 0 | Status: SHIPPED | OUTPATIENT
Start: 2021-07-30 | End: 2021-12-13 | Stop reason: SDUPTHER

## 2021-07-30 NOTE — TELEPHONE ENCOUNTER
S/w the pt and confirmed the pharmacy on file is up to date. Additionally, the pt states having a MRI Brain perform at [facility] today. Will notify.

## 2021-08-14 DIAGNOSIS — I10 ESSENTIAL HYPERTENSION: ICD-10-CM

## 2021-08-15 RX ORDER — AMLODIPINE BESYLATE 5 MG/1
TABLET ORAL
Qty: 90 TABLET | Refills: 3 | Status: SHIPPED | OUTPATIENT
Start: 2021-08-15 | End: 2022-08-18

## 2021-08-31 ENCOUNTER — OFFICE VISIT (OUTPATIENT)
Dept: INTERNAL MEDICINE CLINIC | Age: 66
End: 2021-08-31
Payer: MEDICARE

## 2021-08-31 VITALS
OXYGEN SATURATION: 98 % | RESPIRATION RATE: 17 BRPM | SYSTOLIC BLOOD PRESSURE: 112 MMHG | BODY MASS INDEX: 33.78 KG/M2 | DIASTOLIC BLOOD PRESSURE: 70 MMHG | TEMPERATURE: 97 F | WEIGHT: 210.2 LBS | HEART RATE: 71 BPM | HEIGHT: 66 IN

## 2021-08-31 DIAGNOSIS — F39 MOOD DISORDER (HCC): ICD-10-CM

## 2021-08-31 DIAGNOSIS — G89.29 CHRONIC LOW BACK PAIN: ICD-10-CM

## 2021-08-31 DIAGNOSIS — M54.50 CHRONIC LOW BACK PAIN: ICD-10-CM
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
## 2021-08-31 DIAGNOSIS — I10 ESSENTIAL HYPERTENSION: Primary | ICD-10-CM

## 2021-08-31 DIAGNOSIS — Z76.0 MEDICATION REFILL: ICD-10-CM

## 2021-08-31 DIAGNOSIS — S80.00XA CONTUSION OF KNEE, UNSPECIFIED LATERALITY, INITIAL ENCOUNTER: ICD-10-CM

## 2021-08-31 DIAGNOSIS — G89.4 CHRONIC PAIN SYNDROME: ICD-10-CM

## 2021-08-31 PROCEDURE — G8417 CALC BMI ABV UP PARAM F/U: HCPCS | Performed by: INTERNAL MEDICINE

## 2021-08-31 PROCEDURE — 99214 OFFICE O/P EST MOD 30 MIN: CPT | Performed by: INTERNAL MEDICINE

## 2021-08-31 PROCEDURE — G8536 NO DOC ELDER MAL SCRN: HCPCS | Performed by: INTERNAL MEDICINE

## 2021-08-31 PROCEDURE — G8752 SYS BP LESS 140: HCPCS | Performed by: INTERNAL MEDICINE

## 2021-08-31 PROCEDURE — 1100F PTFALLS ASSESS-DOCD GE2>/YR: CPT | Performed by: INTERNAL MEDICINE

## 2021-08-31 PROCEDURE — G8754 DIAS BP LESS 90: HCPCS | Performed by: INTERNAL MEDICINE

## 2021-08-31 PROCEDURE — G8427 DOCREV CUR MEDS BY ELIG CLIN: HCPCS | Performed by: INTERNAL MEDICINE

## 2021-08-31 PROCEDURE — 3017F COLORECTAL CA SCREEN DOC REV: CPT | Performed by: INTERNAL MEDICINE

## 2021-08-31 PROCEDURE — 3288F FALL RISK ASSESSMENT DOCD: CPT | Performed by: INTERNAL MEDICINE

## 2021-08-31 PROCEDURE — G9899 SCRN MAM PERF RSLTS DOC: HCPCS | Performed by: INTERNAL MEDICINE

## 2021-08-31 PROCEDURE — 1090F PRES/ABSN URINE INCON ASSESS: CPT | Performed by: INTERNAL MEDICINE

## 2021-08-31 PROCEDURE — G8399 PT W/DXA RESULTS DOCUMENT: HCPCS | Performed by: INTERNAL MEDICINE

## 2021-08-31 PROCEDURE — G9717 DOC PT DX DEP/BP F/U NT REQ: HCPCS | Performed by: INTERNAL MEDICINE

## 2021-08-31 RX ORDER — BUTALBITAL, ACETAMINOPHEN AND CAFFEINE 50; 325; 40 MG/1; MG/1; MG/1
TABLET ORAL
Qty: 42 TABLET | Refills: 2 | Status: SHIPPED | OUTPATIENT
Start: 2021-08-31 | End: 2022-06-06

## 2021-08-31 RX ORDER — ERGOCALCIFEROL 1.25 MG/1
50000 CAPSULE ORAL
Qty: 12 CAPSULE | Refills: 1 | Status: SHIPPED | OUTPATIENT
Start: 2021-08-31 | End: 2022-01-24 | Stop reason: SDUPTHER

## 2021-08-31 RX ORDER — OXYCODONE AND ACETAMINOPHEN 7.5; 325 MG/1; MG/1
1 TABLET ORAL
Qty: 120 TABLET | Refills: 0 | Status: SHIPPED | OUTPATIENT
Start: 2021-09-28 | End: 2021-10-28

## 2021-08-31 RX ORDER — OXYCODONE AND ACETAMINOPHEN 7.5; 325 MG/1; MG/1
1 TABLET ORAL
Qty: 120 TABLET | Refills: 0 | Status: SHIPPED | OUTPATIENT
Start: 2021-10-26 | End: 2021-11-08 | Stop reason: SDUPTHER

## 2021-08-31 RX ORDER — OXYCODONE AND ACETAMINOPHEN 7.5; 325 MG/1; MG/1
1 TABLET ORAL
Qty: 120 TABLET | Refills: 0 | Status: SHIPPED | OUTPATIENT
Start: 2021-08-31 | End: 2022-03-03 | Stop reason: SDUPTHER

## 2021-08-31 NOTE — PROGRESS NOTES
Reviewed record in preparation for visit and have obtained necessary documentation. Ghada Caballero is a 74 y.o.  female presents today for office visit for   Chief Complaint   Patient presents with    Fall    Hypertension    Anxiety    Depression    Pain (Chronic)   . Pt is not fasting. Patient is accompanied by self. I have received verbal consent from Ghada Caballero to discuss any/all medical information while they are present in the room. Pt is in Room # [address] [facility] preferred language for health care discussion is english/other. Is the patient using any DME equipment during OV? NO    Advance Directive:  1. Do you have an advance directive in place? Patient Reply: NO    2. If not, would you like material regarding how to put one in place? NO    Coordination of Care:  1. Have you been to the ER, urgent care clinic since your last visit? Hospitalized since your last visit? NO    2. Have you seen or consulted any other health care providers outside of the [address] [place] [address] since your last visit? Include any pap smears or colon screening. NO    Upcoming Appts  No    VORB: No orders of the defined types were placed in this encounter.  Yaya Gonsalez MD/Tara Barajas LPN    Ghada Caballero is due for:   Health Maintenance Due   Topic    Shingrix Vaccine Age 50> (1 of 2)     Health Maintenance reviewed and discussed per provider  Please order/place referral if appropriate.     Requested Prescriptions      No prescriptions requested or ordered in this encounter       Learning Assessment:  Learning Assessment 3/25/2021 7/22/2016   PRIMARY LEARNER Patient Patient   HIGHEST LEVEL OF EDUCATION - PRIMARY LEARNER  SOME COLLEGE SOME COLLEGE   BARRIERS PRIMARY LEARNER NONE -   CO-LEARNER CAREGIVER No -   PRIMARY LANGUAGE ENGLISH ENGLISH   LEARNER PREFERENCE PRIMARY PICTURES DEMONSTRATION   ANSWERED BY patient TINO Ga   RELATIONSHIP SELF SELF     Depression Screening:  3 most recent [facility] Screens 3/25/2021 2/24/2021 9/19/2017 7/22/2016   PHQ Not Done - - - Active Diagnosis of Depression or Bipolar Disorder   Little interest or pleasure in doing things Several days Nearly every day Nearly every day -   Feeling down, depressed, irritable, or hopeless Not at all Nearly every day Nearly every day -   Total Score PHQ 2 1 6 6 -     Abuse Screening:  Abuse Screening Questionnaire 3/25/2021 4/15/2019 8/7/2018 12/6/2017 7/22/2016   Do you ever feel afraid of your partner? N N N N N   Are you in a relationship with someone who physically or mentally threatens you? N N N N N   Is it safe for you to go home? Yuni DONG     Fall Risk  Fall Risk Assessment, last 12 mths 8/31/2021 5/20/2021 3/25/2021 11/12/2020 2/4/2019 11/7/2018 10/16/2018   Able to walk? Yes Yes Yes Yes Yes Yes Yes   Fall in past 12 months? 1 1 1 Yes Yes Yes Yes   Do you feel unsteady? 0 1 - - - - -   Are you worried about falling 0 1 - - - - -   Is TUG test greater than 12 seconds? 0 0 - - - - -   Is the gait abnormal? 0 1 - - - - -   Number of falls in past 12 months 2 2 2 3 3 2 1   Fall with injury? 1 1 1 1 1 1 1     Recent Travel Screening and Travel History documentation     Travel Screening     Question   Response    In the last month, have you been in contact with someone who was confirmed or suspected to have Coronavirus / COVID-19? No / Unsure    Have you had a COVID-19 viral test in the last 14 days? No    Do you have any of the following new or worsening symptoms? None of these    Have you traveled internationally or domestically in the last month?   No      Travel History   Travel since 07/31/21     No documented travel since 07/31/21

## 2021-08-31 NOTE — PROGRESS NOTES
HISTORY OF PRESENT ILLNESS  Abdelrahman Matos is a 74 y.o. female. /70 (BP 1 Location: Right arm, BP Patient Position: Sitting, BP Cuff Size: Large adult)   Pulse 71   Temp 97 °F (36.1 °C) (Temporal)   Resp 17   Ht 5' 6\" (1.676 m)   Wt 210 lb 3.2 oz (95.3 kg)   LMP  (LMP Unknown)   SpO2 98%   BMI 33.93 kg/m²     Last week slid on rocks and fell into a shallow ditch. Landed on her back and partially on her right knee  Did not see a doctor about this      Saw ? Derm or neuro today and had botox for her migraines      May be moving to [facility]. Current Outpatient Medications:     amLODIPine (NORVASC) 5 mg tablet, take 1 tablet by mouth daily for high blood pressure, Disp: 90 Tablet, Rfl: 3    zolpidem CR (AMBIEN CR) 12.5 mg tablet, take 1 tablet by mouth nightly, Disp: 90 Tablet, Rfl: 1    omeprazole (PRILOSEC) 40 mg capsule, TAKE 1 CAPSULE DAILY, Disp: 90 Capsule, Rfl: 3    tiZANidine (ZANAFLEX) 4 mg capsule, TAKE 1 CAPSULE THREE TIMES A DAY FOR MUSCLE SPASM, Disp: 270 Capsule, Rfl: 3    butalbital-acetaminophen-caffeine (FIORICET, ESGIC) -40 mg per tablet, take 1 tablet by mouth three times a day if needed LIMIT TO 2 DAYS A WEEK, Disp: , Rfl: 0    ALPRAZolam (XANAX) 1 mg tablet, Take 1 Tablet by mouth three (3) times daily as needed for Anxiety. Max Daily Amount: 3 mg. Indications: anxious, Disp: 90 Tablet, Rfl: 5    rOPINIRole (REQUIP) 2 mg tablet, Take 1 Tablet by mouth two (2) times a day. Indications: restless legs syndrome, an extreme discomfort in the calf muscles when sitting or lying down, Disp: 180 Tablet, Rfl: 1    nystatin (MYCOSTATIN) powder, Apply  to affected area two (2) times a day., Disp: 60 g, Rfl: 5    clotrimazole-betamethasone (LOTRISONE) topical cream, apply twice a day, Disp: 45 g, Rfl: 5    trospium (SANCTURA) 20 mg tablet, Take 1 Tablet by mouth Before breakfast and dinner.  Indications: overactive bladder, Disp: 60 Tablet, Rfl: 6    potassium chloride (K-DUR, KLOR-CON) 20 mEq tablet, TAKE 1 TABLET DAILY FOR LOW AMOUNT OF POTASSIUM IN THE BLOOD, Disp: 90 Tab, Rfl: 3    ergocalciferol (ERGOCALCIFEROL) 1,250 mcg (50,000 unit) capsule, Take 1 Cap by mouth every seven (7) days. , Disp: 12 Cap, Rfl: 1    lidocaine (LIDODERM) 5 %, Apply one daily, Disp: 30 Each, Rfl: 5    fluticasone propionate (FLONASE) 50 mcg/actuation nasal spray, 2 Sprays by Both Nostrils route daily. , Disp: 1 Bottle, Rfl: 5    benzonatate (TESSALON) 200 mg capsule, Take 1 Cap by mouth three (3) times daily as needed for Cough. , Disp: 90 Cap, Rfl: 5    promethazine (PHENERGAN) 25 mg tablet, Take 1 Tab by mouth every six (6) hours as needed for Nausea., Disp: 60 Tab, Rfl: 5    albuterol (PROVENTIL HFA, VENTOLIN HFA, PROAIR HFA) 90 mcg/actuation inhaler, Take 2 Puffs by inhalation every four (4) hours as needed for Wheezing. Indications: bronchospasm, Disp: 1 Inhaler, Rfl: 5    furosemide (LASIX) 20 mg tablet, Take 1 Tab by mouth daily. , Disp: 90 Tab, Rfl: 4    atorvastatin (LIPITOR) 40 mg tablet, Take 1 Tab by mouth nightly., Disp: 90 Tab, Rfl: 4    montelukast (SINGULAIR) 10 mg tablet, Take 1 Tab by mouth daily. , Disp: 90 Tab, Rfl: 4    venlafaxine-SR (EFFEXOR-XR) 75 mg capsule, TAKE 3 CAPSULES DAILY FOR ANXIOUSNESS ASSOCIATED WITH DEPRESSION, Disp: 270 Cap, Rfl: 3    diclofenac EC (VOLTAREN) 75 mg EC tablet, TAKE 1 TABLET TWICE A DAY, Disp: 180 Tab, Rfl: 3    cetirizine (ZYRTEC) 10 mg tablet, TAKE 1 TABLET DAILY, Disp: 90 Tab, Rfl: 3    diclofenac (VOLTAREN) 1 % gel, APPLY 2 GRAMS TO AFFECTED AREA FOUR TIMES A DAY, Disp: 600 g, Rfl: 3    neomycin-bacitracin-polymyxin (Neosporin, [id number],) 3.5mg-400 unit- 5,000 unit/gram ointment, Apply 1 Each to affected area daily.  TO EFFECTIVE AREA AS NEEDED, Disp: , Rfl:     mupirocin calcium (BACTROBAN) 2 % topical cream, Apply  to affected area two (2) times a day., Disp: 15 g, Rfl: 0    hyoscyamine sulfate (Symax Duotab) 0.125 mg-0.25 mg (0.375 mg) TbMP, Take 1 Tab by mouth every twelve (12) hours as needed for Diarrhea or Other (cramping). , Disp: , Rfl:     ferrous sulfate 325 mg (65 mg iron) tablet, Take 325 mg by mouth Daily (before breakfast). , Disp: , Rfl:     carboxymethyl/glycerin/poly80 (REFRESH OPTIVE ADVANCED OP), Administer 1 Drop to both eyes as needed for Other (dry eyes). , Disp: , Rfl:     hydrOXYzine HCL (ATARAX) 25 mg tablet, Take 25 mg by mouth every four (4) hours as needed for Itching., Disp: , Rfl:     Blood Pressure Test Kit-Large kit, CHECK blood pressure twice a day as directed by prescriber, Disp: 1 Kit, Rfl: 1    naloxone 2 mg/actuation spry, Use 1 spray intranasally into 1 nostril. Use a new Narcan nasal spray for subsequent doses and administer into alternating nostrils. May repeat every 2 to 3 minutes as needed. (Patient taking differently: 1 Spray by Nasal route as needed for Other (narcotic overdose). Use 1 spray intranasally into 1 nostril. Use a new Narcan nasal spray for subsequent doses and administer into alternating nostrils. May repeat every 2 to 3 minutes as needed.), Disp: 1 Device, Rfl: prn    triamcinolone acetonide (KENALOG) 0.025 % topical cream, Apply  to affected area two (2) times a day. use thin layer, Disp: 15 g, Rfl: 5    multivitamin (ONE A DAY) tablet, Take 1 Tab by mouth daily. , Disp: 90 Tab, Rfl: 4    cyanocobalamin (VITAMIN B-12) 1,000 mcg tablet, Take 1 Tab by mouth daily. , Disp: 90 Tab, Rfl: 4    aspirin 81 mg chewable tablet, Take 81 mg by mouth daily. (Patient not taking: Reported on 8/31/2021), Disp: , Rfl:       Fall  The history is provided by the patient. Hypertension   The history is provided by the patient. This is a chronic problem. The current episode started more than 1 week ago. The problem has not changed since onset. Associated symptoms include anxiety. Anxiety  The history is provided by the patient. This is a chronic problem.  The current episode started more than 1 week ago. The problem occurs daily. Depression  The history is provided by the patient. This is a chronic problem. The current episode started more than 1 week ago. The problem occurs daily. Review of Systems   Constitutional: Negative. Musculoskeletal: Positive for back pain, falls and joint pain. Psychiatric/Behavioral: Positive for depression. Physical Exam  Musculoskeletal:      Comments: Right knee has a lateral bruise just distal to patella. Slight soft tissue swelling noted  Right inner knee./thigh has a bruise. These are several days old          ASSESSMENT and PLAN    ICD-10-CM ICD-9-CM    1. Essential hypertension  I10 401.9    2. Chronic pain syndrome  G89.4 338.4 oxyCODONE-acetaminophen (PERCOCET 7.5) 7.5-325 mg per tablet      oxyCODONE-acetaminophen (PERCOCET 7.5) 7.5-325 mg per tablet      oxyCODONE-acetaminophen (PERCOCET 7.5) 7.5-325 mg per tablet   3. Contusion of knee, unspecified laterality, initial encounter  S80.00XA 924.11    4. Mood disorder (HCC)  F39 296.90    5. Medication refill  Z76.0 V68.1 ergocalciferol (ERGOCALCIFEROL) 1,250 mcg (50,000 unit) capsule      butalbital-acetaminophen-caffeine (FIORICET, ESGIC) -40 mg per tablet      oxyCODONE-acetaminophen (PERCOCET 7.5) 7.5-325 mg per tablet      oxyCODONE-acetaminophen (PERCOCET 7.5) 7.5-325 mg per tablet      oxyCODONE-acetaminophen (PERCOCET 7.5) 7.5-325 mg per tablet   6. Fall, initial encounter  [address] XXXA E888.9    7. Chronic low back pain  M54.5 724.2 oxyCODONE-acetaminophen (PERCOCET 7.5) 7.5-325 mg per tablet    G89.29 338.29 oxyCODONE-acetaminophen (PERCOCET 7.5) 7.5-325 mg per tablet      oxyCODONE-acetaminophen (PERCOCET 7.5) 7.5-325 mg per tablet       BP looks good    We restarted requip last time and her RLS sxs are better    Anxiety and depression about the same. Talks to a counsellor via telehealth. S/p fall. Contusion and some knee pain. Expect it to improve with time.  She is walking OK    Due for refill on percocet  UDS is up to date as is controlled substance agreement    F/u 3 months for next refills, HTN, chol, anxiety

## 2021-09-29 DIAGNOSIS — Z76.0 MEDICATION REFILL: ICD-10-CM

## 2021-09-29 DIAGNOSIS — M54.50 CHRONIC LOW BACK PAIN: ICD-10-CM

## 2021-09-29 DIAGNOSIS — B00.9 HERPES SIMPLEX INFECTION: Primary | ICD-10-CM

## 2021-09-29 DIAGNOSIS — G89.29 CHRONIC LOW BACK PAIN: ICD-10-CM

## 2021-09-29 DIAGNOSIS — G89.4 CHRONIC PAIN SYNDROME: ICD-10-CM

## 2021-09-29 RX ORDER — OXYCODONE AND ACETAMINOPHEN 7.5; 325 MG/1; MG/1
1 TABLET ORAL
Qty: 120 TABLET | Refills: 0 | OUTPATIENT
Start: 2021-10-26 | End: 2021-11-25

## 2021-09-30 RX ORDER — DICLOFENAC SODIUM 75 MG/1
TABLET, DELAYED RELEASE ORAL
Qty: 180 TABLET | Refills: 3 | Status: SHIPPED | OUTPATIENT
Start: 2021-09-30 | End: 2022-09-26

## 2021-10-01 RX ORDER — ACYCLOVIR 400 MG/1
400 TABLET ORAL 3 TIMES DAILY
Qty: 21 TABLET | Refills: 5 | Status: SHIPPED | OUTPATIENT
Start: 2021-10-01 | End: 2022-10-20

## 2021-10-01 NOTE — TELEPHONE ENCOUNTER
S/w the pt and made aware of oxycodone refills are available at the pharmacy. Pt voices concerns about having a cold sore flare on her lips. Pt is requesting a refill of acyclovir 400mg to be sent to [facility]. Chart reviewed, previously prescribed this dose.  Will notify

## 2021-11-08 DIAGNOSIS — G89.4 CHRONIC PAIN SYNDROME: ICD-10-CM

## 2021-11-08 DIAGNOSIS — M54.50 CHRONIC LOW BACK PAIN: ICD-10-CM

## 2021-11-08 DIAGNOSIS — G89.29 CHRONIC LOW BACK PAIN: ICD-10-CM

## 2021-11-08 DIAGNOSIS — Z76.0 MEDICATION REFILL: ICD-10-CM

## 2021-11-08 RX ORDER — OXYCODONE AND ACETAMINOPHEN 7.5; 325 MG/1; MG/1
1 TABLET ORAL
Qty: 120 TABLET | Refills: 0 | Status: SHIPPED | OUTPATIENT
Start: 2021-11-08 | End: 2022-01-13 | Stop reason: SDUPTHER

## 2021-11-08 NOTE — TELEPHONE ENCOUNTER
Patient request for refill. Last OV 6/29/21, next scheduled 11/30/21. Requested Prescriptions     Pending Prescriptions Disp Refills    oxyCODONE-acetaminophen (PERCOCET 7.5) 7.5-325 mg per tablet 120 Tablet 0     Sig: Take 1 Tablet by mouth every four (4) hours as needed for Pain for up to 30 days. Max Daily Amount: 6 Tablets.  Indications: pain, chronic pain

## 2021-12-13 ENCOUNTER — OFFICE VISIT (OUTPATIENT)
Dept: INTERNAL MEDICINE CLINIC | Age: 66
End: 2021-12-13
Payer: MEDICARE

## 2021-12-13 VITALS
SYSTOLIC BLOOD PRESSURE: 124 MMHG | RESPIRATION RATE: 18 BRPM | HEART RATE: 82 BPM | TEMPERATURE: 97.2 F | HEIGHT: 66 IN | DIASTOLIC BLOOD PRESSURE: 74 MMHG | WEIGHT: 207 LBS | OXYGEN SATURATION: 96 % | BODY MASS INDEX: 33.27 KG/M2

## 2021-12-13 DIAGNOSIS — I10 ESSENTIAL HYPERTENSION: ICD-10-CM

## 2021-12-13 DIAGNOSIS — J01.90 ACUTE SINUSITIS, RECURRENCE NOT SPECIFIED, UNSPECIFIED LOCATION: ICD-10-CM

## 2021-12-13 DIAGNOSIS — Z23 NEEDS FLU SHOT: ICD-10-CM

## 2021-12-13 DIAGNOSIS — Z09 HOSPITAL DISCHARGE FOLLOW-UP: Primary | ICD-10-CM

## 2021-12-13 DIAGNOSIS — F39 MOOD DISORDER (HCC): ICD-10-CM

## 2021-12-13 DIAGNOSIS — G25.81 RLS (RESTLESS LEGS SYNDROME): ICD-10-CM

## 2021-12-13 PROCEDURE — G8536 NO DOC ELDER MAL SCRN: HCPCS | Performed by: INTERNAL MEDICINE

## 2021-12-13 PROCEDURE — 90694 VACC AIIV4 NO PRSRV 0.5ML IM: CPT | Performed by: INTERNAL MEDICINE

## 2021-12-13 PROCEDURE — G8417 CALC BMI ABV UP PARAM F/U: HCPCS | Performed by: INTERNAL MEDICINE

## 2021-12-13 PROCEDURE — G8754 DIAS BP LESS 90: HCPCS | Performed by: INTERNAL MEDICINE

## 2021-12-13 PROCEDURE — G8752 SYS BP LESS 140: HCPCS | Performed by: INTERNAL MEDICINE

## 2021-12-13 PROCEDURE — G9717 DOC PT DX DEP/BP F/U NT REQ: HCPCS | Performed by: INTERNAL MEDICINE

## 2021-12-13 PROCEDURE — G9899 SCRN MAM PERF RSLTS DOC: HCPCS | Performed by: INTERNAL MEDICINE

## 2021-12-13 PROCEDURE — G8427 DOCREV CUR MEDS BY ELIG CLIN: HCPCS | Performed by: INTERNAL MEDICINE

## 2021-12-13 PROCEDURE — G0008 ADMIN INFLUENZA VIRUS VAC: HCPCS | Performed by: INTERNAL MEDICINE

## 2021-12-13 PROCEDURE — 1101F PT FALLS ASSESS-DOCD LE1/YR: CPT | Performed by: INTERNAL MEDICINE

## 2021-12-13 PROCEDURE — 3017F COLORECTAL CA SCREEN DOC REV: CPT | Performed by: INTERNAL MEDICINE

## 2021-12-13 PROCEDURE — 1090F PRES/ABSN URINE INCON ASSESS: CPT | Performed by: INTERNAL MEDICINE

## 2021-12-13 PROCEDURE — 99214 OFFICE O/P EST MOD 30 MIN: CPT | Performed by: INTERNAL MEDICINE

## 2021-12-13 PROCEDURE — G8399 PT W/DXA RESULTS DOCUMENT: HCPCS | Performed by: INTERNAL MEDICINE

## 2021-12-13 RX ORDER — CEPHALEXIN 500 MG/1
500 CAPSULE ORAL 4 TIMES DAILY
Qty: 40 CAPSULE | Refills: 0 | Status: SHIPPED | OUTPATIENT
Start: 2021-12-13 | End: 2021-12-23

## 2021-12-13 NOTE — PROGRESS NOTES
Reviewed record in preparation for visit and have obtained necessary documentation. José Luis Caldwell is a 74 y.o.  female presents today for office visit for   Chief Complaint   Patient presents with    Hypertension    Anxiety    Cholesterol Problem    Pain (Chronic)   [facility] Follow Up   . Pt is no fasting. Patient is accompanied by self. I have received verbal consent from José Luis Caldwell to discuss any/all medical information while they are present in the room. Pt is in Room # [place] preferred language for health care discussion is english/other. Is the patient using any DME equipment during OV? NO    Advance Directive:  1. Do you have an advance directive in place? Patient Reply: NO    2. If not, would you like material regarding how to put one in place? NO      1. \"Have you been to the ER, urgent care clinic since your last visit? Hospitalized since your last visit? \" Yes [facility] [address]    2. \"Have you seen or consulted any other health care providers outside of the [address] [place] [address] since your last visit? \" Yes, Neurology    3. For patients aged 39-70: Has the patient had a colonoscopy? Yes, HM satisfied with blue hyperlink     If the patient is female:    4. For patients aged 41-77: Has the patient had a mammogram within the past 2 years? Yes, HM satisfied with blue hyperlink    5. For patients aged 21-65: Has the patient had a pap smear? NA based on age or sex    Upcoming Appts  Neurology - TBA    VORB: No orders of the defined types were placed in this encounter.  Ervin Durbin MD/Tara Wang LPN    José Luis Caldwell is due for:   Health Maintenance Due   Topic    Shingrix Vaccine Age 50> (1 of 2)    Flu Vaccine (1)    COVID-19 Vaccine (3 - Booster for Up Peter series)     Health Maintenance reviewed and discussed per provider  Please order/place referral if appropriate.     Requested Prescriptions      No prescriptions requested or ordered in this encounter       Learning Assessment:  Learning Assessment 3/25/2021 7/22/2016   PRIMARY LEARNER Patient Patient   HIGHEST LEVEL OF EDUCATION - PRIMARY LEARNER  SOME COLLEGE SOME COLLEGE   BARRIERS PRIMARY LEARNER NONE -   CO-LEARNER CAREGIVER No -   PRIMARY LANGUAGE ENGLISH ENGLISH   LEARNER PREFERENCE PRIMARY PICTURES DEMONSTRATION   ANSWERED BY patient TINO Ga   RELATIONSHIP SELF SELF     Depression Screening:  3 most recent [facility] Screens 3/25/2021 2/24/2021 9/19/2017 7/22/2016   PHQ Not Done - - - Active Diagnosis of Depression or Bipolar Disorder   Little interest or pleasure in doing things Several days Nearly every day Nearly every day -   Feeling down, depressed, irritable, or hopeless Not at all Nearly every day Nearly every day -   Total Score PHQ 2 1 6 6 -     Abuse Screening:  Abuse Screening Questionnaire 3/25/2021 4/15/2019 8/7/2018 12/6/2017 7/22/2016   Do you ever feel afraid of your partner? N N N N N   Are you in a relationship with someone who physically or mentally threatens you? N N N N N   Is it safe for you to go home? Teodora DONG     Fall Risk  Fall Risk Assessment, last 12 mths 8/31/2021 5/20/2021 3/25/2021 11/12/2020 2/4/2019 11/7/2018 10/16/2018   Able to walk? Yes Yes Yes Yes Yes Yes Yes   Fall in past 12 months? 1 1 1 Yes Yes Yes Yes   Do you feel unsteady? 0 1 - - - - -   Are you worried about falling 0 1 - - - - -   Is TUG test greater than 12 seconds? 0 0 - - - - -   Is the gait abnormal? 0 1 - - - - -   Number of falls in past 12 months 2 2 2 3 3 2 1   Fall with injury? 1 1 1 1 1 1 1     Recent Travel Screening and Travel History documentation     Travel Screening     No screening recorded since 12/12/21 0000     Travel History   Travel since 11/13/21    No documented travel since 11/13/21         Alison Correa is a 74 y.o. female who presents for [facility].    she denies any symptoms , reactions or allergies that would exclude them from being immunized today. Risks and adverse reactions were discussed and the VIS was given to them. All questions were addressed. she was observed for 15 min post injection. There were no reactions observed.     Juan Carlos Garsia LPN

## 2021-12-13 NOTE — PROGRESS NOTES
HISTORY OF PRESENT ILLNESS  José Luis Caldwell is a 74 y.o. female. /74 (BP 1 Location: Left upper arm, BP Patient Position: Sitting, BP Cuff Size: Large adult)   Pulse 82   Temp 97.2 °F (36.2 °C) (Temporal)   Resp 18   Ht 5' 6\" (1.676 m)   Wt 207 lb (93.9 kg)   LMP  (LMP Unknown)   SpO2 96%   BMI 33.41 kg/m²     Pt was in  [facility] from 11/13-11/18/21 due to ? Confusion and question of mental health issues. She had presented with syncopal spell. They changed a bunch of her meds, then she went back and restarted them after going home. Has seen the cardiologist, Dr. Jessica Hananh, since discharge. She says her meds were not given properly in the hospital, but it appears they were held intentionally. There is a concern that she is misusing or overusing them  Several were left off of her discharge list but she resumed her home meds which she had at home. Currently she is homeless, living at a [facility] right now. She had been in a shelter that had someone come in with COVID a week ago. Many of the residents were moved out for the time being due to Beth Zaragoza. She is unclear on this point. She did not bring in any of her meds  And many have not been filled by me in over a year so there is a real question as to what she is taking      On 12/5/21 she was seen at the ER with \"polypharmacy\" barbiturates, benzos and oxycodone in urine (she has Rxs for these). She fell asleep in her car at a homeless shelter and they were worried that she had overdosed. In the ER she was initially drowsy, but remained stable. She was hydrated, observed, then discharged      Hypertension   The history is provided by the patient. This is a chronic problem. Associated symptoms include anxiety and headaches. Pertinent negatives include no chest pain, no palpitations, no dizziness and no shortness of breath. Risk factors include dyslipidemia. Anxiety  The history is provided by the patient.  This is a chronic problem. Associated symptoms include headaches. Pertinent negatives include no chest pain and no shortness of breath. Cholesterol Problem  The history is provided by the patient. This is a chronic problem. The current episode started more than 1 week ago. The problem occurs daily. Associated symptoms include headaches. Pertinent negatives include no chest pain and no shortness of breath. Hospital Follow Up  The history is provided by the patient (see comments). This is a new problem. Associated symptoms include headaches. Pertinent negatives include no chest pain and no shortness of breath. Review of Systems   Constitutional: Negative for chills and fever. HENT: Positive for congestion (green nasal drainage) and sinus pain (pressure). Negative for ear pain. Scalp is tender   Respiratory: Negative for cough and shortness of breath. Cardiovascular: Negative for chest pain and palpitations. Neurological: Positive for headaches. Negative for dizziness. Psychiatric/Behavioral: Positive for memory loss. The patient is nervous/anxious. Physical Exam  Vitals and nursing note reviewed. Constitutional:       Appearance: Normal appearance. She is well-developed. HENT:      Head: Normocephalic and atraumatic. Cardiovascular:      Rate and Rhythm: Normal rate and regular rhythm. Pulmonary:      Effort: Pulmonary effort is normal.      Breath sounds: Normal breath sounds. Musculoskeletal:      Right lower leg: No edema. Left lower leg: No edema. Skin:     General: Skin is warm and dry. Neurological:      General: No focal deficit present. Mental Status: She is alert and oriented to person, place, and time. Psychiatric:         Mood and Affect: Mood normal.         Behavior: Behavior normal.         ASSESSMENT and PLAN    ICD-10-CM ICD-9-CM    1. Hospital discharge follow-up  Z09 V67.59    2.  Acute sinusitis, recurrence not specified, unspecified location  J01.90 461.9 cephALEXin (KEFLEX) 500 mg capsule   3. Essential hypertension  I10 401.9    4. Mood disorder (HCC)  F39 296.90    5. RLS (restless legs syndrome)  G25.81 333.94    6. Needs flu shot  Z23 V04.81 FLU (FLUAD QUAD INFLUENZA VACCINE,QUAD,ADJUVANTED)       Reviewed hospital record, both inpatient and recent ER visit. It is unclear what the patient is actually taking or when she has been getting them filled. There are concerns re polypharmacy and mixing her meds. She does not appear to recognize the danger she puts herself in using her meds as she does. Flu vax today    She will get her COVID booster when she can    Of note is her homelessness. She has been moving from place to place and/or living in her car.     F/u 6 weeks for recheck--asked pt to bring all meds

## 2021-12-25 DIAGNOSIS — Z76.0 MEDICATION REFILL: ICD-10-CM

## 2021-12-25 RX ORDER — ROPINIROLE 2 MG/1
TABLET, FILM COATED ORAL
Qty: 180 TABLET | Refills: 1 | Status: SHIPPED | OUTPATIENT
Start: 2021-12-25 | End: 2022-07-02

## 2022-01-04 DIAGNOSIS — Z76.0 MEDICATION REFILL: ICD-10-CM

## 2022-01-04 RX ORDER — ALPRAZOLAM 1 MG/1
TABLET ORAL
Qty: 90 TABLET | Refills: 1 | Status: SHIPPED | OUTPATIENT
Start: 2022-01-04 | End: 2022-03-23 | Stop reason: SDUPTHER

## 2022-01-06 ENCOUNTER — TELEPHONE (OUTPATIENT)
Dept: INTERNAL MEDICINE CLINIC | Age: 67
End: 2022-01-06

## 2022-01-06 NOTE — TELEPHONE ENCOUNTER
She needs an appointment before I will write any additional percocet. She has now been to the hospital  with sxs and findings of overdosing on her meds (not thought to be intentional) and I do not want to refill them  She can go to any orthopedic doctor she wishes.  [address] does not require a referral. Suggest Dr. Christa Bosch

## 2022-01-06 NOTE — TELEPHONE ENCOUNTER
----- Message from Darlene Zues sent at 1/6/2022 10:21 AM EST -----  Subject: Refill Request    QUESTIONS  Name of Medication? oxyCODONE-acetaminophen (PERCOCET 7.5) 7.5-325 mg per   tablet  Patient-reported dosage and instructions? as needed   How many days do you have left? 4  Preferred Pharmacy? [facility] [address] [place] [address] phone number (if available)? [phone]  Additional Information for Provider? Pt has four left, pt also stated that   she needs to see someone for her knee and wants to see a different   orthopedic doctor. She would like to try [facility]. Pt stated   that the pain is so bad and awful. Pls give the pt a call.  ---------------------------------------------------------------------------  --------------  CALL BACK INFO  What is the best way for the office to contact you? OK to leave message on   voicemail  Preferred Call Back Phone Number?  [phone]

## 2022-01-07 NOTE — TELEPHONE ENCOUNTER
Spoke with pt in regards to medication refill for Percocet. Relayed the 's note. Pt is provided the contact information for [facility] Scheduling. Pt is scheduled for a f/u on 01/26/22. Pt voices no concerns at this time.

## 2022-01-13 DIAGNOSIS — G89.29 CHRONIC LOW BACK PAIN: ICD-10-CM

## 2022-01-13 DIAGNOSIS — G89.4 CHRONIC PAIN SYNDROME: ICD-10-CM

## 2022-01-13 DIAGNOSIS — M54.50 CHRONIC LOW BACK PAIN: ICD-10-CM

## 2022-01-13 DIAGNOSIS — Z76.0 MEDICATION REFILL: ICD-10-CM

## 2022-01-13 RX ORDER — OXYCODONE AND ACETAMINOPHEN 7.5; 325 MG/1; MG/1
1 TABLET ORAL
Qty: 120 TABLET | Refills: 0 | Status: SHIPPED | OUTPATIENT
Start: 2022-01-13 | End: 2022-03-03 | Stop reason: SDUPTHER

## 2022-01-13 NOTE — TELEPHONE ENCOUNTER
Patient is calling in stating  She is still homeless and living out of her car. Tested positive for Covid this week after being in the ER via ambulance. States she will come in to see you once she is feeling better. Requested Prescriptions     Pending Prescriptions Disp Refills    oxyCODONE-acetaminophen (PERCOCET 7.5) 7.5-325 mg per tablet 120 Tablet 0     Sig: Take 1 Tablet by mouth every four (4) hours as needed for Pain for up to 30 days. Max Daily Amount: 6 Tablets.  Indications: pain, chronic pain

## 2022-01-13 NOTE — TELEPHONE ENCOUNTER
Verified tested + for COVID on 1/11/22 at a [facility] ER.     Reluctantly will refill today, but still have concerns over her overmedicating

## 2022-01-24 DIAGNOSIS — Z76.0 MEDICATION REFILL: ICD-10-CM

## 2022-01-24 DIAGNOSIS — E55.9 VITAMIN D DEFICIENCY, UNSPECIFIED: ICD-10-CM

## 2022-01-24 DIAGNOSIS — E78.5 DYSLIPIDEMIA: Primary | ICD-10-CM

## 2022-01-24 DIAGNOSIS — L30.4 INTERTRIGO: ICD-10-CM

## 2022-01-24 RX ORDER — ATORVASTATIN CALCIUM 40 MG/1
40 TABLET, FILM COATED ORAL
Qty: 90 TABLET | Refills: 4 | Status: SHIPPED | OUTPATIENT
Start: 2022-01-24

## 2022-01-24 RX ORDER — ERGOCALCIFEROL 1.25 MG/1
50000 CAPSULE ORAL
Qty: 12 CAPSULE | Refills: 1 | Status: SHIPPED | OUTPATIENT
Start: 2022-01-24 | End: 2022-07-18

## 2022-01-24 RX ORDER — NYSTATIN 100000 [USP'U]/G
POWDER TOPICAL 2 TIMES DAILY
Qty: 60 G | Refills: 5 | Status: SHIPPED | OUTPATIENT
Start: 2022-01-24

## 2022-03-03 ENCOUNTER — OFFICE VISIT (OUTPATIENT)
Dept: INTERNAL MEDICINE CLINIC | Age: 67
End: 2022-03-03
Payer: MEDICARE

## 2022-03-03 VITALS
WEIGHT: 207.4 LBS | HEART RATE: 74 BPM | SYSTOLIC BLOOD PRESSURE: 152 MMHG | TEMPERATURE: 97.2 F | OXYGEN SATURATION: 99 % | HEIGHT: 66 IN | DIASTOLIC BLOOD PRESSURE: 82 MMHG | RESPIRATION RATE: 16 BRPM | BODY MASS INDEX: 33.33 KG/M2

## 2022-03-03 DIAGNOSIS — D35.2 PROLACTINOMA (HCC): ICD-10-CM

## 2022-03-03 DIAGNOSIS — G89.29 CHRONIC PAIN OF RIGHT KNEE: ICD-10-CM

## 2022-03-03 DIAGNOSIS — L97.921 ULCERS OF BOTH LOWER EXTREMITIES, LIMITED TO BREAKDOWN OF SKIN (HCC): ICD-10-CM

## 2022-03-03 DIAGNOSIS — Z76.0 MEDICATION REFILL: ICD-10-CM

## 2022-03-03 DIAGNOSIS — R53.81 MALAISE: ICD-10-CM

## 2022-03-03 DIAGNOSIS — L97.911 ULCERS OF BOTH LOWER EXTREMITIES, LIMITED TO BREAKDOWN OF SKIN (HCC): ICD-10-CM

## 2022-03-03 DIAGNOSIS — I10 ESSENTIAL HYPERTENSION: ICD-10-CM

## 2022-03-03 DIAGNOSIS — Z87.898 H/O: PITUITARY TUMOR: ICD-10-CM

## 2022-03-03 DIAGNOSIS — G89.4 CHRONIC PAIN SYNDROME: ICD-10-CM

## 2022-03-03 DIAGNOSIS — M54.50 CHRONIC LOW BACK PAIN: ICD-10-CM

## 2022-03-03 DIAGNOSIS — M25.561 CHRONIC PAIN OF RIGHT KNEE: ICD-10-CM

## 2022-03-03 DIAGNOSIS — R30.0 DYSURIA: ICD-10-CM

## 2022-03-03 DIAGNOSIS — E55.9 VITAMIN D DEFICIENCY, UNSPECIFIED: ICD-10-CM

## 2022-03-03 DIAGNOSIS — E78.5 DYSLIPIDEMIA: ICD-10-CM

## 2022-03-03 DIAGNOSIS — Z79.891 LONG TERM (CURRENT) USE OF OPIATE ANALGESIC: ICD-10-CM

## 2022-03-03 DIAGNOSIS — F39 MOOD DISORDER (HCC): ICD-10-CM

## 2022-03-03 DIAGNOSIS — D68.59 HYPERCOAGULABLE STATE (HCC): ICD-10-CM

## 2022-03-03 DIAGNOSIS — G40.909 SEIZURE DISORDER (HCC): ICD-10-CM

## 2022-03-03 DIAGNOSIS — F11.99 OPIOID USE, UNSPECIFIED WITH UNSPECIFIED OPIOID-INDUCED DISORDER (HCC): ICD-10-CM

## 2022-03-03 DIAGNOSIS — E23.0 HYPOPITUITARISM (HCC): ICD-10-CM

## 2022-03-03 DIAGNOSIS — Z00.00 MEDICARE ANNUAL WELLNESS VISIT, SUBSEQUENT: Primary | ICD-10-CM

## 2022-03-03 DIAGNOSIS — G89.29 CHRONIC LOW BACK PAIN: ICD-10-CM

## 2022-03-03 LAB
BILIRUB UR QL STRIP: NEGATIVE
GLUCOSE UR-MCNC: NEGATIVE MG/DL
KETONES P FAST UR STRIP-MCNC: NEGATIVE MG/DL
PH UR STRIP: 6.5 [PH] (ref 4.6–8)
PROT UR QL STRIP: NEGATIVE
SP GR UR STRIP: 1.01 (ref 1–1.03)
UA UROBILINOGEN AMB POC: NORMAL (ref 0.2–1)
URINALYSIS CLARITY POC: NORMAL
URINALYSIS COLOR POC: NORMAL
URINE BLOOD POC: NEGATIVE
URINE LEUKOCYTES POC: NEGATIVE
URINE NITRITES POC: NEGATIVE

## 2022-03-03 PROCEDURE — 1101F PT FALLS ASSESS-DOCD LE1/YR: CPT | Performed by: INTERNAL MEDICINE

## 2022-03-03 PROCEDURE — 3017F COLORECTAL CA SCREEN DOC REV: CPT | Performed by: INTERNAL MEDICINE

## 2022-03-03 PROCEDURE — 99214 OFFICE O/P EST MOD 30 MIN: CPT | Performed by: INTERNAL MEDICINE

## 2022-03-03 PROCEDURE — G0439 PPPS, SUBSEQ VISIT: HCPCS | Performed by: INTERNAL MEDICINE

## 2022-03-03 PROCEDURE — G9717 DOC PT DX DEP/BP F/U NT REQ: HCPCS | Performed by: INTERNAL MEDICINE

## 2022-03-03 PROCEDURE — 81001 URINALYSIS AUTO W/SCOPE: CPT | Performed by: INTERNAL MEDICINE

## 2022-03-03 PROCEDURE — G8754 DIAS BP LESS 90: HCPCS | Performed by: INTERNAL MEDICINE

## 2022-03-03 PROCEDURE — G8399 PT W/DXA RESULTS DOCUMENT: HCPCS | Performed by: INTERNAL MEDICINE

## 2022-03-03 PROCEDURE — 1090F PRES/ABSN URINE INCON ASSESS: CPT | Performed by: INTERNAL MEDICINE

## 2022-03-03 PROCEDURE — G8536 NO DOC ELDER MAL SCRN: HCPCS | Performed by: INTERNAL MEDICINE

## 2022-03-03 PROCEDURE — G8427 DOCREV CUR MEDS BY ELIG CLIN: HCPCS | Performed by: INTERNAL MEDICINE

## 2022-03-03 PROCEDURE — G9899 SCRN MAM PERF RSLTS DOC: HCPCS | Performed by: INTERNAL MEDICINE

## 2022-03-03 PROCEDURE — G8753 SYS BP > OR = 140: HCPCS | Performed by: INTERNAL MEDICINE

## 2022-03-03 PROCEDURE — G8417 CALC BMI ABV UP PARAM F/U: HCPCS | Performed by: INTERNAL MEDICINE

## 2022-03-03 RX ORDER — OXYCODONE AND ACETAMINOPHEN 7.5; 325 MG/1; MG/1
1 TABLET ORAL
Qty: 120 TABLET | Refills: 0 | Status: SHIPPED | OUTPATIENT
Start: 2022-03-03 | End: 2022-07-07 | Stop reason: SDUPTHER

## 2022-03-03 RX ORDER — OXYCODONE AND ACETAMINOPHEN 7.5; 325 MG/1; MG/1
1 TABLET ORAL
Qty: 120 TABLET | Refills: 0 | Status: SHIPPED | OUTPATIENT
Start: 2022-04-28 | End: 2022-06-06 | Stop reason: SDUPTHER

## 2022-03-03 RX ORDER — POTASSIUM CHLORIDE 20 MEQ/1
TABLET, EXTENDED RELEASE ORAL
Qty: 90 TABLET | Refills: 3 | Status: SHIPPED | OUTPATIENT
Start: 2022-03-03

## 2022-03-03 RX ORDER — ZOLPIDEM TARTRATE 12.5 MG/1
TABLET, FILM COATED, EXTENDED RELEASE ORAL
Qty: 90 TABLET | Refills: 1 | Status: SHIPPED | OUTPATIENT
Start: 2022-03-03 | End: 2022-10-20

## 2022-03-03 RX ORDER — OXYCODONE AND ACETAMINOPHEN 7.5; 325 MG/1; MG/1
1 TABLET ORAL
Qty: 120 TABLET | Refills: 0 | Status: SHIPPED | OUTPATIENT
Start: 2022-03-31 | End: 2022-07-07 | Stop reason: SDUPTHER

## 2022-03-03 NOTE — PROGRESS NOTES
1. \"Have you been to the ER, urgent care clinic since your last visit? Hospitalized since your last visit? \" Yes [facility]    2. \"Have you seen or consulted any other health care providers outside of the [address] [place] [address] since your last visit? \" Yes [address]     3. For patients aged 39-70: Has the patient had a colonoscopy / FIT/ Cologuard? Yes - no Care Gap present      If the patient is female:    4. For patients aged 41-77: Has the patient had a mammogram within the past 2 years? Yes - no Care Gap present      5. For patients aged 21-65: Has the patient had a pap smear?  NA - based on age or sex

## 2022-03-03 NOTE — PATIENT INSTRUCTIONS
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
## 2022-03-05 LAB
25(OH)D3+25(OH)D2 SERPL-MCNC: 35.6 NG/ML (ref 30–100)
ALBUMIN SERPL-MCNC: 4.2 G/DL (ref 3.8–4.8)
ALBUMIN/GLOB SERPL: 1.8 {RATIO} (ref 1.2–2.2)
ALP SERPL-CCNC: 60 IU/L (ref 44–121)
ALT SERPL-CCNC: 16 IU/L (ref 0–32)
AST SERPL-CCNC: 19 IU/L (ref 0–40)
BACTERIA UR CULT: NO GROWTH
BASOPHILS # BLD AUTO: 0 X10E3/UL (ref 0–0.2)
BASOPHILS NFR BLD AUTO: 1 %
BILIRUB SERPL-MCNC: 0.4 MG/DL (ref 0–1.2)
BUN SERPL-MCNC: 8 MG/DL (ref 8–27)
BUN/CREAT SERPL: 11 (ref 12–28)
CALCIUM SERPL-MCNC: 9.1 MG/DL (ref 8.7–10.3)
CHLORIDE SERPL-SCNC: 106 MMOL/L (ref 96–106)
CHOLEST SERPL-MCNC: 190 MG/DL (ref 100–199)
CO2 SERPL-SCNC: 24 MMOL/L (ref 20–29)
CREAT SERPL-MCNC: 0.75 MG/DL (ref 0.57–1)
DRUGS UR: NORMAL
EGFR: 87 ML/MIN/1.73
EOSINOPHIL # BLD AUTO: 0.3 X10E3/UL (ref 0–0.4)
EOSINOPHIL NFR BLD AUTO: 5 %
ERYTHROCYTE [DISTWIDTH] IN BLOOD BY AUTOMATED COUNT: 13 % (ref 11.7–15.4)
GLOBULIN SER CALC-MCNC: 2.3 G/DL (ref 1.5–4.5)
GLUCOSE SERPL-MCNC: 82 MG/DL (ref 65–99)
HCT VFR BLD AUTO: 37 % (ref 34–46.6)
HDLC SERPL-MCNC: 52 MG/DL
HGB BLD-MCNC: 12.2 G/DL (ref 11.1–15.9)
IMM GRANULOCYTES # BLD AUTO: 0 X10E3/UL (ref 0–0.1)
IMM GRANULOCYTES NFR BLD AUTO: 0 %
IMP & REVIEW OF LAB RESULTS: NORMAL
LDLC SERPL CALC-MCNC: 116 MG/DL (ref 0–99)
LYMPHOCYTES # BLD AUTO: 2.6 X10E3/UL (ref 0.7–3.1)
LYMPHOCYTES NFR BLD AUTO: 43 %
MCH RBC QN AUTO: 32.2 PG (ref 26.6–33)
MCHC RBC AUTO-ENTMCNC: 33 G/DL (ref 31.5–35.7)
MCV RBC AUTO: 98 FL (ref 79–97)
MONOCYTES # BLD AUTO: 0.8 X10E3/UL (ref 0.1–0.9)
MONOCYTES NFR BLD AUTO: 12 %
NEUTROPHILS # BLD AUTO: 2.4 X10E3/UL (ref 1.4–7)
NEUTROPHILS NFR BLD AUTO: 39 %
PLATELET # BLD AUTO: 257 X10E3/UL (ref 150–450)
POTASSIUM SERPL-SCNC: 4.3 MMOL/L (ref 3.5–5.2)
PROLACTIN SERPL-MCNC: 9.9 NG/ML (ref 4.8–23.3)
PROT SERPL-MCNC: 6.5 G/DL (ref 6–8.5)
RBC # BLD AUTO: 3.79 X10E6/UL (ref 3.77–5.28)
SODIUM SERPL-SCNC: 145 MMOL/L (ref 134–144)
TRIGL SERPL-MCNC: 122 MG/DL (ref 0–149)
TSH SERPL DL<=0.005 MIU/L-ACNC: 1.34 UIU/ML (ref 0.45–4.5)
VLDLC SERPL CALC-MCNC: 22 MG/DL (ref 5–40)
WBC # BLD AUTO: 6.1 X10E3/UL (ref 3.4–10.8)

## 2022-03-18 PROBLEM — N89.8 VAGINAL DISCHARGE: Status: ACTIVE | Noted: 2017-03-03

## 2022-03-18 PROBLEM — N17.9 AKI (ACUTE KIDNEY INJURY) (HCC): Status: ACTIVE | Noted: 2020-06-05
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
## 2022-03-18 PROBLEM — G40.909 SEIZURE DISORDER (HCC): Status: ACTIVE | Noted: 2021-03-30

## 2022-03-18 PROBLEM — M85.80 OSTEOPENIA: Status: ACTIVE | Noted: 2019-11-18

## 2022-03-18 PROBLEM — G93.41 METABOLIC ENCEPHALOPATHY: Status: ACTIVE | Noted: 2020-06-05

## 2022-03-19 PROBLEM — K29.70 GASTRITIS: Status: ACTIVE | Noted: 2019-11-13

## 2022-03-19 PROBLEM — G89.29 CHRONIC PAIN OF RIGHT KNEE: Status: ACTIVE | Noted: 2017-11-09

## 2022-03-19 PROBLEM — M54.50 CHRONIC LOW BACK PAIN: Status: ACTIVE | Noted: 2018-08-07

## 2022-03-19 PROBLEM — R41.3 MEMORY DEFICIT: Status: ACTIVE | Noted: 2018-01-18

## 2022-03-19 PROBLEM — R10.30 LOWER ABDOMINAL PAIN: Status: ACTIVE | Noted: 2017-03-03

## 2022-03-19 PROBLEM — E87.6 HYPOKALEMIA: Status: ACTIVE | Noted: 2020-06-05

## 2022-03-19 PROBLEM — E78.5 DYSLIPIDEMIA: Status: ACTIVE | Noted: 2017-09-19

## 2022-03-19 PROBLEM — K76.89 LIVER DYSFUNCTION: Status: ACTIVE | Noted: 2020-06-05

## 2022-03-19 PROBLEM — N30.01 ACUTE CYSTITIS WITH HEMATURIA: Status: ACTIVE | Noted: 2017-03-03

## 2022-03-19 PROBLEM — I10 ESSENTIAL HYPERTENSION: Status: ACTIVE | Noted: 2017-12-06

## 2022-03-19 PROBLEM — G89.29 CHRONIC LOW BACK PAIN: Status: ACTIVE | Noted: 2018-08-07

## 2022-03-19 PROBLEM — M25.561 CHRONIC PAIN OF RIGHT KNEE: Status: ACTIVE | Noted: 2017-11-09

## 2022-03-19 PROBLEM — F11.99 OPIOID USE, UNSPECIFIED WITH UNSPECIFIED OPIOID-INDUCED DISORDER (HCC): Status: ACTIVE | Noted: 2022-03-03

## 2022-03-19 PROBLEM — Z79.891 LONG TERM (CURRENT) USE OF OPIATE ANALGESIC: Status: ACTIVE | Noted: 2018-01-18

## 2022-03-20 PROBLEM — Z86.19 HISTORY OF GONORRHEA: Status: ACTIVE | Noted: 2017-03-03

## 2022-03-20 PROBLEM — M62.82 RHABDOMYOLYSIS: Status: ACTIVE | Noted: 2020-06-05

## 2022-03-20 PROBLEM — R94.31 PROLONGED Q-T INTERVAL ON ECG: Status: ACTIVE | Noted: 2020-06-05

## 2022-03-23 DIAGNOSIS — Z76.0 MEDICATION REFILL: ICD-10-CM

## 2022-03-23 RX ORDER — ALPRAZOLAM 1 MG/1
TABLET ORAL
Qty: 90 TABLET | Refills: 1 | Status: SHIPPED | OUTPATIENT
Start: 2022-03-23 | End: 2022-05-10 | Stop reason: SDUPTHER

## 2022-03-23 NOTE — TELEPHONE ENCOUNTER
Requested Prescriptions     Pending Prescriptions Disp Refills    ALPRAZolam (XANAX) 1 mg tablet 90 Tablet 1     Sig: take 1 tablet by mouth three times a day if needed for anxiety MAXIMUM DAILY DOSE OF 3 tablets

## 2022-05-10 DIAGNOSIS — Z76.0 MEDICATION REFILL: ICD-10-CM

## 2022-05-10 RX ORDER — ALPRAZOLAM 1 MG/1
TABLET ORAL
Qty: 90 TABLET | Refills: 1 | Status: SHIPPED | OUTPATIENT
Start: 2022-05-10 | End: 2022-06-06 | Stop reason: SDUPTHER

## 2022-05-10 RX ORDER — CETIRIZINE HCL 10 MG
TABLET ORAL
Qty: 90 TABLET | Refills: 3 | Status: SHIPPED | OUTPATIENT
Start: 2022-05-10 | End: 2022-06-06

## 2022-05-10 RX ORDER — OMEPRAZOLE 40 MG/1
CAPSULE, DELAYED RELEASE ORAL
Qty: 90 CAPSULE | Refills: 3 | Status: SHIPPED | OUTPATIENT
Start: 2022-05-10

## 2022-05-10 NOTE — TELEPHONE ENCOUNTER
Chart reviewed. Last ordered 03/03/22  Start: 4/28/2022    End: 5/28/2022    ----- Message from Barrett Clinton sent at 5/6/2022  2:53 PM EDT -----  Subject: Refill Request    QUESTIONS  Name of Medication? oxyCODONE-acetaminophen (PERCOCET 7.5) 7.5-325 mg per   tablet  Patient-reported dosage and instructions? 7.5-325  How many days do you have left? 0  [facility]? [id number] [address] [place] [address] phone number (if available)? [phone]  Additional Information for Provider? Patient would like to know if doctor   will increase dosage for her.   ---------------------------------------------------------------------------  --------------  CALL BACK INFO  What is the best way for the office to contact you? OK to leave message on   voicemail  Preferred Call Back Phone Number? [phone]  ---------------------------------------------------------------------------  --------------  SCRIPT ANSWERS  Relationship to Patient?  Self

## 2022-05-10 NOTE — TELEPHONE ENCOUNTER
----- Message from Qing Johnson sent at 5/6/2022  3:00 PM EDT -----  Subject: Refill Request    QUESTIONS  Name of Medication? omeprazole (PRILOSEC) 40 mg capsule  Patient-reported dosage and instructions? 40 mg   How many days do you have left? 0  [facility]? [id number] [address] [place] [address] phone number (if available)? [phone]  ---------------------------------------------------------------------------  --------------  CALL BACK INFO  What is the best way for the office to contact you? OK to leave message on   voicemail  Preferred Call Back Phone Number? [phone]  ---------------------------------------------------------------------------  --------------  SCRIPT ANSWERS  Relationship to Patient?  Self

## 2022-05-10 NOTE — TELEPHONE ENCOUNTER
----- Message from Mai Irvin sent at 5/6/2022  2:57 PM EDT -----  Subject: Refill Request    QUESTIONS  Name of Medication? Other - Zyrtec   Patient-reported dosage and instructions? 10 mg   How many days do you have left? 0  [facility]? [facility] [address] [place] [address] phone number (if available)? [phone]  ---------------------------------------------------------------------------  --------------  CALL BACK INFO  What is the best way for the office to contact you? OK to leave message on   voicemail  Preferred Call Back Phone Number? [phone]  ---------------------------------------------------------------------------  --------------  SCRIPT ANSWERS  Relationship to Patient?  Self

## 2022-05-10 NOTE — TELEPHONE ENCOUNTER
----- Message from Israel Mikelchloe sent at 5/6/2022  2:52 PM EDT -----  Subject: Refill Request    QUESTIONS  Name of Medication? ALPRAZolam (XANAX) 1 mg tablet  Patient-reported dosage and instructions? 1 mg   How many days do you have left? 2  [facility]? [facility] [address] [place] [address] phone number (if available)? [phone]  ---------------------------------------------------------------------------  --------------  CALL BACK INFO  What is the best way for the office to contact you? OK to leave message on   voicemail  Preferred Call Back Phone Number? [phone]  ---------------------------------------------------------------------------  --------------  SCRIPT ANSWERS  Relationship to Patient?  Self

## 2022-06-01 ENCOUNTER — PATIENT OUTREACH (OUTPATIENT)
Dept: CASE MANAGEMENT | Age: 67
End: 2022-06-01

## 2022-06-01 DIAGNOSIS — Z76.0 MEDICATION REFILL: ICD-10-CM

## 2022-06-01 RX ORDER — BENZONATATE 200 MG/1
200 CAPSULE ORAL
Qty: 90 CAPSULE | Refills: 5 | Status: SHIPPED | OUTPATIENT
Start: 2022-06-01 | End: 2022-06-06

## 2022-06-01 RX ORDER — LIDOCAINE 50 MG/G
PATCH TOPICAL
Qty: 30 EACH | Refills: 5 | Status: SHIPPED | OUTPATIENT
Start: 2022-06-01 | End: 2022-10-20 | Stop reason: SDUPTHER

## 2022-06-01 RX ORDER — ALBUTEROL SULFATE 90 UG/1
2 AEROSOL, METERED RESPIRATORY (INHALATION)
Qty: 1 EACH | Refills: 5 | Status: SHIPPED | OUTPATIENT
Start: 2022-06-01 | End: 2022-06-06

## 2022-06-01 NOTE — PROGRESS NOTES
Orders Placed This Encounter    albuterol (PROVENTIL HFA, VENTOLIN HFA, PROAIR HFA) 90 mcg/actuation inhaler     Sig: Take 2 Puffs by inhalation every four (4) hours as needed for Wheezing. Indications: bronchospasm     Dispense:  1 Each     Refill:  5    benzonatate (TESSALON) 200 mg capsule     Sig: Take 1 Capsule by mouth three (3) times daily as needed for Cough. Dispense:  90 Capsule     Refill:  5    lidocaine (LIDODERM) 5 %     Sig: Apply one daily     Dispense:  30 Each     Refill:  5         No percocet until f/u appt. I have not written refresh for her.

## 2022-06-01 NOTE — PROGRESS NOTES
Message left for patient that prescriptions were sent to her pharmacy. Oxycodone not sent-needs ov prior to refill and refresh drops will have to be discuss at office visit. Refresh never filled by pcp.
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
## 2022-06-06 ENCOUNTER — OFFICE VISIT (OUTPATIENT)
Dept: INTERNAL MEDICINE CLINIC | Age: 67
End: 2022-06-06
Payer: MEDICARE

## 2022-06-06 VITALS
SYSTOLIC BLOOD PRESSURE: 161 MMHG | HEART RATE: 83 BPM | OXYGEN SATURATION: 99 % | BODY MASS INDEX: 32.72 KG/M2 | DIASTOLIC BLOOD PRESSURE: 94 MMHG | HEIGHT: 66 IN | TEMPERATURE: 97 F | WEIGHT: 203.6 LBS

## 2022-06-06 DIAGNOSIS — G40.909 SEIZURE DISORDER (HCC): ICD-10-CM

## 2022-06-06 DIAGNOSIS — G89.4 CHRONIC PAIN SYNDROME: ICD-10-CM

## 2022-06-06 DIAGNOSIS — G89.29 CHRONIC LOW BACK PAIN: ICD-10-CM

## 2022-06-06 DIAGNOSIS — D68.59 HYPERCOAGULABLE STATE (HCC): ICD-10-CM

## 2022-06-06 DIAGNOSIS — E23.0 HYPOPITUITARISM (HCC): ICD-10-CM

## 2022-06-06 DIAGNOSIS — D35.2 PROLACTINOMA (HCC): ICD-10-CM

## 2022-06-06 DIAGNOSIS — R60.0 PEDAL EDEMA: ICD-10-CM

## 2022-06-06 DIAGNOSIS — M54.50 CHRONIC LOW BACK PAIN: ICD-10-CM

## 2022-06-06 DIAGNOSIS — I10 ESSENTIAL HYPERTENSION: Primary | ICD-10-CM

## 2022-06-06 DIAGNOSIS — Z76.0 MEDICATION REFILL: ICD-10-CM

## 2022-06-06 PROBLEM — L97.921 ULCERS OF BOTH LOWER EXTREMITIES, LIMITED TO BREAKDOWN OF SKIN (HCC): Status: ACTIVE | Noted: 2022-06-06

## 2022-06-06 PROBLEM — L97.911 ULCERS OF BOTH LOWER EXTREMITIES, LIMITED TO BREAKDOWN OF SKIN (HCC): Status: ACTIVE | Noted: 2022-06-06

## 2022-06-06 PROBLEM — L97.911 ULCERS OF BOTH LOWER EXTREMITIES, LIMITED TO BREAKDOWN OF SKIN (HCC): Status: RESOLVED | Noted: 2022-06-06 | Resolved: 2022-06-06

## 2022-06-06 PROBLEM — L97.921 ULCERS OF BOTH LOWER EXTREMITIES, LIMITED TO BREAKDOWN OF SKIN (HCC): Status: RESOLVED | Noted: 2022-06-06 | Resolved: 2022-06-06

## 2022-06-06 PROCEDURE — 1090F PRES/ABSN URINE INCON ASSESS: CPT | Performed by: INTERNAL MEDICINE

## 2022-06-06 PROCEDURE — 1101F PT FALLS ASSESS-DOCD LE1/YR: CPT | Performed by: INTERNAL MEDICINE

## 2022-06-06 PROCEDURE — G8755 DIAS BP > OR = 90: HCPCS | Performed by: INTERNAL MEDICINE

## 2022-06-06 PROCEDURE — G9899 SCRN MAM PERF RSLTS DOC: HCPCS | Performed by: INTERNAL MEDICINE

## 2022-06-06 PROCEDURE — G8417 CALC BMI ABV UP PARAM F/U: HCPCS | Performed by: INTERNAL MEDICINE

## 2022-06-06 PROCEDURE — G8399 PT W/DXA RESULTS DOCUMENT: HCPCS | Performed by: INTERNAL MEDICINE

## 2022-06-06 PROCEDURE — G8753 SYS BP > OR = 140: HCPCS | Performed by: INTERNAL MEDICINE

## 2022-06-06 PROCEDURE — G9717 DOC PT DX DEP/BP F/U NT REQ: HCPCS | Performed by: INTERNAL MEDICINE

## 2022-06-06 PROCEDURE — G8536 NO DOC ELDER MAL SCRN: HCPCS | Performed by: INTERNAL MEDICINE

## 2022-06-06 PROCEDURE — 3017F COLORECTAL CA SCREEN DOC REV: CPT | Performed by: INTERNAL MEDICINE

## 2022-06-06 PROCEDURE — 99214 OFFICE O/P EST MOD 30 MIN: CPT | Performed by: INTERNAL MEDICINE

## 2022-06-06 PROCEDURE — G8427 DOCREV CUR MEDS BY ELIG CLIN: HCPCS | Performed by: INTERNAL MEDICINE

## 2022-06-06 PROCEDURE — 1123F ACP DISCUSS/DSCN MKR DOCD: CPT | Performed by: INTERNAL MEDICINE

## 2022-06-06 RX ORDER — OXYCODONE AND ACETAMINOPHEN 7.5; 325 MG/1; MG/1
1 TABLET ORAL
Qty: 120 TABLET | Refills: 0 | Status: SHIPPED | OUTPATIENT
Start: 2022-06-06 | End: 2022-07-07 | Stop reason: SDUPTHER

## 2022-06-06 RX ORDER — ALPRAZOLAM 1 MG/1
TABLET ORAL
Qty: 90 TABLET | Refills: 2 | Status: SHIPPED | OUTPATIENT
Start: 2022-06-06 | End: 2022-10-20

## 2022-06-06 RX ORDER — FUROSEMIDE 40 MG/1
40 TABLET ORAL DAILY
Qty: 90 TABLET | Refills: 1 | Status: SHIPPED | OUTPATIENT
Start: 2022-06-06
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
## 2022-06-06 NOTE — PROGRESS NOTES
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 Reactions    Morphine rash/itching    Soma [Carisoprodol] other/intolerance   Constipaton     Stadol [Butorphanol Tartrate] cardiac dysrhythmia   Increased HR    Ultram [Tramadol] unknown   Unsure- mixed with Stadol and is unsure which med she reacted to.  Glutens gi distress and other/intolerance   Celiac Disease     Vital Signs:  Patient Vitals for the past 72 hrs:  Temp Heart Rate Resp BP BP Mean SpO2 Weight   06/02/22 2036 98.5 °F (36.9 °C) 81 19 140/73 95 MM HG 96 % --   06/02/22 1702 98.2 °F (36.8 °C) 87 18 166/90 115 MM HG 95 % 95.3 kg (210 lb)     Physical Exam  Vitals and nursing note reviewed. Constitutional:   Appearance: She is well-developed. HENT:   Head: Normocephalic and atraumatic. Eyes:   Conjunctiva/sclera: Conjunctivae normal.   Cardiovascular:   Rate and Rhythm: Normal rate and regular rhythm. Pulmonary:   Effort: Pulmonary effort is normal. No respiratory distress. Abdominal:   General: There is no distension. Palpations: Abdomen is soft. Tenderness: There is no abdominal tenderness. There is no guarding. Musculoskeletal:   General: No deformity. Cervical back: Normal range of motion. Skin:  General: Skin is warm and dry.    Neurological:   Mental Status: She is alert and oriented to person, place, and time. Diagnostics:  Labs:   Results for orders placed or performed during the hospital encounter of 19/82/93   SALICYLATE LEVEL   Result Value Ref Range   Salicylate <1 (L) 15 - 30 mg/dL   ACETAMINOPHEN LEVEL   Result Value Ref Range   Acetaminophen <15 10 - 30 mcg/mL   ALCOHOL ETHYL   Result Value Ref Range   ETHANOL SERUM <=0.01 <=0.02 g/dL   BASIC METABOLIC PANEL   Result Value Ref Range   Potassium 3.9 3.5 - 5.5 mmol/L   Sodium 142 133 - 145 mmol/L   Chloride 106 98 - 110 mmol/L   Glucose 117 (H) 70 - 99 mg/dL   Calcium 9.0 8.4 - 10.5 mg/dL   BUN 13 6 - 22 mg/dL   Creatinine 0.9 0.8 - 1.4 mg/dL   CO2 24 20 - 32 mmol/L   eGFR >60.0 >60.0 mL/min/1.73 sq.m.    Anion Gap 12.0 3.0 - 15.0 mmol/L   CBC   Result Value Ref Range   WBC x 10*3 7.9 4.0 - 11.0 K/uL   RBC x 10^6 3.82 3.80 - 5.20 M/uL   HGB 12.7 11.7 - 16.1 g/dL   HCT 38.6 35.1 - 48.3 %    (H) 80 - 99 fL   MCH 33 26 - 34 pg   MCHC 33 31 - 36 g/dL   RDW 13.8 10.0 - 15.5 %   Platelet 339 313 - 837 K/uL   MPV 10.9 9.0 - 13.0 fL   Urine Drug Screen 9   Result Value Ref Range   Amphetamine Screen NDET NDET   Opiate Screen NDET NDET   Cocaine Screen NDET NDET   Cannabinoids Screen NDET NDET   Barbiturates Screen Urine DET (A) NDET   Benzodiazepine Screen DET (A) NDET   Hydrocodone Screen Urine NDET NDET   Oxycodone Screen DET (A) NDET   6-Acetylmorphine Screen NDET NDET   pH Urine Drug 6.0 4.5 - 8.0   SPECIFIC GRAVITY URINE 1.005 1.003 - 1.030   Creatinine Urine mg/dL 32 mg/dL   Urinalysis   Result Value Ref Range   Urine Color Straw Colorless, Straw, Light Yellow, Yellow, Dark Yellow   Urine Clarity Clear Clear, Slightly Cloudy   Urine pH 6.0 5.0 - 8.0 pH   Urine Protein Screen Negative Negative, Trace mg/dL   Urine Glucose Negative Negative mg/dL   Urine Ketones Negative Negative, NOT TESTED mg/dL   Urine Occult Blood Negative Negative   Urine Specific Gravity 1.005 1.005 - 1.030   Urine Nitrite Negative Negative   Urine Leukocyte Esterase Negative Negative   Urine Bilirubin Negative Negative   Urine Urobilinogen <2.0 <2.0 mg/dL   Urine Ascorbic Acid Negative Negative mg/dL     EKG 12 LEAD UNIT PERFORMED (Results Pending)       Electronically signed by Jazmín Oates MD at 06/02/2022 11:36 PM EDT    Back to top of ED Notes    Lisa Coello RN - 06/02/2022 4:49 PM EDT  Formatting of this note might be different from the original.  [place] [employer]    Hx of anxiety    Endorses HI    Reported homelessness/[employer]     Electronically signed by Lisa Coello RN at 06/02/2022 4:49 PM EDT    Back to top of ED Notes
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
## 2022-06-06 NOTE — PROGRESS NOTES
Back to top of ED Notes    Dora Ingram MD - 05/14/2022 3:54 AM EDT  Formatting of this note is different from the original.  (R07.9) Chest pain, unspecified type    (F41.1) Anxiety state    ED Course/Medical Decision Making:   Stable  No distress  Resting  Trop neg x 2    Procedures/Consults: none    Disposition: Home    Documentation Review: Old medical records, Nursing notes    Diagnostics:  Labs:   Admission on 05/13/2022   Component Date Value    MARY PDMP 05/13/2022 1    MARY FREQUENCY 05/13/2022 1      Potassium 05/13/2022 3.8    Sodium 05/13/2022 140    Chloride 05/13/2022 106    Glucose 05/13/2022 96    Calcium 05/13/2022 8.8    BUN 05/13/2022 15    Creatinine 05/13/2022 0.9    CO2 05/13/2022 23    eGFR  05/13/2022 >60.0    eGFR Non  05/13/2022 58.5 (A)    Anion Gap 05/13/2022 11.0    Troponin (T) Quant High * 05/13/2022 6    WBC x 10*3 05/13/2022 8.3    RBC x 10^6 05/13/2022 3.65 (A)    HGB 05/13/2022 11.8    HCT 05/13/2022 37.0    MCV 05/13/2022 101 (A)    MCH 05/13/2022 32    MCHC 05/13/2022 32    RDW 05/13/2022 14.0    Platelet 99/75/3540 993    MPV 05/13/2022 10.5    Segmented Neutrophils (A* 05/13/2022 45    Lymphocytes (Auto) 05/13/2022 40    Monocytes (Auto) 05/13/2022 8    Eosinophils (Auto) 05/13/2022 7 (A)    Basophils (Auto) 05/13/2022 1    Absolute Neutrophils (Au* 05/13/2022 3.7    Absolute Lymphocytes (Au* 05/13/2022 3.3    Absolute Monocytes (Auto) 05/13/2022 0.7    Absolute Eosinophils (Au* 05/13/2022 0.6 (A)    Absolute Basophils (Auto) 05/13/2022 0.1    Troponin (T) Quant High * 05/14/2022 9     CHEST PORTABLE   Final Result   No acute cardiopulmonary disease.      Signed By: Aileen Cardozo MD on 5/13/2022 11:32 PM       Patient Vitals for the past 72 hrs:  Temp Heart Rate Pulse Resp BP BP Mean SpO2 Weight   05/14/22 0241 -- 66 66 -- -- -- 93 % --   05/14/22 0201 -- 64 64 -- 117/60 77 MM HG 93 % --   05/14/22 0101 -- 69 68 -- 123/90 97 MM HG 97 % --   05/14/22 0001 -- 65 64 -- 139/93 108 MM HG 97 % --   05/13/22 2301 -- 76 76 -- 136/80 98 MM HG 97 % --   05/13/22 2258 98.6 °F (37 °C) -- -- -- -- -- -- --   05/13/22 2250 -- 78 -- -- 148/78 101 MM HG -- 90.7 kg (199 lb 15.3 oz)   05/13/22 1931 -- 89 -- 14 146/90 109 MM HG 97 % --         Electronically signed by Azra Cortez MD at 05/14/2022 3:55 AM EDT    Back to top of ED Notes    Aguilar Multani RN - 05/14/2022 3:52 AM EDT  Formatting of this note might be different from the original.  Pain assessment on discharge was 0. Condition stable. Patient discharged to home. Patient education was completed: yes  Education taught to: patient  Teaching method used was discussion and handout. Understanding of teaching was good. Patient was discharged via wheelchair. Discharged with self.   Valuables were given to: patient.        Electronically signed by Aguilar Multani RN at 05/14/2022 3:52 AM EDT    Back to top of ED Notes    Terrance Cobos MD - 05/13/2022 10:03 PM EDT  Formatting of this note is different from the original.    [id number] [facility]    Time of Arrival: 05/13/22 1923

## 2022-06-08 ENCOUNTER — PATIENT OUTREACH (OUTPATIENT)
Dept: CASE MANAGEMENT | Age: 67
End: 2022-06-08

## 2022-06-08 RX ORDER — ONDANSETRON 4 MG/1
4 TABLET, ORALLY DISINTEGRATING ORAL
Qty: 3 TABLET | Refills: 5 | Status: SHIPPED | OUTPATIENT
Start: 2022-06-08

## 2022-06-08 NOTE — PROGRESS NOTES
Orders Placed This Encounter    ondansetron (ZOFRAN ODT) 4 mg disintegrating tablet     Sig: Take 1 Tablet by mouth every eight (8) hours as needed for Nausea or Vomiting.      Dispense:  3 Tablet     Refill:  5

## 2022-06-08 NOTE — PROGRESS NOTES
Complex Case Management      Date/Time:  2022 10:53 AM    Method of communication with patient:phone    LPN Care Coordinator (ALPN CC) contacted the patient by telephone to perform Ambulatory Care Coordination. Verified name and  (PHI) with patient as identifiers. Provided introduction to self, and explanation of the LPN CC's role. Reviewed most recent clinic visit w/ patient who verbalized understanding. Patient given an opportunity to ask questions. Top Challenges reviewed with the patient   1. Medications. Patient would like Zofran ODT. She forgot to ask Dr. Daryn Dumont at the appointment. 2. Patient saw pcp on  and cardiology on . Went to ED on  for suicidal thoughts. Feeling better just find it is hard finding a place to leave. Currently living back in her carnot in the shelter. Gave information for [facility]. Encouraged patient to call to see if that could work for her. The patient agrees to contact the PCP office or the LPN CC for questions related to their healthcare. Provided contact information for future reference. Disease Specific:   N/A    Home Health Active: No    DME Active: No    Barriers to care? homelessness, support system    Advance Care Planning:   Does patient have an Advance Directive:  not on file     Medication(s):   Medication reconciliation was not performed with patient, who verbalizes understanding of administration of home medications. There were no barriers to obtaining medications identified at this time. Referral to Pharm D needed: no     Current Outpatient Medications   Medication Sig    furosemide (LASIX) 40 mg tablet Take 1 Tablet by mouth daily.  ALPRAZolam (XANAX) 1 mg tablet take 1 tablet by mouth three times a day if needed for anxiety MAXIMUM DAILY DOSE OF 3 tablets    oxyCODONE-acetaminophen (PERCOCET 7.5) 7.5-325 mg per tablet Take 1 Tablet by mouth every four (4) hours as needed for Pain for up to 30 days.  Max Daily Amount: 6 Tablets. Indications: pain, chronic pain    lidocaine (LIDODERM) 5 % Apply one daily    omeprazole (PRILOSEC) 40 mg capsule TAKE 1 CAPSULE DAILY    oxybutynin chloride XL (DITROPAN XL) 10 mg CR tablet Take 1 Tablet by mouth daily.  potassium chloride (K-DUR, KLOR-CON M20) 20 mEq tablet TAKE 1 TABLET DAILY FOR LOW AMOUNT OF POTASSIUM IN THE BLOOD    zolpidem CR (AMBIEN CR) 12.5 mg tablet take 1 tablet by mouth nightly    nystatin (MYCOSTATIN) powder Apply  to affected area two (2) times a day.  ergocalciferol (ERGOCALCIFEROL) 1,250 mcg (50,000 unit) capsule Take 1 Capsule by mouth every seven (7) days.  atorvastatin (LIPITOR) 40 mg tablet Take 1 Tablet by mouth nightly.  rOPINIRole (REQUIP) 2 mg tablet take 1 tablet by mouth twice a day    acyclovir (ZOVIRAX) 400 mg tablet Take 1 Tablet by mouth three (3) times daily. Indications: a herpes simplex infection    diclofenac EC (VOLTAREN) 75 mg EC tablet TAKE 1 TABLET TWICE A DAY    amLODIPine (NORVASC) 5 mg tablet take 1 tablet by mouth daily for high blood pressure    tiZANidine (ZANAFLEX) 4 mg capsule TAKE 1 CAPSULE THREE TIMES A DAY FOR MUSCLE SPASM    clotrimazole-betamethasone (LOTRISONE) topical cream apply twice a day    fluticasone propionate (FLONASE) 50 mcg/actuation nasal spray 2 Sprays by Both Nostrils route daily.  promethazine (PHENERGAN) 25 mg tablet Take 1 Tab by mouth every six (6) hours as needed for Nausea.  montelukast (SINGULAIR) 10 mg tablet Take 1 Tab by mouth daily.  venlafaxine-SR (EFFEXOR-XR) 75 mg capsule TAKE 3 CAPSULES DAILY FOR ANXIOUSNESS ASSOCIATED WITH DEPRESSION    diclofenac (VOLTAREN) 1 % gel APPLY 2 GRAMS TO AFFECTED AREA FOUR TIMES A DAY    mupirocin calcium (BACTROBAN) 2 % topical cream Apply  to affected area two (2) times a day.     hyoscyamine sulfate (Symax Duotab) 0.125 mg-0.25 mg (0.375 mg) TbMP Take 1 Tab by mouth every twelve (12) hours as needed for Diarrhea or Other (cramping).  hydrOXYzine HCL (ATARAX) 25 mg tablet Take 25 mg by mouth every four (4) hours as needed for Itching.  Blood Pressure Test Kit-Large kit CHECK blood pressure twice a day as directed by prescriber    aspirin 81 mg chewable tablet Take 81 mg by mouth daily.  naloxone 2 mg/actuation spry Use 1 spray intranasally into 1 nostril. Use a new Narcan nasal spray for subsequent doses and administer into alternating nostrils. May repeat every 2 to 3 minutes as needed. (Patient taking differently: 1 Spray by Nasal route as needed for Other (narcotic overdose). Use 1 spray intranasally into 1 nostril. Use a new Narcan nasal spray for subsequent doses and administer into alternating nostrils. May repeat every 2 to 3 minutes as needed.)    triamcinolone acetonide (KENALOG) 0.025 % topical cream Apply  to affected area two (2) times a day. use thin layer    multivitamin (ONE A DAY) tablet Take 1 Tab by mouth daily.  cyanocobalamin (VITAMIN B-12) 1,000 mcg tablet Take 1 Tab by mouth daily. No current facility-administered medications for this visit. [facility] follow up appointment(s):   Future Appointments   Date Time Provider Cory Daniels   7/6/2022  1:30 PM Ivin Sandhoff, MD [facility]   7/18/2022 11:00 AM Koki Roberson, [facility]        Non-[facility] follow up appointment(s): cardiology as needed    Goals Addressed                 This Visit's Progress     Coordinate pain management plan for patient. On track     Patient will maintain pain medication as prescribe. UA will be in normal limits for opids       Develops appropriate coping skills. On track     Patient has Anxiety; Xanax 1 mg tid as needed not to exceed 3 mg /24 hours       Self-schedules and keeps appointments    On track     Patient will schedule and attend all appointments as scheduled.   Patient can drive herself to all appointments

## 2022-06-15 ENCOUNTER — PATIENT OUTREACH (OUTPATIENT)
Dept: CASE MANAGEMENT | Age: 67
End: 2022-06-15

## 2022-06-15 NOTE — PROGRESS NOTES
Date/Time:  6/15/2022 12:47 PM   Attempted to reach patient by telephone. Spoke with patient briefly. She states she wasn't able to speak at this time but will return my call. Patient [facility] at [facility] ED on 6/9, 6/19and 6/13. Chart reviewed.

## 2022-06-22 ENCOUNTER — PATIENT OUTREACH (OUTPATIENT)
Dept: CASE MANAGEMENT | Age: 67
End: 2022-06-22

## 2022-06-22 ENCOUNTER — TELEPHONE (OUTPATIENT)
Dept: INTERNAL MEDICINE CLINIC | Age: 67
End: 2022-06-22

## 2022-06-22 NOTE — PROGRESS NOTES
Complex Case Management      Date/Time:  2022 1:53 PM    Method of communication with patient:phone    LPN Care Coordinator (LPN CC) contacted the patient by telephone to perform Ambulatory Care Coordination. Verified name and  (PHI) with patient as identifiers. Provided introduction to self, and explanation of theLPN CC's role. Reviewed most recent clinic visit w/ patient who verbalized understanding. Patient given an opportunity to ask questions. Top Challenges reviewed with the patient   1. Patient states her legs are swollen and starting to blister. If she push on them water comes out per patient. Patient trying to get in with pcp sooner. I spoke with Awilda Irizarry- was told  does not have any sooner appointments. Left message for Dr. Jen Caldwell.  2. Homelessness- patient has a call out to speak with someone about a room     The patient agrees to contact the PCP office or the LPN CC for questions related to their healthcare. Provided contact information for future reference. Disease Specific:   N/A    Home Health Active: No    DME Active: No    Barriers to care? homelessness, support system, utilization of services    Advance Care Planning:   Does patient have an Advance Directive:  not on file     Medication(s):   Medication reconciliation was not performed with patient, who verbalizes understanding of administration of home medications. There were no barriers to obtaining medications identified at this time. Referral to Pharm D needed: no     Current Outpatient Medications   Medication Sig    ondansetron (ZOFRAN ODT) 4 mg disintegrating tablet Take 1 Tablet by mouth every eight (8) hours as needed for Nausea or Vomiting.  furosemide (LASIX) 40 mg tablet Take 1 Tablet by mouth daily.     ALPRAZolam (XANAX) 1 mg tablet take 1 tablet by mouth three times a day if needed for anxiety MAXIMUM DAILY DOSE OF 3 tablets    oxyCODONE-acetaminophen (PERCOCET 7.5) 7.5-325 mg per tablet Take 1 Tablet by mouth every four (4) hours as needed for Pain for up to 30 days. Max Daily Amount: 6 Tablets. Indications: pain, chronic pain    lidocaine (LIDODERM) 5 % Apply one daily    omeprazole (PRILOSEC) 40 mg capsule TAKE 1 CAPSULE DAILY    oxybutynin chloride XL (DITROPAN XL) 10 mg CR tablet Take 1 Tablet by mouth daily.  potassium chloride (K-DUR, KLOR-CON M20) 20 mEq tablet TAKE 1 TABLET DAILY FOR LOW AMOUNT OF POTASSIUM IN THE BLOOD    zolpidem CR (AMBIEN CR) 12.5 mg tablet take 1 tablet by mouth nightly    nystatin (MYCOSTATIN) powder Apply  to affected area two (2) times a day.  ergocalciferol (ERGOCALCIFEROL) 1,250 mcg (50,000 unit) capsule Take 1 Capsule by mouth every seven (7) days.  atorvastatin (LIPITOR) 40 mg tablet Take 1 Tablet by mouth nightly.  rOPINIRole (REQUIP) 2 mg tablet take 1 tablet by mouth twice a day    acyclovir (ZOVIRAX) 400 mg tablet Take 1 Tablet by mouth three (3) times daily. Indications: a herpes simplex infection    diclofenac EC (VOLTAREN) 75 mg EC tablet TAKE 1 TABLET TWICE A DAY    amLODIPine (NORVASC) 5 mg tablet take 1 tablet by mouth daily for high blood pressure    tiZANidine (ZANAFLEX) 4 mg capsule TAKE 1 CAPSULE THREE TIMES A DAY FOR MUSCLE SPASM    clotrimazole-betamethasone (LOTRISONE) topical cream apply twice a day    fluticasone propionate (FLONASE) 50 mcg/actuation nasal spray 2 Sprays by Both Nostrils route daily.  montelukast (SINGULAIR) 10 mg tablet Take 1 Tab by mouth daily.  venlafaxine-SR (EFFEXOR-XR) 75 mg capsule TAKE 3 CAPSULES DAILY FOR ANXIOUSNESS ASSOCIATED WITH DEPRESSION    diclofenac (VOLTAREN) 1 % gel APPLY 2 GRAMS TO AFFECTED AREA FOUR TIMES A DAY    mupirocin calcium (BACTROBAN) 2 % topical cream Apply  to affected area two (2) times a day.  hyoscyamine sulfate (Symax Duotab) 0.125 mg-0.25 mg (0.375 mg) TbMP Take 1 Tab by mouth every twelve (12) hours as needed for Diarrhea or Other (cramping).     hydrOXYzine HCL (ATARAX) 25 mg tablet Take 25 mg by mouth every four (4) hours as needed for Itching.  Blood Pressure Test Kit-Large kit CHECK blood pressure twice a day as directed by prescriber    aspirin 81 mg chewable tablet Take 81 mg by mouth daily.  naloxone 2 mg/actuation spry Use 1 spray intranasally into 1 nostril. Use a new Narcan nasal spray for subsequent doses and administer into alternating nostrils. May repeat every 2 to 3 minutes as needed. (Patient taking differently: 1 Spray by Nasal route as needed for Other (narcotic overdose). Use 1 spray intranasally into 1 nostril. Use a new Narcan nasal spray for subsequent doses and administer into alternating nostrils. May repeat every 2 to 3 minutes as needed.)    triamcinolone acetonide (KENALOG) 0.025 % topical cream Apply  to affected area two (2) times a day. use thin layer    multivitamin (ONE A DAY) tablet Take 1 Tab by mouth daily.  cyanocobalamin (VITAMIN B-12) 1,000 mcg tablet Take 1 Tab by mouth daily. No current facility-administered medications for this visit. [facility] follow up appointment(s):   Future Appointments   Date Time Provider Cory Daniels   7/6/2022  1:30 PM MD WALESKA Niño [facility]   7/18/2022 11:00 AM Josemanuel Roberson NP [facility]        Non-[facility] follow up appointment(s): neurology    Goals Addressed                 This Visit's Progress     Self-schedules and keeps appointments    On track     Patient will schedule and attend all appointments as scheduled. Patient can drive herself to all appointments       Takes all medications as ordered   On track     Pt will adhere to medication regime   Patient will take medications as prescribed.

## 2022-06-22 NOTE — TELEPHONE ENCOUNTER
Papi Barrios a [facility] Coordinator stated that the Pt. Is experiencing swelling in her legs and it's starting to blister. Would like to be seen before July appt.

## 2022-06-23 NOTE — TELEPHONE ENCOUNTER
Keep current appt. She has been bouncing in and out of the ER with multiple complaints.  This is not new and not urgent

## 2022-07-01 DIAGNOSIS — Z76.0 MEDICATION REFILL: ICD-10-CM

## 2022-07-01 NOTE — TELEPHONE ENCOUNTER
Called pt and left message. Call back number left. The call was to remind the pt a follow up appt on 7/6/22 and to inquire the status of her blister.

## 2022-07-02 RX ORDER — ROPINIROLE 2 MG/1
TABLET, FILM COATED ORAL
Qty: 180 TABLET | Refills: 1 | Status: SHIPPED | OUTPATIENT
Start: 2022-07-02 | End: 2022-10-20

## 2022-07-07 ENCOUNTER — HOSPITAL ENCOUNTER (OUTPATIENT)
Dept: LAB | Age: 67
Discharge: HOME | End: 2022-07-07
Payer: MEDICARE

## 2022-07-07 ENCOUNTER — OFFICE VISIT (OUTPATIENT)
Dept: INTERNAL MEDICINE CLINIC | Age: 67
End: 2022-07-07
Payer: MEDICARE

## 2022-07-07 VITALS
TEMPERATURE: 97.9 F | HEIGHT: 66 IN | BODY MASS INDEX: 34.07 KG/M2 | WEIGHT: 212 LBS | RESPIRATION RATE: 18 BRPM | SYSTOLIC BLOOD PRESSURE: 108 MMHG | HEART RATE: 58 BPM | OXYGEN SATURATION: 96 % | DIASTOLIC BLOOD PRESSURE: 61 MMHG

## 2022-07-07 DIAGNOSIS — G89.4 CHRONIC PAIN SYNDROME: Primary | ICD-10-CM

## 2022-07-07 DIAGNOSIS — Z79.899 MEDICATION MANAGEMENT: ICD-10-CM

## 2022-07-07 DIAGNOSIS — Z76.0 MEDICATION REFILL: ICD-10-CM

## 2022-07-07 DIAGNOSIS — G40.909 SEIZURE DISORDER (HCC): ICD-10-CM

## 2022-07-07 DIAGNOSIS — M54.50 CHRONIC LOW BACK PAIN: ICD-10-CM

## 2022-07-07 DIAGNOSIS — I10 ESSENTIAL HYPERTENSION: ICD-10-CM

## 2022-07-07 DIAGNOSIS — G89.29 CHRONIC LOW BACK PAIN: ICD-10-CM

## 2022-07-07 DIAGNOSIS — R60.0 PEDAL EDEMA: ICD-10-CM

## 2022-07-07 LAB
ALBUMIN SERPL-MCNC: 3.6 G/DL (ref 3.4–5)
ALBUMIN/GLOB SERPL: 1.3 {RATIO} (ref 0.8–1.7)
ALP SERPL-CCNC: 69 U/L (ref 45–117)
ALT SERPL-CCNC: 25 U/L (ref 13–56)
ANION GAP SERPL CALC-SCNC: 2 MMOL/L (ref 3–18)
AST SERPL-CCNC: 20 U/L (ref 10–38)
BILIRUB SERPL-MCNC: 0.4 MG/DL (ref 0.2–1)
BUN SERPL-MCNC: 13 MG/DL (ref 7–18)
BUN/CREAT SERPL: 15 (ref 12–20)
CALCIUM SERPL-MCNC: 9 MG/DL (ref 8.5–10.1)
CHLORIDE SERPL-SCNC: 107 MMOL/L (ref 100–111)
CHOLEST SERPL-MCNC: 175 MG/DL
CO2 SERPL-SCNC: 31 MMOL/L (ref 21–32)
CREAT SERPL-MCNC: 0.84 MG/DL (ref 0.6–1.3)
GLOBULIN SER CALC-MCNC: 2.8 G/DL (ref 2–4)
GLUCOSE SERPL-MCNC: 74 MG/DL (ref 74–99)
HDLC SERPL-MCNC: 51 MG/DL (ref 40–60)
HDLC SERPL: 3.4 {RATIO} (ref 0–5)
LDLC SERPL CALC-MCNC: 107 MG/DL (ref 0–100)
LIPID PROFILE,FLP: ABNORMAL
POTASSIUM SERPL-SCNC: 4.6 MMOL/L (ref 3.5–5.5)
PROT SERPL-MCNC: 6.4 G/DL (ref 6.4–8.2)
SODIUM SERPL-SCNC: 140 MMOL/L (ref 136–145)
TRIGL SERPL-MCNC: 85 MG/DL (ref ?–150)
VLDLC SERPL CALC-MCNC: 17 MG/DL

## 2022-07-07 PROCEDURE — G8754 DIAS BP LESS 90: HCPCS | Performed by: INTERNAL MEDICINE

## 2022-07-07 PROCEDURE — 36415 COLL VENOUS BLD VENIPUNCTURE: CPT

## 2022-07-07 PROCEDURE — 1090F PRES/ABSN URINE INCON ASSESS: CPT | Performed by: INTERNAL MEDICINE

## 2022-07-07 PROCEDURE — G8417 CALC BMI ABV UP PARAM F/U: HCPCS | Performed by: INTERNAL MEDICINE

## 2022-07-07 PROCEDURE — 3017F COLORECTAL CA SCREEN DOC REV: CPT | Performed by: INTERNAL MEDICINE

## 2022-07-07 PROCEDURE — 80061 LIPID PANEL: CPT

## 2022-07-07 PROCEDURE — 99214 OFFICE O/P EST MOD 30 MIN: CPT | Performed by: INTERNAL MEDICINE

## 2022-07-07 PROCEDURE — 80053 COMPREHEN METABOLIC PANEL: CPT

## 2022-07-07 PROCEDURE — G9899 SCRN MAM PERF RSLTS DOC: HCPCS | Performed by: INTERNAL MEDICINE

## 2022-07-07 PROCEDURE — G8399 PT W/DXA RESULTS DOCUMENT: HCPCS | Performed by: INTERNAL MEDICINE

## 2022-07-07 PROCEDURE — 1123F ACP DISCUSS/DSCN MKR DOCD: CPT | Performed by: INTERNAL MEDICINE

## 2022-07-07 PROCEDURE — G8536 NO DOC ELDER MAL SCRN: HCPCS | Performed by: INTERNAL MEDICINE

## 2022-07-07 PROCEDURE — G8427 DOCREV CUR MEDS BY ELIG CLIN: HCPCS | Performed by: INTERNAL MEDICINE

## 2022-07-07 RX ORDER — OXYCODONE AND ACETAMINOPHEN 7.5; 325 MG/1; MG/1
1 TABLET ORAL
Qty: 120 TABLET | Refills: 0 | Status: SHIPPED | OUTPATIENT
Start: 2022-08-05 | End: 2022-09-04

## 2022-07-07 RX ORDER — OXYCODONE AND ACETAMINOPHEN 7.5; 325 MG/1; MG/1
1 TABLET ORAL
Qty: 120 TABLET | Refills: 0 | Status: SHIPPED | OUTPATIENT
Start: 2022-09-03 | End: 2022-10-28 | Stop reason: SDUPTHER

## 2022-07-07 RX ORDER — OXYCODONE AND ACETAMINOPHEN 7.5; 325 MG/1; MG/1
1 TABLET ORAL
Qty: 120 TABLET | Refills: 0 | Status: SHIPPED | OUTPATIENT
Start: 2022-07-07 | End: 2022-08-06

## 2022-07-07 NOTE — PROGRESS NOTES
1. \"Have you been to the ER, urgent care clinic since your last visit? Hospitalized since your last visit? \" No    2. \"Have you seen or consulted any other health care providers outside of the [address] [place] [address] since your last visit? \" Yes Dr. Isidra Ferrell, Orthopedic     3. For patients aged 39-70: Has the patient had a colonoscopy / FIT/ Cologuard? Yes - no Care Gap present      If the patient is female:    4. For patients aged 41-77: Has the patient had a mammogram within the past 2 years? Yes - no Care Gap present      5. For patients aged 21-65: Has the patient had a pap smear?  NA - based on age or sex

## 2022-07-07 NOTE — PROGRESS NOTES
HISTORY OF PRESENT ILLNESS  Getachew Francois is a 69 y.o. female. /61 (BP 1 Location: Left upper arm, BP Patient Position: Sitting, BP Cuff Size: Large adult)   Pulse (!) 58   Temp 97.9 °F (36.6 °C) (Temporal)   Resp 18   Ht 5' 6\" (1.676 m)   Wt 212 lb (96.2 kg)   LMP  (LMP Unknown)   SpO2 96%   BMI 34.22 kg/m²       She was seen at neurology yesterday. She is not clear what they told her. She has APS and  coming out as she is homeless still        Current Outpatient Medications:     oxyCODONE-acetaminophen (PERCOCET 7.5) 7.5-325 mg per tablet, Take 1 Tablet by mouth every four (4) hours as needed for Pain for up to 30 days. Max Daily Amount: 6 Tablets. Indications: pain, chronic pain, Disp: 120 Tablet, Rfl: 0    (START ON 8/5/2022) oxyCODONE-acetaminophen (PERCOCET 7.5) 7.5-325 mg per tablet, Take 1 Tablet by mouth every four (4) hours as needed for Pain for up to 30 days. Max Daily Amount: 6 Tablets. Indications: pain, chronic pain, Disp: 120 Tablet, Rfl: 0    (START ON 9/3/2022) oxyCODONE-acetaminophen (PERCOCET 7.5) 7.5-325 mg per tablet, Take 1 Tablet by mouth every four (4) hours as needed for Pain for up to 30 days. Max Daily Amount: 6 Tablets. Indications: pain, chronic pain, Disp: 120 Tablet, Rfl: 0    rOPINIRole (REQUIP) 2 mg tablet, take 1 tablet by mouth twice a day, Disp: 180 Tablet, Rfl: 1    ondansetron (ZOFRAN ODT) 4 mg disintegrating tablet, Take 1 Tablet by mouth every eight (8) hours as needed for Nausea or Vomiting., Disp: 3 Tablet, Rfl: 5    furosemide (LASIX) 40 mg tablet, Take 1 Tablet by mouth daily. , Disp: 90 Tablet, Rfl: 1    ALPRAZolam (XANAX) 1 mg tablet, take 1 tablet by mouth three times a day if needed for anxiety MAXIMUM DAILY DOSE OF 3 tablets, Disp: 90 Tablet, Rfl: 2    lidocaine (LIDODERM) 5 %, Apply one daily, Disp: 30 Each, Rfl: 5    omeprazole (PRILOSEC) 40 mg capsule, TAKE 1 CAPSULE DAILY, Disp: 90 Capsule, Rfl: 3    oxybutynin chloride XL (DITROPAN XL) 10 mg CR tablet, Take 1 Tablet by mouth daily. , Disp: 90 Tablet, Rfl: 3    potassium chloride (K-DUR, KLOR-CON M20) 20 mEq tablet, TAKE 1 TABLET DAILY FOR LOW AMOUNT OF POTASSIUM IN THE BLOOD, Disp: 90 Tablet, Rfl: 3    zolpidem CR (AMBIEN CR) 12.5 mg tablet, take 1 tablet by mouth nightly, Disp: 90 Tablet, Rfl: 1    nystatin (MYCOSTATIN) powder, Apply  to affected area two (2) times a day., Disp: 60 g, Rfl: 5    ergocalciferol (ERGOCALCIFEROL) 1,250 mcg (50,000 unit) capsule, Take 1 Capsule by mouth every seven (7) days. , Disp: 12 Capsule, Rfl: 1    atorvastatin (LIPITOR) 40 mg tablet, Take 1 Tablet by mouth nightly., Disp: 90 Tablet, Rfl: 4    acyclovir (ZOVIRAX) 400 mg tablet, Take 1 Tablet by mouth three (3) times daily. Indications: a herpes simplex infection, Disp: 21 Tablet, Rfl: 5    diclofenac EC (VOLTAREN) 75 mg EC tablet, TAKE 1 TABLET TWICE A DAY, Disp: 180 Tablet, Rfl: 3    amLODIPine (NORVASC) 5 mg tablet, take 1 tablet by mouth daily for high blood pressure, Disp: 90 Tablet, Rfl: 3    tiZANidine (ZANAFLEX) 4 mg capsule, TAKE 1 CAPSULE THREE TIMES A DAY FOR MUSCLE SPASM, Disp: 270 Capsule, Rfl: 3    clotrimazole-betamethasone (LOTRISONE) topical cream, apply twice a day, Disp: 45 g, Rfl: 5    fluticasone propionate (FLONASE) 50 mcg/actuation nasal spray, 2 Sprays by Both Nostrils route daily. , Disp: 1 Bottle, Rfl: 5    montelukast (SINGULAIR) 10 mg tablet, Take 1 Tab by mouth daily. , Disp: 90 Tab, Rfl: 4    venlafaxine-SR (EFFEXOR-XR) 75 mg capsule, TAKE 3 CAPSULES DAILY FOR ANXIOUSNESS ASSOCIATED WITH DEPRESSION, Disp: 270 Cap, Rfl: 3    diclofenac (VOLTAREN) 1 % gel, APPLY 2 GRAMS TO AFFECTED AREA FOUR TIMES A DAY, Disp: 600 g, Rfl: 3    mupirocin calcium (BACTROBAN) 2 % topical cream, Apply  to affected area two (2) times a day., Disp: 15 g, Rfl: 0    hyoscyamine sulfate (Symax Duotab) 0.125 mg-0.25 mg (0.375 mg) TbMP, Take 1 Tab by mouth every twelve (12) hours as needed for Diarrhea or Other (cramping). , Disp: , Rfl:     hydrOXYzine HCL (ATARAX) 25 mg tablet, Take 25 mg by mouth every four (4) hours as needed for Itching., Disp: , Rfl:     Blood Pressure Test Kit-Large kit, CHECK blood pressure twice a day as directed by prescriber, Disp: 1 Kit, Rfl: 1    aspirin 81 mg chewable tablet, Take 81 mg by mouth daily. , Disp: , Rfl:     naloxone 2 mg/actuation spry, Use 1 spray intranasally into 1 nostril. Use a new Narcan nasal spray for subsequent doses and administer into alternating nostrils. May repeat every 2 to 3 minutes as needed. (Patient taking differently: 1 Spray by Nasal route as needed for Other (narcotic overdose). Use 1 spray intranasally into 1 nostril. Use a new Narcan nasal spray for subsequent doses and administer into alternating nostrils. May repeat every 2 to 3 minutes as needed.), Disp: 1 Device, Rfl: prn    triamcinolone acetonide (KENALOG) 0.025 % topical cream, Apply  to affected area two (2) times a day. use thin layer, Disp: 15 g, Rfl: 5    multivitamin (ONE A DAY) tablet, Take 1 Tab by mouth daily. , Disp: 90 Tab, Rfl: 4    cyanocobalamin (VITAMIN B-12) 1,000 mcg tablet, Take 1 Tab by mouth daily. , Disp: 90 Tab, Rfl: 4      Anxiety  The history is provided by the patient. This is a chronic problem. The current episode started more than 1 week ago. The problem occurs daily. Hypertension   The history is provided by the patient. This is a chronic problem. The current episode started more than 1 week ago. Associated symptoms include anxiety. Swelling  The history is provided by the patient. This is a chronic problem. The current episode started more than 1 week ago. The problem occurs daily. Review of Systems   Constitutional: Negative. Cardiovascular: Positive for leg swelling. Neurological: Positive for focal weakness (right leg). Physical Exam  Vitals and nursing note reviewed.    Constitutional:       Appearance: Normal appearance. She is well-developed. HENT:      Head: Normocephalic and atraumatic. Cardiovascular:      Rate and Rhythm: Normal rate. Pulmonary:      Effort: Pulmonary effort is normal.   Musculoskeletal:         General: Swelling present. Right lower leg: Edema present. Left lower leg: Edema present. Comments: Right knee pain on palpation and movement   Skin:     General: Skin is warm and dry. Neurological:      General: No focal deficit present. Mental Status: She is alert and oriented to person, place, and time. Psychiatric:         Mood and Affect: Mood normal.         Behavior: Behavior normal.         ASSESSMENT and PLAN    ICD-10-CM ICD-9-CM    1. Chronic pain syndrome  G89.4 338.4 oxyCODONE-acetaminophen (PERCOCET 7.5) 7.5-325 mg per tablet      oxyCODONE-acetaminophen (PERCOCET 7.5) 7.5-325 mg per tablet      oxyCODONE-acetaminophen (PERCOCET 7.5) 7.5-325 mg per tablet   2. Chronic low back pain  M54.50 724.2 oxyCODONE-acetaminophen (PERCOCET 7.5) 7.5-325 mg per tablet    G89.29 338.29 oxyCODONE-acetaminophen (PERCOCET 7.5) 7.5-325 mg per tablet      oxyCODONE-acetaminophen (PERCOCET 7.5) 7.5-325 mg per tablet   3. Essential hypertension  H60 327.3 METABOLIC PANEL, COMPREHENSIVE      LIPID PANEL   4. Pedal edema  Q64.5 172.2 METABOLIC PANEL, COMPREHENSIVE   5. Seizure disorder (HCC)  G40.909 345.90    6. Medication refill  Z76.0 V68.1 oxyCODONE-acetaminophen (PERCOCET 7.5) 7.5-325 mg per tablet      oxyCODONE-acetaminophen (PERCOCET 7.5) 7.5-325 mg per tablet      oxyCODONE-acetaminophen (PERCOCET 7.5) 7.5-325 mg per tablet   7. Medication management  Z79.899 V58.69        Discussed with patient. She has several meds that contribute to edema including amlodipine and voltaren tabs    She has chronic knee and joint pain. Sees orthopedic, Dr. Concha Francois, who told her there is nothing else that could be done for her knee. Thinking of getting a second opinion.   After an accident, she was told by the ER that something seemed wrong on exam of her knee, but Dr. Sukhdev Schaeffer disagreed. Only plain xray has been done    Refills as noted. Again discussed trying to taper the percocet    on some days can double the lasix when there is more edema    Medical care is adversely affected by social determinants of health  Homelessness. Monetary.     F/u 3 months for next refills

## 2022-07-13 ENCOUNTER — PATIENT OUTREACH (OUTPATIENT)
Dept: CASE MANAGEMENT | Age: 67
End: 2022-07-13

## 2022-07-13 NOTE — PROGRESS NOTES
Complex Case Management      Date/Time:  2022 12:19 PM    Method of communication with patient:phone    LPN Care Coordinator (LPN CC) contacted the patient by telephone to perform Ambulatory Care Coordination. Verified name and  (PHI) with patient as identifiers. Provided introduction to self, and explanation of the LPN CC's role. Reviewed most recent clinic visit w/ patient who verbalized understanding. Patient given an opportunity to ask questions. Top Challenges reviewed with the patient   1. Discussed ED visit from 7/10/2022  2. Patient is still homeless. She has spoken with her . Followed up with pcp on 2022     The patient agrees to contact the PCP office or the LPN CC for questions related to their healthcare. Provided contact information for future reference. Disease Specific:   N/A    Home Health Active: No    DME Active: No    Barriers to care? none    Advance Care Planning:   Does patient have an Advance Directive:  not on file; education provided     Medication(s):   Medication reconciliation was not performed with patient, who verbalizes understanding of administration of home medications. There were no barriers to obtaining medications identified at this time. Referral to Pharm D needed: no     Current Outpatient Medications   Medication Sig    oxyCODONE-acetaminophen (PERCOCET 7.5) 7.5-325 mg per tablet Take 1 Tablet by mouth every four (4) hours as needed for Pain for up to 30 days. Max Daily Amount: 6 Tablets. Indications: pain, chronic pain    [START ON 2022] oxyCODONE-acetaminophen (PERCOCET 7.5) 7.5-325 mg per tablet Take 1 Tablet by mouth every four (4) hours as needed for Pain for up to 30 days. Max Daily Amount: 6 Tablets. Indications: pain, chronic pain    [START ON 9/3/2022] oxyCODONE-acetaminophen (PERCOCET 7.5) 7.5-325 mg per tablet Take 1 Tablet by mouth every four (4) hours as needed for Pain for up to 30 days. Max Daily Amount: 6 Tablets. Indications: pain, chronic pain    rOPINIRole (REQUIP) 2 mg tablet take 1 tablet by mouth twice a day    ondansetron (ZOFRAN ODT) 4 mg disintegrating tablet Take 1 Tablet by mouth every eight (8) hours as needed for Nausea or Vomiting.  furosemide (LASIX) 40 mg tablet Take 1 Tablet by mouth daily.  ALPRAZolam (XANAX) 1 mg tablet take 1 tablet by mouth three times a day if needed for anxiety MAXIMUM DAILY DOSE OF 3 tablets    lidocaine (LIDODERM) 5 % Apply one daily    omeprazole (PRILOSEC) 40 mg capsule TAKE 1 CAPSULE DAILY    oxybutynin chloride XL (DITROPAN XL) 10 mg CR tablet Take 1 Tablet by mouth daily.  potassium chloride (K-DUR, KLOR-CON M20) 20 mEq tablet TAKE 1 TABLET DAILY FOR LOW AMOUNT OF POTASSIUM IN THE BLOOD    zolpidem CR (AMBIEN CR) 12.5 mg tablet take 1 tablet by mouth nightly    nystatin (MYCOSTATIN) powder Apply  to affected area two (2) times a day.  ergocalciferol (ERGOCALCIFEROL) 1,250 mcg (50,000 unit) capsule Take 1 Capsule by mouth every seven (7) days.  atorvastatin (LIPITOR) 40 mg tablet Take 1 Tablet by mouth nightly.  acyclovir (ZOVIRAX) 400 mg tablet Take 1 Tablet by mouth three (3) times daily. Indications: a herpes simplex infection    diclofenac EC (VOLTAREN) 75 mg EC tablet TAKE 1 TABLET TWICE A DAY    amLODIPine (NORVASC) 5 mg tablet take 1 tablet by mouth daily for high blood pressure    tiZANidine (ZANAFLEX) 4 mg capsule TAKE 1 CAPSULE THREE TIMES A DAY FOR MUSCLE SPASM    clotrimazole-betamethasone (LOTRISONE) topical cream apply twice a day    fluticasone propionate (FLONASE) 50 mcg/actuation nasal spray 2 Sprays by Both Nostrils route daily.  montelukast (SINGULAIR) 10 mg tablet Take 1 Tab by mouth daily.     venlafaxine-SR (EFFEXOR-XR) 75 mg capsule TAKE 3 CAPSULES DAILY FOR ANXIOUSNESS ASSOCIATED WITH DEPRESSION    diclofenac (VOLTAREN) 1 % gel APPLY 2 GRAMS TO AFFECTED AREA FOUR TIMES A DAY    mupirocin calcium (BACTROBAN) 2 % topical cream Apply  to affected area two (2) times a day.  hyoscyamine sulfate (Symax Duotab) 0.125 mg-0.25 mg (0.375 mg) TbMP Take 1 Tab by mouth every twelve (12) hours as needed for Diarrhea or Other (cramping).  hydrOXYzine HCL (ATARAX) 25 mg tablet Take 25 mg by mouth every four (4) hours as needed for Itching.  Blood Pressure Test Kit-Large kit CHECK blood pressure twice a day as directed by prescriber    aspirin 81 mg chewable tablet Take 81 mg by mouth daily.  naloxone 2 mg/actuation spry Use 1 spray intranasally into 1 nostril. Use a new Narcan nasal spray for subsequent doses and administer into alternating nostrils. May repeat every 2 to 3 minutes as needed. (Patient taking differently: 1 Spray by Nasal route as needed for Other (narcotic overdose). Use 1 spray intranasally into 1 nostril. Use a new Narcan nasal spray for subsequent doses and administer into alternating nostrils. May repeat every 2 to 3 minutes as needed.)    triamcinolone acetonide (KENALOG) 0.025 % topical cream Apply  to affected area two (2) times a day. use thin layer    multivitamin (ONE A DAY) tablet Take 1 Tab by mouth daily.  cyanocobalamin (VITAMIN B-12) 1,000 mcg tablet Take 1 Tab by mouth daily. No current facility-administered medications for this visit. [facility] follow up appointment(s):   Future Appointments   Date Time Provider Cory Daniels   7/18/2022 11:00 AM Alex Tejeda NP [facility]   10/7/2022 11:00 AM Melly Yanez MD [facility]        [facility] follow up appointment(s): urology, neurology, orthopedic    Goals Addressed                 This Visit's Progress     Self-schedules and keeps appointments    On track     Patient will schedule and attend all appointments as scheduled. Patient can drive herself to all appointments       Takes all medications as ordered   On track     Pt will adhere to medication regime   Patient will take medications as prescribed.

## 2022-07-18 DIAGNOSIS — E55.9 VITAMIN D DEFICIENCY, UNSPECIFIED: ICD-10-CM

## 2022-07-18 PROBLEM — E78.5 HYPERLIPIDEMIA: Status: ACTIVE | Noted: 2021-11-20

## 2022-07-18 PROBLEM — R60.9 EDEMA: Status: ACTIVE | Noted: 2022-07-18

## 2022-07-18 PROBLEM — I44.0 FIRST DEGREE AV BLOCK: Status: ACTIVE | Noted: 2021-11-17

## 2022-07-18 PROBLEM — R26.81 UNSTEADY GAIT: Status: ACTIVE | Noted: 2021-11-20

## 2022-07-18 PROBLEM — I05.9 MITRAL VALVE DISEASE: Status: ACTIVE | Noted: 2022-07-18

## 2022-07-18 PROBLEM — K59.03 DRUG-INDUCED CONSTIPATION: Status: ACTIVE | Noted: 2021-11-17

## 2022-07-18 PROBLEM — F20.0 PARANOID SCHIZOPHRENIA (HCC): Status: ACTIVE | Noted: 2021-11-17

## 2022-07-18 RX ORDER — ERGOCALCIFEROL 1.25 MG/1
CAPSULE ORAL
Qty: 12 CAPSULE | Refills: 1 | Status: SHIPPED | OUTPATIENT
Start: 2022-07-18

## 2022-08-12 ENCOUNTER — PATIENT OUTREACH (OUTPATIENT)
Dept: CASE MANAGEMENT | Age: 67
End: 2022-08-12

## 2022-08-12 NOTE — PROGRESS NOTES
Complex Case Management      Date/Time:  2022 1:14 PM    Method of communication with patient:phone    [address] [facility] ([facility]) contacted the patient by telephone to perform Ambulatory Care Coordination. Verified name and  (PHI) with patient as identifiers. Provided introduction to self, and explanation of the Ambulatory Care Manager's role. Reviewed most recent clinic visit w/ patient who verbalized understanding. Patient given an opportunity to ask questions. Top Challenges reviewed with the Provider   Appointments- last visit with neurology and upcoming appointments. Patient will contact neurology about scheduled appointments on  and . To see which one is correct. Homelessness- at hotel with friends. They are going to try to get a place together. Last ED visit on 2022 at [facility]. The patient agrees to contact the PCP office or the [facility] for questions related to their healthcare. Provided contact information for future reference. Disease Specific:   N/A    Home Health Active: No    DME Active: No    Barriers to care? support system, utilization of services    Advance Care Planning:   Does patient have an Advance Directive:  not on file     Medication(s):   Medication reconciliation was not performed with patient, who verbalizes understanding of administration of home medications. There were no barriers to obtaining medications identified at this time. Referral to Pharm D needed: no     Current Outpatient Medications   Medication Sig    albuterol (PROVENTIL HFA, VENTOLIN HFA, PROAIR HFA) 90 mcg/actuation inhaler inhale 2 puffs by mouth every 4 hours if needed for wheezing    predniSONE (DELTASONE) 10 mg tablet TAKE 6 TABLETS DAILY FOR 2 DAYS, DECREASE BY 1 TABLET EVERY 2 DAY. ..  (REFER TO PRESCRIPTION NOTES).     clindamycin (CLEOCIN) 300 mg capsule TAKE 2 CAPSULES BY MOUTH THREE TIMES DAILY FOR 10 DAYS    Vitamin D2 1,250 mcg (50,000 unit) capsule take 1 capsule by mouth every week    oxyCODONE-acetaminophen (PERCOCET 7.5) 7.5-325 mg per tablet Take 1 Tablet by mouth every four (4) hours as needed for Pain for up to 30 days. Max Daily Amount: 6 Tablets. Indications: pain, chronic pain    [START ON 9/3/2022] oxyCODONE-acetaminophen (PERCOCET 7.5) 7.5-325 mg per tablet Take 1 Tablet by mouth every four (4) hours as needed for Pain for up to 30 days. Max Daily Amount: 6 Tablets. Indications: pain, chronic pain    rOPINIRole (REQUIP) 2 mg tablet take 1 tablet by mouth twice a day    ondansetron (ZOFRAN ODT) 4 mg disintegrating tablet Take 1 Tablet by mouth every eight (8) hours as needed for Nausea or Vomiting. furosemide (LASIX) 40 mg tablet Take 1 Tablet by mouth daily. ALPRAZolam (XANAX) 1 mg tablet take 1 tablet by mouth three times a day if needed for anxiety MAXIMUM DAILY DOSE OF 3 tablets    lidocaine (LIDODERM) 5 % Apply one daily    omeprazole (PRILOSEC) 40 mg capsule TAKE 1 CAPSULE DAILY    oxybutynin chloride XL (DITROPAN XL) 10 mg CR tablet Take 1 Tablet by mouth daily. potassium chloride (K-DUR, KLOR-CON M20) 20 mEq tablet TAKE 1 TABLET DAILY FOR LOW AMOUNT OF POTASSIUM IN THE BLOOD    zolpidem CR (AMBIEN CR) 12.5 mg tablet take 1 tablet by mouth nightly    nystatin (MYCOSTATIN) powder Apply  to affected area two (2) times a day. atorvastatin (LIPITOR) 40 mg tablet Take 1 Tablet by mouth nightly. acyclovir (ZOVIRAX) 400 mg tablet Take 1 Tablet by mouth three (3) times daily. Indications: a herpes simplex infection    diclofenac EC (VOLTAREN) 75 mg EC tablet TAKE 1 TABLET TWICE A DAY    amLODIPine (NORVASC) 5 mg tablet take 1 tablet by mouth daily for high blood pressure    tiZANidine (ZANAFLEX) 4 mg capsule TAKE 1 CAPSULE THREE TIMES A DAY FOR MUSCLE SPASM    clotrimazole-betamethasone (LOTRISONE) topical cream apply twice a day    fluticasone propionate (FLONASE) 50 mcg/actuation nasal spray 2 Sprays by Both Nostrils route daily. montelukast (SINGULAIR) 10 mg tablet Take 1 Tab by mouth daily. venlafaxine-SR (EFFEXOR-XR) 75 mg capsule TAKE 3 CAPSULES DAILY FOR ANXIOUSNESS ASSOCIATED WITH DEPRESSION    diclofenac (VOLTAREN) 1 % gel APPLY 2 GRAMS TO AFFECTED AREA FOUR TIMES A DAY    mupirocin calcium (BACTROBAN) 2 % topical cream Apply  to affected area two (2) times a day. hyoscyamine sulfate (Symax Duotab) 0.125 mg-0.25 mg (0.375 mg) TbMP Take 1 Tab by mouth every twelve (12) hours as needed for Diarrhea or Other (cramping). hydrOXYzine HCL (ATARAX) 25 mg tablet Take 25 mg by mouth every four (4) hours as needed for Itching. Blood Pressure Test Kit-Large kit CHECK blood pressure twice a day as directed by prescriber    aspirin 81 mg chewable tablet Take 81 mg by mouth daily. naloxone 2 mg/actuation spry Use 1 spray intranasally into 1 nostril. Use a new Narcan nasal spray for subsequent doses and administer into alternating nostrils. May repeat every 2 to 3 minutes as needed. (Patient taking differently: 1 Spray by Nasal route as needed for Other (narcotic overdose). Use 1 spray intranasally into 1 nostril. Use a new Narcan nasal spray for subsequent doses and administer into alternating nostrils. May repeat every 2 to 3 minutes as needed.)    triamcinolone acetonide (KENALOG) 0.025 % topical cream Apply  to affected area two (2) times a day. use thin layer    multivitamin (ONE A DAY) tablet Take 1 Tab by mouth daily. cyanocobalamin (VITAMIN B-12) 1,000 mcg tablet Take 1 Tab by mouth daily. No current facility-administered medications for this visit. [facility] follow up appointment(s):   Future Appointments   Date Time Provider Cory Frances   10/7/2022 11:00 AM Ananda Yanez MD [facility]        Non-[facility] follow up appointment(s): Neurology 8/18&19/2022, Orthopedic 8/22/2022     Goals Addressed                   This Visit's Progress     Coordinate pain management plan for patient.    On track     Patient will maintain pain medication as prescribe. UA will be in normal limits for opids       Develops appropriate coping skills. On track     Patient has Anxiety; Xanax 1 mg tid as needed not to exceed 3 mg /24 hours       Self-schedules and keeps appointments    On track     Patient will schedule and attend all appointments as scheduled. Patient can drive herself to all appointments       Takes all medications as ordered   On track     Pt will adhere to medication regime   Patient will take medications as prescribed.

## 2022-08-18 DIAGNOSIS — I10 ESSENTIAL HYPERTENSION: ICD-10-CM

## 2022-08-18 RX ORDER — AMLODIPINE BESYLATE 5 MG/1
TABLET ORAL
Qty: 90 TABLET | Refills: 3 | Status: SHIPPED | OUTPATIENT
Start: 2022-08-18

## 2022-08-24 ENCOUNTER — PATIENT OUTREACH (OUTPATIENT)
Dept: CASE MANAGEMENT | Age: 67
End: 2022-08-24

## 2022-08-25 ENCOUNTER — PATIENT OUTREACH (OUTPATIENT)
Dept: CASE MANAGEMENT | Age: 67
End: 2022-08-25

## 2022-08-25 NOTE — PROGRESS NOTES
Complex Case Management  Follow-Up       Date/Time:   8/25/2022  10:35 pm       Ambulatory Care Manager [facility]) contacted patient for Complex Case Management  follow up. Spoke to patient  Introduced self/role and reason for call. Patient reported: went to ED yesterday. \"I was stressed and took too much medication or took it wrong\"           Pertinent concerns: Homelessness       Specialist appointments since last outreach? none   If so, specialist and date: n/a    Next PCP Appointment: 10/7/2022    Medications:     New medications since last outreach: one  Does patient need refills on any medications: no   Medication changes since last outreach (dose adjustments or discontinued meds): Rosemaryven: n/a    Barriers to care? homelessness, utilization of services        Patient reminded that there are physicians on call 24 hours a day / 7 days a week (M-F 5pm to 8am and from Friday 5pm until Monday 8a for the weekend) should the patient have questions or concerns.

## 2022-09-01 ENCOUNTER — PATIENT OUTREACH (OUTPATIENT)
Dept: CASE MANAGEMENT | Age: 67
End: 2022-09-01

## 2022-09-01 NOTE — PROGRESS NOTES
Patient has graduated from the Complex Case Management  program on 9/1/2022. Patient/family has the ability to self-manage at this time. Care management goals have been completed. No further LPN CC follow up scheduled. Goals Addressed                   This Visit's Progress     COMPLETED: Coordinate pain management plan for patient. Patient will maintain pain medication as prescribe. UA will be in normal limits for opids       COMPLETED: Develops appropriate coping skills. Patient has Anxiety; Xanax 1 mg tid as needed not to exceed 3 mg /24 hours       COMPLETED: Self-schedules and keeps appointments         Patient will schedule and attend all appointments as scheduled. Patient can drive herself to all appointments       COMPLETED: Takes all medications as ordered        Pt will adhere to medication regime   Patient will take medications as prescribed. Patient has Ambulatory Care Manager's contact information for any further questions, concerns, or needs.   Patients upcoming visits:    Future Appointments   Date Time Provider Cory Daniels   10/7/2022 11:00 AM Anton Yanez MD [facility]

## 2022-09-03 DIAGNOSIS — Z76.0 MEDICATION REFILL: ICD-10-CM

## 2022-09-04 RX ORDER — TIZANIDINE HYDROCHLORIDE 4 MG/1
CAPSULE, GELATIN COATED ORAL
Qty: 270 CAPSULE | Refills: 3 | Status: SHIPPED | OUTPATIENT
Start: 2022-09-04

## 2022-09-26 RX ORDER — DICLOFENAC SODIUM 75 MG/1
TABLET, DELAYED RELEASE ORAL
Qty: 180 TABLET | Refills: 3 | Status: SHIPPED | OUTPATIENT
Start: 2022-09-26

## 2022-10-04 RX ORDER — VENLAFAXINE HYDROCHLORIDE 75 MG/1
CAPSULE, EXTENDED RELEASE ORAL
Qty: 270 CAPSULE | Refills: 3 | Status: SHIPPED | OUTPATIENT
Start: 2022-10-04

## 2022-10-04 NOTE — TELEPHONE ENCOUNTER
Pharmacy request for a new 90 day Rx. Last OV 7/7/22, next scheduled 10/7/22.     Requested Prescriptions     Pending Prescriptions Disp Refills    venlafaxine-SR (EFFEXOR-XR) 75 mg capsule 270 Capsule 3

## 2022-10-20 ENCOUNTER — OFFICE VISIT (OUTPATIENT)
Dept: INTERNAL MEDICINE CLINIC | Age: 67
End: 2022-10-20
Payer: MEDICARE

## 2022-10-20 VITALS
OXYGEN SATURATION: 96 % | RESPIRATION RATE: 15 BRPM | BODY MASS INDEX: 33.17 KG/M2 | HEIGHT: 66 IN | WEIGHT: 206.38 LBS | DIASTOLIC BLOOD PRESSURE: 85 MMHG | SYSTOLIC BLOOD PRESSURE: 138 MMHG | TEMPERATURE: 97.2 F | HEART RATE: 70 BPM

## 2022-10-20 DIAGNOSIS — Z09 HOSPITAL DISCHARGE FOLLOW-UP: Primary | ICD-10-CM

## 2022-10-20 DIAGNOSIS — G47.00 INSOMNIA, UNSPECIFIED TYPE: ICD-10-CM

## 2022-10-20 DIAGNOSIS — F25.9 SCHIZOAFFECTIVE DISORDER, UNSPECIFIED TYPE (HCC): ICD-10-CM

## 2022-10-20 DIAGNOSIS — R60.0 PEDAL EDEMA: ICD-10-CM

## 2022-10-20 DIAGNOSIS — G89.4 CHRONIC PAIN SYNDROME: ICD-10-CM

## 2022-10-20 DIAGNOSIS — Z51.81 MEDICATION MONITORING ENCOUNTER: ICD-10-CM

## 2022-10-20 DIAGNOSIS — I10 ESSENTIAL HYPERTENSION: ICD-10-CM

## 2022-10-20 PROCEDURE — 1101F PT FALLS ASSESS-DOCD LE1/YR: CPT | Performed by: INTERNAL MEDICINE

## 2022-10-20 PROCEDURE — G8417 CALC BMI ABV UP PARAM F/U: HCPCS | Performed by: INTERNAL MEDICINE

## 2022-10-20 PROCEDURE — 3017F COLORECTAL CA SCREEN DOC REV: CPT | Performed by: INTERNAL MEDICINE

## 2022-10-20 PROCEDURE — G8427 DOCREV CUR MEDS BY ELIG CLIN: HCPCS | Performed by: INTERNAL MEDICINE

## 2022-10-20 PROCEDURE — G8399 PT W/DXA RESULTS DOCUMENT: HCPCS | Performed by: INTERNAL MEDICINE

## 2022-10-20 PROCEDURE — G8536 NO DOC ELDER MAL SCRN: HCPCS | Performed by: INTERNAL MEDICINE

## 2022-10-20 PROCEDURE — 1090F PRES/ABSN URINE INCON ASSESS: CPT | Performed by: INTERNAL MEDICINE

## 2022-10-20 PROCEDURE — G8754 DIAS BP LESS 90: HCPCS | Performed by: INTERNAL MEDICINE

## 2022-10-20 PROCEDURE — G8752 SYS BP LESS 140: HCPCS | Performed by: INTERNAL MEDICINE

## 2022-10-20 PROCEDURE — G9717 DOC PT DX DEP/BP F/U NT REQ: HCPCS | Performed by: INTERNAL MEDICINE

## 2022-10-20 PROCEDURE — 99214 OFFICE O/P EST MOD 30 MIN: CPT | Performed by: INTERNAL MEDICINE

## 2022-10-20 PROCEDURE — 1123F ACP DISCUSS/DSCN MKR DOCD: CPT | Performed by: INTERNAL MEDICINE

## 2022-10-20 PROCEDURE — G9899 SCRN MAM PERF RSLTS DOC: HCPCS | Performed by: INTERNAL MEDICINE

## 2022-10-20 RX ORDER — MIRTAZAPINE 30 MG/1
30 TABLET, FILM COATED ORAL
Qty: 30 TABLET | Refills: 5 | Status: SHIPPED | OUTPATIENT
Start: 2022-10-20 | End: 2022-10-31 | Stop reason: SINTOL

## 2022-10-20 RX ORDER — LIDOCAINE 50 MG/G
PATCH TOPICAL
Qty: 60 EACH | Refills: 5 | Status: SHIPPED | OUTPATIENT
Start: 2022-10-20
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
## 2022-10-20 NOTE — PROGRESS NOTES
1. \"Have you been to the ER, urgent care clinic since your last visit? Hospitalized since your last visit? \" Yes [address] [place] [address] 10/2022    2. \"Have you seen or consulted any other health care providers outside of the [address] [place] [address] since your last visit? \" No     3. For patients aged 39-70: Has the patient had a colonoscopy / FIT/ Cologuard? Yes - no Care Gap present      If the patient is female:    4. For patients aged 41-77: Has the patient had a mammogram within the past 2 years? Yes - no Care Gap present      5. For patients aged 21-65: Has the patient had a pap smear?  NA - based on age or sex

## 2022-10-28 DIAGNOSIS — Z76.0 MEDICATION REFILL: ICD-10-CM

## 2022-10-28 DIAGNOSIS — G89.4 CHRONIC PAIN SYNDROME: ICD-10-CM

## 2022-10-28 DIAGNOSIS — M54.50 CHRONIC LOW BACK PAIN: ICD-10-CM

## 2022-10-28 DIAGNOSIS — G89.29 CHRONIC LOW BACK PAIN: ICD-10-CM

## 2022-10-28 RX ORDER — OXYCODONE AND ACETAMINOPHEN 7.5; 325 MG/1; MG/1
1 TABLET ORAL
Qty: 120 TABLET | Refills: 0 | Status: SHIPPED | OUTPATIENT
Start: 2022-10-28 | End: 2022-11-27

## 2022-10-28 NOTE — TELEPHONE ENCOUNTER
Percocet Last filled 9/9/22  Have declined filling any additional xanax due to problems with recent overdosing/combining meds.  Will need to discuss tapering percocet at next appt

## 2022-10-28 NOTE — TELEPHONE ENCOUNTER
----- Message from Brandon Bernardo sent at 10/28/2022  1:28 PM EDT -----  Subject: Refill Request    QUESTIONS  Name of Medication? oxyCODONE-acetaminophen (PERCOCET 7.5) 7.5-325 mg per   tablet  Patient-reported dosage and instructions? 1 tablet every 6 hours as needed  How many days do you have left? 0  Preferred Pharmacy? [facility]  Pharmacy phone number (if available)? [phone]  Additional Information for Provider? Pt is requesting a 30 day supply of   meds. Pt has 0 days of rx left. Please send rx to pharmacy for pt.   ---------------------------------------------------------------------------  --------------  [address]  What is the best way for the office to contact you? OK to leave message on   voicemail  Preferred Call Back Phone Number? [phone]  ---------------------------------------------------------------------------  --------------  SCRIPT ANSWERS  Relationship to Patient?  Self

## 2022-10-31 RX ORDER — TRAZODONE HYDROCHLORIDE 50 MG/1
50 TABLET ORAL
Qty: 30 TABLET | Refills: 1 | Status: SHIPPED | OUTPATIENT
Start: 2022-10-31

## 2022-10-31 NOTE — PROGRESS NOTES
Will D/C remeron. Trial trazodone instead. Lowest dose since is also on effexor    Orders Placed This Encounter    traZODone (DESYREL) 50 mg tablet     Sig: Take 1 Tablet by mouth nightly.      Dispense:  30 Tablet     Refill:  1

## 2022-11-15 DIAGNOSIS — Z76.0 MEDICATION REFILL: ICD-10-CM

## 2022-11-15 RX ORDER — ALPRAZOLAM 1 MG/1
TABLET ORAL
Qty: 90 TABLET | Refills: 2 | Status: SHIPPED | OUTPATIENT
Start: 2022-11-15

## 2022-11-15 NOTE — TELEPHONE ENCOUNTER
Patient is requesting a temporary supply of xanax.  she states that she is still searching for a psychiatrist.    Future Appointments   Date Time Provider Cory Daniels   11/28/2022  3:45 PM Chanel Pineda MD [facility]     Last seen 10/20/22     Requested Prescriptions     Pending Prescriptions Disp Refills    ALPRAZolam (XANAX) 1 mg tablet 90 Tablet 2     Sig: take 1 tablet by mouth three times a day if needed for anxiety MAXIMUM DAILY DOSE OF 3 tablets

## 2022-11-30 DIAGNOSIS — R60.0 PEDAL EDEMA: ICD-10-CM

## 2022-11-30 RX ORDER — FUROSEMIDE 40 MG/1
40 TABLET ORAL DAILY
Qty: 90 TABLET | Refills: 1 | Status: SHIPPED | OUTPATIENT
Start: 2022-11-30

## 2022-12-13 DIAGNOSIS — Z76.0 MEDICATION REFILL: ICD-10-CM

## 2022-12-13 DIAGNOSIS — G89.4 CHRONIC PAIN SYNDROME: ICD-10-CM

## 2022-12-13 DIAGNOSIS — M54.50 CHRONIC LOW BACK PAIN: ICD-10-CM

## 2022-12-13 DIAGNOSIS — G89.29 CHRONIC LOW BACK PAIN: ICD-10-CM

## 2022-12-13 NOTE — TELEPHONE ENCOUNTER
Pt called in requesting refill. Last ov 10/20/22, no future appt's made. Wanted to schedule but hung up on me before I even finished putting this refill request in. Requested Prescriptions     Pending Prescriptions Disp Refills    oxyCODONE-acetaminophen (PERCOCET 7.5) 7.5-325 mg per tablet 120 Tablet 0     Sig: Take 1 Tablet by mouth every four (4) hours as needed for Pain for up to 30 days. Max Daily Amount: 6 Tablets.  Indications: pain, chronic pain

## 2022-12-15 RX ORDER — OXYCODONE AND ACETAMINOPHEN 7.5; 325 MG/1; MG/1
1 TABLET ORAL
Qty: 120 TABLET | Refills: 0 | Status: SHIPPED | OUTPATIENT
Start: 2022-12-15 | End: 2023-01-14

## 2022-12-22 ENCOUNTER — PATIENT OUTREACH (OUTPATIENT)
Dept: CASE MANAGEMENT | Age: 67
End: 2022-12-22

## 2022-12-22 NOTE — PROGRESS NOTES
ACM attempted to contact patient to enroll in CCM. All numbers in chart are disconnected at this time. Noted in chart documentation that pt living in car. Checked with Dr. Mukund Agarwal nurse - unsure if pt receives mail or not. Will attempt to reach pt via mail.

## 2022-12-22 NOTE — LETTER
12/22/2022 12:03 PM    Ms. Rodney Ga  [address] [address]        Ms Tico Francisel,    I am a care manager currently working with Dr. Nory Rose. I have been unable to contact you via phone so am sending you this letter. I would love to speak with you and see if there are any available resources available to assist you in many ways.   Please call me at your convenience at [phone]          Sincerely,      Hussain Ferguson RN

## 2023-01-09 DIAGNOSIS — M54.50 CHRONIC LOW BACK PAIN: ICD-10-CM

## 2023-01-09 DIAGNOSIS — G89.29 CHRONIC LOW BACK PAIN: ICD-10-CM

## 2023-01-09 DIAGNOSIS — Z76.0 MEDICATION REFILL: ICD-10-CM

## 2023-01-09 DIAGNOSIS — G89.4 CHRONIC PAIN SYNDROME: ICD-10-CM

## 2023-01-09 RX ORDER — OXYCODONE AND ACETAMINOPHEN 7.5; 325 MG/1; MG/1
TABLET ORAL
Qty: 120 TABLET | Refills: 0 | Status: SHIPPED | OUTPATIENT
Start: 2023-01-09 | End: 2023-02-08

## 2023-01-10 ENCOUNTER — OFFICE VISIT (OUTPATIENT)
Dept: INTERNAL MEDICINE CLINIC | Age: 68
End: 2023-01-10
Payer: MEDICARE

## 2023-01-10 VITALS
HEIGHT: 66 IN | OXYGEN SATURATION: 99 % | SYSTOLIC BLOOD PRESSURE: 116 MMHG | DIASTOLIC BLOOD PRESSURE: 75 MMHG | HEART RATE: 67 BPM | TEMPERATURE: 97.1 F | RESPIRATION RATE: 18 BRPM | BODY MASS INDEX: 33.91 KG/M2 | WEIGHT: 211 LBS

## 2023-01-10 DIAGNOSIS — R32 URINARY INCONTINENCE, UNSPECIFIED TYPE: ICD-10-CM

## 2023-01-10 DIAGNOSIS — Z59.86 FINANCIAL INSECURITY: ICD-10-CM

## 2023-01-10 DIAGNOSIS — G43.909 MIGRAINE WITHOUT STATUS MIGRAINOSUS, NOT INTRACTABLE, UNSPECIFIED MIGRAINE TYPE: ICD-10-CM

## 2023-01-10 DIAGNOSIS — Z59.819 HOUSING INSTABILITY: ICD-10-CM

## 2023-01-10 DIAGNOSIS — H91.93 BILATERAL HEARING LOSS, UNSPECIFIED HEARING LOSS TYPE: Primary | ICD-10-CM

## 2023-01-10 DIAGNOSIS — H65.93 MIDDLE EAR EFFUSION, BILATERAL: ICD-10-CM

## 2023-01-10 DIAGNOSIS — L30.4 INTERTRIGO: ICD-10-CM

## 2023-01-10 DIAGNOSIS — Z59.41 FOOD INSECURITY: ICD-10-CM

## 2023-01-10 DIAGNOSIS — Z76.0 MEDICATION REFILL: ICD-10-CM

## 2023-01-10 DIAGNOSIS — J32.9 SINUSITIS, UNSPECIFIED CHRONICITY, UNSPECIFIED LOCATION: ICD-10-CM

## 2023-01-10 PROCEDURE — 3078F DIAST BP <80 MM HG: CPT | Performed by: INTERNAL MEDICINE

## 2023-01-10 PROCEDURE — G8536 NO DOC ELDER MAL SCRN: HCPCS | Performed by: INTERNAL MEDICINE

## 2023-01-10 PROCEDURE — 99214 OFFICE O/P EST MOD 30 MIN: CPT | Performed by: INTERNAL MEDICINE

## 2023-01-10 PROCEDURE — G8427 DOCREV CUR MEDS BY ELIG CLIN: HCPCS | Performed by: INTERNAL MEDICINE

## 2023-01-10 PROCEDURE — G9899 SCRN MAM PERF RSLTS DOC: HCPCS | Performed by: INTERNAL MEDICINE

## 2023-01-10 PROCEDURE — 1090F PRES/ABSN URINE INCON ASSESS: CPT | Performed by: INTERNAL MEDICINE

## 2023-01-10 PROCEDURE — G9717 DOC PT DX DEP/BP F/U NT REQ: HCPCS | Performed by: INTERNAL MEDICINE

## 2023-01-10 PROCEDURE — 1101F PT FALLS ASSESS-DOCD LE1/YR: CPT | Performed by: INTERNAL MEDICINE

## 2023-01-10 PROCEDURE — 3074F SYST BP LT 130 MM HG: CPT | Performed by: INTERNAL MEDICINE

## 2023-01-10 PROCEDURE — 3017F COLORECTAL CA SCREEN DOC REV: CPT | Performed by: INTERNAL MEDICINE

## 2023-01-10 PROCEDURE — 1123F ACP DISCUSS/DSCN MKR DOCD: CPT | Performed by: INTERNAL MEDICINE

## 2023-01-10 PROCEDURE — G8399 PT W/DXA RESULTS DOCUMENT: HCPCS | Performed by: INTERNAL MEDICINE

## 2023-01-10 PROCEDURE — G8417 CALC BMI ABV UP PARAM F/U: HCPCS | Performed by: INTERNAL MEDICINE

## 2023-01-10 RX ORDER — NYSTATIN 100000 [USP'U]/G
POWDER TOPICAL 2 TIMES DAILY
Qty: 60 G | Refills: 5 | Status: SHIPPED | OUTPATIENT
Start: 2023-01-10

## 2023-01-10 RX ORDER — AMOXICILLIN 500 MG/1
500 CAPSULE ORAL 3 TIMES DAILY
Qty: 30 CAPSULE | Refills: 0 | Status: SHIPPED | OUTPATIENT
Start: 2023-01-10 | End: 2023-01-20

## 2023-01-10 RX ORDER — FROVATRIPTAN SUCCINATE 2.5 MG/1
TABLET, FILM COATED ORAL
Qty: 20 TABLET | Refills: 5 | Status: SHIPPED | OUTPATIENT
Start: 2023-01-10

## 2023-01-10 RX ORDER — ONDANSETRON 4 MG/1
4 TABLET, ORALLY DISINTEGRATING ORAL
Qty: 30 TABLET | Refills: 5 | Status: SHIPPED | OUTPATIENT
Start: 2023-01-10

## 2023-01-10 RX ORDER — BUTALBITAL, ACETAMINOPHEN AND CAFFEINE 50; 325; 40 MG/1; MG/1; MG/1
TABLET ORAL
Qty: 42 TABLET | Refills: 2 | Status: SHIPPED | OUTPATIENT
Start: 2023-01-10

## 2023-01-10 RX ORDER — CLOTRIMAZOLE AND BETAMETHASONE DIPROPIONATE 10; .64 MG/G; MG/G
CREAM TOPICAL 2 TIMES DAILY
Qty: 45 G | Refills: 5 | Status: SHIPPED | OUTPATIENT
Start: 2023-01-10

## 2023-01-10 SDOH — ECONOMIC STABILITY - INCOME SECURITY: FINANCIAL INSECURITY: Z59.86

## 2023-01-10 SDOH — ECONOMIC STABILITY - FOOD INSECURITY: FOOD INSECURITY: Z59.41

## 2023-01-10 SDOH — ECONOMIC STABILITY - HOUSING INSECURITY: HOUSING INSTABILITY UNSPECIFIED: Z59.819

## 2023-01-10 NOTE — PROGRESS NOTES
HISTORY OF PRESENT ILLNESS  Elma Vasquez is a 69 y.o. female. /75 (BP 1 Location: Right arm, BP Patient Position: Sitting, BP Cuff Size: Large adult)   Pulse 67   Temp 97.1 °F (36.2 °C) (Temporal)   Resp 18   Ht 5' 6\" (1.676 m)   Wt 211 lb (95.7 kg)   LMP  (LMP Unknown)   SpO2 99%   BMI 34.06 kg/m²     Pt is here with her significant other, nIga Marcus  (BACK -[phone] Ashlyn Scale)    They have been sleeping in her car. They have been told she will get a housing voucher but the city is dragging their feet. She has been to the ER several times with c/o abdominal pain, but, more importantly, hearing loss felt to be due to xs fluid in the ears. She was treated with steroids and has been taking sudafed but indicates this has not helped    She needs a referral to ENT though believes she has an appt in February      Review of Systems   Constitutional:  Negative for chills and fever. Respiratory:  Positive for cough (feels a little congested). Genitourinary:  Positive for frequency and urgency. Incontinence. Lost to f/u with urology   Musculoskeletal:  Positive for back pain and joint pain. Skin:         Small lesion on lateral lower right leg   Psychiatric/Behavioral:  Positive for depression. The patient is nervous/anxious. Physical Exam  Vitals and nursing note reviewed. Constitutional:       Appearance: Normal appearance. She is well-developed. HENT:      Head: Normocephalic and atraumatic. Right Ear: Ear canal normal.      Left Ear: Ear canal normal.      Ears:      Comments: TMs not red or inflamed. Mouth/Throat:      Mouth: Mucous membranes are moist.      Comments: Post-nasal drainage noted  Cardiovascular:      Rate and Rhythm: Normal rate and regular rhythm. Pulmonary:      Effort: Pulmonary effort is normal.      Breath sounds: Normal breath sounds. Musculoskeletal:      Right lower leg: No edema. Left lower leg: No edema.    Skin: General: Skin is warm and dry. Comments: Small 1 cm circular erosion on right mid-lateral calf   Neurological:      General: No focal deficit present. Mental Status: She is alert and oriented to person, place, and time. Psychiatric:         Mood and Affect: Mood normal.         Behavior: Behavior normal.       ASSESSMENT and PLAN    ICD-10-CM ICD-9-CM    1. Bilateral hearing loss, unspecified hearing loss type  H91.93 389.9 REFERRAL TO ENT-OTOLARYNGOLOGY      2. Middle ear effusion, bilateral  H65.93 381.4       3. Migraine without status migrainosus, not intractable, unspecified migraine type  G43.909 346.90       4. Intertrigo  L30.4 695.89 clotrimazole-betamethasone (LOTRISONE) topical cream      nystatin (MYCOSTATIN) powder      5. Sinusitis, unspecified chronicity, unspecified location  J32.9 473.9 amoxicillin (AMOXIL) 500 mg capsule      6. Urinary incontinence, unspecified type  R32 788.30 REFERRAL TO UROLOGY      7. Food insecurity  Z59.41 V60.89       8. Financial insecurity  Z59.86 V60.2       9. Housing instability  Z59.819 V60.9       10. Medication refill  Z76.0 V68.1 butalbital-acetaminophen-caffeine (FIORICET, ESGIC) -40 mg per tablet      nystatin (MYCOSTATIN) powder        Available hospital/ER records reviewed  Referral done to ENT  Referral back to urology for her incontinence. They had seen her 3 years ago but she was confused and did not return for f/u after going for pelvic floor rehab  Refills as noted  Care is impacted by social determinants--food and financial insecurity. Depression and anxiety. Her significant other is trying to help her with this and provide some security and assistance leyla with her appts and meds. Care is limited by social determinants of care including financial and food insecurity  F/u 3-4 week, 30 minutes.  To continue care

## 2023-01-10 NOTE — PROGRESS NOTES
1. \"Have you been to the ER, urgent care clinic since your last visit? Hospitalized since your last visit? \" Yes [facility] ED for kidney stones, and abdominal pain 01/2023    2. \"Have you seen or consulted any other health care providers outside of the [address] [place] [address] since your last visit? \" Yes   1/2023      3. For patients aged 39-70: Has the patient had a colonoscopy / FIT/ Cologuard? Yes - no Care Gap present      If the patient is female:    4. For patients aged 41-77: Has the patient had a mammogram within the past 2 years? Yes - no Care Gap present      5. For patients aged 21-65: Has the patient had a pap smear? NA - based on age or sex      This patient is accompanied in the office by her friend.

## 2023-01-30 DIAGNOSIS — Z12.31 ENCOUNTER FOR SCREENING MAMMOGRAM FOR BREAST CANCER: Primary | ICD-10-CM

## 2023-02-03 DIAGNOSIS — Z12.31 ENCOUNTER FOR SCREENING MAMMOGRAM FOR BREAST CANCER: Primary | ICD-10-CM

## 2023-02-06 DIAGNOSIS — Z12.31 ENCOUNTER FOR SCREENING MAMMOGRAM FOR BREAST CANCER: Primary | ICD-10-CM

## 2023-02-07 ENCOUNTER — OFFICE VISIT (OUTPATIENT)
Dept: INTERNAL MEDICINE CLINIC | Age: 68
End: 2023-02-07
Payer: MEDICARE

## 2023-02-07 VITALS
SYSTOLIC BLOOD PRESSURE: 126 MMHG | WEIGHT: 217 LBS | HEART RATE: 82 BPM | BODY MASS INDEX: 34.87 KG/M2 | TEMPERATURE: 97.2 F | RESPIRATION RATE: 18 BRPM | HEIGHT: 66 IN | OXYGEN SATURATION: 97 % | DIASTOLIC BLOOD PRESSURE: 86 MMHG

## 2023-02-07 DIAGNOSIS — M54.50 CHRONIC LOW BACK PAIN: ICD-10-CM

## 2023-02-07 DIAGNOSIS — G89.29 CHRONIC LOW BACK PAIN: ICD-10-CM

## 2023-02-07 DIAGNOSIS — Z76.0 MEDICATION REFILL: ICD-10-CM

## 2023-02-07 DIAGNOSIS — G43.909 MIGRAINE WITHOUT STATUS MIGRAINOSUS, NOT INTRACTABLE, UNSPECIFIED MIGRAINE TYPE: ICD-10-CM

## 2023-02-07 DIAGNOSIS — E66.01 SEVERE OBESITY (BMI 35.0-39.9) WITH COMORBIDITY (HCC): ICD-10-CM

## 2023-02-07 DIAGNOSIS — H91.93 BILATERAL HEARING LOSS, UNSPECIFIED HEARING LOSS TYPE: ICD-10-CM

## 2023-02-07 DIAGNOSIS — G89.4 CHRONIC PAIN SYNDROME: ICD-10-CM

## 2023-02-07 DIAGNOSIS — J32.9 SINUSITIS, UNSPECIFIED CHRONICITY, UNSPECIFIED LOCATION: Primary | ICD-10-CM

## 2023-02-07 PROCEDURE — 1101F PT FALLS ASSESS-DOCD LE1/YR: CPT | Performed by: INTERNAL MEDICINE

## 2023-02-07 PROCEDURE — 1123F ACP DISCUSS/DSCN MKR DOCD: CPT | Performed by: INTERNAL MEDICINE

## 2023-02-07 PROCEDURE — 3017F COLORECTAL CA SCREEN DOC REV: CPT | Performed by: INTERNAL MEDICINE

## 2023-02-07 PROCEDURE — G8417 CALC BMI ABV UP PARAM F/U: HCPCS | Performed by: INTERNAL MEDICINE

## 2023-02-07 PROCEDURE — 99214 OFFICE O/P EST MOD 30 MIN: CPT | Performed by: INTERNAL MEDICINE

## 2023-02-07 PROCEDURE — 3074F SYST BP LT 130 MM HG: CPT | Performed by: INTERNAL MEDICINE

## 2023-02-07 PROCEDURE — G8399 PT W/DXA RESULTS DOCUMENT: HCPCS | Performed by: INTERNAL MEDICINE

## 2023-02-07 PROCEDURE — G9717 DOC PT DX DEP/BP F/U NT REQ: HCPCS | Performed by: INTERNAL MEDICINE

## 2023-02-07 PROCEDURE — G9899 SCRN MAM PERF RSLTS DOC: HCPCS | Performed by: INTERNAL MEDICINE

## 2023-02-07 PROCEDURE — 3079F DIAST BP 80-89 MM HG: CPT | Performed by: INTERNAL MEDICINE

## 2023-02-07 PROCEDURE — G8536 NO DOC ELDER MAL SCRN: HCPCS | Performed by: INTERNAL MEDICINE

## 2023-02-07 PROCEDURE — 1090F PRES/ABSN URINE INCON ASSESS: CPT | Performed by: INTERNAL MEDICINE

## 2023-02-07 PROCEDURE — G8427 DOCREV CUR MEDS BY ELIG CLIN: HCPCS | Performed by: INTERNAL MEDICINE

## 2023-02-07 RX ORDER — ALPRAZOLAM 1 MG/1
TABLET ORAL
Qty: 90 TABLET | Refills: 2 | Status: SHIPPED | OUTPATIENT
Start: 2023-02-07

## 2023-02-07 RX ORDER — OXYCODONE AND ACETAMINOPHEN 7.5; 325 MG/1; MG/1
1 TABLET ORAL
Qty: 120 TABLET | Refills: 0 | Status: SHIPPED | OUTPATIENT
Start: 2023-03-07 | End: 2023-04-06

## 2023-02-07 RX ORDER — BENZONATATE 200 MG/1
CAPSULE ORAL
Qty: 90 CAPSULE | Refills: 5 | Status: SHIPPED | OUTPATIENT
Start: 2023-02-07

## 2023-02-07 RX ORDER — OXYCODONE AND ACETAMINOPHEN 7.5; 325 MG/1; MG/1
1 TABLET ORAL
Qty: 120 TABLET | Refills: 0 | Status: SHIPPED | OUTPATIENT
Start: 2023-02-07 | End: 2023-03-09

## 2023-02-07 RX ORDER — CEPHALEXIN 500 MG/1
500 CAPSULE ORAL 4 TIMES DAILY
Qty: 40 CAPSULE | Refills: 0 | Status: SHIPPED | OUTPATIENT
Start: 2023-02-07 | End: 2023-02-17
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
## 2023-02-07 NOTE — PROGRESS NOTES
HISTORY OF PRESENT ILLNESS  Mane Castillo is a 69 y.o. female. /86 (BP 1 Location: Left upper arm, BP Patient Position: Sitting, BP Cuff Size: Large adult)   Pulse 82   Temp 97.2 °F (36.2 °C) (Temporal)   Resp 18   Ht 5' 6\" (1.676 m)   Wt 217 lb (98.4 kg)   LMP  (LMP Unknown)   SpO2 97%   BMI 35.02 kg/m²       She is here to f/u her hearing. She has been unable to see ENT due to her insurance    Follow-up  The history is provided by the Patient. This is a new problem. The current episode started more than 1 week ago. Review of Systems   Constitutional:  Negative for chills and fever. HENT:  Positive for congestion, ear pain, hearing loss, sinus pain and sore throat. Physical Exam  Vitals and nursing note reviewed. Constitutional:       Appearance: Normal appearance. She is well-developed. HENT:      Head: Normocephalic and atraumatic. Right Ear: Ear canal and external ear normal. There is no impacted cerumen. Left Ear: Ear canal and external ear normal. There is no impacted cerumen. Nose: Congestion (mild) present. Mouth/Throat:      Mouth: Mucous membranes are moist.      Pharynx: No oropharyngeal exudate. Cardiovascular:      Rate and Rhythm: Normal rate and regular rhythm. Pulmonary:      Effort: Pulmonary effort is normal.      Breath sounds: Normal breath sounds. Lymphadenopathy:      Cervical: No cervical adenopathy. Skin:     General: Skin is warm and dry. Neurological:      General: No focal deficit present. Mental Status: She is alert and oriented to person, place, and time. Psychiatric:         Mood and Affect: Mood normal.         Behavior: Behavior normal.       ASSESSMENT and PLAN    ICD-10-CM ICD-9-CM    1.  Sinusitis, unspecified chronicity, unspecified location  J32.9 473.9 cephALEXin (KEFLEX) 500 mg capsule      REFERRAL TO ENT-OTOLARYNGOLOGY      2. Bilateral hearing loss, unspecified hearing loss type  H91.93 389.9 REFERRAL TO ENT-OTOLARYNGOLOGY      3. Migraine without status migrainosus, not intractable, unspecified migraine type  G43.909 346.90       4. Severe obesity (BMI 35.0-39. 9) with comorbidity ([facility] [address]  E66.01 278.01       5. Chronic pain syndrome  G89.4 338.4 oxyCODONE-acetaminophen (PERCOCET 7.5) 7.5-325 mg per tablet      oxyCODONE-acetaminophen (PERCOCET 7.5) 7.5-325 mg per tablet      6. Chronic low back pain  M54.50 724.2 oxyCODONE-acetaminophen (PERCOCET 7.5) 7.5-325 mg per tablet    G89.29 338.29 oxyCODONE-acetaminophen (PERCOCET 7.5) 7.5-325 mg per tablet      7. Medication refill  Z76.0 V68.1 ALPRAZolam (XANAX) 1 mg tablet      benzonatate (TESSALON) 200 mg capsule      oxyCODONE-acetaminophen (PERCOCET 7.5) 7.5-325 mg per tablet      oxyCODONE-acetaminophen (PERCOCET 7.5) 7.5-325 mg per tablet        Will tx with keflex  Continue to try and get into ENT.  Will check with insurance  Advised she should NOT be taking a triptan given her stroke history  Incidental refills as noted  F/u 4-6 weeks

## 2023-02-07 NOTE — PROGRESS NOTES
1. \"Have you been to the ER, urgent care clinic since your last visit? Hospitalized since your last visit? \" No    2. \"Have you seen or consulted any other health care providers outside of the [address] [place] [address] since your last visit? \" No     3. For patients aged 39-70: Has the patient had a colonoscopy / FIT/ Cologuard? Yes - no Care Gap present      If the patient is female:    4. For patients aged 41-77: Has the patient had a mammogram within the past 2 years? Yes - no Care Gap present      5. For patients aged 21-65: Has the patient had a pap smear? NA - based on age or sex      This patient is accompanied in the office by her partner.

## 2023-02-13 DIAGNOSIS — R32 URINARY INCONTINENCE, UNSPECIFIED TYPE: Primary | ICD-10-CM

## 2023-02-27 RX ORDER — POTASSIUM CHLORIDE 20 MEQ/1
TABLET, EXTENDED RELEASE ORAL
Qty: 90 TABLET | Refills: 3 | Status: SHIPPED | OUTPATIENT
Start: 2023-02-27

## 2023-03-06 ENCOUNTER — CARE COORDINATION (OUTPATIENT)
Facility: CLINIC | Age: 68
End: 2023-03-06

## 2023-03-06 ENCOUNTER — TELEPHONE (OUTPATIENT)
Facility: CLINIC | Age: 68
End: 2023-03-06

## 2023-03-06 DIAGNOSIS — J32.1 CHRONIC FRONTAL SINUSITIS: ICD-10-CM

## 2023-03-06 DIAGNOSIS — R05.9 COUGH IN ADULT: Primary | ICD-10-CM

## 2023-03-06 DIAGNOSIS — H66.90 SUBACUTE OTITIS MEDIA, UNSPECIFIED OTITIS MEDIA TYPE: ICD-10-CM

## 2023-03-06 RX ORDER — ACETAMINOPHEN 500 MG
500 TABLET ORAL
Qty: 30 TABLET | Refills: 0 | Status: SHIPPED | OUTPATIENT
Start: 2023-03-06

## 2023-03-06 RX ORDER — AMOXICILLIN AND CLAVULANATE POTASSIUM 562.5; 437.5; 62.5 MG/1; MG/1; MG/1
2 TABLET, FILM COATED, EXTENDED RELEASE ORAL 2 TIMES DAILY
Qty: 28 TABLET | Refills: 0 | Status: SHIPPED | OUTPATIENT
Start: 2023-03-06 | End: 2023-03-13

## 2023-03-06 RX ORDER — AMOXICILLIN AND CLAVULANATE POTASSIUM 562.5; 437.5; 62.5 MG/1; MG/1; MG/1
2 TABLET, FILM COATED, EXTENDED RELEASE ORAL 2 TIMES DAILY
Qty: 40 TABLET | Refills: 0 | Status: SHIPPED | OUTPATIENT
Start: 2023-03-06 | End: 2023-03-06 | Stop reason: SDUPTHER

## 2023-03-06 NOTE — TELEPHONE ENCOUNTER
Patient was given antibiotic by Dr. Otoniel Vital when seen in February for ear infection and states that her symptoms got much better after taking the medication but that her symptoms have now reoccurred. Ear pain, Loss of hearing , \"stuffed up feeling\" in ears. Would like to know if one of the other providers at [facility] could refill the antibiotic again. Will send in request to [facility] provider.     Lola Dahl, RN

## 2023-03-06 NOTE — PROGRESS NOTES
Patient called our office today requesting antibiotics since she is having issues with her ears and sinuses again. After reviewing the records, I noticed that she was treated before for sinusitis first with amoxicillin alone and in a follow-up she was prescribed Keflex. At that time she complained of sinusitis symptoms as well left otitis symptoms. At that time during patient's evaluation it was described fluid building up in the ear. Also patient complains of having difficulty of hearing. Patient is currently living in a hotel, she is applying for housing with the [facility]. She has a  helping her. I called the patient today. 2 steps verification. She is stated having issues hearing again, also described chills and ear pain bilaterally. She has been also coughing and describing a back throat drip. I think she is suffering of a chronic recurrent sinusitis which is affecting her ears causing the possible chronic otitis media. This last 1 probably complicated with a bacterial infection, given the chills, pain and hearing problems probably fluid building up. Seems to me that she failed the first treatment with amoxicillin but somehow she improved with Keflex. It is unclear to me if this is a new infection or is the same previous infection that it was not completely resolved with previous treatment. She had some problems getting in touch with ENT due to insurance issues. Now she has an appointment with ENT on 03/29/2023 but she cannot wait to see them due to her current symptoms. I will prescribe high-dose amoxicillin/clavulanate acid extended release twice a day which should take care of otitis media as well as sinuses infections. She can also take dextromethorphan guaifenesin and Tylenol for symptoms control. I reviewed her allergies list.  No penicillins allergies and she received amoxicillin before as well as Keflex without issues.   I also advised her to go to the ER if symptoms get worse or if they do not improve with treatments. Patient agreed with the plan. I will informed her PCP for further follow-up.

## 2023-03-06 NOTE — CARE COORDINATION
Ambulatory Care Coordination Note  3/6/2023    Patient Current Location:  [place]    ACM contacted the patient by telephone. Verified name and  with patient as identifiers. Provided introduction to self, and explanation of the ACM role. ACM: Eusebia Dc RN    Challenges to be reviewed by the provider   Additional needs identified to be addressed with provider: Yes  medications-requesting refill of Antibiotic - states that the Antibiotic Dr. Dimitri Cheng gave her helped but her symptoms have returned since finishing the antibiotic. Has an appt with ENT but not until later this month. Method of communication with provider: staff message. Ambulatory Care Coordination Assessment    Care Coordination Protocol  Referral from Primary Care Provider: No  Week 1 - Initial Assessment              Current Housing: Homeless                 Suggested Interventions and Community Resources                Patient currently homeless - has been for some time. [facility] was approved for \"housing and has Voucher\" but due to her continued cough, they won't let her move in at this time. Has had covid recently and cough continues. Also c/o recurrent ear symptoms - loss of hearing, ear pain, feels cold at times (not sure if has had fever or not). States symptoms improved while on Keflex but has since returned. Feels like ears stuffed up. Will see if another provider at [facility] willing to give her another rx of medication. Is due to see Dr. Dimitri Cheng when she returns (gone all month per patient). No future appointments.

## 2023-03-13 ENCOUNTER — CARE COORDINATION (OUTPATIENT)
Facility: CLINIC | Age: 68
End: 2023-03-13

## 2023-03-13 NOTE — CARE COORDINATION
Ambulatory Care Coordination Note  3/13/2023    Patient Current Location:  [place]    ACM contacted the patient by telephone. Verified name and  with patient as identifiers. ACM: Grover Allen RN    Challenges to be reviewed by the provider   Additional needs identified to be addressed with provider: No  none                     Ambulatory Care Coordination Assessment    Care Coordination Protocol  Referral from Primary Care Provider: No  Week 1 - Initial Assessment     Do you have all of your prescriptions and are they filled?: Yes  Barriers to medication adherence: Other  Are you able to afford your medications?: Yes  How often do you have trouble taking your medications the way you have been told to take them?: Sometimes I take them as prescribed. Do you have Home O2 Therapy?: No      Ability to seek help/take action for Emergent Urgent situations i.e. fire, crime, inclement weather or health crisis.: Needs Assistance  Ability to ambulate to restroom: Needs Assistance  Ability handle personal hygeine needs (bathing/dressing/grooming): Needs Assistance  Ability to manage Medications: Needs Assistance  Ability to prepare Food Preparation: Needs Assistance  Ability to maintain home (clean home, laundry): Needs Assistance  Ability to drive and/or has transportation: Needs Assistance  Ability to do shopping: Needs Assistance  Ability to manage finances: Needs Assistance  Is patient able to live independently?: Yes     Current Housing: Homeless        Per the Fall Risk Screening, did the patient have 2 or more falls or 1 fall with injury in the past year?: Yes  How often do you think you are about to fall and you do NOT fall?  For example, you grab something to stabilize yourself or hold onto a wall/furniture?: Occasionally  Use of a Mobility Aid: Yes  Difficulty walking/impaired gait: Yes  Issues with feet or shoes like numbness, edema, shoes not fitting: No  Changes in vision, poor vision or poor lighting in environment: No (Comment: needs glasses)  Dizziness: Yes  Other Fall Risk: Yes  What other fall risks does the patient have?: rods in back     Frequent urination at night?: Yes  Do you use rails/bars?: Yes  Do you have a non-slip tub mat?: Yes     Are you experiencing loss of meaning?: Yes  Are you experiencing loss of hope and peace?: Yes     Thinking about your patient's physical health needs, are there any symptoms or problems (risk indicators) you are unsure about that require further investigation?: Moderate to severe symptoms or problems that impact on daily life   Are the patients physical health problems impacting on their mental well-being?: Mild impact on mental well-being e.g. \"\"feeling fed-up\"\", \"\"reduced enjoyment\"\"   Are there any problems with your patients lifestyle behaviors (alcohol, drugs, diet, exercise) that are impacting on physical or mental well-being?: Some mild concern of potential negative impact on well-being   Do you have any other concerns about your patients mental well-being? How would you rate their severity and impact on the patient?: Mild problems - don't interfere with function   How would you rate their home environment in terms of safety and stability (including domestic violence, insecure housing, neighbor harassment)?: Safety/stability questionable   How do daily activities impact on the patient's well-being? (include current or anticipated unemployment, work, caregiving, access to transportation or other):  Contributes to low mood or stress at times   How would you rate their social network (family, work, friends)?: Adequate participation with social networks   How would you rate their financial resources (including ability to afford all required medical care)?: Financially insecure, very few resources, immediate challenges   How wells does the patient now understand their health and well-being (symptoms, signs or risk factors) and what they need to do to manage their health?: Reasonable to good understanding but do not feel able to engage with advice at this time   How well do you think your patient can engage in healthcare discussions? (Barriers include language, deafness, aphasia, alcohol or drug problems, learning difficulties, concentration): Adequate communication, with or without minor barriers   Do other services need to be involved to help this patient?: Other care/services in place and adequate   Are current services involved with this patient well-coordinated? (Include coordination with other services you are now recommendation): Required care/services in place and adequately coordinated   Suggested Interventions and Community Resources   Social Work: In Process         Schedule an appointment with the patient's PCP          Patient is currently living in hotel - states city has given her housing \"voucher\" but hasn't \"pulled \" her name yet. Waiting to be notified when she will get housing placement. Lives with her boyfriend and he assists her with ADL's , cooking, meds, etc.  She has appt with ENT end of month. Will see Dr. Salmeron Degree again when she returns (end of this month). No future appointments. Goals Addressed                   This Visit's Progress     Attend follow up appointments on schedule   On track     Patient will attend all scheduled appointments through end of June 2023         Knowledge and adherence of prescribed medication (ie. action, side effects, missed dose, etc.).         Pt will take all medications prescribed to be evaluated on each outreach through  end of June 2023     - patient has most of her medications but does state there are a few that she runs out of sometimes and doesn't get refilled right away due to cost.

## 2023-03-20 RX ORDER — ERGOCALCIFEROL 1.25 MG/1
CAPSULE ORAL
Qty: 12 CAPSULE | Refills: 3 | Status: SHIPPED | OUTPATIENT
Start: 2023-03-20

## 2023-03-21 ENCOUNTER — CARE COORDINATION (OUTPATIENT)
Facility: CLINIC | Age: 68
End: 2023-03-21
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
## 2023-03-29 ENCOUNTER — CARE COORDINATION (OUTPATIENT)
Facility: CLINIC | Age: 68
End: 2023-03-29

## 2023-03-29 NOTE — CARE COORDINATION
Ambulatory Care Coordination Note  3/29/2023    Patient Current Location:  [place]     ACM contacted the patient by telephone. Verified name and  with patient as identifiers. Challenges to be reviewed by the provider   Additional needs identified to be addressed with provider: No  none               Method of communication with provider: none. ACM: Pee Chavez RN     Patient upset today - states she missed her appointment with Dr. Talya Amanda yesterday and missed her appointment with the ENT provider this am.  States she has \"memory problem\" and forgot to go. ACM will remind her of all upcoming appointments. Patient states she is still working on housing - her friend, \"Bill\" is working today and she is not sure if they will be staying at [facility] after today due to cost.  Patient did state APS worker was just there. Patient states she is taking meds as directed however she is out of Effexor and can't get again for a week.         Care Coordination Interventions    Referral from Primary Care Provider: No  Suggested Interventions and Community Resources  Social Work: In Process          Goals Addressed    None         Future Appointments   Date Time Provider Jami Sosa   2023 10:00 AM PILAR He - MELQUIADES Barney   2023  8:15 AM Mary Kay MD GMA BS AMB

## 2023-04-04 ENCOUNTER — CARE COORDINATION (OUTPATIENT)
Facility: CLINIC | Age: 68
End: 2023-04-04
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
## 2023-04-14 ENCOUNTER — CARE COORDINATION (OUTPATIENT)
Facility: CLINIC | Age: 68
End: 2023-04-14

## 2023-04-19 DIAGNOSIS — F11.99 OPIOID USE, UNSPECIFIED WITH UNSPECIFIED OPIOID-INDUCED DISORDER (HCC): Primary | ICD-10-CM

## 2023-04-19 DIAGNOSIS — M54.50 CHRONIC LOW BACK PAIN, UNSPECIFIED BACK PAIN LATERALITY, UNSPECIFIED WHETHER SCIATICA PRESENT: ICD-10-CM

## 2023-04-19 DIAGNOSIS — G89.29 CHRONIC LOW BACK PAIN, UNSPECIFIED BACK PAIN LATERALITY, UNSPECIFIED WHETHER SCIATICA PRESENT: ICD-10-CM

## 2023-04-20 RX ORDER — OXYCODONE AND ACETAMINOPHEN 7.5; 325 MG/1; MG/1
TABLET ORAL
Qty: 120 TABLET | Refills: 0 | Status: SHIPPED | OUTPATIENT
Start: 2023-04-20 | End: 2023-05-20

## 2023-04-20 RX ORDER — NYSTATIN 100000 [USP'U]/G
POWDER TOPICAL 2 TIMES DAILY
Qty: 60 G | Refills: 5 | Status: SHIPPED | OUTPATIENT
Start: 2023-04-20

## 2023-04-20 NOTE — CARE COORDINATION
Ambulatory Care Coordination Note  2023    Patient Current Location:  [place]     ACM contacted the patient by telephone. Verified name and  with patient as identifiers. Challenges to be reviewed by the provider   Additional needs identified to be addressed with provider: No  none               Method of communication with provider: none. ACM: Kishan Moura RN     Patient having oral surgery at [facility] in [place] today - NPO for surgery and c/o dry mouth. Otherwise no complaints at this time. States she had called PCP office about med refills - hasn't heard back yet. Care Coordination Interventions    Referral from Primary Care Provider: No  Suggested Interventions and Community Resources  Social Work: In Process          Goals Addressed                   This Visit's Progress     Attend follow up appointments on schedule   Worsening     Patient will attend all scheduled appointments through end      - missed Urology appt , despite being reminded day before. - missed appt with PCP            Knowledge and adherence of prescribed medication (ie. action, side effects, missed dose, etc.).    On track     Pt will take all medications prescribed to be evaluated on each outreach through  end of 2023     - patient has most of her medications but does state there are a few that she runs out of sometimes and doesn't get refilled right away due to cost.                  Future Appointments   Date Time Provider Jami Sosa   2023 11:45 AM Sonny Navarro MD GMA BS AMB

## 2023-05-01 ENCOUNTER — CARE COORDINATION (OUTPATIENT)
Facility: CLINIC | Age: 68
End: 2023-05-01

## 2023-05-01 NOTE — CARE COORDINATION
Ambulatory Care Coordination Note  2023    Patient Current Location:  [place]     ACM contacted the patient by telephone. Verified name and  with patient as identifiers. Provided introduction to self, and explanation of the ACM role. Challenges to be reviewed by the provider   Additional needs identified to be addressed with provider: No  none               Method of communication with provider: none. ACM: Destinee Moctezuma RN    Care Coordination Interventions    Referral from Primary Care Provider: No  Suggested Interventions and Community Resources  Social Work: In Process     Patient states she is doing ok. Goals Addressed                   This Visit's Progress     Attend follow up appointments on schedule   On track     Patient will attend all scheduled appointments through end      - missed Urology appt , despite being reminded day before. - missed appt with PCP            Knowledge and adherence of prescribed medication (ie. action, side effects, missed dose, etc.).    On track     Pt will take all medications prescribed to be evaluated on each outreach through  end      - patient has most of her medications but does state there are a few that she runs out of sometimes and doesn't get refilled right away due to cost.                  Future Appointments   Date Time Provider Jami Sosa   2023 11:45 AM Piper Murphy MD [facility]MA BS AMB

## 2023-05-02 ENCOUNTER — OFFICE VISIT (OUTPATIENT)
Facility: CLINIC | Age: 68
End: 2023-05-02
Payer: OTHER GOVERNMENT

## 2023-05-02 ENCOUNTER — HOSPITAL ENCOUNTER (OUTPATIENT)
Facility: HOSPITAL | Age: 68
Setting detail: SPECIMEN
Discharge: HOME | End: 2023-05-05
Payer: MEDICARE

## 2023-05-02 VITALS
WEIGHT: 223 LBS | BODY MASS INDEX: 35.84 KG/M2 | TEMPERATURE: 97.7 F | RESPIRATION RATE: 17 BRPM | SYSTOLIC BLOOD PRESSURE: 110 MMHG | HEART RATE: 65 BPM | HEIGHT: 66 IN | OXYGEN SATURATION: 98 % | DIASTOLIC BLOOD PRESSURE: 65 MMHG

## 2023-05-02 DIAGNOSIS — I10 ESSENTIAL (PRIMARY) HYPERTENSION: Primary | ICD-10-CM

## 2023-05-02 DIAGNOSIS — D68.59 OTHER PRIMARY THROMBOPHILIA (HCC): ICD-10-CM

## 2023-05-02 DIAGNOSIS — F11.99 OPIOID USE, UNSPECIFIED WITH UNSPECIFIED OPIOID-INDUCED DISORDER (HCC): ICD-10-CM

## 2023-05-02 DIAGNOSIS — G89.29 CHRONIC PAIN OF BOTH KNEES: ICD-10-CM

## 2023-05-02 DIAGNOSIS — Z87.898 H/O: PITUITARY TUMOR: ICD-10-CM

## 2023-05-02 DIAGNOSIS — M25.562 CHRONIC PAIN OF BOTH KNEES: ICD-10-CM

## 2023-05-02 DIAGNOSIS — F25.9 SCHIZOAFFECTIVE DISORDER, UNSPECIFIED TYPE (HCC): ICD-10-CM

## 2023-05-02 DIAGNOSIS — G47.00 INSOMNIA, UNSPECIFIED TYPE: Primary | ICD-10-CM

## 2023-05-02 DIAGNOSIS — G40.909 NONINTRACTABLE EPILEPSY WITHOUT STATUS EPILEPTICUS, UNSPECIFIED EPILEPSY TYPE (HCC): ICD-10-CM

## 2023-05-02 DIAGNOSIS — M25.561 CHRONIC PAIN OF BOTH KNEES: ICD-10-CM

## 2023-05-02 DIAGNOSIS — I10 ESSENTIAL (PRIMARY) HYPERTENSION: ICD-10-CM

## 2023-05-02 DIAGNOSIS — E23.0 HYPOPITUITARISM (HCC): ICD-10-CM

## 2023-05-02 DIAGNOSIS — D64.9 ANEMIA, UNSPECIFIED TYPE: ICD-10-CM

## 2023-05-02 DIAGNOSIS — R51.9 NONINTRACTABLE HEADACHE, UNSPECIFIED CHRONICITY PATTERN, UNSPECIFIED HEADACHE TYPE: ICD-10-CM

## 2023-05-02 DIAGNOSIS — D35.2 BENIGN NEOPLASM OF PITUITARY GLAND (HCC): ICD-10-CM

## 2023-05-02 DIAGNOSIS — Z76.0 MEDICATION REFILL: ICD-10-CM

## 2023-05-02 LAB
ALBUMIN SERPL-MCNC: 3.8 G/DL (ref 3.4–5)
ALBUMIN/GLOB SERPL: 1.4 (ref 0.8–1.7)
ALP SERPL-CCNC: 62 U/L (ref 45–117)
ALT SERPL-CCNC: 24 U/L (ref 13–56)
ANION GAP SERPL CALC-SCNC: 5 MMOL/L (ref 3–18)
AST SERPL-CCNC: 20 U/L (ref 10–38)
BASOPHILS # BLD: 0.1 K/UL (ref 0–0.1)
BASOPHILS NFR BLD: 1 % (ref 0–2)
BILIRUB SERPL-MCNC: 0.3 MG/DL (ref 0.2–1)
BUN SERPL-MCNC: 13 MG/DL (ref 7–18)
BUN/CREAT SERPL: 16 (ref 12–20)
CALCIUM SERPL-MCNC: 8.8 MG/DL (ref 8.5–10.1)
CALCIUM SERPL-MCNC: 8.8 MG/DL (ref 8.5–10.1)
CHLORIDE SERPL-SCNC: 109 MMOL/L (ref 100–111)
CO2 SERPL-SCNC: 28 MMOL/L (ref 21–32)
CREAT SERPL-MCNC: 0.81 MG/DL (ref 0.6–1.3)
DIFFERENTIAL METHOD BLD: ABNORMAL
EOSINOPHIL # BLD: 0.4 K/UL (ref 0–0.4)
EOSINOPHIL NFR BLD: 5 % (ref 0–5)
ERYTHROCYTE [DISTWIDTH] IN BLOOD BY AUTOMATED COUNT: 14.7 % (ref 11.6–14.5)
GLOBULIN SER CALC-MCNC: 2.8 G/DL (ref 2–4)
GLUCOSE SERPL-MCNC: 81 MG/DL (ref 74–99)
HCT VFR BLD AUTO: 33.7 % (ref 35–45)
HGB BLD-MCNC: 10.8 G/DL (ref 12–16)
IMM GRANULOCYTES # BLD AUTO: 0 K/UL (ref 0–0.04)
IMM GRANULOCYTES NFR BLD AUTO: 0 % (ref 0–0.5)
LYMPHOCYTES # BLD: 3.6 K/UL (ref 0.9–3.6)
LYMPHOCYTES NFR BLD: 49 % (ref 21–52)
MCH RBC QN AUTO: 32.6 PG (ref 24–34)
MCHC RBC AUTO-ENTMCNC: 32 G/DL (ref 31–37)
MCV RBC AUTO: 101.8 FL (ref 78–100)
MONOCYTES # BLD: 0.9 K/UL (ref 0.05–1.2)
MONOCYTES NFR BLD: 12 % (ref 3–10)
NEUTS SEG # BLD: 2.4 K/UL (ref 1.8–8)
NEUTS SEG NFR BLD: 33 % (ref 40–73)
NRBC # BLD: 0 K/UL (ref 0–0.01)
NRBC BLD-RTO: 0 PER 100 WBC
PHOSPHATE SERPL-MCNC: 3.5 MG/DL (ref 2.5–4.9)
PLATELET # BLD AUTO: 222 K/UL (ref 135–420)
PMV BLD AUTO: 11.4 FL (ref 9.2–11.8)
POTASSIUM SERPL-SCNC: 4 MMOL/L (ref 3.5–5.5)
PROT SERPL-MCNC: 6.6 G/DL (ref 6.4–8.2)
PTH-INTACT SERPL-MCNC: 93.3 PG/ML (ref 18.4–88)
RBC # BLD AUTO: 3.31 M/UL (ref 4.2–5.3)
SODIUM SERPL-SCNC: 142 MMOL/L (ref 136–145)
WBC # BLD AUTO: 7.3 K/UL (ref 4.6–13.2)

## 2023-05-02 PROCEDURE — 85025 COMPLETE CBC W/AUTO DIFF WBC: CPT

## 2023-05-02 PROCEDURE — 83550 IRON BINDING TEST: CPT

## 2023-05-02 PROCEDURE — 84100 ASSAY OF PHOSPHORUS: CPT

## 2023-05-02 PROCEDURE — 3074F SYST BP LT 130 MM HG: CPT | Performed by: INTERNAL MEDICINE

## 2023-05-02 PROCEDURE — 80053 COMPREHEN METABOLIC PANEL: CPT

## 2023-05-02 PROCEDURE — 83540 ASSAY OF IRON: CPT

## 2023-05-02 PROCEDURE — 36415 COLL VENOUS BLD VENIPUNCTURE: CPT

## 2023-05-02 PROCEDURE — 1123F ACP DISCUSS/DSCN MKR DOCD: CPT | Performed by: INTERNAL MEDICINE

## 2023-05-02 PROCEDURE — 3078F DIAST BP <80 MM HG: CPT | Performed by: INTERNAL MEDICINE

## 2023-05-02 PROCEDURE — 83970 ASSAY OF PARATHORMONE: CPT

## 2023-05-02 PROCEDURE — 99214 OFFICE O/P EST MOD 30 MIN: CPT | Performed by: INTERNAL MEDICINE

## 2023-05-02 RX ORDER — PREDNISONE 10 MG/1
TABLET ORAL
Qty: 9 TABLET | Refills: 0 | Status: SHIPPED | OUTPATIENT
Start: 2023-05-02

## 2023-05-02 RX ORDER — ACYCLOVIR 400 MG/1
400 TABLET ORAL
Qty: 50 TABLET | Refills: 5 | Status: SHIPPED | OUTPATIENT
Start: 2023-05-02 | End: 2023-05-12

## 2023-05-02 RX ORDER — ZOLPIDEM TARTRATE 12.5 MG/1
TABLET, FILM COATED, EXTENDED RELEASE ORAL
Qty: 90 TABLET | Refills: 1 | Status: SHIPPED | OUTPATIENT
Start: 2023-05-02 | End: 2023-10-29

## 2023-05-02 RX ORDER — CLINDAMYCIN HYDROCHLORIDE 300 MG/1
600 CAPSULE ORAL 3 TIMES DAILY
Qty: 60 CAPSULE | Refills: 1 | Status: SHIPPED | OUTPATIENT
Start: 2023-05-02 | End: 2023-05-12

## 2023-05-02 SDOH — ECONOMIC STABILITY: HOUSING INSECURITY
IN THE LAST 12 MONTHS, WAS THERE A TIME WHEN YOU DID NOT HAVE A STEADY PLACE TO SLEEP OR SLEPT IN A SHELTER (INCLUDING NOW)?: YES

## 2023-05-02 SDOH — ECONOMIC STABILITY: FOOD INSECURITY: WITHIN THE PAST 12 MONTHS, YOU WORRIED THAT YOUR FOOD WOULD RUN OUT BEFORE YOU GOT MONEY TO BUY MORE.: SOMETIMES TRUE

## 2023-05-02 SDOH — ECONOMIC STABILITY: INCOME INSECURITY: HOW HARD IS IT FOR YOU TO PAY FOR THE VERY BASICS LIKE FOOD, HOUSING, MEDICAL CARE, AND HEATING?: VERY HARD

## 2023-05-02 SDOH — ECONOMIC STABILITY: FOOD INSECURITY: WITHIN THE PAST 12 MONTHS, THE FOOD YOU BOUGHT JUST DIDN'T LAST AND YOU DIDN'T HAVE MONEY TO GET MORE.: SOMETIMES TRUE

## 2023-05-02 ASSESSMENT — PATIENT HEALTH QUESTIONNAIRE - PHQ9
4. FEELING TIRED OR HAVING LITTLE ENERGY: 3
7. TROUBLE CONCENTRATING ON THINGS, SUCH AS READING THE NEWSPAPER OR WATCHING TELEVISION: 3
5. POOR APPETITE OR OVEREATING: 0
SUM OF ALL RESPONSES TO PHQ QUESTIONS 1-9: 13
SUM OF ALL RESPONSES TO PHQ QUESTIONS 1-9: 13
3. TROUBLE FALLING OR STAYING ASLEEP: 2
9. THOUGHTS THAT YOU WOULD BE BETTER OFF DEAD, OR OF HURTING YOURSELF: 0
SUM OF ALL RESPONSES TO PHQ QUESTIONS 1-9: 13
2. FEELING DOWN, DEPRESSED OR HOPELESS: 3
SUM OF ALL RESPONSES TO PHQ9 QUESTIONS 1 & 2: 5
6. FEELING BAD ABOUT YOURSELF - OR THAT YOU ARE A FAILURE OR HAVE LET YOURSELF OR YOUR FAMILY DOWN: 0
SUM OF ALL RESPONSES TO PHQ QUESTIONS 1-9: 13
1. LITTLE INTEREST OR PLEASURE IN DOING THINGS: 2
10. IF YOU CHECKED OFF ANY PROBLEMS, HOW DIFFICULT HAVE THESE PROBLEMS MADE IT FOR YOU TO DO YOUR WORK, TAKE CARE OF THINGS AT HOME, OR GET ALONG WITH OTHER PEOPLE: 2
8. MOVING OR SPEAKING SO SLOWLY THAT OTHER PEOPLE COULD HAVE NOTICED. OR THE OPPOSITE, BEING SO FIGETY OR RESTLESS THAT YOU HAVE BEEN MOVING AROUND A LOT MORE THAN USUAL: 0

## 2023-05-02 ASSESSMENT — ENCOUNTER SYMPTOMS: FACIAL SWELLING: 1

## 2023-05-02 NOTE — PROGRESS NOTES
1. \"Have you been to the ER, urgent care clinic since your last visit? Hospitalized since your last visit? \" Yes Mamadou Martin 12 X3 [address] Tir[address] X 1    2. \"Have you seen or consulted any other health care providers outside of the [address] [place] [address] since your last visit? \" No     3. For patients aged 39-70: Has the patient had a colonoscopy / FIT/ Cologuard? Yes - no Care Gap present      If the patient is female:    4. For patients aged 41-77: Has the patient had a mammogram within the past 2 years? Yes - no Care Gap present      5. For patients aged 21-65: Has the patient had a pap smear?  NA - based on age or sex

## 2023-05-02 NOTE — PROGRESS NOTES
HISTORY OF PRESENT ILLNESS      Meenu Lozano is a 69 y.o. female. /65 (Site: Left Upper Arm, Position: Sitting, Cuff Size: Large Adult) Comment: W/O MED  Pulse 65   Temp 97.7 °F (36.5 °C) (Temporal)   Resp 17   Ht 5' 6\" (1.676 m)   Wt 223 lb (101.2 kg)   SpO2 98%   BMI 35.99 kg/m²         Here to f/u on various issues  Her friend, Tano Muñoz, did not come with her today. She was seen at the ER 4/5 for a fall and knee pain  She also was see 4/16/23 for mouth swelling which was felt to be dental infection    She has been back to the oral surgeon several times. She has at least one dry socket. And a dental abscess. She still has not seen the ear doctor. Her teeth and infection took over. Review of Systems   Constitutional:  Negative for chills and fever. HENT:  Positive for dental problem, facial swelling and mouth sores. Musculoskeletal:  Positive for gait problem (due to arthritic knee and back problems). Knee pain. Neurological:  Positive for headaches (that started with the dental problem. It is an upper left tooth that has had the abscess. ). Physical Exam  Vitals and nursing note reviewed. Constitutional:       Appearance: Normal appearance. HENT:      Head: Normocephalic and atraumatic. Nose:      Left Sinus: Maxillary sinus tenderness and frontal sinus tenderness present. Neck:      Comments: I do not appreciate any adenopathy but pt complains of left neck pain. Cardiovascular:      Rate and Rhythm: Normal rate and regular rhythm. Pulmonary:      Effort: Pulmonary effort is normal.      Breath sounds: Normal breath sounds. Musculoskeletal:      Cervical back: No rigidity. Right lower leg: No edema. Left lower leg: No edema. Skin:     General: Skin is warm and dry. Neurological:      General: No focal deficit present. Mental Status: She is alert and oriented to person, place, and time.    Psychiatric:         Mood and Affect:

## 2023-05-03 LAB
IRON SATN MFR SERPL: 21 % (ref 20–50)
IRON SERPL-MCNC: 84 UG/DL (ref 50–175)
TIBC SERPL-MCNC: 398 UG/DL (ref 250–450)

## 2023-05-11 RX ORDER — OMEPRAZOLE 40 MG/1
CAPSULE, DELAYED RELEASE ORAL
Qty: 90 CAPSULE | Refills: 3 | Status: SHIPPED | OUTPATIENT
Start: 2023-05-11

## 2023-05-19 ENCOUNTER — CARE COORDINATION (OUTPATIENT)
Facility: CLINIC | Age: 68
End: 2023-05-19

## 2023-05-19 NOTE — CARE COORDINATION
[facility] Care Transitions Initial Follow Up Call    Call within 2 business days of discharge: Yes    Patient Current Location:  [place] [address] Transition Nurse contacted the patient by telephone to perform post hospital discharge assessment. Verified name and  with patient as identifiers. Provided introduction to self, and explanation of the Care Transition Nurse role. Patient: Brenda Gupta Patient : 1955   MRN: [id number]      Reason for Admission:   Drug Overdose of undetermined intent. Discharge Date: 2023  RARS: No data recorded    Last Discharge 30 [facility]       None            Was this an external facility discharge? Yes, 2023    Discharge Facility: [facility]       Challenges to be reviewed by the provider   Per chart Review:   Patient was discharged from [facility] on 23. Discharge Diagnoses, per chart review, include:   Drug overdose    Overdose of undetermined intent, initial encounter    No additional discharge diagnoses are noted at this time   Please review Care Everywhere / electronic medical record for verification or additional information. Additional needs identified to be addressed with provider: Yes    Patient reports that she is getting better daily     Patient states that she has a question for PCP regarding BP medication. Patient declined review of medications at this time. Patient referred to PCP for follow up as needed    Referral placed to [facility] team for assistance with   Review of mediations as patient was discharged with over 20 medications per the most recent hospital discharge list of medications. Please review/verify as needed. Patient reports back pain. Patient reports that she has a history of rods in her neck and back. Patient reports an UTI and that urinary symptoms / urgency have improved.       Patient referred to PCP, alerted to the

## 2023-05-22 ENCOUNTER — TELEPHONE (OUTPATIENT)
Dept: PHARMACY | Facility: CLINIC | Age: 68
End: 2023-05-22

## 2023-05-22 DIAGNOSIS — G89.29 CHRONIC LOW BACK PAIN, UNSPECIFIED BACK PAIN LATERALITY, UNSPECIFIED WHETHER SCIATICA PRESENT: ICD-10-CM

## 2023-05-22 DIAGNOSIS — M54.50 CHRONIC LOW BACK PAIN, UNSPECIFIED BACK PAIN LATERALITY, UNSPECIFIED WHETHER SCIATICA PRESENT: ICD-10-CM

## 2023-05-22 DIAGNOSIS — F11.99 OPIOID USE, UNSPECIFIED WITH UNSPECIFIED OPIOID-INDUCED DISORDER (HCC): ICD-10-CM

## 2023-05-22 NOTE — TELEPHONE ENCOUNTER
----- Message from Modesta Medrano RN sent at 5/19/2023  3:12 PM EDT -----  Regarding: Referral  Hello,  I would like to refer this case to the outpatient pharmacy team that covers [facility]. Patient was discharged from an outside acute care facility. It is noted that patient had over 8 medications listed per discharge summary / after visit summary. Some  differences are noted between medications listed on the [facility] medication list and the list of discharge medications from [facility]. Patient advises that she is no longer taking some  medications. Care Transitions Nurse ( CTN) was unable to review medications with patient so far on this date as she reports that she would like to significant other to be home to assist with medication review and he is not currently available. Please review for PHI as needed or per your workflow. Please contact me with any questions.      Thank you,     [facility] Transitions Nurse  [phone]

## 2023-05-22 NOTE — TELEPHONE ENCOUNTER
Attempted to contact patient at the home number to schedule a Pharmacist Telephone Appointment to review her medications. Patient unavailable at the time of the call. Left a message on home TAD requesting a call back at [phone] option #2 to schedule this appointment.     Reyna Koch, [facility] [employer] [facility]   Department, toll free: [phone] Option #2

## 2023-05-23 NOTE — TELEPHONE ENCOUNTER
Patient returned call  Scheduled for 5/24/23 at 14[address] Only    Program: [address] in place:  No  Recommendation Provided To: Patient/Caregiver: 1 via Telephone  Intervention Detail: Scheduled Appointment  Intervention Accepted By: Patient/Caregiver: 1  Gap Closed?: Yes   Time Spent (min): 15

## 2023-05-23 NOTE — TELEPHONE ENCOUNTER
Second attempt to contact patient to schedule medication review with pharmacist. Left message for call back at [phone] option #2. [employer] message sent.        Talisha Leyva, [address]   Phone: [phone]       For Pharmacy Admin Tracking Only    Program: [address] in place:  No  Gap Closed?: No   Time Spent (min): 10

## 2023-05-24 ENCOUNTER — TELEPHONE (OUTPATIENT)
Dept: PHARMACY | Facility: CLINIC | Age: 68
End: 2023-05-24

## 2023-05-26 ENCOUNTER — OFFICE VISIT (OUTPATIENT)
Facility: CLINIC | Age: 68
End: 2023-05-26

## 2023-05-26 VITALS
OXYGEN SATURATION: 97 % | SYSTOLIC BLOOD PRESSURE: 140 MMHG | WEIGHT: 203 LBS | DIASTOLIC BLOOD PRESSURE: 86 MMHG | BODY MASS INDEX: 32.62 KG/M2 | RESPIRATION RATE: 17 BRPM | TEMPERATURE: 97.3 F | HEIGHT: 66 IN | HEART RATE: 96 BPM

## 2023-05-26 DIAGNOSIS — F25.9 SCHIZOAFFECTIVE DISORDER, UNSPECIFIED TYPE (HCC): ICD-10-CM

## 2023-05-26 DIAGNOSIS — E23.0 HYPOPITUITARISM (HCC): ICD-10-CM

## 2023-05-26 DIAGNOSIS — Z91.81 AT HIGH RISK FOR FALLS: ICD-10-CM

## 2023-05-26 DIAGNOSIS — Z00.00 MEDICARE ANNUAL WELLNESS VISIT, SUBSEQUENT: Primary | ICD-10-CM

## 2023-05-26 DIAGNOSIS — Z12.31 SCREENING MAMMOGRAM FOR HIGH-RISK PATIENT: ICD-10-CM

## 2023-05-26 DIAGNOSIS — I10 ESSENTIAL (PRIMARY) HYPERTENSION: ICD-10-CM

## 2023-05-26 DIAGNOSIS — F32.A DEPRESSION, UNSPECIFIED DEPRESSION TYPE: ICD-10-CM

## 2023-05-26 DIAGNOSIS — Z76.0 MEDICATION REFILL: ICD-10-CM

## 2023-05-26 RX ORDER — ALPRAZOLAM 1 MG/1
TABLET ORAL
Qty: 90 TABLET | Refills: 5 | Status: SHIPPED | OUTPATIENT
Start: 2023-05-26 | End: 2023-11-22

## 2023-05-26 RX ORDER — CLINDAMYCIN HYDROCHLORIDE 300 MG/1
1 CAPSULE ORAL 3 TIMES DAILY
Qty: 30 CAPSULE | Refills: 0 | COMMUNITY
Start: 2023-05-25 | End: 2023-06-04

## 2023-05-26 RX ORDER — OXYCODONE AND ACETAMINOPHEN 7.5; 325 MG/1; MG/1
TABLET ORAL
Qty: 120 TABLET | Refills: 0 | Status: SHIPPED | OUTPATIENT
Start: 2023-05-26 | End: 2023-06-25

## 2023-05-26 RX ORDER — METRONIDAZOLE 500 MG/1
1 TABLET ORAL 2 TIMES DAILY
Qty: 20 TABLET | Refills: 0 | COMMUNITY
Start: 2023-05-25 | End: 2023-06-04

## 2023-05-26 RX ORDER — VENLAFAXINE HYDROCHLORIDE 75 MG/1
CAPSULE, EXTENDED RELEASE ORAL
Qty: 90 CAPSULE | Refills: 5 | Status: SHIPPED | OUTPATIENT
Start: 2023-05-26

## 2023-05-26 SDOH — ECONOMIC STABILITY: INCOME INSECURITY: HOW HARD IS IT FOR YOU TO PAY FOR THE VERY BASICS LIKE FOOD, HOUSING, MEDICAL CARE, AND HEATING?: VERY HARD

## 2023-05-26 SDOH — ECONOMIC STABILITY: FOOD INSECURITY: WITHIN THE PAST 12 MONTHS, YOU WORRIED THAT YOUR FOOD WOULD RUN OUT BEFORE YOU GOT MONEY TO BUY MORE.: SOMETIMES TRUE

## 2023-05-26 SDOH — ECONOMIC STABILITY: FOOD INSECURITY: WITHIN THE PAST 12 MONTHS, THE FOOD YOU BOUGHT JUST DIDN'T LAST AND YOU DIDN'T HAVE MONEY TO GET MORE.: SOMETIMES TRUE

## 2023-05-26 ASSESSMENT — PATIENT HEALTH QUESTIONNAIRE - PHQ9
SUM OF ALL RESPONSES TO PHQ QUESTIONS 1-9: 14
5. POOR APPETITE OR OVEREATING: 3
SUM OF ALL RESPONSES TO PHQ QUESTIONS 1-9: 14
2. FEELING DOWN, DEPRESSED OR HOPELESS: 2
7. TROUBLE CONCENTRATING ON THINGS, SUCH AS READING THE NEWSPAPER OR WATCHING TELEVISION: 3
6. FEELING BAD ABOUT YOURSELF - OR THAT YOU ARE A FAILURE OR HAVE LET YOURSELF OR YOUR FAMILY DOWN: 0
3. TROUBLE FALLING OR STAYING ASLEEP: 3
4. FEELING TIRED OR HAVING LITTLE ENERGY: 3
9. THOUGHTS THAT YOU WOULD BE BETTER OFF DEAD, OR OF HURTING YOURSELF: 0
10. IF YOU CHECKED OFF ANY PROBLEMS, HOW DIFFICULT HAVE THESE PROBLEMS MADE IT FOR YOU TO DO YOUR WORK, TAKE CARE OF THINGS AT HOME, OR GET ALONG WITH OTHER PEOPLE: 2
1. LITTLE INTEREST OR PLEASURE IN DOING THINGS: 0
8. MOVING OR SPEAKING SO SLOWLY THAT OTHER PEOPLE COULD HAVE NOTICED. OR THE OPPOSITE, BEING SO FIGETY OR RESTLESS THAT YOU HAVE BEEN MOVING AROUND A LOT MORE THAN USUAL: 0
SUM OF ALL RESPONSES TO PHQ9 QUESTIONS 1 & 2: 2
SUM OF ALL RESPONSES TO PHQ QUESTIONS 1-9: 14
SUM OF ALL RESPONSES TO PHQ QUESTIONS 1-9: 14

## 2023-05-26 ASSESSMENT — LIFESTYLE VARIABLES
HOW MANY STANDARD DRINKS CONTAINING ALCOHOL DO YOU HAVE ON A TYPICAL DAY: PATIENT DOES NOT DRINK
HOW OFTEN DO YOU HAVE A DRINK CONTAINING ALCOHOL: NEVER

## 2023-05-26 ASSESSMENT — ENCOUNTER SYMPTOMS: SHORTNESS OF BREATH: 0

## 2023-05-26 NOTE — PATIENT INSTRUCTIONS
[id number]   Phone: [phone]   Website: [email or web address]/     [facility]   [address] [facility], [address] [facility], [address]   [phone]   Fax: [phone]   Website: [email or web address]     [facility]   [address]   [phone]   Website: [email or web address]   [address] [place] [address], [address]   [phone]   Fax: [phone]   Website: [email or web address]     [facility]   [address] [facility] [place] [facility], [address] [facility], [address]   [phone]   Fax: [phone]   Website: [email or web address]     [address] [facility]   [address] [facility], [address] [place] [address]   [phone]   Fax: [phone]   Website: None     Dr. Vinay Claros [address]   [place], [address] [place] [address]   [phone]   Fax: Website: None     [facility]   [address] [facility], [address] [facility]   [phone]   WEH:[phone]   Website: [email or web address]     [facility], [facility] [address]   [place], [address]   [phone]   Fax: Website: [email or web address]     [address] [place] [address] [address], [address] [facility], [address]   [phone]   Fax: [phone]   Website: [email or web address]. [facility] [facility] [address], [address] [place] [address]   [phone]   Fax: Website: [email or web address]     [employer] [facility]   [facility] [address]   [phone]   Fax: Website: [email or web address] [place], [address] [facility]   [phone]   Fax:    Website: [email or web address]     [employer],

## 2023-05-26 NOTE — PROGRESS NOTES
HISTORY OF PRESENT ILLNESS      Javier Dueñas is a 69 y.o. female. BP (!) 140/86 (Site: Left Upper Arm, Position: Sitting, Cuff Size: Large Adult)   Pulse 96   Temp 97.3 °F (36.3 °C) (Temporal)   Resp 17   Ht 5' 6\" (1.676 m)   Wt 203 lb (92.1 kg)   SpO2 97%   BMI 32.77 kg/m²         Finally go to the dentist. She has a dry socket and infection. She will be having all of them removed on the top and will be getting dentures on the top and partials on the bottom      Has lost a little weight (intentional). She was at the ER on 5/16/23 with an accidental overdose of her meds. She was given 4 narcan sprays and recovered without incident  At first she told me it was due to tramadol that was given her by the dentist interacting badly. She does not tolerate tramadol  But based on the time line she reported, the OD was AFTER she had stopped the tramadol. Hypertension  This is a chronic problem. The current episode started more than 1 year ago. Associated symptoms include headaches. Pertinent negatives include no chest pain or shortness of breath. Review of Systems   Constitutional: Negative. HENT:          Jaw and dental pain     Respiratory:  Negative for shortness of breath. Cardiovascular:  Negative for chest pain. Musculoskeletal:  Positive for arthralgias and myalgias. Neurological:  Positive for headaches. Psychiatric/Behavioral:  Positive for sleep disturbance. The patient is nervous/anxious. Physical Exam  Vitals and nursing note reviewed. Constitutional:       Appearance: Normal appearance. HENT:      Head: Normocephalic and atraumatic. Cardiovascular:      Rate and Rhythm: Normal rate and regular rhythm. Pulmonary:      Effort: Pulmonary effort is normal.      Breath sounds: Normal breath sounds. Musculoskeletal:      Right lower leg: Edema (trace) present. Left lower leg: Edema (trace) present. Skin:     General: Skin is warm and dry.    Neurological:
kg/m². (!) Abnormal  Obesity Interventions:  Patient declines any further evaluation or treatment            Vision Screen:  Do you have difficulty driving, watching TV, or doing any of your daily activities because of your eyesight?: (!) Yes  Have you had an eye exam within the past year?: (!) No  No results found. Interventions:   Patient encouraged to make appointment with their eye specialist    Safety:  Do you have either shower bars, grab bars, non-slip mats or non-slip surfaces in your shower or bathtub?: (!) No  Interventions:  See AVS for additional education material    ADL's:   Patient reports needing help with:  Select all that apply: (!) Walking/Balance  Select all that apply: [employer]ke[employer]  Interventions:  See AVS for additional education material    Advanced Directives:  Do you have a Living Will?: (!) No    Intervention:                         Objective   Vitals:    05/26/23 1116   BP: (!) 140/86   Site: Left Upper Arm   Position: Sitting   Cuff Size: Large Adult   Pulse: 96   Resp: 17   Temp: 97.3 °F (36.3 °C)   TempSrc: Temporal   SpO2: 97%   Weight: 203 lb (92.1 kg)   Height: 5' 6\" (1.676 m)      Body mass index is 32.77 kg/m². Allergies   Allergen Reactions    Atenolol      Other reaction(s): Unknown (comments)    Gluten Other (See Comments)     GI upset    Butorphanol Other (See Comments)     Other reaction(s): cardiac dysrhythmia  Increased HR  Made her have palpitations    Carisoprodol Other (See Comments)     Other reaction(s): other/intolerance  Constipaton  Constipaton    Morphine Rash     Other reaction(s): Unknown (comments)  Other reaction(s): mild rash/itching    Tramadol Other (See Comments)     Other reaction(s): unknown  Unsure- mixed with Stadol and is unsure which med she reacted to. Made her have palpitations     Prior to Visit Medications    Medication Sig Taking?  Authorizing Provider   clindamycin (CLEOCIN) 300 MG capsule Take 1 capsule by

## 2023-05-30 ENCOUNTER — TELEPHONE (OUTPATIENT)
Facility: CLINIC | Age: 68
End: 2023-05-30

## 2023-05-30 DIAGNOSIS — G89.4 CHRONIC PAIN SYNDROME: Primary | ICD-10-CM

## 2023-05-30 RX ORDER — HYDROCODONE BITARTRATE AND ACETAMINOPHEN 7.5; 325 MG/1; MG/1
1 TABLET ORAL EVERY 6 HOURS PRN
Qty: 8 TABLET | Refills: 0 | Status: SHIPPED | OUTPATIENT
Start: 2023-05-30 | End: 2023-06-02

## 2023-05-30 NOTE — TELEPHONE ENCOUNTER
[facility] there was a mixup when they filled Ms. Souza' medication. [facility] patient was given Norco 7.5/325 instead of Percocet. They reached out to the patient but they were not able to reach her until the next day. [facility] patient brought the medication back to the pharmacy but had already taken 8 pills. Patient turned the German Reggie in and was given the correct Percocet, reports no reaction to the [address] would like to know if you would be willing to write a Rx for the 8 Norco that were given out so they can balance their books. [facility] neither the patient nor her Ins will be charged.       [address] [place] [address] [phone]

## 2023-05-30 NOTE — TELEPHONE ENCOUNTER
Orders Placed This Encounter    HYDROcodone-acetaminophen (NORCO) 7.5-325 MG per tablet     Sig: Take 1 tablet by mouth every 6 hours as needed for Pain for up to 3 days. Intended supply: 3 days.  Take lowest dose possible to manage pain Max Daily Amount: 4 tablets     Dispense:  8 tablet     Refill:  0     Per your request

## 2023-06-01 ENCOUNTER — TELEPHONE (OUTPATIENT)
Facility: CLINIC | Age: 68
End: 2023-06-01
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
## 2023-06-02 ENCOUNTER — CARE COORDINATION (OUTPATIENT)
Facility: CLINIC | Age: 68
End: 2023-06-02

## 2023-06-02 NOTE — CARE COORDINATION
[facility] Care Transitions Follow Up Call    Patient Current Location:  [facility] Transition Nurse contacted the patient by telephone to follow up after admission on 23 . Verified name and  with patient as identifiers. Patient: Qiana Robins  Patient : 1955   MRN: [id number]      Discharge Date: 2023  RARS: No data recorded    Needs to be reviewed by the provider   Additional needs identified to be addressed with provider: No  none             Method of communication with provider: none. Patient reports:   - She is doing better   - She has moved to a different hotel/motel in [place]   - She has an APS worker who is trying to provide assistance with housing issues  - She is receptive to a referral to the MSW with [facility] for housing resources / other community resources as needed  - She attended PCP follow up appointment and medications were reviewed in depth. - She is having some mouth pain and headache from a previous dental surgery. - She will call in refills for medications, otherwise she has all medications. Follow Up  Future Appointments   Date Time Provider Jami Sosa   6/15/2023 11:15 AM Ad Martinez MD GMA BS AMB       Care Transition Nurse reviewed medical action plan with patient and discussed any barriers to care and/or understanding of plan of care after discharge. Discussed appropriate site of care based on symptoms and resources available to patient including: PCP  After hours contact number-medical provider office phone number   When to call 911. The patient agrees to contact the PCP office for questions related to their healthcare.      Patients top risk factors for readmission:   - Medical Condition   - Social Issues/Barriers - Housing    Interventions to address risk factors:   -Referral placed to MSW staff with [facility] Coordination Team.    - Patient referred to oral surgeon
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
## 2023-06-06 ENCOUNTER — CARE COORDINATION (OUTPATIENT)
Facility: CLINIC | Age: 68
End: 2023-06-06

## 2023-06-06 NOTE — CARE COORDINATION
Initial Contact Social Work Note - Ambulatory  6/6/2023      Date of referral: 06/02/2023  Referral received from: AIMEE Hartley RN  Reason for referral: Assist with any needs identified (housing)    Previous SW referral: No  If yes, brief summary of outcome: N/A    Two Identifiers Verified: Yes    Insurance Provider: [address] [place] [address],[address] Medicare,  for Life Supplemental     Support System:  Spouse/Partner     Status: Spouse of     Community Providers:  PCP    ADL Assistance Needed: N/A    Housing/Living Concerns or Home Modification Needs: Living in a motel in [facility] Concern: None    Medication Cost Concern: None     Medication Adherence Concern: None    Financial Concern(s): None    Income (only if applicable): None    Ability to Read/Write: Yes    Advance Care Plan:  Not on file; educated patient    Other: None    Identified Needs:  Patient seeking permanent housing    Social Work Plan:  Seek housing options other than what the patient already contacted. Seek available senior living apartments  Next Steps: Follow to assist as needed. Method of Communication With Provider (if appropriate): None    Mrs Anastasia Waterman is a 69year old female who currently resides in a hotel with her significant other in [place]. The patient has an estranged relationship with her children at this time. She is currently been followed by the  APS unit . MSW contacted the patient and introduced self, role and reason for call. Mrs. Souza is alert and oriented. The patient was fully engaged in conversation. Mrs Monie Dumont stated that she is on the section 8 listing for housing. She is on a list at some of the senior living apartment communities and shared that she has been checking in with them regularly on any availabilities. MSW asked if she have reached out to the [employer] that works in conjunction with the [employer].  The

## 2023-06-22 ENCOUNTER — CARE COORDINATION (OUTPATIENT)
Facility: CLINIC | Age: 68
End: 2023-06-22

## 2023-06-22 RX ORDER — CETIRIZINE HYDROCHLORIDE 10 MG/1
10 TABLET ORAL DAILY
Qty: 90 TABLET | Refills: 3 | Status: SHIPPED | OUTPATIENT
Start: 2023-06-22

## 2023-06-22 NOTE — CARE COORDINATION
[facility] Care Transitions Follow Up Call    Patient Current Location:  [facility] Transition Nurse contacted the patient by telephone to follow up after admission on 23 . Verified name and  with patient as identifiers. Patient: Jacques Morales : 1955   MRN: [id number]    Discharge Date: 20 RARS: No data recorded    Needs to be reviewed by the provider   Additional needs identified to be addressed with provider: No  none             Method of communication with provider: none. Patient reports:   - She needs additional dental surgery. - Dental insurance was exhausted and she needs funds for surgery   - Housing voucher is in progresss    Patient ended the conversation early as she had another call coming in. Follow Up  Future Appointments   Date Time Provider Jami Sosa   2023  3:30 PM MD CHIQUIS Tiwari AMB     Advance Care Planning:   Not on file per chart review. CTN was unable to review with patient as planned on this date. Interventions:   - CTN sent message to MSW with the [address] [place] [address] Team via confidential e-mail. CTN requested MSW to please contact patient if possible regarding dental resources. Offered patient enrollment in the Remote Patient Monitoring (RPM) program for in-home monitoring: NA.     Care Transitions Subsequent and Final Call    Subsequent and Final Calls  Care Transitions Interventions    Pharmacist: Completed    Other Interventions:           No further follow-up call indicated based on severity of symptoms and risk factors. Plan for next call:  n/a     No additional outreach planned at this time. Episode remains active for MSW follow up as needed. Referral to the [facility] placed via secure staff messaging.       Alexandra Moreno RN

## 2023-06-22 NOTE — TELEPHONE ENCOUNTER
Pt is requesting a 90 day refill of cetirizine (ZYRTEC) 10 MG tablet- Sig - Route:  Take 1 tablet by mouth daily       [address], [place] - [address] [phone] Nadine Beltran [phone]      Last OV 5/26/23 Next OV 6/26/23

## 2023-06-22 NOTE — CARE COORDINATION
Social Work Note  6/22/2023    Date of referral: 06/02/2023  Referral received from: AIMEE Schroeder RN  Reason for referral: Assist with any needs identified (housing)     Previous SW referral: No  If yes, brief summary of outcome: N/A     Two Identifiers Verified: Yes     Identified Needs:  Patient seeking permanent housing     Social Work Plan:  Seek housing options other than what the patient already contacted. Seek available senior living apartments  Next Steps: Follow to assist as needed. Mrs Ariana Benjamin is a 69year old female who currently resides in a hotel with her significant other in [place]. MSW contacted Mrs. Ariana Benjamin and introduced self, role and provided reason for call. Mrs. Souza is alert and oriented and engaged in conversation. She informed MSW that she has been having issues with her gum since her recent surgery earlier this year. She mentioned that the same surgery was performed twice and she has not had any relief. She shared that she conveyed this to her oral surgeon. The patient indicated that the insurance will not cover the charges for another surgery as the allocated funds has been exhausted. MSW provided the patient with contact information to [employer] for financial assistance. She is aware that this is not guaranteed and will be determined based upon review. Mrs. Souza voiced understanding. She reported that she turned in some additional documents today to her  at the [address] [place] [address]. The patient is optimistic about moving into to housing pretty soon. She had no other issues or concerns to report. MSW will follow to assist as needed.

## 2023-06-26 ENCOUNTER — OFFICE VISIT (OUTPATIENT)
Facility: CLINIC | Age: 68
End: 2023-06-26
Payer: MEDICARE

## 2023-06-26 ENCOUNTER — CARE COORDINATION (OUTPATIENT)
Facility: CLINIC | Age: 68
End: 2023-06-26

## 2023-06-26 ENCOUNTER — HOSPITAL ENCOUNTER (OUTPATIENT)
Facility: HOSPITAL | Age: 68
Setting detail: SPECIMEN
Discharge: HOME | End: 2023-06-29

## 2023-06-26 VITALS
BODY MASS INDEX: 33.43 KG/M2 | HEART RATE: 84 BPM | DIASTOLIC BLOOD PRESSURE: 78 MMHG | SYSTOLIC BLOOD PRESSURE: 129 MMHG | RESPIRATION RATE: 16 BRPM | HEIGHT: 66 IN | WEIGHT: 208 LBS | OXYGEN SATURATION: 98 %

## 2023-06-26 DIAGNOSIS — R07.89 CHEST WALL PAIN: ICD-10-CM

## 2023-06-26 DIAGNOSIS — M62.838 MUSCLE SPASM: ICD-10-CM

## 2023-06-26 DIAGNOSIS — R30.0 DYSURIA: ICD-10-CM

## 2023-06-26 DIAGNOSIS — G47.00 INSOMNIA, UNSPECIFIED TYPE: ICD-10-CM

## 2023-06-26 DIAGNOSIS — M54.50 CHRONIC MIDLINE LOW BACK PAIN, UNSPECIFIED WHETHER SCIATICA PRESENT: Primary | ICD-10-CM

## 2023-06-26 DIAGNOSIS — Z72.820 POOR SLEEP: ICD-10-CM

## 2023-06-26 DIAGNOSIS — G89.29 CHRONIC MIDLINE LOW BACK PAIN, UNSPECIFIED WHETHER SCIATICA PRESENT: Primary | ICD-10-CM

## 2023-06-26 LAB — LABCORP SPECIMEN COLLECTION: NORMAL

## 2023-06-26 PROCEDURE — 99214 OFFICE O/P EST MOD 30 MIN: CPT | Performed by: INTERNAL MEDICINE

## 2023-06-26 PROCEDURE — 3074F SYST BP LT 130 MM HG: CPT | Performed by: INTERNAL MEDICINE

## 2023-06-26 PROCEDURE — 3078F DIAST BP <80 MM HG: CPT | Performed by: INTERNAL MEDICINE

## 2023-06-26 PROCEDURE — 1123F ACP DISCUSS/DSCN MKR DOCD: CPT | Performed by: INTERNAL MEDICINE

## 2023-06-26 RX ORDER — BACLOFEN 10 MG/1
10 TABLET ORAL 3 TIMES DAILY PRN
Qty: 30 TABLET | Refills: 0 | Status: SHIPPED | OUTPATIENT
Start: 2023-06-26

## 2023-06-26 ASSESSMENT — ENCOUNTER SYMPTOMS
BACK PAIN: 1
SHORTNESS OF BREATH: 0

## 2023-06-27 LAB
APPEARANCE UR: CLEAR
BACTERIA #/AREA URNS HPF: ABNORMAL /[HPF]
BILIRUB UR QL STRIP: NEGATIVE
CASTS URNS MICRO: ABNORMAL
CASTS URNS QL MICRO: PRESENT /LPF
COLOR UR: YELLOW
CRYSTALS URNS MICRO: ABNORMAL
EPI CELLS #/AREA URNS HPF: ABNORMAL /HPF (ref 0–10)
GLUCOSE UR QL STRIP: NEGATIVE
HGB UR QL STRIP: NEGATIVE
KETONES UR QL STRIP: NEGATIVE
LEUKOCYTE ESTERASE UR QL STRIP: ABNORMAL
MICRO URNS: ABNORMAL
NITRITE UR QL STRIP: NEGATIVE
PH UR STRIP: 7 [PH] (ref 5–7.5)
PROT UR QL STRIP: NEGATIVE
RBC #/AREA URNS HPF: ABNORMAL /HPF (ref 0–2)
SP GR UR STRIP: 1.02 (ref 1–1.03)
UNIDENT CRYS URNS QL MICRO: PRESENT
UROBILINOGEN UR STRIP-MCNC: 1 MG/DL (ref 0.2–1)
WBC #/AREA URNS HPF: ABNORMAL /HPF (ref 0–5)

## 2023-06-28 DIAGNOSIS — G89.29 CHRONIC LOW BACK PAIN, UNSPECIFIED BACK PAIN LATERALITY, UNSPECIFIED WHETHER SCIATICA PRESENT: ICD-10-CM

## 2023-06-28 DIAGNOSIS — M54.50 CHRONIC LOW BACK PAIN, UNSPECIFIED BACK PAIN LATERALITY, UNSPECIFIED WHETHER SCIATICA PRESENT: ICD-10-CM

## 2023-06-28 DIAGNOSIS — F11.99 OPIOID USE, UNSPECIFIED WITH UNSPECIFIED OPIOID-INDUCED DISORDER (HCC): ICD-10-CM

## 2023-06-29 LAB — BACTERIA UR CULT: NORMAL

## 2023-06-30 DIAGNOSIS — M54.50 CHRONIC LOW BACK PAIN, UNSPECIFIED BACK PAIN LATERALITY, UNSPECIFIED WHETHER SCIATICA PRESENT: ICD-10-CM

## 2023-06-30 DIAGNOSIS — F11.99 OPIOID USE, UNSPECIFIED WITH UNSPECIFIED OPIOID-INDUCED DISORDER (HCC): Primary | ICD-10-CM

## 2023-06-30 DIAGNOSIS — G89.29 CHRONIC LOW BACK PAIN, UNSPECIFIED BACK PAIN LATERALITY, UNSPECIFIED WHETHER SCIATICA PRESENT: ICD-10-CM

## 2023-06-30 RX ORDER — OXYCODONE AND ACETAMINOPHEN 7.5; 325 MG/1; MG/1
1 TABLET ORAL EVERY 4 HOURS PRN
COMMUNITY
Start: 2021-11-08 | End: 2023-06-30 | Stop reason: SDUPTHER

## 2023-06-30 RX ORDER — OXYCODONE AND ACETAMINOPHEN 7.5; 325 MG/1; MG/1
TABLET ORAL
Qty: 120 TABLET | OUTPATIENT
Start: 2023-06-30 | End: 2023-07-30

## 2023-06-30 RX ORDER — OXYCODONE AND ACETAMINOPHEN 7.5; 325 MG/1; MG/1
1 TABLET ORAL EVERY 4 HOURS PRN
Qty: 120 TABLET | Refills: 0 | Status: SHIPPED | OUTPATIENT
Start: 2023-06-30 | End: 2023-07-30

## 2023-07-03 ENCOUNTER — CARE COORDINATION (OUTPATIENT)
Facility: CLINIC | Age: 68
End: 2023-07-03

## 2023-07-03 NOTE — CARE COORDINATION
Ambulatory Care Coordination Note  7/3/2023    Patient Current Location:  Home: [address] [place] [address] [id number]  [id number]  [address] [address]     ACM contacted the patient by telephone. Verified name and  with patient as identifiers. Provided introduction to self, and explanation of the ACM role. Challenges to be reviewed by the provider   Additional needs identified to be addressed with provider: No  none               Method of communication with provider: none. ACM: Edward Garcia RN    Patient stated that she is doing well. She has all meds and is taking as directed. She is eating and drinking well. She is moving about and resting as needed. Offered patient enrollment in the Remote Patient Monitoring (RPM) program for in-home monitoring: NA.      Care Coordination Interventions    Referral from Primary Care Provider: No  Suggested Interventions and Community Resources          Goals Addressed                   This Visit's Progress     Attend follow up appointments on schedule        Patient will attend all scheduled appointments       - missed Urology appt , despite being reminded day before. - Appt with PCP on 23    Patient still encouraged to attend all physician appts       Knowledge and adherence of prescribed medication (ie. action, side effects, missed dose, etc.). Pt will take all medications prescribed to be evaluated on each call     - patient has most of her medications but does state there are a few that she runs out of sometimes and doesn't get refilled right away due to cost.     - Patient stated that she has all meds and continues to take as scheduled.                  Future Appointments   Date Time Provider [address] [place] [address]   2023  3:30 PM Salome Walters MD [facility] BS AMB

## 2023-07-05 ENCOUNTER — TELEPHONE (OUTPATIENT)
Facility: CLINIC | Age: 68
End: 2023-07-05

## 2023-07-05 ENCOUNTER — CARE COORDINATION (OUTPATIENT)
Facility: CLINIC | Age: 68
End: 2023-07-05

## 2023-07-05 NOTE — CARE COORDINATION
Social Work Note  7/5/2023    Date of referral: 06/02/2023  Referral received from: AIMEE Robins RN  Reason for referral: Assist with any needs identified (housing)     Previous SW referral: No  If yes, brief summary of outcome: N/A    Attempt made to reach Ms Souza by telephone. Left message with information yo return call. MSW will follow to assist as needed.

## 2023-07-05 NOTE — TELEPHONE ENCOUNTER
Patient is requesting refill on Oxycodone 7.5 mg tablets    Future Appointments   Date Time Provider [address]   7/26/2023  3:30 PM Yeimi Hooper MD [facility] AMB

## 2023-07-06 NOTE — TELEPHONE ENCOUNTER
Called pt and left message. Call back number left and I myself or one of the other nurses will attempt to contact again.     The call was to inform pt the PCP is not currently in the office and to inquire the specific rationale for this medication request.

## 2023-07-10 NOTE — TELEPHONE ENCOUNTER
Called pt and unable to leave message. The call was to follow up re: medication refill request for Hydrocodone; med was sent on 6/30/23.

## 2023-07-11 ENCOUNTER — CARE COORDINATION (OUTPATIENT)
Facility: CLINIC | Age: 68
End: 2023-07-11

## 2023-07-11 NOTE — CARE COORDINATION
7/11/2023       10:21 AM    ACM attempted to call patient for routine follow up and follow up from her admission over the weekend. First number stated that subscriber could not accept calls at present time. Second number called stated that phone was not in service at present time and third number that was called stated that number is not in service or has been disconnected. Unable to leave a message for patient. Will attempt to call at a later time.

## 2023-07-11 NOTE — CARE COORDINATION
Social Work Note  7/10/2023    Date of referral: 06/02/2023  Referral received from: PERRY- Bernard Farooq RN  Reason for referral: Assist with any needs identified (housing)     Previous SW referral: No  If yes, brief summary of outcome: N/A     Two Identifiers Verified: Yes     Identified Needs:  Patient seeking permanent housing     Social Work Plan:  Seek housing options other than what the patient already contacted. Seek available senior living apartments  Next Steps: Follow to assist as needed. Mrs Dusty Muhammad is a 69year old female who currently resides in a hotel with her significant other in [place]. Call received from Mrs Renetta Morse. She informed MSW that she was hospitalized at [facility] from 7/7/2023 and was recently discharged with home health. She mentioned that she is waiting for a phone call from the home health agency. Ms. Renetta Morse reported that her  has her section 8 voucher and she plans to take her to apartment complex that may be able to offer her a ground unit. The patient is very optimistic and excited. The patient shared that she will contact her PCP to inform of her hospitalization. Ms Renetta Morse had no other issues or concerns to report. MSW will follow to assist as needed.

## 2023-07-12 NOTE — TELEPHONE ENCOUNTER
Received correspondence the pt was admitted  and recently discharged . PCP made aware. Encounter closed.

## 2023-07-14 ENCOUNTER — CARE COORDINATION (OUTPATIENT)
Facility: CLINIC | Age: 68
End: 2023-07-14

## 2023-07-14 NOTE — CARE COORDINATION
Ambulatory Care Coordination Note  2023    Patient Current Location:  [place]     ACM contacted the patient by telephone. Verified name and  with patient as identifiers. Provided introduction to self, and explanation of the ACM role. Challenges to be reviewed by the provider   Additional needs identified to be addressed with provider: No  none               Method of communication with provider: staff message. ACM: Tang Head RN    ACM called patient to check on her as I didn't get her the other day and she was in hospital. She stated that she was in for panic as she was locked in an elevator for several hours. She stated that she is not doing well. She was told not to take meds- I encouraged her to take ones she was on before admission. She stated that she is moving about with walker. She has a meeting with  to get section 8 housing. Patient is eating when she can. She stated that her partner is not in the picture any longer. Will follow. Offered patient enrollment in the Remote Patient Monitoring (RPM) program for in-home monitoring: NA.      Care Coordination Interventions    Referral from Primary Care Provider: No  Suggested Interventions and Community Resources          Goals Addressed                   This Visit's Progress     Attend follow up appointments on schedule        Patient will attend all scheduled appointments       - missed Urology appt , despite being reminded day before. - Appt with PCP on 23    Patient still encouraged to attend all physician appts    Patient has f/u with PCP 2023       Knowledge and adherence of prescribed medication (ie. action, side effects, missed dose, etc.).         Pt will take all medications prescribed to be evaluated on each call     - patient has most of her medications but does state there are a few that she runs out of sometimes and doesn't get refilled right away due to cost.     - Patient stated that she has all

## 2023-07-18 ENCOUNTER — TELEPHONE (OUTPATIENT)
Facility: CLINIC | Age: 68
End: 2023-07-18

## 2023-07-18 ENCOUNTER — CARE COORDINATION (OUTPATIENT)
Facility: CLINIC | Age: 68
End: 2023-07-18

## 2023-07-18 NOTE — CARE COORDINATION
Social Work Note  7/18/2023       Date of referral: 06/02/2023  Referral received from: AIMEE Tomlinson RN  Reason for referral: Assist with any needs identified (housing)     Previous SW referral: No  If yes, brief summary of outcome: N/A     Two Identifiers Verified: Yes     Identified Needs:  Patient seeking permanent housing     Social Work Plan:  Seek housing options other than what the patient already contacted. Seek available senior living apartments  Next Steps: Follow to assist as needed. Mrs Lorrie Laureano is a 69year old female who currently resides in a hotel with her significant other in [place]. MSW received phone calls from the patient who informed that she is scheduled to  her housing voucher on Thursday (7/20/2023). The patient is in good spirits. She mentioned that she will be trying to find apartment complexes willing to accept the voucher. The patient is very optimistic about her search and her future. Mrs. oSuza had no issues or concerns to report during this call. MSW will follow to assist as needed.

## 2023-07-18 NOTE — TELEPHONE ENCOUNTER
Patient wanted to let you know why she cancelled her appt on 7/20 for her hospital F/U. States she has an appt with the housing people at the same time and she has waited months for this appt. Still has her appt on 7/26 asn will keep that one.

## 2023-07-18 NOTE — CARE COORDINATION
Ambulatory Care Coordination Note  2023    Patient Current Location:  [place]     ACM contacted the patient by telephone. Verified name and  with patient as identifiers. Provided introduction to self, and explanation of the ACM role. Challenges to be reviewed by the provider   Additional needs identified to be addressed with provider: No  none               Method of communication with provider: staff message. ACM: Anastasiya Bustamante RN    Patient stated that she is doing. . She has appt with Dr Argelia Osei on 2023- reminded of that. Patient stated that she has all meds. Not sure if she is taking as she should as they told her in hospital not to take until she saw PCP. Patient stated that she needed to go quickly. Will follow up at a later time. Offered patient enrollment in the Remote Patient Monitoring (RPM) program for in-home monitoring: NA.      Care Coordination Interventions    Referral from Primary Care Provider: No  Suggested Interventions and Community Resources          Goals Addressed                   This Visit's Progress     Attend follow up appointments on schedule        Patient will attend all scheduled appointments       - missed Urology appt , despite being reminded day before. - Appt with PCP on 23    Patient still encouraged to attend all physician appts    Patient has f/u with PCP 2023- reminded patient of appt       Knowledge and adherence of prescribed medication (ie. action, side effects, missed dose, etc.). Pt will take all medications prescribed to be evaluated on each call     - patient has most of her medications but does state there are a few that she runs out of sometimes and doesn't get refilled right away due to cost.     - Patient stated that she has all meds and continues to take as scheduled. Patient stated that she has meds.                 Future Appointments   Date Time Provider [address]   2023  3:00 PM Geraldine

## 2023-07-19 ENCOUNTER — TELEPHONE (OUTPATIENT)
Facility: CLINIC | Age: 68
End: 2023-07-19

## 2023-07-19 NOTE — TELEPHONE ENCOUNTER
Called pt and left message. Call back number left . The call was to inform pt of the PCP's note re: letter received.

## 2023-07-25 ENCOUNTER — CARE COORDINATION (OUTPATIENT)
Facility: CLINIC | Age: 68
End: 2023-07-25

## 2023-07-25 NOTE — CARE COORDINATION
Social Work Note  7/25/2023    Date of referral: 06/02/2023  Referral received from: AIMEE Damon Res RN  Reason for referral: Assist with any needs identified (housing)     Previous SW referral: No  If yes, brief summary of outcome: N/A     Two Identifiers Verified: Yes     Identified Needs:  Patient seeking permanent housing     Social Work Plan:  Seek housing options other than what the patient already contacted. Seek available senior living apartments  Next Steps: Follow to assist as needed. Mrs Gertrude Johnson is a 69year old female who currently resides in a hotel with her significant other in [place]. Ms. Rafaela Mccall returned phone call. She is alert and oriented. She mentioned that she has been driving around searching for available housing. She indicated that she is tired. The patient asked for confirmation on her upcoming doctor's appointment. Information was provided to her. The patient received housing voucher from the [address] [place] [address] and is seeking housing willing to accept voucher. Ms. Rafaela Mccall had no issues or concerns to report at this time. There are no other needs identified. No further follow up is required.

## 2023-07-25 NOTE — CARE COORDINATION
Social Work Note  7/25/2023    Date of referral: 06/02/2023  Referral received from: AIMEE Downs RN  Reason for referral: Assist with any needs identified (housing)     Previous SW referral: No  If yes, brief summary of outcome: N/A     Attempt made to reach the patient by telephone. Left message with information to return call. During last conversation, the patient reported that she was given the section 8 voucher for affordable housing. She is responsible for finding housing willing to accept voucher. There are no other needs identified. No further follow up will be required.

## 2023-07-26 ENCOUNTER — OFFICE VISIT (OUTPATIENT)
Facility: CLINIC | Age: 68
End: 2023-07-26
Payer: MEDICARE

## 2023-07-26 ENCOUNTER — HOSPITAL ENCOUNTER (OUTPATIENT)
Facility: HOSPITAL | Age: 68
Setting detail: SPECIMEN
Discharge: HOME | End: 2023-07-29

## 2023-07-26 VITALS
HEART RATE: 93 BPM | OXYGEN SATURATION: 97 % | BODY MASS INDEX: 34.72 KG/M2 | WEIGHT: 216 LBS | TEMPERATURE: 98.4 F | RESPIRATION RATE: 15 BRPM | SYSTOLIC BLOOD PRESSURE: 132 MMHG | DIASTOLIC BLOOD PRESSURE: 80 MMHG | HEIGHT: 66 IN

## 2023-07-26 DIAGNOSIS — R30.0 DYSURIA: ICD-10-CM

## 2023-07-26 DIAGNOSIS — M25.561 CHRONIC PAIN OF RIGHT KNEE: ICD-10-CM

## 2023-07-26 DIAGNOSIS — G89.29 CHRONIC PAIN OF RIGHT KNEE: ICD-10-CM

## 2023-07-26 DIAGNOSIS — F11.99 OPIOID USE, UNSPECIFIED WITH UNSPECIFIED OPIOID-INDUCED DISORDER (HCC): ICD-10-CM

## 2023-07-26 DIAGNOSIS — M54.50 CHRONIC LOW BACK PAIN, UNSPECIFIED BACK PAIN LATERALITY, UNSPECIFIED WHETHER SCIATICA PRESENT: ICD-10-CM

## 2023-07-26 DIAGNOSIS — Z79.899 MEDICATION MANAGEMENT: ICD-10-CM

## 2023-07-26 DIAGNOSIS — Z09 HOSPITAL DISCHARGE FOLLOW-UP: Primary | ICD-10-CM

## 2023-07-26 DIAGNOSIS — G89.29 CHRONIC LOW BACK PAIN, UNSPECIFIED BACK PAIN LATERALITY, UNSPECIFIED WHETHER SCIATICA PRESENT: ICD-10-CM

## 2023-07-26 LAB — LABCORP SPECIMEN COLLECTION: NORMAL

## 2023-07-26 PROCEDURE — 3079F DIAST BP 80-89 MM HG: CPT | Performed by: INTERNAL MEDICINE

## 2023-07-26 PROCEDURE — 99214 OFFICE O/P EST MOD 30 MIN: CPT | Performed by: INTERNAL MEDICINE

## 2023-07-26 PROCEDURE — 1123F ACP DISCUSS/DSCN MKR DOCD: CPT | Performed by: INTERNAL MEDICINE

## 2023-07-26 PROCEDURE — 3075F SYST BP GE 130 - 139MM HG: CPT | Performed by: INTERNAL MEDICINE

## 2023-07-26 RX ORDER — ESTRADIOL 0.1 MG/G
CREAM VAGINAL
Qty: 42.5 G | Refills: 3 | Status: SHIPPED | OUTPATIENT
Start: 2023-07-26

## 2023-07-26 RX ORDER — OXYCODONE AND ACETAMINOPHEN 7.5; 325 MG/1; MG/1
1 TABLET ORAL EVERY 4 HOURS PRN
Qty: 120 TABLET | Refills: 0 | Status: SHIPPED | OUTPATIENT
Start: 2023-07-31 | End: 2023-08-30

## 2023-07-26 ASSESSMENT — ENCOUNTER SYMPTOMS: BACK PAIN: 1

## 2023-07-26 NOTE — PROGRESS NOTES
1. \"Have you been to the ER, urgent care clinic since your last visit? Hospitalized since your last visit? \" [facility] being locked in the elevator    2. \"Have you seen or consulted any other health care providers outside of the [address] [place] [address] since your last visit? \" No     3. For patients aged 43-73: Has the patient had a colonoscopy / FIT/ Cologuard? Yes - no Care Gap present      If the patient is female:    4. For patients aged 43-66: Has the patient had a mammogram within the past 2 years? Yes - Care Gap present. Most recent result on file      5. For patients aged 21-65: Has the patient had a pap smear?  NA - based on age or sex

## 2023-07-27 LAB
APPEARANCE UR: CLEAR
BACTERIA #/AREA URNS HPF: NORMAL /[HPF]
BILIRUB UR QL STRIP: NEGATIVE
CASTS URNS QL MICRO: NORMAL /LPF
COLOR UR: YELLOW
EPI CELLS #/AREA URNS HPF: NORMAL /HPF (ref 0–10)
GLUCOSE UR QL STRIP: NEGATIVE
HGB UR QL STRIP: NEGATIVE
KETONES UR QL STRIP: NEGATIVE
LEUKOCYTE ESTERASE UR QL STRIP: ABNORMAL
MICRO URNS: ABNORMAL
NITRITE UR QL STRIP: NEGATIVE
PH UR STRIP: 6.5 [PH] (ref 5–7.5)
PROT UR QL STRIP: NEGATIVE
RBC #/AREA URNS HPF: NORMAL /HPF (ref 0–2)
SP GR UR STRIP: 1.01 (ref 1–1.03)
UROBILINOGEN UR STRIP-MCNC: 0.2 MG/DL (ref 0.2–1)
WBC #/AREA URNS HPF: NORMAL /HPF (ref 0–5)

## 2023-07-28 LAB — BACTERIA UR CULT: NO GROWTH

## 2023-07-30 DIAGNOSIS — Z76.0 MEDICATION REFILL: ICD-10-CM

## 2023-07-30 RX ORDER — VENLAFAXINE HYDROCHLORIDE 75 MG/1
CAPSULE, EXTENDED RELEASE ORAL
Qty: 270 CAPSULE | Refills: 3 | Status: SHIPPED | OUTPATIENT
Start: 2023-07-30

## 2023-08-01 ENCOUNTER — CARE COORDINATION (OUTPATIENT)
Facility: CLINIC | Age: 68
End: 2023-08-01

## 2023-08-01 NOTE — CARE COORDINATION
stated that she has meds.  No concerns with meds                 Future Appointments   Date Time Provider [address]   8/28/2023  3:30 PM Reilly Beebe MD [facility] BS AMB

## 2023-08-15 ENCOUNTER — CARE COORDINATION (OUTPATIENT)
Facility: CLINIC | Age: 68
End: 2023-08-15

## 2023-08-15 NOTE — CARE COORDINATION
Ambulatory Care Coordination Note  8/15/2023    Patient Current Location:  [place]     ACM contacted the patient by telephone. Verified name and  with patient as identifiers. Provided introduction to self, and explanation of the ACM role. Challenges to be reviewed by the provider   Additional needs identified to be addressed with provider: No  none               Method of communication with provider: staff message. ACM: Tej Carrasco RN    Patient is doing well today. No complaints. She stated that she is walking each day without difficulty. She stated that she has all her meds and is taking as prescribed. No care needs noted at present . Patient wanted to get off phone quickly. Offered patient enrollment in the Remote Patient Monitoring (RPM) program for in-home monitoring: NA.      Care Coordination Interventions    Referral from Primary Care Provider: No  Suggested Interventions and Community Resources          Goals Addressed                   This Visit's Progress     Attend follow up appointments on schedule        Patient will attend all scheduled appointments       - missed Urology appt , despite being reminded day before. - Appt with PCP on 23    Patient still encouraged to attend all physician appts    Patient has f/u with PCP 2023- reminded patient of appt ( mised appt)    Patient attended appt with PCP 2023    Patient reminded of appt wit PCP 2023         Knowledge and adherence of prescribed medication (ie. action, side effects, missed dose, etc.). Pt will take all medications prescribed to be evaluated on each call     - patient has most of her medications but does state there are a few that she runs out of sometimes and doesn't get refilled right away due to cost.     - Patient stated that she has all meds and continues to take as scheduled. Patient stated that she has meds.  No concerns with meds     No isues with meds and has them all

## 2023-08-29 ENCOUNTER — CARE COORDINATION (OUTPATIENT)
Facility: CLINIC | Age: 68
End: 2023-08-29

## 2023-08-29 SDOH — ECONOMIC STABILITY: TRANSPORTATION INSECURITY
IN THE PAST 12 MONTHS, HAS THE LACK OF TRANSPORTATION KEPT YOU FROM MEDICAL APPOINTMENTS OR FROM GETTING MEDICATIONS?: NO

## 2023-08-29 NOTE — CARE COORDINATION
8/29/2023         2:15 PM    ACM reviewed patient chart and then attempted to call for routine follow up. ACM called several numbers on patient chart. Left messages introducing myself, explaining the reason for my call and providing my contact information. Requested that patient return my call at her earliest convenience. Will follow.

## 2023-09-12 ENCOUNTER — CARE COORDINATION (OUTPATIENT)
Facility: CLINIC | Age: 68
End: 2023-09-12

## 2023-09-12 NOTE — CARE COORDINATION
9/12/2023          4:19 PM    ACM attempted to call patient for routine follow up. She was unavailable at the time of the call. Left a voice mail message for patient introducing myself, explaining the reason for my call and providing my contact information. Requested that patient return my call at her earliest convenience. Will follow.

## 2023-09-14 ENCOUNTER — TELEPHONE (OUTPATIENT)
Facility: CLINIC | Age: 68
End: 2023-09-14

## 2023-09-14 DIAGNOSIS — M54.50 CHRONIC LOW BACK PAIN, UNSPECIFIED BACK PAIN LATERALITY, UNSPECIFIED WHETHER SCIATICA PRESENT: ICD-10-CM

## 2023-09-14 DIAGNOSIS — F11.99 OPIOID USE, UNSPECIFIED WITH UNSPECIFIED OPIOID-INDUCED DISORDER (HCC): ICD-10-CM

## 2023-09-14 DIAGNOSIS — M25.561 CHRONIC PAIN OF RIGHT KNEE: ICD-10-CM

## 2023-09-14 DIAGNOSIS — G89.29 CHRONIC LOW BACK PAIN, UNSPECIFIED BACK PAIN LATERALITY, UNSPECIFIED WHETHER SCIATICA PRESENT: ICD-10-CM

## 2023-09-14 DIAGNOSIS — G89.29 CHRONIC PAIN OF RIGHT KNEE: ICD-10-CM

## 2023-09-14 NOTE — TELEPHONE ENCOUNTER
Called pt and unable to leave message, telephone #s are disconnected. The call was to inform pt the message has been forwarded for review. Staff states the pt is aware of the current insurance, [address]. Staff states the pt is aware and plans to proceed  the next OV. PCP made aware. Letter sent.

## 2023-09-14 NOTE — TELEPHONE ENCOUNTER
Pt requesting med refill. Last ov 7/26/23. Had appt. Today 9/14/23, due to traffic she would not have made it in time and rescheduled to 10/9/23. ALPRAZolam (XANAX) 1 MG tablet [[id number]]     Order Details  Dose, Route, Frequency: As Directed   Dispense Quantity: 90 tablet Refills: 5          Sig: TAKE 1 TABLET BY MOUTH 3 TIMES A DAY IF NEEDED FOR ANXIETY. MAXIMUM DAILY DOSE OF 3 TABLETS         oxyCODONE-acetaminophen (PERCOCET) 7.5-325 MG per tablet [[id number]]  ENDED    Order Details  Dose: 1 tablet Route: Oral Frequency: EVERY 4 HOURS PRN for Pain   Dispense Quantity: 120 tablet Refills: 0    Note to Pharmacy: Reduce doses taken as pain becomes manageable         Sig: Take 1 tablet by mouth every 4 hours as needed for Pain for up to 30 days.  Max Daily Amount: 6 tablets

## 2023-09-15 RX ORDER — OXYCODONE AND ACETAMINOPHEN 7.5; 325 MG/1; MG/1
1 TABLET ORAL EVERY 4 HOURS PRN
Qty: 110 TABLET | Refills: 0 | Status: SHIPPED | OUTPATIENT
Start: 2023-09-15 | End: 2023-10-15

## 2023-09-15 NOTE — TELEPHONE ENCOUNTER
She should still have active refills on her xanax    I will refill the percocet but I am not giving her as many pills. She will have to make them last the full month. We are going to taper her down and off these this year.

## 2023-09-21 RX ORDER — DICLOFENAC SODIUM 75 MG/1
75 TABLET, DELAYED RELEASE ORAL 2 TIMES DAILY
Qty: 180 TABLET | Refills: 3 | Status: SHIPPED | OUTPATIENT
Start: 2023-09-21

## 2023-09-26 ENCOUNTER — CARE COORDINATION (OUTPATIENT)
Facility: CLINIC | Age: 68
End: 2023-09-26

## 2023-09-26 NOTE — CARE COORDINATION
Ambulatory Care Coordination Note  2023    Patient Current Location:  [place]     ACM contacted the patient by telephone. Verified name and  with patient as identifiers. Provided introduction to self, and explanation of the ACM role. Challenges to be reviewed by the provider   Additional needs identified to be addressed with provider: No  none               Method of communication with provider: staff message. ACM: Edward Garcia RN    Patient stated that she is doing well at present time. She is walking with her walker daily. She stated that she is eating and drinking ok. She is staying with boyfriend. No care needs noted at present. Offered patient enrollment in the Remote Patient Monitoring (RPM) program for in-home monitoring: NA.         Goals Addressed                   This Visit's Progress     Attend follow up appointments on schedule        Patient will attend all scheduled appointments       - missed Urology appt , despite being reminded day before. - Appt with PCP on 23    Patient still encouraged to attend all physician appts    Patient has f/u with PCP 2023- reminded patient of appt ( mised appt)    Patient attended appt with PCP 2023    Patient reminded of appt wit PCP 2023    Patient missed appt with PCP due to traffic. Rescheduled for 10/9/2023. Knowledge and adherence of prescribed medication (ie. action, side effects, missed dose, etc.). Pt will take all medications prescribed to be evaluated on each call     - patient has most of her medications but does state there are a few that she runs out of sometimes and doesn't get refilled right away due to cost.     - Patient stated that she has all meds and continues to take as scheduled. Patient stated that she has meds. No concerns with meds     No isues with meds and has them all    Patient has all meds and is taking as directed.                  Future Appointments   Date Time Provider

## 2023-10-02 LAB — MAMMOGRAPHY, EXTERNAL: NORMAL

## 2023-10-10 ENCOUNTER — CARE COORDINATION (OUTPATIENT)
Facility: CLINIC | Age: 68
End: 2023-10-10

## 2023-10-10 NOTE — CARE COORDINATION
10/10/2023        3:38 PM    ACM reviewed patient chart and then attempted to call patient for routine follow up. She was unavailable at the time of the call. Left a voicemail for her introducing myself, explaining the reason for my call and providing my contact information. Requested that patient return my call at her earliest convenience. Will follow.

## 2023-10-24 ENCOUNTER — CARE COORDINATION (OUTPATIENT)
Facility: CLINIC | Age: 68
End: 2023-10-24

## 2023-10-24 NOTE — CARE COORDINATION
10/24/2023         4:06 PM      ACM closing episode at this time. ACP has been reviewed and information sent to patient as needed. Use of MyChart has been discussed with patient/family understanding its use. Med rec has been completed and up to date at time of closing episode. Importance of taking all medications as prescribed and on time, maintaining an adequate supply of medication, and how to obtain refills. Goals are updated and met to the best of patient's ability. Importance of keeping all scheduled appointments and how to make those appointments discussed with patient/family understanding. Review of symptoms and disease process discussed as needed, with patient/family showing understanding.   No further ACM contacts scheduled  Patient/family has ACM contact information for any further questions, concerns or needs

## 2023-10-30 ENCOUNTER — TELEPHONE (OUTPATIENT)
Facility: CLINIC | Age: 68
End: 2023-10-30

## 2023-10-31 ENCOUNTER — HOSPITAL ENCOUNTER (OUTPATIENT)
Facility: HOSPITAL | Age: 68
Setting detail: SPECIMEN
Discharge: HOME | End: 2023-11-03

## 2023-10-31 ENCOUNTER — OFFICE VISIT (OUTPATIENT)
Facility: CLINIC | Age: 68
End: 2023-10-31
Payer: MEDICARE

## 2023-10-31 VITALS
HEIGHT: 66 IN | HEART RATE: 75 BPM | SYSTOLIC BLOOD PRESSURE: 131 MMHG | DIASTOLIC BLOOD PRESSURE: 83 MMHG | RESPIRATION RATE: 18 BRPM | TEMPERATURE: 96.8 F | WEIGHT: 202 LBS | OXYGEN SATURATION: 95 % | BODY MASS INDEX: 32.47 KG/M2

## 2023-10-31 DIAGNOSIS — M54.50 CHRONIC MIDLINE LOW BACK PAIN, UNSPECIFIED WHETHER SCIATICA PRESENT: ICD-10-CM

## 2023-10-31 DIAGNOSIS — F11.99 OPIOID USE, UNSPECIFIED WITH UNSPECIFIED OPIOID-INDUCED DISORDER (HCC): ICD-10-CM

## 2023-10-31 DIAGNOSIS — G89.29 CHRONIC MIDLINE LOW BACK PAIN, UNSPECIFIED WHETHER SCIATICA PRESENT: ICD-10-CM

## 2023-10-31 DIAGNOSIS — D35.2 BENIGN NEOPLASM OF PITUITARY GLAND (HCC): Primary | ICD-10-CM

## 2023-10-31 DIAGNOSIS — Z76.0 MEDICATION REFILL: ICD-10-CM

## 2023-10-31 LAB — LABCORP SPECIMEN COLLECTION: NORMAL

## 2023-10-31 PROCEDURE — 3079F DIAST BP 80-89 MM HG: CPT | Performed by: INTERNAL MEDICINE

## 2023-10-31 PROCEDURE — 99214 OFFICE O/P EST MOD 30 MIN: CPT | Performed by: INTERNAL MEDICINE

## 2023-10-31 PROCEDURE — 1123F ACP DISCUSS/DSCN MKR DOCD: CPT | Performed by: INTERNAL MEDICINE

## 2023-10-31 PROCEDURE — 3075F SYST BP GE 130 - 139MM HG: CPT | Performed by: INTERNAL MEDICINE

## 2023-10-31 RX ORDER — RIZATRIPTAN BENZOATE 10 MG/1
1 TABLET ORAL 2 TIMES DAILY PRN
COMMUNITY
Start: 2023-10-09 | End: 2023-10-31 | Stop reason: SDUPTHER

## 2023-10-31 RX ORDER — ROPINIROLE 2 MG/1
2 TABLET, FILM COATED ORAL 2 TIMES DAILY
COMMUNITY

## 2023-10-31 RX ORDER — NYSTATIN 100000 [USP'U]/G
POWDER TOPICAL 2 TIMES DAILY
Qty: 60 G | Refills: 5 | Status: SHIPPED | OUTPATIENT
Start: 2023-10-31

## 2023-10-31 RX ORDER — BENZONATATE 100 MG/1
CAPSULE ORAL
COMMUNITY
Start: 2023-08-12

## 2023-10-31 RX ORDER — HYOSCYAMINE SULFATE EXTENDED-RELEASE 0.38 MG/1
375 TABLET ORAL EVERY 12 HOURS PRN
COMMUNITY

## 2023-10-31 RX ORDER — ACYCLOVIR 400 MG/1
400 TABLET ORAL PRN
COMMUNITY

## 2023-10-31 RX ORDER — AMLODIPINE BESYLATE 5 MG/1
5 TABLET ORAL DAILY
Qty: 90 TABLET | Refills: 3 | Status: SHIPPED | OUTPATIENT
Start: 2023-10-31

## 2023-10-31 RX ORDER — MIRTAZAPINE 15 MG/1
TABLET, FILM COATED ORAL
COMMUNITY
Start: 2023-09-11

## 2023-10-31 RX ORDER — BACLOFEN 10 MG/1
10 TABLET ORAL 3 TIMES DAILY PRN
Qty: 30 TABLET | Refills: 0 | Status: SHIPPED | OUTPATIENT
Start: 2023-10-31

## 2023-10-31 RX ORDER — RIZATRIPTAN BENZOATE 10 MG/1
10 TABLET ORAL 2 TIMES DAILY PRN
Qty: 30 TABLET | Refills: 5 | Status: SHIPPED | OUTPATIENT
Start: 2023-10-31

## 2023-10-31 RX ORDER — ONDANSETRON 4 MG/1
4 TABLET, ORALLY DISINTEGRATING ORAL EVERY 8 HOURS PRN
Qty: 30 TABLET | Refills: 5 | Status: SHIPPED | OUTPATIENT
Start: 2023-10-31

## 2023-10-31 RX ORDER — BUTALBITAL, ACETAMINOPHEN AND CAFFEINE 50; 325; 40 MG/1; MG/1; MG/1
TABLET ORAL
COMMUNITY
Start: 2021-08-31

## 2023-10-31 ASSESSMENT — PATIENT HEALTH QUESTIONNAIRE - PHQ9
9. THOUGHTS THAT YOU WOULD BE BETTER OFF DEAD, OR OF HURTING YOURSELF: 0
SUM OF ALL RESPONSES TO PHQ9 QUESTIONS 1 & 2: 2
6. FEELING BAD ABOUT YOURSELF - OR THAT YOU ARE A FAILURE OR HAVE LET YOURSELF OR YOUR FAMILY DOWN: 0
7. TROUBLE CONCENTRATING ON THINGS, SUCH AS READING THE NEWSPAPER OR WATCHING TELEVISION: 3
SUM OF ALL RESPONSES TO PHQ QUESTIONS 1-9: 13
SUM OF ALL RESPONSES TO PHQ QUESTIONS 1-9: 13
1. LITTLE INTEREST OR PLEASURE IN DOING THINGS: 0
3. TROUBLE FALLING OR STAYING ASLEEP: 2
10. IF YOU CHECKED OFF ANY PROBLEMS, HOW DIFFICULT HAVE THESE PROBLEMS MADE IT FOR YOU TO DO YOUR WORK, TAKE CARE OF THINGS AT HOME, OR GET ALONG WITH OTHER PEOPLE: 3
8. MOVING OR SPEAKING SO SLOWLY THAT OTHER PEOPLE COULD HAVE NOTICED. OR THE OPPOSITE, BEING SO FIGETY OR RESTLESS THAT YOU HAVE BEEN MOVING AROUND A LOT MORE THAN USUAL: 0
SUM OF ALL RESPONSES TO PHQ QUESTIONS 1-9: 13
5. POOR APPETITE OR OVEREATING: 3
4. FEELING TIRED OR HAVING LITTLE ENERGY: 3
2. FEELING DOWN, DEPRESSED OR HOPELESS: 2
SUM OF ALL RESPONSES TO PHQ QUESTIONS 1-9: 13

## 2023-10-31 ASSESSMENT — ENCOUNTER SYMPTOMS: BACK PAIN: 1

## 2023-10-31 NOTE — PROGRESS NOTES
1. \"Have you been to the ER, urgent care clinic since your last visit? Hospitalized since your last visit? \" Yes Yuko Yang for a fall 10/29/2023    2. \"Have you seen or consulted any other health care providers outside of the [address] [place] [address] since your last visit? \" No     3. For patients aged 43-73: Has the patient had a colonoscopy / FIT/ Cologuard? Yes - no Care Gap present      If the patient is female:    4. For patients aged 43-66: Has the patient had a mammogram within the past 2 years? Yes - no Care Gap present      5. For patients aged 21-65: Has the patient had a pap smear?  NA - based on age or sex

## 2023-11-01 LAB
BUN SERPL-MCNC: 14 MG/DL (ref 8–27)
BUN/CREAT SERPL: 11 (ref 12–28)
CHLORIDE SERPL-SCNC: 102 MMOL/L (ref 96–106)
CO2 SERPL-SCNC: 23 MMOL/L (ref 20–29)
CREAT SERPL-MCNC: 1.22 MG/DL (ref 0.57–1)
EGFRCR SERPLBLD CKD-EPI 2021: 48 ML/MIN/1.73
GLUCOSE SERPL-MCNC: 91 MG/DL (ref 70–99)
PHOSPHATE SERPL-MCNC: 3 MG/DL (ref 3–4.3)
POTASSIUM SERPL-SCNC: 3.6 MMOL/L (ref 3.5–5.2)
PROLACTIN SERPL-MCNC: 14.5 NG/ML (ref 4.8–23.3)
SODIUM SERPL-SCNC: 141 MMOL/L (ref 134–144)

## 2023-11-03 ENCOUNTER — TELEPHONE (OUTPATIENT)
Facility: CLINIC | Age: 68
End: 2023-11-03

## 2023-11-03 NOTE — TELEPHONE ENCOUNTER
PT wants refill of meds for only a month. States she is in pain and cannot move. oxyCODONE-acetaminophen (PERCOCET) 7.5-325 MG per tablet [[id number]]  ENDED    Order Details  Dose: 1 tablet Route: Oral Frequency: EVERY 4 HOURS PRN for Pain   Dispense Quantity: 110 tablet Refills: 0    Note to Pharmacy: Reduce doses taken as pain becomes manageable         Sig: Take 1 tablet by mouth every 4 hours as needed for Pain for up to 30 days.  Max Daily Amount: 6 tablets

## 2023-11-07 NOTE — TELEPHONE ENCOUNTER
Called pt and left message. Call back number left and I myself or one of the other nurses will attempt to contact again. The call was to inform pt of the PCP note,  NO. I told her that at her appointment. I will knot refill kthis any longer .

## 2023-12-15 RX ORDER — TIZANIDINE HYDROCHLORIDE 4 MG/1
CAPSULE, GELATIN COATED ORAL
Qty: 270 CAPSULE | Refills: 3 | Status: SHIPPED | OUTPATIENT
Start: 2023-12-15

## 2024-01-02 DIAGNOSIS — Z76.0 MEDICATION REFILL: Primary | ICD-10-CM

## 2024-01-02 DIAGNOSIS — R05.3 CHRONIC COUGH: ICD-10-CM

## 2024-01-02 RX ORDER — HYOSCYAMINE SULFATE 0.38 MG/1
0.38 TABLET, MULTILAYER, EXTENDED RELEASE ORAL 2 TIMES DAILY PRN
Qty: 180 TABLET | Refills: 3 | Status: SHIPPED | OUTPATIENT
Start: 2024-01-02

## 2024-01-02 RX ORDER — BENZONATATE 100 MG/1
CAPSULE ORAL
Qty: 60 CAPSULE | Refills: 5 | Status: SHIPPED | OUTPATIENT
Start: 2024-01-02

## 2024-01-02 NOTE — PROGRESS NOTES
Orders Placed This Encounter    benzonatate (TESSALON) 100 MG capsule     Sig: take 1 capsule by mouth three times a day if needed for cough (SWALLOW CAPSULES WHOLE)     Dispense:  60 capsule     Refill:  5    hyoscyamine ER (SYMAX DUOTAB) 375 MCG TBCR extended release tablet     Sig: Take 0.375 mg by mouth 2 times daily as needed (abdominal pain)     Dispense:  180 tablet     Refill:  3     Encounter Diagnoses   Name Primary?    Medication refill Yes    Chronic cough

## 2024-03-13 ENCOUNTER — CARE COORDINATION (OUTPATIENT)
Facility: CLINIC | Age: 69
End: 2024-03-13

## 2024-03-13 NOTE — CARE COORDINATION
3/13/2024        11:25 AM    ACM reviewed patient chart and attempted to call patient for enrollment into CCM. Patient was not available at the time of call. Left a voice mail for patient introducing myself, explaining the reason for my call andproviding my contact information. Requested that patient return my call at her earliest convenience.Will follow.

## 2024-03-14 ENCOUNTER — CARE COORDINATION (OUTPATIENT)
Facility: CLINIC | Age: 69
End: 2024-03-14

## 2024-03-14 NOTE — CARE COORDINATION
3/14/2024       8:55 AM        ACM reviewed patient chart and attempted to call patient for enrollment into CCM. Patient was not available at the time of call. Left a voice mail for patient introducing myself, explaining the reason for my call andproviding my contact information. Requested that patient return my call at her earliest convenience.Will send letter. This is second attempt to contact patient. Will follow.

## 2024-03-21 ENCOUNTER — CARE COORDINATION (OUTPATIENT)
Facility: CLINIC | Age: 69
End: 2024-03-21

## 2024-03-21 NOTE — CARE COORDINATION
3/21/2024       12:34 PM        ACM reviewed patient chart and attempted to call patient for enrollment into CCM. Patient was not available at the time of call. Left a voice mail for patient introducing myself, explaining the reason for my call andproviding my contact information. Requested that patient return my call at her earliest convenience. Letter sent last week. This is the third attempt to contact patient. Will follow.

## 2024-03-28 ENCOUNTER — CARE COORDINATION (OUTPATIENT)
Facility: CLINIC | Age: 69
End: 2024-03-28

## 2024-03-28 NOTE — CARE COORDINATION
3/28/2024       11:09 AM        ACM reviewed patient chart and attempted to call patient for enrollment into CCM. Patient was not available at the time of call. Left a voice mail for patient introducing myself, explaining the reason for my call andproviding my contact information. Requested that patient return my call at her earliest convenience. Letter sent last week. This is the fourth attempt to contact patient without success. Will resolve episode at present.

## 2024-04-24 RX ORDER — TIZANIDINE HYDROCHLORIDE 4 MG/1
CAPSULE, GELATIN COATED ORAL
Qty: 270 CAPSULE | Refills: 0 | Status: SHIPPED | OUTPATIENT
Start: 2024-04-24

## 2024-04-24 RX ORDER — CLOTRIMAZOLE AND BETAMETHASONE DIPROPIONATE 10; .64 MG/G; MG/G
CREAM TOPICAL
Qty: 3 EACH | Refills: 1 | Status: SHIPPED | OUTPATIENT
Start: 2024-04-24

## 2024-04-24 NOTE — TELEPHONE ENCOUNTER
Last Appointment:  10/31/2023  No future appointments.     Appointment reminder sent via [employer].

## 2024-04-25 ENCOUNTER — OFFICE VISIT (OUTPATIENT)
Facility: CLINIC | Age: 69
End: 2024-04-25
Payer: MEDICARE

## 2024-04-25 VITALS
OXYGEN SATURATION: 97 % | RESPIRATION RATE: 15 BRPM | HEART RATE: 84 BPM | WEIGHT: 196.44 LBS | HEIGHT: 66 IN | BODY MASS INDEX: 31.57 KG/M2 | SYSTOLIC BLOOD PRESSURE: 149 MMHG | TEMPERATURE: 97 F | DIASTOLIC BLOOD PRESSURE: 86 MMHG

## 2024-04-25 DIAGNOSIS — L30.9 DERMATITIS: ICD-10-CM

## 2024-04-25 DIAGNOSIS — F25.9 SCHIZOAFFECTIVE DISORDER, UNSPECIFIED TYPE (HCC): ICD-10-CM

## 2024-04-25 DIAGNOSIS — I10 ESSENTIAL (PRIMARY) HYPERTENSION: Primary | ICD-10-CM

## 2024-04-25 DIAGNOSIS — D35.2 BENIGN NEOPLASM OF PITUITARY GLAND (HCC): ICD-10-CM

## 2024-04-25 DIAGNOSIS — F11.99 OPIOID USE, UNSPECIFIED WITH UNSPECIFIED OPIOID-INDUCED DISORDER (HCC): ICD-10-CM

## 2024-04-25 DIAGNOSIS — D68.59 OTHER PRIMARY THROMBOPHILIA (HCC): ICD-10-CM

## 2024-04-25 DIAGNOSIS — E23.0 HYPOPITUITARISM (HCC): ICD-10-CM

## 2024-04-25 DIAGNOSIS — G40.909 NONINTRACTABLE EPILEPSY WITHOUT STATUS EPILEPTICUS, UNSPECIFIED EPILEPSY TYPE (HCC): ICD-10-CM

## 2024-04-25 PROBLEM — G93.41 METABOLIC ENCEPHALOPATHY: Status: RESOLVED | Noted: 2020-06-05 | Resolved: 2024-04-25

## 2024-04-25 PROBLEM — N30.01 ACUTE CYSTITIS WITH HEMATURIA: Status: RESOLVED | Noted: 2017-03-03 | Resolved: 2024-04-25

## 2024-04-25 PROBLEM — Z86.19 HISTORY OF GONORRHEA: Status: RESOLVED | Noted: 2017-03-03 | Resolved: 2024-04-25

## 2024-04-25 PROBLEM — E87.6 HYPOKALEMIA: Status: RESOLVED | Noted: 2020-06-05 | Resolved: 2024-04-25

## 2024-04-25 PROBLEM — N17.9 AKI (ACUTE KIDNEY INJURY) (HCC): Status: RESOLVED | Noted: 2020-06-05 | Resolved: 2024-04-25

## 2024-04-25 PROBLEM — M62.82 RHABDOMYOLYSIS: Status: RESOLVED | Noted: 2020-06-05 | Resolved: 2024-04-25

## 2024-04-25 PROCEDURE — 1123F ACP DISCUSS/DSCN MKR DOCD: CPT | Performed by: INTERNAL MEDICINE

## 2024-04-25 PROCEDURE — 3079F DIAST BP 80-89 MM HG: CPT | Performed by: INTERNAL MEDICINE

## 2024-04-25 PROCEDURE — 3077F SYST BP >= 140 MM HG: CPT | Performed by: INTERNAL MEDICINE

## 2024-04-25 PROCEDURE — 99214 OFFICE O/P EST MOD 30 MIN: CPT | Performed by: INTERNAL MEDICINE

## 2024-04-25 PROCEDURE — G2211 COMPLEX E/M VISIT ADD ON: HCPCS | Performed by: INTERNAL MEDICINE

## 2024-04-25 RX ORDER — CEPHALEXIN 500 MG/1
500 CAPSULE ORAL 4 TIMES DAILY
Qty: 40 CAPSULE | Refills: 0 | Status: SHIPPED | OUTPATIENT
Start: 2024-04-25 | End: 2024-05-05

## 2024-04-25 RX ORDER — VALACYCLOVIR HYDROCHLORIDE 500 MG/1
500 TABLET, FILM COATED ORAL 3 TIMES DAILY
Qty: 21 TABLET | Refills: 0 | Status: SHIPPED | OUTPATIENT
Start: 2024-04-25 | End: 2024-05-02

## 2024-04-25 ASSESSMENT — ENCOUNTER SYMPTOMS
BACK PAIN: 1
SHORTNESS OF BREATH: 0

## 2024-04-25 NOTE — PROGRESS NOTES
\"Have you been to the ER, urgent care clinic since your last visit?  Hospitalized since your last visit?\"    YES - When: approximately 4  weeks ago.  Where and Why: [facility] for an infection .    “Have you seen or consulted any other health care providers outside of [facility] since your last visit?”    NO            Click Here for Release of Records Request

## 2024-05-03 ENCOUNTER — CLINICAL DOCUMENTATION (OUTPATIENT)
Facility: CLINIC | Age: 69
End: 2024-05-03
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
## 2024-05-27 RX ORDER — POTASSIUM CHLORIDE 20 MEQ/1
TABLET, EXTENDED RELEASE ORAL
Qty: 90 TABLET | Refills: 3 | Status: SHIPPED | OUTPATIENT
Start: 2024-05-27

## 2024-06-17 ENCOUNTER — TELEPHONE (OUTPATIENT)
Facility: CLINIC | Age: 69
End: 2024-06-17

## 2024-06-17 RX ORDER — DICLOFENAC SODIUM 75 MG/1
75 TABLET, DELAYED RELEASE ORAL 2 TIMES DAILY
Qty: 180 TABLET | Refills: 3 | Status: SHIPPED | OUTPATIENT
Start: 2024-06-17

## 2024-06-17 NOTE — TELEPHONE ENCOUNTER
Pharmacy is requesting refill to be sent in for patient.    Last Appointment:  4/25/2024  Future Appointments   Date Time Provider Department Center   7/24/2024 11:00 AM Geraldine Dalton MD [facility]      diclofenac (VOLTAREN) 75 MG EC tablet [[id number]]    Order Details  Dose: 75 mg Route: Oral Frequency: 2 TIMES DAILY   Dispense Quantity: 180 tablet Refills: 3          Sig: TAKE 1 TABLET TWICE A DAY

## 2024-07-16 ENCOUNTER — CARE COORDINATION (OUTPATIENT)
Facility: CLINIC | Age: 69
End: 2024-07-16

## 2024-07-16 NOTE — CARE COORDINATION
Ambulatory Care Coordination Note     2024 4:35 PM     Patient Current Location:  Home: [address]  [facility] [address]     This patient was received as a referral from Population health report .    ACM contacted the patient by telephone. Verified name and  with patient as identifiers. Provided introduction to self, and explanation of the ACM role.   Patient accepted care management services at this time.          ACM: Horacio Contreras RN     Challenges to be reviewed by the provider   Additional needs identified to be addressed with provider Yes  medications-pt states she is in need of Baclofen & Zofran renewal & has completed course of Doxycycline & steroids for 'sores' to neck, face & scalp, which she says helped but hasn't resolved.  She has a sched appt .               Method of communication with provider: chart routing.    Care Summary Note: States she's out of some of her medications. Pt may reach out to see PCP earlier if possible or request med renewal.    Offered patient enrollment in the Remote Patient Monitoring (RPM) program for in-home monitoring: not discussed at this time.     Assessments Completed:       2024     4:30 PM   Amb Fall Risk Assessment and TUG Test   Do you feel unsteady or are you worried about falling?  yes       Advance Care Planning:   Not discussed at this time     Care Planning:   Not completed during this call    PCP/Specialist follow up:   Future Appointments         Provider Specialty Dept Phone    2024 11:00 AM Geraldine Dalton MD Internal Medicine [phone]            Follow Up:   Plan for next ACM outreach in approximately 1-2 days  to complete:  - to discuss med renewals/PCP visit .   Patient  is agreeable to this plan.

## 2024-07-17 ENCOUNTER — CARE COORDINATION (OUTPATIENT)
Facility: CLINIC | Age: 69
End: 2024-07-17

## 2024-07-17 DIAGNOSIS — G89.29 CHRONIC MIDLINE LOW BACK PAIN, UNSPECIFIED WHETHER SCIATICA PRESENT: ICD-10-CM

## 2024-07-17 DIAGNOSIS — M54.50 CHRONIC MIDLINE LOW BACK PAIN, UNSPECIFIED WHETHER SCIATICA PRESENT: ICD-10-CM

## 2024-07-17 DIAGNOSIS — Z76.0 MEDICATION REFILL: ICD-10-CM

## 2024-07-17 RX ORDER — BACLOFEN 10 MG/1
10 TABLET ORAL 3 TIMES DAILY PRN
Qty: 60 TABLET | Refills: 3 | Status: CANCELLED | OUTPATIENT
Start: 2024-07-17

## 2024-07-17 RX ORDER — ONDANSETRON 4 MG/1
4 TABLET, ORALLY DISINTEGRATING ORAL EVERY 8 HOURS PRN
Qty: 30 TABLET | Refills: 5 | Status: CANCELLED | OUTPATIENT
Start: 2024-07-17

## 2024-07-18 ENCOUNTER — TELEPHONE (OUTPATIENT)
Facility: CLINIC | Age: 69
End: 2024-07-18

## 2024-07-18 ENCOUNTER — CARE COORDINATION (OUTPATIENT)
Facility: CLINIC | Age: 69
End: 2024-07-18

## 2024-07-18 DIAGNOSIS — Z76.0 MEDICATION REFILL: ICD-10-CM

## 2024-07-18 DIAGNOSIS — G89.29 CHRONIC MIDLINE LOW BACK PAIN, UNSPECIFIED WHETHER SCIATICA PRESENT: ICD-10-CM

## 2024-07-18 DIAGNOSIS — M54.50 CHRONIC MIDLINE LOW BACK PAIN, UNSPECIFIED WHETHER SCIATICA PRESENT: ICD-10-CM

## 2024-07-18 RX ORDER — BACLOFEN 10 MG/1
10 TABLET ORAL 3 TIMES DAILY PRN
Qty: 60 TABLET | Refills: 3 | Status: SHIPPED | OUTPATIENT
Start: 2024-07-18

## 2024-07-18 RX ORDER — ONDANSETRON 4 MG/1
4 TABLET, ORALLY DISINTEGRATING ORAL EVERY 8 HOURS PRN
Qty: 30 TABLET | Refills: 5 | Status: SHIPPED | OUTPATIENT
Start: 2024-07-18

## 2024-07-18 NOTE — TELEPHONE ENCOUNTER
Kasia , Ambulatory nurse is calling back to let Dr. Dalton know the patient is using the [facility] at [address] in [facility]

## 2024-07-18 NOTE — CARE COORDINATION
Ambulatory Care Coordination Note     2024 11:29 AM     Patient Current Location:  Home: [address] [place] [address] [address]  [facility] [address]     ACM contacted the patient by telephone. Verified name and  with patient as identifiers.         ACM: Horacio Contreras RN     Challenges to be reviewed by the provider   Additional needs identified to be addressed with provider No                 Method of communication with provider: phone.    Care Summary Note:   Pharmacy info requested by MD, Pt provided & office called to relay info.   Will continue to assess need for patient enrollment in the Remote Patient Monitoring (RPM) program for in-home monitoring: .     Assessments Completed:   No changes since last call    Medications Reviewed:   Not completed during this call:      Advance Care Planning:   Not reviewed during this call     Care Planning:   Not completed during this call    PCP/Specialist follow up:   Future Appointments         Provider Specialty Dept Phone    2024 11:00 AM Geraldine Dalton MD Internal Medicine [phone]            Follow Up:   Plan for next ACM outreach in approximately 1 week to complete:  - goal planning .   Patient  is agreeable to this plan.

## 2024-07-18 NOTE — TELEPHONE ENCOUNTER
Orders Placed This Encounter    baclofen (LIORESAL) 10 MG tablet     Sig: Take 1 tablet by mouth 3 times daily as needed (muscle spasm)     Dispense:  60 tablet     Refill:  3    ondansetron (ZOFRAN-ODT) 4 MG disintegrating tablet     Sig: Take 1 tablet by mouth every 8 hours as needed for Nausea     Dispense:  30 tablet     Refill:  5         I cannot order the doxycycline or steroids as I am not the prescriber and have not seen her for this

## 2024-07-18 NOTE — CARE COORDINATION
Ambulatory Care Coordination Note     2024 9:51 AM     Patient Current Location:  Home: [address] [place] [address] [address]  [facility] [address]     ACM contacted the patient by telephone. Verified name and  with patient as identifiers.         ACM: Horacio Contreras RN     Challenges to be reviewed by the provider   Additional needs identified to be addressed with provider No  none               Method of communication with provider: phone.    Care Summary Note: MD requested accurate pharmacy information. Reached out to pt, called office with pharmacy provided by pt.    Offered patient enrollment in the Remote Patient Monitoring (RPM) program for in-home monitoring: Deferred at this time because specific info requested today; will discuss at next outreach.     Assessments Completed:   No changes since last call    Medications Reviewed:   Requested meds for refill.    Advance Care Planning:   Not reviewed during this call     Care Planning:   Not completed during this call    PCP/Specialist follow up:   Future Appointments         Provider Specialty Dept Phone    2024 11:00 AM Geraldine Dalton MD Internal Medicine [phone]            Follow Up:   Plan for next ACM outreach in approximately 1 week to complete:  To check refill status.   Patient  is agreeable to this plan.

## 2024-07-19 NOTE — TELEPHONE ENCOUNTER
Spoke with pt in regards to medication requests. Two patient identifier's verified.  Relayed the PCP's note. Pt acknowledges understanding and voices no concerns at this time.

## 2024-07-24 ENCOUNTER — OFFICE VISIT (OUTPATIENT)
Facility: CLINIC | Age: 69
End: 2024-07-24
Payer: MEDICARE

## 2024-07-24 VITALS
SYSTOLIC BLOOD PRESSURE: 89 MMHG | TEMPERATURE: 97.5 F | WEIGHT: 193 LBS | HEART RATE: 59 BPM | RESPIRATION RATE: 13 BRPM | HEIGHT: 66 IN | BODY MASS INDEX: 31.02 KG/M2 | OXYGEN SATURATION: 97 % | DIASTOLIC BLOOD PRESSURE: 63 MMHG

## 2024-07-24 DIAGNOSIS — Z76.0 MEDICATION REFILL: ICD-10-CM

## 2024-07-24 DIAGNOSIS — G89.29 CHRONIC PAIN OF RIGHT KNEE: ICD-10-CM

## 2024-07-24 DIAGNOSIS — L08.9 SKIN INFECTION: ICD-10-CM

## 2024-07-24 DIAGNOSIS — Z00.00 MEDICARE ANNUAL WELLNESS VISIT, SUBSEQUENT: Primary | ICD-10-CM

## 2024-07-24 DIAGNOSIS — I10 ESSENTIAL (PRIMARY) HYPERTENSION: ICD-10-CM

## 2024-07-24 DIAGNOSIS — M54.50 CHRONIC LOW BACK PAIN, UNSPECIFIED BACK PAIN LATERALITY, UNSPECIFIED WHETHER SCIATICA PRESENT: ICD-10-CM

## 2024-07-24 DIAGNOSIS — M25.561 CHRONIC PAIN OF RIGHT KNEE: ICD-10-CM

## 2024-07-24 DIAGNOSIS — G89.29 CHRONIC LOW BACK PAIN, UNSPECIFIED BACK PAIN LATERALITY, UNSPECIFIED WHETHER SCIATICA PRESENT: ICD-10-CM

## 2024-07-24 DIAGNOSIS — G44.89 OTHER HEADACHE SYNDROME: ICD-10-CM

## 2024-07-24 DIAGNOSIS — F25.9 SCHIZOAFFECTIVE DISORDER, UNSPECIFIED TYPE (HCC): ICD-10-CM

## 2024-07-24 DIAGNOSIS — R60.9 EDEMA, UNSPECIFIED TYPE: ICD-10-CM

## 2024-07-24 PROCEDURE — 1123F ACP DISCUSS/DSCN MKR DOCD: CPT | Performed by: INTERNAL MEDICINE

## 2024-07-24 PROCEDURE — G8427 DOCREV CUR MEDS BY ELIG CLIN: HCPCS | Performed by: INTERNAL MEDICINE

## 2024-07-24 PROCEDURE — 3078F DIAST BP <80 MM HG: CPT | Performed by: INTERNAL MEDICINE

## 2024-07-24 PROCEDURE — G0439 PPPS, SUBSEQ VISIT: HCPCS | Performed by: INTERNAL MEDICINE

## 2024-07-24 PROCEDURE — 1036F TOBACCO NON-USER: CPT | Performed by: INTERNAL MEDICINE

## 2024-07-24 PROCEDURE — 3017F COLORECTAL CA SCREEN DOC REV: CPT | Performed by: INTERNAL MEDICINE

## 2024-07-24 PROCEDURE — G8399 PT W/DXA RESULTS DOCUMENT: HCPCS | Performed by: INTERNAL MEDICINE

## 2024-07-24 PROCEDURE — 3074F SYST BP LT 130 MM HG: CPT | Performed by: INTERNAL MEDICINE

## 2024-07-24 PROCEDURE — 1090F PRES/ABSN URINE INCON ASSESS: CPT | Performed by: INTERNAL MEDICINE

## 2024-07-24 PROCEDURE — G8417 CALC BMI ABV UP PARAM F/U: HCPCS | Performed by: INTERNAL MEDICINE

## 2024-07-24 PROCEDURE — 99213 OFFICE O/P EST LOW 20 MIN: CPT | Performed by: INTERNAL MEDICINE

## 2024-07-24 RX ORDER — OXYBUTYNIN CHLORIDE 10 MG/1
10 TABLET, EXTENDED RELEASE ORAL DAILY
Qty: 90 TABLET | Refills: 3 | Status: SHIPPED | OUTPATIENT
Start: 2024-07-24

## 2024-07-24 RX ORDER — MONTELUKAST SODIUM 10 MG/1
10 TABLET ORAL DAILY
Qty: 90 TABLET | Refills: 3 | Status: SHIPPED | OUTPATIENT
Start: 2024-07-24

## 2024-07-24 RX ORDER — CEPHALEXIN 500 MG/1
500 CAPSULE ORAL 4 TIMES DAILY
Qty: 40 CAPSULE | Refills: 0 | Status: SHIPPED | OUTPATIENT
Start: 2024-07-24 | End: 2024-08-03

## 2024-07-24 RX ORDER — AMLODIPINE BESYLATE 5 MG/1
5 TABLET ORAL DAILY
Qty: 90 TABLET | Refills: 3 | Status: SHIPPED | OUTPATIENT
Start: 2024-07-24

## 2024-07-24 RX ORDER — RIZATRIPTAN BENZOATE 10 MG/1
TABLET ORAL
Qty: 30 TABLET | Refills: 5 | Status: SHIPPED | OUTPATIENT
Start: 2024-07-24

## 2024-07-24 RX ORDER — RIZATRIPTAN BENZOATE 10 MG/1
10 TABLET ORAL 2 TIMES DAILY PRN
Qty: 30 TABLET | Refills: 5 | Status: SHIPPED | OUTPATIENT
Start: 2024-07-24 | End: 2024-07-24 | Stop reason: SDUPTHER

## 2024-07-24 RX ORDER — BUTALBITAL, ACETAMINOPHEN AND CAFFEINE 50; 325; 40 MG/1; MG/1; MG/1
TABLET ORAL
Qty: 30 TABLET | Refills: 3 | Status: SHIPPED | OUTPATIENT
Start: 2024-07-24

## 2024-07-24 RX ORDER — ATORVASTATIN CALCIUM 40 MG/1
40 TABLET, FILM COATED ORAL DAILY
Qty: 90 TABLET | Refills: 3 | Status: SHIPPED | OUTPATIENT
Start: 2024-07-24

## 2024-07-24 RX ORDER — NYSTATIN 100000 [USP'U]/G
POWDER TOPICAL 2 TIMES DAILY
Qty: 60 G | Refills: 5 | Status: SHIPPED | OUTPATIENT
Start: 2024-07-24

## 2024-07-24 ASSESSMENT — ENCOUNTER SYMPTOMS: BACK PAIN: 1

## 2024-07-24 NOTE — PATIENT INSTRUCTIONS
assessing their needs, providing information and resources, and directly contributing financially  Website: [email or web address]://[email or web address].[employer]/[email or web address]-services/#emergency  [place] residents can make an appointment by calling [facility] at [phone] or emailing [email or web address] Crisis Hotline  Can assist with rent assistance, eviction prevention and utility bills.  Call [phone]    Medication  Good Rx   What they offer: Good Rx tracks prescription drug prices and provides free drug coupons for discounts on medications.   Website: https://www.[place]/     NeedyMeds   What they offer: NeedyMeds offers free information on medications and healthcare cost savings programs including prescription assistance programs, coupons, and discount programs.   Website: https://www.[employer].org/   Helpline: 221.130.3441     RX Assist   What they offer: Information about free and low-cost medicine programs.   Website: https://www.rxassist.[place]/     [employer] $4 Prescription Program   What they offer: Prescription Program includes up to a 30-day supply for $4 and a 90-day supply for $10 of some covered generic drugs at commonly prescribed dosages   Website: https://www.Emulate/cp/4-prescriptions/7983726      The Co-Pay Relief Program  What they offer: The Co-Pay Relief Program provides you with direct prescription co-payment assistance, if you are an insured [place] citizen and financially and medically qualify, including Medicare Part D beneficiaries who require assistance with their prescription drug co-payments.   Phone toll-free: 1-964.542.5919  Website: www.copay.org    The Partnership for Prescription Assistance   What they offer:  The Partnership for Prescription Assistance can help you if you lack  Prescription coverage to get the medicines you need through the public or private program that is right for you.  Phone toll-free: 1-307.596.3309  Website:  www.pparx.org      [place] Drug Card Program  What they

## 2024-07-24 NOTE — TELEPHONE ENCOUNTER
Call received from Kerri diaz pharmacist with [facility].  States they need clarification on the direction for the   rizatriptan (MAXALT) 10 MG tablet [[id number]]    Order Details  Dose: 10 mg Route: Oral Frequency: 2 TIMES DAILY PRN for Migraine   Dispense Quantity: 30 tablet Refills: 5          Sig: Take 1 tablet by mouth 2 times daily as needed for Migraine       Kerri - [phone]  Ref# [id number]

## 2024-07-24 NOTE — PROGRESS NOTES
Medicare Annual Wellness Visit    Sugey Souza is here for Medicare AWV, Hypertension, and Cholesterol Problem    Assessment & Plan   Medicare annual wellness visit, subsequent      Recommendations for Preventive Services Due: see orders and patient instructions/AVS.  Recommended screening schedule for the next 5-10 years is provided to the patient in written form: see Patient Instructions/AVS.     No follow-ups on file.     Subjective       Patient's complete Health Risk Assessment and screening values have been reviewed and are found in Flowsheets. The following problems were reviewed today and where indicated follow up appointments were made and/or referrals ordered.    Positive Risk Factor Screenings with Interventions:    Fall Risk:        Interventions:    Reviewed medications, home hazards, visual acuity, and co-morbidities that can increase risk for falls     Depression:  PHQ-2 Score: 2  PHQ-9 Total Score: 10  Total Score Interpretation: 10-14 = moderate depression  Interventions:  See AVS for additional education material         Self-assessment of health:  In general, how would you say your health is?: (!) Poor    Interventions:  See AVS for additional education material    General HRA Questions:  Select all that apply: (!) Stress, New or Increased Pain, New or Increased Fatigue  Interventions - Pain:  See AVS for additional education material  Interventions Fatigue:  See AVS for additional education material  Interventions - Stress:  See AVS for additional education material      Inactivity:  On average, how many days per week do you engage in moderate to strenuous exercise (like a brisk walk)?: 1 day (!) Abnormal  On average, how many minutes do you engage in exercise at this level?: 10 min  Interventions:  See AVS for additional education material    Poor Eating Habits/Diet:  Do you eat balanced/healthy meals regularly?: (!) No  Interventions:  Patient declines any further evaluation or

## 2024-07-24 NOTE — PROGRESS NOTES
HISTORY OF PRESENT ILLNESS      Sugey Souza is a 69 y.o. female.    BP (!) 89/63 (Site: Left Upper Arm)   Pulse 59   Temp 97.5 °F (36.4 °C) (Temporal)   Resp 13   Ht 1.676 m (5' 6\")   Wt 87.5 kg (193 lb)   SpO2 97%   BMI 31.15 kg/m²         She was also in the hospital for sepsis:    \"6/5/2024  [facility]  Sugey Souza - 68 y.o. Female; born [birth year]Encounter Summary, generated on Jun. 11, 2024June 11, 2024   Discharge Summaries  - documented in this encounter  Quinn Cano MD - 06/10/2024 2:42 PM EDT  Formatting of this note is different from the original.  Images from the original note were not included.    Internal Medicine Discharge Summary  Admit Date: 6/5/2024  Discharge Date: 6/10/2024    Attending: No att. miquel rogel M.D.  Primary Care Provider: MARIA Dalton MD    Patient ID:  Sugey Souza  68 y.o. female  1955    Reason for Admission: Sepsis, due to unspecified organism, unspecified whether acute organ dysfunction present (HCC) [A41.9]  Hypotension [I95.9]    Discharge Diagnosis:  # Hypotension, adrenal insufficiency ruled out  # History of pituitary adenoma  # Lactic acidosis resolved  # Syncope secondary to hypotension  # Fall with headstrike,  # Chronic neck pain  # History of multiple C-spine surgeries\"  .       She is back to living in her small apartment.  She has been living there over a year which has been very stable for her.  Currently her youngest son is helping out.    She continues to complain of widespread pain    Widespread \"sores\" on her skin    She stated she felt like she was about to pass out on the way to the office today.    Hypertension  This is a chronic problem. The current episode started more than 1 year ago. Associated symptoms include neck pain.       Review of Systems   Constitutional:  Positive for activity change. Appetite change: due to pain.  Musculoskeletal:  Positive for

## 2024-07-24 NOTE — TELEPHONE ENCOUNTER
Spoke with Pharmacist Loomey, [facility] in regards to order clarification. Two patient identifier's verified.  The sig must change as follows: Take one tablet by mouth at onset of migraine and may repeat dose in 2 hours as needed. Not to exceed 30 mg in 24 hours. Pt's insurance has a quantity limit of no more than 24 tablets per 60 days. PCP notified.

## 2024-07-26 ENCOUNTER — CARE COORDINATION (OUTPATIENT)
Facility: CLINIC | Age: 69
End: 2024-07-26

## 2024-07-26 NOTE — CARE COORDINATION
Ambulatory Care Coordination Note     2024 11:52 AM     Patient Current Location:  Home: [address]  [place] [facility] [address]     ACM contacted the patient by telephone. Verified name and  with patient as identifiers.         ACM: Horacio Contreras RN     Challenges to be reviewed by the provider   Additional needs identified to be addressed with provider Yes,  Pt states she'd like to find a PT closer to [facility], not sure if new referral needed. Mentions trend of lower BP, see that this was addressed at recent last visit. Reports taking Coreg 12.5mg but not found on med record.                 Method of communication with provider: chart routing.    Care Summary Note:   Follow up outreach, mentions low trending BP, was addressed with PCP, seeks a PT close to her home.  Offered patient enrollment in the Remote Patient Monitoring (RPM) program for in-home monitoring: Patient is not eligible for RPM program because: does not wish to participate .     Assessments Completed:   General Assessment    Do you have any symptoms that are causing concern?: Yes  Progression since Onset: Intermittent - Waxing/Waning  Reported Symptoms: Other (Comment: pt states BP has been trending on the low sidelow)          Medications Reviewed:   BP medications    Advance Care Planning:   Not reviewed during this call     Care Planning:   Not completed during this call    PCP/Specialist follow up:   Future Appointments         Provider Specialty Dept Phone    9/3/2024 11:45 AM Geraldine Dalton MD Internal Medicine [phone]            Follow Up:   Plan for next ACM outreach in approximately 1 week to complete:  - CC Protocol assessments.   Patient  is agreeable to this plan.

## 2024-08-06 ENCOUNTER — TELEPHONE (OUTPATIENT)
Facility: CLINIC | Age: 69
End: 2024-08-06

## 2024-08-08 DIAGNOSIS — Z63.4 GRIEF AT LOSS OF CHILD: Primary | ICD-10-CM

## 2024-08-08 DIAGNOSIS — F43.21 GRIEF AT LOSS OF CHILD: Primary | ICD-10-CM

## 2024-08-08 RX ORDER — LORAZEPAM 0.5 MG/1
0.5 TABLET ORAL 3 TIMES DAILY PRN
Qty: 30 TABLET | Refills: 0 | Status: SHIPPED | OUTPATIENT
Start: 2024-08-08 | End: 2024-08-23

## 2024-08-08 SDOH — SOCIAL STABILITY - SOCIAL INSECURITY: DISSAPEARANCE AND DEATH OF FAMILY MEMBER: Z63.4

## 2024-08-08 NOTE — TELEPHONE ENCOUNTER
Orders Placed This Encounter    LORazepam (ATIVAN) 0.5 MG tablet     Sig: Take 1 tablet by mouth 3 times daily as needed for Anxiety for up to 15 days. Max Daily Amount: 1.5 mg     Dispense:  30 tablet     Refill:  0     Encounter Diagnosis   Name Primary?    Grief at loss of child Yes

## 2024-08-12 ENCOUNTER — CARE COORDINATION (OUTPATIENT)
Facility: CLINIC | Age: 69
End: 2024-08-12

## 2024-08-12 NOTE — CARE COORDINATION
Ambulatory Care Coordination Note     8/12/2024 10:10 AM     Patient outreach attempt by this ACM today to perform care management follow up . ACM was unable to reach the patient by telephone today; left voice message requesting a return phone call to this ACM.     ACM: Horacio Contreras RN     Care Summary Note:     PCP/Specialist follow up:   Future Appointments         Provider Specialty Dept Phone    9/3/2024 11:45 AM Geraldine Dalton MD Internal Medicine [phone]            Follow Up:   Plan for next ACM outreach in approximately 1 week to complete:  - goal progression.

## 2024-08-12 NOTE — CARE COORDINATION
Ambulatory Care Coordination Note     2024 10:13 AM     Patient Current Location:  Home: [address] [place] [address] [address]  [facility] [address]     Patient contacted the patient by telephone. Verified name and  with patient as identifiers.         ACM: Horacio Contreras RN     Challenges to be reviewed by the provider   Additional needs identified to be addressed with provider No  none               Method of communication with provider: none.    Care Summary Note: Patient's son just  unexpectedly. She's facing homelessness again, exploring options & will refer to team , April Raquel.    Offered patient enrollment in the Remote Patient Monitoring (RPM) program for in-home monitoring: Deferred at this time because patient has other pressing issues, finding housing; will discuss at next outreach.     Assessments Completed:        Medications Reviewed:   None at this time.    Advance Care Planning:   Not reviewed during this call     Care Planning:   Not completed during this call    PCP/Specialist follow up:   Future Appointments         Provider Specialty Dept Phone    9/3/2024 11:45 AM Geraldine Dalton MD Internal Medicine [phone]            Follow Up:   Plan for next ACM outreach in approximately 1-2 days  to complete:  - goal progression.   Patient  is agreeable to this plan.

## 2024-08-12 NOTE — CARE COORDINATION
[facility] received referral on 08/12 from ABBIE Nesbitt. Patient in need of housing, transportation, food/nutrition, financial assistance. SATISH scheduled outreach 08/13 to contact patient. ABBIE completed outreach today.     Candida García LMSW  Care Management

## 2024-08-13 ENCOUNTER — CARE COORDINATION (OUTPATIENT)
Facility: CLINIC | Age: 69
End: 2024-08-13

## 2024-08-13 SDOH — ECONOMIC STABILITY: TRANSPORTATION INSECURITY
IN THE PAST 12 MONTHS, HAS LACK OF TRANSPORTATION KEPT YOU FROM MEETINGS, WORK, OR FROM GETTING THINGS NEEDED FOR DAILY LIVING?: YES

## 2024-08-13 SDOH — SOCIAL STABILITY: SOCIAL NETWORK: ARE YOU MARRIED, WIDOWED, DIVORCED, SEPARATED, NEVER MARRIED, OR LIVING WITH A PARTNER?: SEPARATED

## 2024-08-13 SDOH — ECONOMIC STABILITY: TRANSPORTATION INSECURITY
IN THE PAST 12 MONTHS, HAS THE LACK OF TRANSPORTATION KEPT YOU FROM MEDICAL APPOINTMENTS OR FROM GETTING MEDICATIONS?: YES

## 2024-08-13 SDOH — ECONOMIC STABILITY: INCOME INSECURITY: IN THE LAST 12 MONTHS, WAS THERE A TIME WHEN YOU WERE NOT ABLE TO PAY THE MORTGAGE OR RENT ON TIME?: YES

## 2024-08-13 SDOH — HEALTH STABILITY: MENTAL HEALTH
STRESS IS WHEN SOMEONE FEELS TENSE, NERVOUS, ANXIOUS, OR CAN'T SLEEP AT NIGHT BECAUSE THEIR MIND IS TROUBLED. HOW STRESSED ARE YOU?: TO SOME EXTENT

## 2024-08-13 SDOH — SOCIAL STABILITY: SOCIAL NETWORK: IN A TYPICAL WEEK, HOW MANY TIMES DO YOU TALK ON THE PHONE WITH FAMILY, FRIENDS, OR NEIGHBORS?: NEVER

## 2024-08-13 SDOH — SOCIAL STABILITY: SOCIAL NETWORK
DO YOU BELONG TO ANY CLUBS OR ORGANIZATIONS SUCH AS CHURCH GROUPS UNIONS, FRATERNAL OR ATHLETIC GROUPS, OR SCHOOL GROUPS?: NO

## 2024-08-13 SDOH — SOCIAL STABILITY: SOCIAL NETWORK: HOW OFTEN DO YOU GET TOGETHER WITH FRIENDS OR RELATIVES?: NEVER

## 2024-08-13 NOTE — CARE COORDINATION
Initial Contact Social Work Note - Ambulatory  8/13/2024      Date of referral: 08/12  Referral received from: ABBIE Nesbitt   Reason for referral: Housing, transportation,food/nutrition resources    Previous  referral: Yes (2023 [facility] [place])   If yes, brief summary of outcome:  provided resources for housing support. Patient already engaged with community resources for housing. No additional resources provided.     Two Identifiers Verified: Yes; confirmed with patient name and date and birth    Insurance Provider: [employer] Medicare;  supplement    Support System:   No identified supports      Status: Former Spouse of [place]    Community Providers:  Case management from Doris ( [facility])     ADL Assistance Needed:  IADL: grocery shopping, cooking, cleaning, transportation    Housing/Living Concerns or Home Modification Needs: Currently facing a potential eviction. Unable to afford current apartment, living expense, utilities.  Scheduled court date: 8/23 Patient receives section 8 voucher for $876/month. Voucher also covers water bill.     Transportation Concern: Does not drive, no vehicle. No supports to assist with transportation to and from medical appointments, pharmacy, grocery store.     Medication Cost Concern: None expressed during this call     Medication Adherence Concern: Pt states she is taking all medications independently and daily. Barrier in getting to and from pharmacy with no support.     Financial Concern(s): Yes; unable to afford all living & utility expenses.     Income (only if applicable): $1900/month (Social Security & spouse income)     Ability to Read/Write: Yes    Advance Care Plan:  Not on file; educated patient; Educated patient on updating emergency contact since son Fei has passed away. Identified 2 other children ( Patric Souza-lives in [place] and in [employer]-declined to give phone number. Sagrario Atwood- lives in [place]-declined to

## 2024-08-14 ENCOUNTER — CARE COORDINATION (OUTPATIENT)
Facility: CLINIC | Age: 69
End: 2024-08-14

## 2024-08-14 NOTE — CARE COORDINATION
Follow Up Social Work Note - Ambulatory  8/14/2024      Identified Needs:  Housing/eviction prevention assistance  Transportation assistance  [employer] utility bill assistance  Food/nutrition resources  [facility] referral      Social Work Plan:  SATISH completed referral to [facility] and waiting valid/invalid response. (Pending)   SATISH will follow up with Doris ([facility]) for assistance (pending)  SW will assist patient in renewing paratransit application & provide information to [facility] [employer]. (pending)  Patient will call SW with balance due and possible disconnection notice from [employer] (pending)  SW will complete referral for Meals on Wheels (pending)    2:45 pm   SATISH contacted patient again for follow up. Per patient, Doris offered resources to local churches for rent assistance. [facility] APS case assigned to Germaine Swain ([phone]). Attempted again to complete Energy Assistance application via [employer] phone line. Wait time over an hour. SATISH provided patient with link via text message to submit application. SW offered [employer] and [employer] as additional resources for rental assistance/eviction prevention. SATISH will follow up with patient.       9:00 am  [facility] A[facility] report is still being reviewed and pending. LEE contacted [facility]. Per Doris, she is meeting with patient this morning at 10:30 and will follow up with SATISH after home visit. HRT paratransit office is currently closed. SATISH will attempt when office is open. SW & patient contacted [employer] for assistance. Patient not eligible for PIP program but arranged payment plan and apply for Energy Assistance via [employer] today. Referral for Meals on Wheels completed with [facility].     Candida García LMSW  Care Management

## 2024-08-15 ENCOUNTER — CARE COORDINATION (OUTPATIENT)
Facility: CLINIC | Age: 69
End: 2024-08-15

## 2024-08-15 NOTE — CARE COORDINATION
Resource Follow up:  STAISH spoke with Kiah Vora with [facility] regarding Housing Assistance. [facility] is out of emergency funding until October 1. Advised to open case with Housing Crisis Hotline and contact Dany Galvanlin at [facility]. SATISH left a message with Ms. Morris and will await return call. No HIPAA /PHI shared during call.     SATISH left message for  APS worker, Ms. Swain for follow up.    SATISH spoke to Doris,  [facility] , about housing resources only. No HIPAA /PHI information shared during call. Ms. George is the assigned worker and will assist patient with housing emergency and resources. Doris did advise patient has been in program for 1 year which is the maximum however she has requested additional time to assist patient and develop a more permanent supportive living environment for patient. Doris did request this writer research additional income based housing resources and provided email.     SATISH completed a call with patient. Patient expressed she does not have an alternative plan if she has to move. SATISH educated patient on importance of working with Doris for assistance and advocating on her behalf. SATISH introduced conversation of Assisted Living facilities. Patient states she is not open to assisted living environment and would like independent living apartments in her area. Patient was able to complete Energy Assistance application via [employer] service. Application # [id number]. Follow up in 7 days for status. Patient stated she completed Meals on Wheels intake assessment this morning with [facility] and it is pending. SATISH updated assigned ACM on case and will monitor and support patient as needed.     Candida García LMSW  Care Management

## 2024-08-16 ENCOUNTER — CARE COORDINATION (OUTPATIENT)
Facility: CLINIC | Age: 69
End: 2024-08-16

## 2024-08-16 NOTE — CARE COORDINATION
Ambulatory Care Coordination Note     8/16/2024 11:17 AM     Patient outreach attempt by this ACM today to perform care management follow up . ACM was unable to reach the patient by telephone today; left voice message requesting a return phone call to this ACM.     ACM: Horacio Contreras RN     Care Summary Note:     PCP/Specialist follow up:   Future Appointments         Provider Specialty Dept Phone    9/3/2024 11:45 AM Geraldine Dalton MD Internal Medicine [phone]            Follow Up:   Plan for next ACM outreach in approximately 1 week to complete:  - SDOH assessments.

## 2024-08-20 ENCOUNTER — CARE COORDINATION (OUTPATIENT)
Facility: CLINIC | Age: 69
End: 2024-08-20

## 2024-08-20 ENCOUNTER — TELEPHONE (OUTPATIENT)
Facility: CLINIC | Age: 69
End: 2024-08-20

## 2024-08-20 NOTE — CARE COORDINATION
Ambulatory Care Coordination Note     2024 5:11 PM     Patient Current Location:  Home: [address] [place] [address] [address]  [facility] [address]     Patient contacted the ACM by telephone. Verified name and  with patient as identifiers.         ACM: Horacio Contreras RN     Challenges to be reviewed by the provider   Additional needs identified to be addressed with provider No         Method of communication with provider: none.    Care Summary Note: medications-pt states she's having increased anxiety, wants to cancel 9/3/2024 & request a sooner appt. Says she will phone PCP.    Offered patient enrollment in the Remote Patient Monitoring (RPM) program for in-home monitoring: Deferred at this time because pt fixated on getting appt; will discuss at next outreach.     Assessments Completed:   No changes since last call    Medications Reviewed:   Patient denies any changes with medications and reports taking all medications as prescribed.    Advance Care Planning:   Not reviewed during this call     Care Planning:   Not completed during this call    PCP/Specialist follow up:   Future Appointments         Provider Specialty Dept Phone    9/10/2024 3:30 PM Geraldine Dalton MD Internal Medicine [phone]            Follow Up:   Plan for next ACM outreach in approximately 1 week to complete:  - goal progression.   Patient  is agreeable to this plan.

## 2024-08-20 NOTE — TELEPHONE ENCOUNTER
Patient is calling in asking if there is anything you can prescribe to help calm her.  She is having a VERY difficult time dealing with the death of her son.  Asked that something please be called in to the [facility] on file.

## 2024-08-20 NOTE — TELEPHONE ENCOUNTER
I sent over some lorazepam on August 8.  Has she taken it all?  With her history I do not feel comfortable giving her additional medication.  I think she needs to talk to a grief counselor or therapist/psychiatrist.  I am very sorry she is going through this, but relying just on medication is not the answer and usually only makes things worse.

## 2024-08-21 NOTE — TELEPHONE ENCOUNTER
Called pt and left message. Call back number left and I myself or one of the other nurses will attempt to contact again.    The call was to inform pt of the PCP's note.

## 2024-08-22 ENCOUNTER — CARE COORDINATION (OUTPATIENT)
Facility: CLINIC | Age: 69
End: 2024-08-22

## 2024-08-22 NOTE — TELEPHONE ENCOUNTER
Spoke with pt in regards to medication request. Two patient identifier's verified.  Relayed the PCP's note. Pt states she did take all of the prescribed medication because it was not strong enough. Pt states she does have a counselor. Pt states she is currently admitted into the hospital due to her low blood pressure. Pt states she is scheduled to have a kidney test. Pt states her memory has gotten worse.     Pt states she has to disconnect the call for another incoming call. PCP notified.

## 2024-08-26 ENCOUNTER — CARE COORDINATION (OUTPATIENT)
Facility: CLINIC | Age: 69
End: 2024-08-26

## 2024-08-26 DIAGNOSIS — I95.2 HYPOTENSION DUE TO DRUGS: Primary | ICD-10-CM

## 2024-08-26 PROCEDURE — 1111F DSCHRG MED/CURRENT MED MERGE: CPT | Performed by: INTERNAL MEDICINE

## 2024-08-26 RX ORDER — PSEUDOEPHEDRINE HCL 30 MG
100 TABLET ORAL DAILY
COMMUNITY
Start: 2021-11-18

## 2024-08-26 RX ORDER — MUPIROCIN 20 MG/G
OINTMENT TOPICAL 3 TIMES DAILY
COMMUNITY

## 2024-08-26 RX ORDER — OXYCODONE AND ACETAMINOPHEN 10; 325 MG/1; MG/1
1 TABLET ORAL EVERY 6 HOURS PRN
COMMUNITY

## 2024-08-26 RX ORDER — ALPRAZOLAM 0.5 MG
0.5 TABLET ORAL EVERY 8 HOURS PRN
COMMUNITY
Start: 2024-08-23 | End: 2024-09-22

## 2024-08-26 RX ORDER — FLUTICASONE PROPIONATE 50 MCG
1 SPRAY, SUSPENSION (ML) NASAL DAILY
COMMUNITY

## 2024-08-26 RX ORDER — CARVEDILOL 12.5 MG/1
12.5 TABLET ORAL 2 TIMES DAILY WITH MEALS
COMMUNITY
Start: 2024-06-10

## 2024-08-26 NOTE — CARE COORDINATION
Care Transitions Note    Initial Call - Call within 2 business days of discharge: Yes    Patient Current Location:  Home: [address] [facility] [facility] [address]  [facility] [address]    Care Transition Nurse contacted the patient by telephone to perform post hospital discharge assessment, verified name and  as identifiers. Provided introduction to self, and explanation of the Care Transition Nurse role.     Patient: Sugey Souza    Patient : 1955   MRN: [id number]    Reason for Admission: Hypotension/Bradycardia/AMS   Discharge Date: 20  RURS: No data recorded    Last Discharge Facility       None            Was this an external facility discharge? Yes. Discharge Date: 24. Facility Name: [facility]    Additional needs identified to be addressed with provider   Hypotension: Patient taking Baclofen, Zanaflex, Percocet, Diclofenac, and Xanax. Concern for polypharmacy.    LENI: Per d/c summary, patient to avoid nephrotoxins. Currently taking Baclofen. Please advise.    CHF: Patient voiced she was taking Coreg 12.5 mg but does not have refill. Last prescribed on 6/10 at d/c from [facility]. Per patient, her cardiologist is Dr. Blankenship.    Grief: Patient's son killed 2 weeks ago in a MVA. Please refer to psych for associated grief and depression.     Referral: Patient voiced she needs referral to pain management for chronic neck/back pain.        Method of communication with provider: chart routing.    Patients top risk factors for readmission: medical condition-hypotension, bradycardia, possible polypharmacy    Interventions to address risk factors:   Education: CTN explained to patient that muscle relaxants and pain medications can cause a drop in blood pressure and heart rate. Advised patient to only take medications as prescribed.   Review of patient management of conditions/medications: CTN completed full med rec with patient. CTN    Care Summary Note: Patient voiced she is tired but

## 2024-08-30 ENCOUNTER — CARE COORDINATION (OUTPATIENT)
Facility: CLINIC | Age: 69
End: 2024-08-30

## 2024-08-30 NOTE — CARE COORDINATION
CTN attempted to contact [facility] (office of Dr. Blankenship) to notify office of most recent hospitalization and patient no longer taking Coreg 12.5 mg BID due to no refills being available. On hold > 10 mins. Chart forwarded to provider to make aware.

## 2024-09-03 ENCOUNTER — CARE COORDINATION (OUTPATIENT)
Facility: CLINIC | Age: 69
End: 2024-09-03

## 2024-09-03 NOTE — CARE COORDINATION
Care Transitions Note    Follow Up Call     Attempted to reach patient for transitions of care follow up.  Unable to reach patient.      Outreach Attempts:   HIPAA compliant voicemail left for patient.     Care Summary Note: Patient rescheduled pcp appointment    Follow Up Appointment:   Future Appointments         Provider Specialty Dept Phone    9/10/2024 3:30 PM Geraldine Dalton MD Internal Medicine [phone]            Plan for follow-up call in 6-10 days based on severity of symptoms and risk factors. Plan for next call: symptom management-assess for hypotension red flags   follow-up appointment-verify scheduling/attendance of virtual [facility] appt  referrals-follow up on pain management and psych referral.      Juli Naylor LPN

## 2024-09-10 ENCOUNTER — CARE COORDINATION (OUTPATIENT)
Facility: CLINIC | Age: 69
End: 2024-09-10

## 2024-09-12 ENCOUNTER — CARE COORDINATION (OUTPATIENT)
Facility: CLINIC | Age: 69
End: 2024-09-12

## 2024-09-23 ENCOUNTER — CARE COORDINATION (OUTPATIENT)
Facility: CLINIC | Age: 69
End: 2024-09-23

## 2024-09-25 ENCOUNTER — CARE COORDINATION (OUTPATIENT)
Facility: CLINIC | Age: 69
End: 2024-09-25

## 2024-09-26 ENCOUNTER — CARE COORDINATION (OUTPATIENT)
Facility: CLINIC | Age: 69
End: 2024-09-26

## 2024-10-07 ENCOUNTER — CARE COORDINATION (OUTPATIENT)
Facility: CLINIC | Age: 69
End: 2024-10-07

## 2024-10-07 NOTE — CARE COORDINATION
Ambulatory Care Coordination Note     10/7/2024 1:32 PM     Patient outreach attempt by this ACM today to perform care management follow up . ACM was unable to reach the patient by telephone today; left voice message requesting a return phone call to this ACM.     ACM: Horacio Contreras RN     Care Summary Note:     PCP/Specialist follow up:   Future Appointments         Provider Specialty Dept Phone    11/4/2024 3:00 PM Rosalinda Zamora APRN - NP Urology [phone]            Follow Up:   Plan for next ACM outreach in approximately 1 week to complete:  - CC Protocol assessments  - SDOH assessments  - goal progression.

## 2024-10-09 ENCOUNTER — CARE COORDINATION (OUTPATIENT)
Facility: CLINIC | Age: 69
End: 2024-10-09

## 2024-10-09 NOTE — CARE COORDINATION
Follow Up Social Work Note - Ambulatory  10/9/2024          Two Identifiers Verified: Yes    Identified Needs:      [employer] utility bill assistance      [facility] APS referral     Grief counseling resources    Rental/Eviction assistance (COMPLETED)       LMSW completed follow up outreach with patient. Confirmed name and date of birth.     Rental/Eviction assistance- Patient stated rental assistance/eviction concerns were resolved.    Utility assistance- Patient stated she still requires assistance with [employer] bill. Not eligible for EAP program due to income. Patient stated another  came to home to assist with utility bill however she is unable to recall name, agency or phone number. Patient stated she contacted [employer] to assist however they did not have funding at time.      APS referral- LMSW left a message with , ARI Swain for update.     Grief counseling resources- Patient was sent counseling resources via [employer] via previous . Patient stated she did not receive. This LMSW resend resources to patient via [employer].    Patient also stated she is waiting for her spousal support check and will pay her [employer] but will still like assistance if eligible.     Additional call made to [facility] for resource assistance. [facility] are participating in utility assistance programs however patient would need to apply and application is randomly selected. Program is not a guarantee. Patient must have a disconnection notice or medial waiver on file with [employer].      PLAN:  This LMSW will follow up with [place] APS on referral, research utility assistance programs  and coordinate with patient to apply with [facility] Crisis hotline.

## 2024-10-10 ENCOUNTER — CARE COORDINATION (OUTPATIENT)
Facility: CLINIC | Age: 69
End: 2024-10-10

## 2024-10-10 NOTE — CARE COORDINATION
Follow Up Social Work Note - Ambulatory  10/10/2024          Two Identifiers Verified: Yes    Identified Needs:      [employer] utility bill assistance       APS referral      Grief counseling resources     Rental/Eviction assistance (COMPLETED)     LMSW completed outreach call with patient to follow up on goals. LMSW and patient contacted "[employer] and the Housing Crisis Hotline.  was given permission to speak with agencies on her behalf with patient on line.     At this time, patient is not eligible for resources through [facility] Housing Crisis hotline ([facility] [employer]) Must have a medical waiver form or disconnect notice from "[employer] on file to submit to their [email or web address] system.  will send PCP a Serious Medical Condition Certification Form for completion. Advised patient to look into local churches that may assist with utility assistance.  will inquire with local churches as well. Patient not eligible for "[employer] PIPP program due to exceeding income.      reminded patient to review counseling resources [facility].  Patient is agreement with [employer] meal delivery.  completed referral for one time post discharge meals. Patient will receive 14 meals on 10/17/2024. Confirmation # [id number]     waiting on return call from [facility].      also messaged assigned RN ABBIE for PCP ED follow up appointment.     MARIN Jordan  Care Management

## 2024-10-14 ENCOUNTER — CARE COORDINATION (OUTPATIENT)
Facility: CLINIC | Age: 69
End: 2024-10-14

## 2024-10-14 NOTE — CARE COORDINATION
Ambulatory Care Coordination Note     10/14/2024 3:03 PM     Patient outreach attempt by this ACM today to perform care management follow up . ACM was unable to reach the patient by telephone today; left voice message requesting a return phone call to this ACM.     ACM: Horacio Contreras RN     Care Summary Note:     PCP/Specialist follow up:   Future Appointments         Provider Specialty Dept Phone    11/4/2024 3:00 PM Rosalinda Zamora APRN - NP Urology [phone]            Follow Up:   Plan for next ACM outreach in approximately 1 week to complete:  - CC Protocol assessments  - SDOH assessments  - goal progression.

## 2024-10-15 ENCOUNTER — CARE COORDINATION (OUTPATIENT)
Facility: CLINIC | Age: 69
End: 2024-10-15

## 2024-10-15 NOTE — CARE COORDINATION
Ambulatory Care Coordination Note     10/15/2024 2:52 PM     Patient Current Location:  Home: [address] [place] [address] [address]  [facility] [address]     ACM contacted the patient by telephone. Verified name and  with patient as identifiers.         ACM: Horacio Contreras RN     Challenges to be reviewed by the provider   Additional needs identified to be addressed with provider No  none               Method of communication with provider: none.    Has the patient been seen in the ED since your last call? Yes,   Discharge Date: 10/05/2024  Discharge Facility: [facility]  Reason for ED Visit: SOB  Visit Diagnosis:   [M54.50] Acute low back pain without sciatica, unspecified back pain laterality (Primary)   [G89.29] Other chronic pain   [F41.8] Anxiety with somatic features   [F43.29] Stress and adjustment reaction   [N20.0] Renal calculus   [R31.9] Hematuria, unspecified type       Number of ED visits in the last 6 months: 7      Do you have any ongoing symptoms? Yes, my symptoms have improved.   Current symptoms: Anxiety, chronic pain.    Did you call your PCP prior to going to the ED? No, did not call the PCP office.     Review of Discharge Instructions:   [x] AVS discharge instructions  [x] Right Care, Right Place, Right Time document  [] Medication changes  [] Follow up appointments  [x] Referral follow up   []        Care Summary Note: Pt was walking into court, couldn't talk long. She requests assistance finding a psychiatrist or counselor for grief support/anxiety. She deny's any issues somatic in nature,  encouraged pt to reach out to PCP for appt, & to reach out to PCP or this ACM should any issues arise. Pt states she will.       Assessments Completed:   General Assessment    Do you have any symptoms that are causing concern?: No          Medications Reviewed:   Patient denies any changes with medications and reports taking all medications as prescribed.    Advance Care Planning:   Not reviewed during this

## 2024-10-16 ENCOUNTER — CARE COORDINATION (OUTPATIENT)
Facility: CLINIC | Age: 69
End: 2024-10-16

## 2024-10-16 NOTE — CARE COORDINATION
Follow Up Social Work Note - Ambulatory  10/16/2024      Two Identifiers Verified: Yes    Identified Needs:      [employer] utility bill assistance      [facility] APS referral      Grief counseling resources     Rental/Eviction assistance (COMPLETED)       SW completed outreach with patient. She reports doing well today and preparing to return to court. Reports she has submitted all documentation to receive updated payment of spousal support and plans to receive it by 10/18/[address].     Patient reports she was able to contact churches provided to assist however they were unable to assist patient. She will try additional churches in her area.  left message with [facility] regarding assistance with [employer]. Provided resource to patient as well. Received return call from [employer] and they have funding available however patient must call to make appointment. Updated patient with information.      provided resource of [facility] [employer] near home. Patient accepted resource and will attempt to  food today.     Discussed assisted living facilities and is thinking about it receiving additional support.  Not agreeable to placement or resources at this time.     Updated patient on [employer] meal delivery. Patient expressed her thanks to .     Discussed counseling resources. Patient stated she did not receive via [employer] & provided email. SW emailed counseling resources to patient.     followed up on APS referral. Previous case was closed.  completed a second report for no food and concerns of self neglect. Report # [id number].    PLAN:   will complete outreach with patient to assist in food resources & making appointments for utility assistance and counseling.       Candida García LMSW  Care Management

## 2024-10-17 ENCOUNTER — TELEPHONE (OUTPATIENT)
Facility: CLINIC | Age: 69
End: 2024-10-17

## 2024-10-17 ENCOUNTER — CARE COORDINATION (OUTPATIENT)
Facility: CLINIC | Age: 69
End: 2024-10-17

## 2024-10-17 NOTE — TELEPHONE ENCOUNTER
Pt called requesting refills to be sent to [id number] - [place], [place] - [address] - P [phone] - F [phone]     Last Appointment:  7/24/2024  Future Appointments   Date Time Provider Department Center   11/4/2024  3:00 PM Rosalinda Zamora APRN - NP [facility] [place] Sched      ALPRAZolam (XANAX) 0.5 MG tablet [[id number]]  ENDED    Order Details    Dose: 0.5 mg Route: Oral Frequency: EVERY 8 HOURS PRN   Dispense Quantity: -- Refills: --          Sig: Take 1 tablet by mouth every 8 hours as needed.         oxyCODONE-acetaminophen (PERCOCET)  MG per tablet [[id number]]    Order Details    Dose: 1 tablet Route: Oral Frequency: EVERY 6 HOURS PRN   Dispense Quantity: -- Refills: --          Sig: Take 1 tablet by mouth every 6 hours as needed.

## 2024-10-17 NOTE — TELEPHONE ENCOUNTER
Patient is not to get these refills from this office.  I have advised her in the past that I will not be giving her any more pain medication.  She misuses these medications and has \"accidentally\" overdosed several times

## 2024-10-17 NOTE — CARE COORDINATION
Follow Up Social Work Note - Ambulatory  10/17/2024      Two Identifiers Verified: Yes     Identified Needs:      [employer] utility bill assistance (received assistance; additional resources are pending)       APS referral      Grief counseling resources (Provided [facility],  Outpatient Mental Health Services & local counselors)     Rental/Eviction assistance (COMPLETED)    LMSW received incoming phone call from patient. She reports doing well today, made breakfast but is feeling a little tired. She was able to submit documentation to [facility] for utility assistance. Per patient, [facility] is able to assist with $150 one time payment. Reports she has submitted all documentation to receive updated payment of spousal support and plans to receive it by 10/18/78414.      Patient spoke with Yvette at [facility] & will make appointment.     At the time of call, patient have not received meals from [employer].  and patient completed a follow up call regarding meals to [employer]. Delivery is scheduled for today between 2-4 pm.      provided patient with additional resource to [facility]. Provided address, phone number and instructions to be seen to include bring ID, insurance card and timeframe of visit.  Outpatient only accept walk ins. Patient also has [facility] resources for support groups and resources.          PLAN:  Ongoing psychosocial support and resource referral as desired by patient/family. Patient has this SW contact information if any further questions, concerns, or care needs arise.    Candida García LMSW  Care Management

## 2024-10-18 NOTE — TELEPHONE ENCOUNTER
Spoke with pt in regards to medication refills. Two patient identifier's verified.  Relayed the PCP's note. Pt acknowledges understanding and voices no concerns at this time.

## 2024-10-22 ENCOUNTER — CARE COORDINATION (OUTPATIENT)
Facility: CLINIC | Age: 69
End: 2024-10-22

## 2024-10-22 NOTE — CARE COORDINATION
housing, transportation, food resources. (pt-stated)   No change      Patient agreeable to MSW services to assist with obtaining community resources for transportation, food/nutrition, housing support.     Barriers: impairment:  mental health: depression, lack of support, overwhelmed by complexity of regimen, stress, and time constraints  Plan for overcoming my barriers: MSW will provide assistance with identifying appropriate resources. Patient will engage with MSW and utilize resources provided.   Confidence:  9 /10  Anticipated Goal Completion Date:                PCP/Specialist follow up:   Future Appointments         Provider Specialty Dept Phone    11/4/2024 3:00 PM Rosalinda Zamora APRN - NP Urology [phone]            Follow Up:   Plan for next ACM outreach in approximately 2 weeks to complete:  - CC Protocol assessments  - SDOH assessments  - goal progression.   Patient  is agreeable to this plan.

## 2024-10-25 ENCOUNTER — CARE COORDINATION (OUTPATIENT)
Facility: CLINIC | Age: 69
End: 2024-10-25

## 2024-10-25 NOTE — CARE COORDINATION
Follow Up Social Work Note - Ambulatory  10/25/2024          Two Identifiers Verified: Yes     Identified Needs:      [employer] utility bill assistance (received assistance; additional resources are pending)       APS referral      Grief counseling resources (Provided [facility],  Outpatient Mental Health Services & local counselors)     Rental/Eviction assistance (COMPLETED)     [facility] completed outreach with patient. Patient state she is having challenges receiving spousal income. Supported patient through navigating [employer] ([employer]), [employer] however wait times exceeded 30 minutes. [facility]- appointment is scheduled on 10/29 at 2:30 pm. Patient is aware and completing forms for eligibility determination. [employer] meals-Meals were received & patient is thankful.  Outpatient Mental Health Services- Did not attend walk in services but still plans to. Provided additional resource of [facility] Case Management & Financial Assistance programs and completed referral to agency. Patient stated a friend, HERRERA Ocsaio, is present at her home to assist with phone calls, completing forms,fax,etc.Verbal permission to speak with Mr. Ocasio. Explained the importance of helping patient with documents. Mr. Ocasio is willing to help navigate system on patient behalf.       PLAN:  Ongoing psychosocial support and resource referral as desired by patient/family. Patient has this SW contact information if any further questions, concerns, or care needs arise.    Candida García SATISH  Care Management

## 2024-10-29 ENCOUNTER — CARE COORDINATION (OUTPATIENT)
Facility: CLINIC | Age: 69
End: 2024-10-29

## 2024-10-29 NOTE — CARE COORDINATION
Follow Up Social Work Note - Ambulatory  10/29/2024     4:00 pm  Patient completed appointment with Familia Merritt however still needs to complete paperwork and submit. Patient plans on submitting documents this week.     12:57 pm  Two Identifiers Verified: Yes      Identified Needs:      [employer] utility bill assistance (received assistance; additional resources are pending)       APS referral      Grief counseling resources (Provided [facility],  Outpatient Mental Health Services & local counselors)     Rental/Eviction assistance (COMPLETED)    Patient stated she is still working on receiving money from [employer]. Reminded of appointment today with [employer] [facility] for financial assistance for [employer] bill. Patient will follow up with outcome.       12:42 pm  Attempted to contact patient for Complex Care Management. No answer, left message and provided contact information. Will attempt contact at a later time.    Candida García LMSW  Care Management

## 2024-11-04 ENCOUNTER — CARE COORDINATION (OUTPATIENT)
Facility: CLINIC | Age: 69
End: 2024-11-04

## 2024-11-04 NOTE — CARE COORDINATION
Follow Up Social Work Note - Ambulatory  11/4/2024      Two Identifiers Verified: Yes    Identified Needs:      [employer] utility bill assistance (received assistance; additional resources are pending)       APS referral      Grief counseling resources (Provided [facility] Outpatient Mental Health Services & local counselors, 988)     Rental/Eviction assistance (COMPLETED)     contacted patient to complete outreach. Patient reports ED visit for leg pain. Patient unable to recall medications prescribed from ED visit. Discussion held on care plan and goals. Patient has not progressed with  outreach & assistance.      contacted  APS & updated previous referral with memory concerns, not taking medications. Also received supervisor name and phone number. Left a message requesting a return call. Patient would benefit from Assisted living placement.  explained support of an assisted living facility. Patient will consider placement as an option. Patient expressed she is having challenges navigating tasks and no longer has support of friend, Farhat. Discussed connecting with children and ex- for support. Patient declined to contact family for assistance.   also left message with [employer] regarding assistance for [employer] bill. Received follow up from [facility] case management program. They are not accepting patients at this time. Referral sent to [facility].     Patient became overwhelmed during conversation and stated she no longer wanted to live like this.  assessed for plans and means. Patient then stated she has no plan to harm self or no access to gun. Knives, pills are in home but patient stated she wants to live for granddaughter.  provided emotional support within license. Patient declines outreach for immediate psych or mental health support.  provided number to

## 2024-11-05 ENCOUNTER — CARE COORDINATION (OUTPATIENT)
Facility: CLINIC | Age: 69
End: 2024-11-05

## 2024-11-05 NOTE — CARE COORDINATION
Ambulatory Care Coordination Note     2024 11:04 AM     Patient Current Location:  Home: [address]  [place] [address]     ACM contacted the patient by telephone. Verified name and  with patient as identifiers.         ACM: Horacio Contreras RN     Challenges to be reviewed by the provider   Additional needs identified to be addressed with provider Yes, Pt was seen recently in ED for RLE weepy, swollen,  leg wound, cellulitis, was given Keflex, 500mg Q12hrs x 7 days. Reports wanting to be seen by PCP for pain & current infection.  Stressed with pt the importance of taking medication(antibiotic & others) as directed, verbalizes understanding. Also under the care of SW for housing & other resources.                  Method of communication with provider: chart routing.    Utilization: Has the patient been seen in the ED since your last call? Yes,   Discharge Date: 2024  Discharge Facility: [facility]  Reason for ED Visit: Right leg wound  Visit Diagnosis: complicated leg wound    Number of ED visits in the last 6 months: 9      Do you have any ongoing symptoms? Yes,  pain    Current symptoms: pain, .    Did you call your PCP prior to going to the ED? No, did not call the PCP office.   LE wound  Review of Discharge Instructions:   [x] AVS discharge instructions  [x] Right Care, Right Place, Right Time document  [x] Medication changes  [x] Follow up appointments  [x] Referral follow up   []        Care Summary Note: Pt was seen recently in ED for RLE leg wound, cellulitis, was given Keflex, 500mg Q12hrs x 7 days.        Assessments Completed:   General Assessment    Do you have any symptoms that are causing concern?: Yes  Progression since Onset: Unchanged  Reported Symptoms: Other (Comment: seen recently for cellulitis of leg)          Medications Reviewed:   Patient denies any changes with medications and reports taking all medications as prescribed.  Also taking Keflex, reiterated the

## 2024-11-12 ENCOUNTER — CARE COORDINATION (OUTPATIENT)
Facility: CLINIC | Age: 69
End: 2024-11-12

## 2024-11-12 NOTE — CARE COORDINATION
spoke with patient today. Patient stated it was not a good time to talk as she is out in the community. Patient will return  call. This writer agrees with plan. Patient has  contact number and will call.     Candida García [facility]  Care Management

## 2024-11-12 NOTE — CARE COORDINATION
Ambulatory Care Coordination Note     11/12/2024 1:46 PM     Patient outreach attempt by this ACM today to perform care management follow up . ACM was unable to reach the patient by telephone today; left voice message requesting a return phone call to this ACM.     ACM: Horacio Contreras RN     Care Summary Note:     PCP/Specialist follow up:       Follow Up:   Plan for next ACM outreach in approximately 2 weeks to complete:  - CC Protocol assessments  - disease specific assessments  - SDOH assessments  - medication review  - advance care planning  - goal progression.

## 2024-11-25 ENCOUNTER — CARE COORDINATION (OUTPATIENT)
Facility: CLINIC | Age: 69
End: 2024-11-25

## 2024-11-25 NOTE — CARE COORDINATION
Follow Up Social Work Note - Ambulatory  11/25/2024          Two Identifiers Verified: Yes     Identified Needs:      [employer] utility bill assistance (received assistance; additional resources provided)      [employer] [facility] referral (COMPLETED & SERVICE CONNECTED)      Grief counseling resources (Provided [facility] & local counselors, 988)     Rental/Eviction assistance (COMPLETED)      contacted patient to complete outreach. Patient states she is doing very well today and preparing for Thanksgiving. Reports no ED or hospital visits since last conversation. Patient stated she has no needs at this time. Reports having food and received [employer] meals.  reviewed all resources that have been provided to patient during course of engagement. Patient understand to contact additional resources or call  if assistance is needed.      Addendum:  [facility] workerARI. Patient has history with [facility] & been connected to services in past to include rental assistance, utility assistance, food/nutrition and case management.  Last home visit was completed. Patient had food, all utilities were connected and assistance was provided. Per Ms. Swain, patient is ambulatory, drives, completes ADL/IADL & is not in any imminent risk and need of protective services.       Plan:  Ongoing psychosocial support and resource referral as desired by patient/family. Patient has this SW contact information if any further questions, concerns, or care needs arise.    Candida García SATISH  Care Management

## 2024-11-26 ENCOUNTER — CARE COORDINATION (OUTPATIENT)
Facility: CLINIC | Age: 69
End: 2024-11-26

## 2024-11-26 ENCOUNTER — TELEPHONE (OUTPATIENT)
Facility: CLINIC | Age: 69
End: 2024-11-26

## 2024-11-26 NOTE — TELEPHONE ENCOUNTER
Received a message from staff, pt is attempting to schedule an appointment for skin sores. Call placed to the pt and informed she plans to go to the Urgent Care today for treatment for her sores. She states her blood pressures have not been controlled lately and would like an appointment to be seen. The pt is scheduled for 12/10/2024. Pt was offered earlier appointments however could not come due to timing. PCP notified.

## 2024-11-26 NOTE — CARE COORDINATION
Ambulatory Care Coordination Note     2024 9:52 AM     Patient Current Location:  Home: [address]  [place] [address]     ACM contacted the patient by telephone. Verified name and  with patient as identifiers.         ACM: Horacio Contreras RN     Challenges to be reviewed by the provider   Additional needs identified to be addressed with provider Yes  Pt seen a few weeks ago for cellulitis of LE, was given course of antibiotics  which is now finished.   Pt reports her LE's becoming worse again, more 'sores' & 'it feels like something is eating them from the inside'. Pt is c/o pain, discussed importance of addressing infection if present to alleviate pain.  Pt encouraged to make appt.                Method of communication with provider: chart routing.    Utilization: Has the patient been seen in the ED since your last call? Yes,   Discharge Date: 2024  [facility]  Reason for ED Visit: Cellulitis RLE  Visit Diagnosis: Cellulitis RLE    Number of ED visits in the last 6 months: 7      Do you have any ongoing symptoms? Yes, my symptoms have worsened.   Current symptoms: Cellulitis/sores, Bilateral LE.    Did you call your PCP prior to going to the ED? No, did not call the PCP office.     Review of Discharge Instructions:   [x] AVS discharge instructions  [x] Right Care, Right Place, Right Time document  [x] Medication changes  [] Follow up appointments  [] Referral follow up   []        Care Summary Note: See above note     Assessments Completed:   General Assessment    Do you have any symptoms that are causing concern?: Yes  Progression since Onset: Gradually Worsening  Reported Symptoms: Other (Comment: cellulitis of LE's, pt reports worsening)          Medications Reviewed:   Patient denies any changes with medications and reports taking all medications as prescribed.  Keflex 500 mg BID now done(was for 7 days)  Advance Care Planning:   Not reviewed

## 2024-11-29 DIAGNOSIS — M54.50 CHRONIC MIDLINE LOW BACK PAIN, UNSPECIFIED WHETHER SCIATICA PRESENT: ICD-10-CM

## 2024-11-29 DIAGNOSIS — G89.29 CHRONIC MIDLINE LOW BACK PAIN, UNSPECIFIED WHETHER SCIATICA PRESENT: ICD-10-CM

## 2024-11-29 DIAGNOSIS — Z76.0 MEDICATION REFILL: ICD-10-CM

## 2024-11-30 RX ORDER — BACLOFEN 10 MG/1
10 TABLET ORAL 3 TIMES DAILY PRN
Qty: 60 TABLET | Refills: 3 | Status: SHIPPED | OUTPATIENT
Start: 2024-11-30

## 2024-12-02 ENCOUNTER — CARE COORDINATION (OUTPATIENT)
Facility: CLINIC | Age: 69
End: 2024-12-02

## 2024-12-02 NOTE — CARE COORDINATION
Ambulatory Social Work Note    Patient graduated from the Social Work program on 12/2/2024.  At this time all social work goals have been completed and the patient/family has the ability to self-manage. All resources have been provided and patient is connected to services. Per discussion today, patient states she has [facility]  community resource guide for further resources, aware of Senior Services and 211. No further social work follow-up scheduled.  Patient/family has social work contact information for further questions, concerns, or needs.      Goals Addressed                      This Visit's Progress      COMPLETED: Medication Management         I will take my medication as directed.  I will notify my provider of any problems with medications, like adverse effects or side effects.  I will notify my provider/Care Coordinator if I am unable to afford my medications.  I will notify my provider for advice before I stop taking any of my medication.            Obtain community resources via  to assist with ensuring safety in the home, assist with housing, transportation, food resources. (pt-stated)   On track      Patient agreeable to MSW services to assist with obtaining community resources for transportation, food/nutrition, housing support.     Barriers: impairment:  mental health: depression, lack of support, overwhelmed by complexity of regimen, stress, and time constraints  Plan for overcoming my barriers: MSW will provide assistance with identifying appropriate resources. Patient will engage with MSW and utilize resources provided.   Confidence:  9 /10  Anticipated Goal Completion Date:               Future Appointments   Date Time Provider Department Center   12/10/2024  1:15 PM Geraldine Dalton MD [facility]        Candida García LMSW  Care Management

## 2024-12-03 ENCOUNTER — CARE COORDINATION (OUTPATIENT)
Facility: CLINIC | Age: 69
End: 2024-12-03

## 2024-12-03 NOTE — CARE COORDINATION
Ambulatory Care Coordination Note     12/3/2024 12:13 PM     Patient Current Location:  Home: [address]  [place] [address]     ACM contacted the patient by telephone. Verified name and  with patient as identifiers.         ACM: Horacio Contreras RN     Challenges to be reviewed by the provider   Additional needs identified to be addressed with provider No  none               Method of communication with provider: none.    Utilization: Has the patient been seen in the ED since your last call? no    Care Summary Note: Pt continues to report rash/cellulitis to LE's, reports her legs were getting better but antibiotics course finished & the inflammation, edema & rash returned. She reports this has been a chronic problem of hers, that it improves then gets worse.      Pt has appt with PCP next week to address.    Offered patient enrollment in the Remote Patient Monitoring (RPM) program for in-home monitoring: Deferred at this time because  ; will discuss at next outreach.     Assessments Completed:   General Assessment    Do you have any symptoms that are causing concern?: Yes  Progression since Onset: Unchanged  Reported Symptoms: Rash, Other (Comment: cellulitus bilateral LE's)          Medications Reviewed:   Pt reports not having full course of antibiotics for LE's, says she feels this is why her cellulitis persists.    Advance Care Planning:   Not reviewed during this call     Care Planning: Goals addressed.         PCP/Specialist follow up:   Future Appointments         Provider Specialty Dept Phone    12/10/2024 1:15 PM Geraldine Dalton MD Internal Medicine [phone]            Follow Up:   Plan for next AC outreach in approximately 2 weeks to complete:  - CC Protocol assessments  - disease specific assessments  - SDOH assessments  - medication review   - advance care planning   - goal progression.   Patient  is agreeable to this plan.

## 2024-12-17 ENCOUNTER — CARE COORDINATION (OUTPATIENT)
Facility: CLINIC | Age: 69
End: 2024-12-17

## 2024-12-17 NOTE — CARE COORDINATION
Ambulatory Care Coordination Note     12/17/2024 11:12 AM     Patient outreach attempt by this ACM today to perform care management follow up . ACM was unable to reach the patient by telephone today;   left voice message requesting a return phone call to this ACM.     ACM: Horacio Contreras RN     Care Summary Note:     PCP/Specialist follow up:       Follow Up:   Plan for next ACM outreach in approximately 1 week to complete:  - CC Protocol assessments  - disease specific assessments  - SDOH assessments  - medication review  - goal progression.

## 2024-12-26 ENCOUNTER — CARE COORDINATION (OUTPATIENT)
Facility: CLINIC | Age: 69
End: 2024-12-26

## 2024-12-26 NOTE — CARE COORDINATION
Ambulatory Care Coordination Note     12/26/2024 8:54 AM     Patient outreach attempt by this ACM today to perform care management follow up . ACM was unable to reach the patient by telephone today;   left voice message requesting a return phone call to this ACM.     ACM: Horacio Contreras RN     Care Summary Note:     PCP/Specialist follow up:       Follow Up:   Plan for next ACM outreach in approximately 2 weeks to complete:  - CC Protocol assessments  - disease specific assessments  - SDOH assessments  - medication review  - advance care planning  - goal progression.

## 2025-01-08 ENCOUNTER — CARE COORDINATION (OUTPATIENT)
Facility: CLINIC | Age: 70
End: 2025-01-08

## 2025-01-08 NOTE — CARE COORDINATION
Ambulatory Care Coordination Note     1/8/2025 9:39 AM     patient outreach attempt by this AC today to perform care management follow up . ACM was unable to reach the patient by telephone today;   left voice message requesting a return phone call to this ACM.     Patient closed (unable to reach patient) from the High Risk Care Management program on 1/8/2025.  Patient unable to progress towards self management. .  Care management goals have not been completed. No further Ambulatory Care Manager follow up scheduled.

## 2025-03-02 DIAGNOSIS — G89.29 CHRONIC MIDLINE LOW BACK PAIN, UNSPECIFIED WHETHER SCIATICA PRESENT: ICD-10-CM

## 2025-03-02 DIAGNOSIS — Z76.0 MEDICATION REFILL: ICD-10-CM

## 2025-03-02 DIAGNOSIS — M54.50 CHRONIC MIDLINE LOW BACK PAIN, UNSPECIFIED WHETHER SCIATICA PRESENT: ICD-10-CM

## 2025-03-03 DIAGNOSIS — Z76.0 MEDICATION REFILL: ICD-10-CM

## 2025-03-03 RX ORDER — BACLOFEN 10 MG/1
10 TABLET ORAL 3 TIMES DAILY PRN
Qty: 60 TABLET | Refills: 1 | Status: SHIPPED | OUTPATIENT
Start: 2025-03-03 | End: 2025-03-05 | Stop reason: ALTCHOICE

## 2025-03-03 RX ORDER — BUTALBITAL, ACETAMINOPHEN AND CAFFEINE 50; 325; 40 MG/1; MG/1; MG/1
TABLET ORAL
Qty: 30 TABLET | Refills: 1 | Status: SHIPPED | OUTPATIENT
Start: 2025-03-03 | End: 2025-03-05 | Stop reason: SDUPTHER

## 2025-03-05 ENCOUNTER — HOSPITAL ENCOUNTER (OUTPATIENT)
Facility: HOSPITAL | Age: 70
Setting detail: SPECIMEN
Discharge: HOME | End: 2025-03-08
Payer: MEDICARE

## 2025-03-05 ENCOUNTER — OFFICE VISIT (OUTPATIENT)
Facility: CLINIC | Age: 70
End: 2025-03-05

## 2025-03-05 VITALS
TEMPERATURE: 97.7 F | HEIGHT: 66 IN | WEIGHT: 231.13 LBS | SYSTOLIC BLOOD PRESSURE: 145 MMHG | DIASTOLIC BLOOD PRESSURE: 90 MMHG | OXYGEN SATURATION: 97 % | RESPIRATION RATE: 18 BRPM | HEART RATE: 94 BPM | BODY MASS INDEX: 37.15 KG/M2

## 2025-03-05 DIAGNOSIS — Z76.0 MEDICATION REFILL: ICD-10-CM

## 2025-03-05 DIAGNOSIS — F11.99 OPIOID USE, UNSPECIFIED WITH UNSPECIFIED OPIOID-INDUCED DISORDER (HCC): ICD-10-CM

## 2025-03-05 DIAGNOSIS — D35.2 BENIGN NEOPLASM OF PITUITARY GLAND (HCC): ICD-10-CM

## 2025-03-05 DIAGNOSIS — Z51.81 MEDICATION MONITORING ENCOUNTER: ICD-10-CM

## 2025-03-05 DIAGNOSIS — R35.0 URINARY FREQUENCY: ICD-10-CM

## 2025-03-05 DIAGNOSIS — F25.9 SCHIZOAFFECTIVE DISORDER, UNSPECIFIED TYPE (HCC): ICD-10-CM

## 2025-03-05 DIAGNOSIS — I10 ESSENTIAL (PRIMARY) HYPERTENSION: Primary | ICD-10-CM

## 2025-03-05 DIAGNOSIS — F43.21 GRIEF AT LOSS OF CHILD: ICD-10-CM

## 2025-03-05 DIAGNOSIS — R30.0 DYSURIA: ICD-10-CM

## 2025-03-05 DIAGNOSIS — G44.89 OTHER HEADACHE SYNDROME: ICD-10-CM

## 2025-03-05 DIAGNOSIS — E23.0 HYPOPITUITARISM: ICD-10-CM

## 2025-03-05 DIAGNOSIS — G40.909 NONINTRACTABLE EPILEPSY WITHOUT STATUS EPILEPTICUS, UNSPECIFIED EPILEPSY TYPE (HCC): ICD-10-CM

## 2025-03-05 DIAGNOSIS — Z12.31 SCREENING MAMMOGRAM FOR BREAST CANCER: ICD-10-CM

## 2025-03-05 DIAGNOSIS — R63.5 WEIGHT GAIN: ICD-10-CM

## 2025-03-05 DIAGNOSIS — Z63.4 GRIEF AT LOSS OF CHILD: ICD-10-CM

## 2025-03-05 DIAGNOSIS — R60.9 EDEMA, UNSPECIFIED TYPE: ICD-10-CM

## 2025-03-05 LAB
ALBUMIN SERPL-MCNC: 3.8 G/DL (ref 3.4–5)
ALBUMIN/GLOB SERPL: 1.2 (ref 0.8–1.7)
ALP SERPL-CCNC: 64 U/L (ref 45–117)
ALT SERPL-CCNC: 27 U/L (ref 13–56)
ANION GAP SERPL CALC-SCNC: 6 MMOL/L (ref 3–18)
APPEARANCE UR: CLEAR
AST SERPL-CCNC: 15 U/L (ref 10–38)
BACTERIA URNS QL MICRO: ABNORMAL /HPF
BASOPHILS # BLD: 0.06 K/UL (ref 0–0.1)
BASOPHILS NFR BLD: 0.6 % (ref 0–2)
BILIRUB SERPL-MCNC: 0.5 MG/DL (ref 0.2–1)
BILIRUB UR QL: NEGATIVE
BUN SERPL-MCNC: 16 MG/DL (ref 7–18)
BUN/CREAT SERPL: 16 (ref 12–20)
CALCIUM SERPL-MCNC: 8.7 MG/DL (ref 8.5–10.1)
CHLORIDE SERPL-SCNC: 108 MMOL/L (ref 100–111)
CO2 SERPL-SCNC: 25 MMOL/L (ref 21–32)
COLOR UR: ABNORMAL
CREAT SERPL-MCNC: 1.02 MG/DL (ref 0.6–1.3)
DIFFERENTIAL METHOD BLD: ABNORMAL
EOSINOPHIL # BLD: 0.57 K/UL (ref 0–0.4)
EOSINOPHIL NFR BLD: 5.7 % (ref 0–5)
EPITH CASTS URNS QL MICRO: ABNORMAL /LPF (ref 0–5)
ERYTHROCYTE [DISTWIDTH] IN BLOOD BY AUTOMATED COUNT: 14.2 % (ref 11.6–14.5)
GLOBULIN SER CALC-MCNC: 3.3 G/DL (ref 2–4)
GLUCOSE SERPL-MCNC: 116 MG/DL (ref 74–99)
GLUCOSE UR STRIP.AUTO-MCNC: NEGATIVE MG/DL
HCT VFR BLD AUTO: 40.9 % (ref 35–45)
HGB BLD-MCNC: 13.3 G/DL (ref 12–16)
HGB UR QL STRIP: NEGATIVE
IMM GRANULOCYTES # BLD AUTO: 0.03 K/UL (ref 0–0.04)
IMM GRANULOCYTES NFR BLD AUTO: 0.3 % (ref 0–0.5)
KETONES UR QL STRIP.AUTO: ABNORMAL MG/DL
LEUKOCYTE ESTERASE UR QL STRIP.AUTO: ABNORMAL
LYMPHOCYTES # BLD: 3.28 K/UL (ref 0.9–3.6)
LYMPHOCYTES NFR BLD: 32.6 % (ref 21–52)
MCH RBC QN AUTO: 32.8 PG (ref 24–34)
MCHC RBC AUTO-ENTMCNC: 32.5 G/DL (ref 31–37)
MCV RBC AUTO: 100.7 FL (ref 78–100)
MONOCYTES # BLD: 1.18 K/UL (ref 0.05–1.2)
MONOCYTES NFR BLD: 11.7 % (ref 3–10)
NEUTS SEG # BLD: 4.93 K/UL (ref 1.8–8)
NEUTS SEG NFR BLD: 49.1 % (ref 40–73)
NITRITE UR QL STRIP.AUTO: NEGATIVE
NRBC # BLD: 0 K/UL (ref 0–0.01)
NRBC BLD-RTO: 0 PER 100 WBC
PH UR STRIP: 6 (ref 5–8)
PLATELET # BLD AUTO: 289 K/UL (ref 135–420)
PMV BLD AUTO: 11.6 FL (ref 9.2–11.8)
POTASSIUM SERPL-SCNC: 3.5 MMOL/L (ref 3.5–5.5)
PROT SERPL-MCNC: 7.1 G/DL (ref 6.4–8.2)
PROT UR STRIP-MCNC: 30 MG/DL
RBC # BLD AUTO: 4.06 M/UL (ref 4.2–5.3)
RBC #/AREA URNS HPF: ABNORMAL /HPF (ref 0–5)
SODIUM SERPL-SCNC: 139 MMOL/L (ref 136–145)
SP GR UR REFRACTOMETRY: 1.02 (ref 1–1.03)
T4 FREE SERPL-MCNC: 0.8 NG/DL (ref 0.7–1.5)
TSH SERPL DL<=0.05 MIU/L-ACNC: 1.07 UIU/ML (ref 0.36–3.74)
UROBILINOGEN UR QL STRIP.AUTO: 1 EU/DL (ref 0.2–1)
WBC # BLD AUTO: 10.1 K/UL (ref 4.6–13.2)
WBC URNS QL MICRO: ABNORMAL /HPF (ref 0–5)

## 2025-03-05 PROCEDURE — 80053 COMPREHEN METABOLIC PANEL: CPT

## 2025-03-05 PROCEDURE — 84439 ASSAY OF FREE THYROXINE: CPT

## 2025-03-05 PROCEDURE — 84146 ASSAY OF PROLACTIN: CPT

## 2025-03-05 PROCEDURE — 85025 COMPLETE CBC W/AUTO DIFF WBC: CPT

## 2025-03-05 PROCEDURE — 36415 COLL VENOUS BLD VENIPUNCTURE: CPT

## 2025-03-05 PROCEDURE — 81001 URINALYSIS AUTO W/SCOPE: CPT

## 2025-03-05 PROCEDURE — 84443 ASSAY THYROID STIM HORMONE: CPT

## 2025-03-05 RX ORDER — ATORVASTATIN CALCIUM 40 MG/1
40 TABLET, FILM COATED ORAL DAILY
Qty: 90 TABLET | Refills: 3 | Status: SHIPPED | OUTPATIENT
Start: 2025-03-05

## 2025-03-05 RX ORDER — ALBUTEROL SULFATE 90 UG/1
2 INHALANT RESPIRATORY (INHALATION) EVERY 6 HOURS PRN
Qty: 18 G | Refills: 5 | Status: SHIPPED | OUTPATIENT
Start: 2025-03-05

## 2025-03-05 RX ORDER — FLUTICASONE PROPIONATE 50 MCG
1 SPRAY, SUSPENSION (ML) NASAL DAILY
Qty: 16 G | Refills: 5 | Status: SHIPPED | OUTPATIENT
Start: 2025-03-05

## 2025-03-05 RX ORDER — CETIRIZINE HYDROCHLORIDE 10 MG/1
10 TABLET ORAL DAILY
Qty: 90 TABLET | Refills: 3 | Status: SHIPPED | OUTPATIENT
Start: 2025-03-05

## 2025-03-05 RX ORDER — NYSTATIN 100000 [USP'U]/G
POWDER TOPICAL 2 TIMES DAILY
Qty: 60 G | Refills: 5 | Status: SHIPPED | OUTPATIENT
Start: 2025-03-05

## 2025-03-05 RX ORDER — TIZANIDINE HYDROCHLORIDE 4 MG/1
CAPSULE, GELATIN COATED ORAL
Qty: 90 CAPSULE | Refills: 5 | Status: SHIPPED | OUTPATIENT
Start: 2025-03-05

## 2025-03-05 RX ORDER — ACYCLOVIR 400 MG/1
400 TABLET ORAL
Qty: 50 TABLET | Refills: 5 | Status: SHIPPED | OUTPATIENT
Start: 2025-03-05 | End: 2025-09-01

## 2025-03-05 RX ORDER — BUTALBITAL, ACETAMINOPHEN AND CAFFEINE 50; 325; 40 MG/1; MG/1; MG/1
TABLET ORAL
Qty: 30 TABLET | Refills: 1 | Status: SHIPPED | OUTPATIENT
Start: 2025-03-05

## 2025-03-05 SDOH — ECONOMIC STABILITY: FOOD INSECURITY: WITHIN THE PAST 12 MONTHS, YOU WORRIED THAT YOUR FOOD WOULD RUN OUT BEFORE YOU GOT MONEY TO BUY MORE.: NEVER TRUE

## 2025-03-05 SDOH — ECONOMIC STABILITY: FOOD INSECURITY: WITHIN THE PAST 12 MONTHS, THE FOOD YOU BOUGHT JUST DIDN'T LAST AND YOU DIDN'T HAVE MONEY TO GET MORE.: NEVER TRUE

## 2025-03-05 SDOH — SOCIAL STABILITY - SOCIAL INSECURITY: DISSAPEARANCE AND DEATH OF FAMILY MEMBER: Z63.4

## 2025-03-05 ASSESSMENT — PATIENT HEALTH QUESTIONNAIRE - PHQ9
2. FEELING DOWN, DEPRESSED OR HOPELESS: SEVERAL DAYS
SUM OF ALL RESPONSES TO PHQ QUESTIONS 1-9: 10
7. TROUBLE CONCENTRATING ON THINGS, SUCH AS READING THE NEWSPAPER OR WATCHING TELEVISION: SEVERAL DAYS
3. TROUBLE FALLING OR STAYING ASLEEP: NEARLY EVERY DAY
SUM OF ALL RESPONSES TO PHQ QUESTIONS 1-9: 10
9. THOUGHTS THAT YOU WOULD BE BETTER OFF DEAD, OR OF HURTING YOURSELF: NOT AT ALL
6. FEELING BAD ABOUT YOURSELF - OR THAT YOU ARE A FAILURE OR HAVE LET YOURSELF OR YOUR FAMILY DOWN: NOT AT ALL
10. IF YOU CHECKED OFF ANY PROBLEMS, HOW DIFFICULT HAVE THESE PROBLEMS MADE IT FOR YOU TO DO YOUR WORK, TAKE CARE OF THINGS AT HOME, OR GET ALONG WITH OTHER PEOPLE: SOMEWHAT DIFFICULT
5. POOR APPETITE OR OVEREATING: SEVERAL DAYS
8. MOVING OR SPEAKING SO SLOWLY THAT OTHER PEOPLE COULD HAVE NOTICED. OR THE OPPOSITE, BEING SO FIGETY OR RESTLESS THAT YOU HAVE BEEN MOVING AROUND A LOT MORE THAN USUAL: NOT AT ALL
1. LITTLE INTEREST OR PLEASURE IN DOING THINGS: SEVERAL DAYS
4. FEELING TIRED OR HAVING LITTLE ENERGY: NEARLY EVERY DAY

## 2025-03-05 ASSESSMENT — ENCOUNTER SYMPTOMS: SHORTNESS OF BREATH: 1

## 2025-03-05 NOTE — PATIENT INSTRUCTIONS
[facility] Utility - Financial Resources*  (Call [employer]/211 if need more resources.)    Utilities  [employer]  What they offer: Partnership with the [place] Department of . Assist with finding and applying for government funded programs and benefits. You can also update your benefits or report changes through [employer].  Website: https://www.[employer]/  Phone Number: 833-5CALLVA (127-991-8639)    [employer] [place] [employer]  What they offer: [employer]Share is [facility]'s energy assistance program of last resort for  anyone who faces financial hardships from unemployment or family crisis.  Phone Number: 284.290.3667  Address: [address] [place] [address]  Website: https://www.[employer]/[place]/billing/billing-options/energyshare    Energy Assistance Programs (EAP) - Department of   What they offer: EAP assists low income households in meeting their immediate home energy needs.  Phone Number: 713.880.7180 or contact your local department of   Website: https://www.dss.[place].gov/benefit/ea/    [facility] [facility]  What they offer: Bill and utility assistance  Phone number: 446.387.8039.  Website: https://[employer]  What they offer: Utility, rent, mortgage assistance   Phone number: 654.137.5210.  Website: https://[employer].org  Regional Housing Crisis Hotline  Can assist with rent assistance, eviction prevention and utility bills.  Phone Number: 414.769.2123    Medical  [employer] Financial Assistance  What they offer: The [employer] Financial Assistance Program helps uninsured patients who do not qualify for government-sponsored health insurance and cannot afford to pay for their medical care. Insured patient may also qualify for assistance based on family income, family size, and medical needs.  Phone Number: 653.101.1773  How to apply for the [employer]

## 2025-03-05 NOTE — PROGRESS NOTES
\"Have you been to the ER, urgent care clinic since your last visit?  Hospitalized since your last visit?\"    YES - When: approximately 1 months ago.  Where and Why: Urgent Care for back pain.    “Have you seen or consulted any other health care providers outside our system since your last visit?”    NO    Have you had a mammogram?”   NO    Date of last Mammogram: 10/2/2023            
also inquire about a counselor or therapist.    Reviewed all her meds and cleaned up the list as best we could. She was unsure about a couple of them    Reports urinary frequency and some urinary incontinence.  Has not had a urine checked in some time so we will recheck    History of opiate use but we have not been prescribing her medications due to \"accidental\" overdoses.  She has been somewhat unreliable in the past so we will update a urine drug screen to see if she is taking something that we do not know about.    Will update lab today.  Mammogram order was placed.  If she cannot have it done here conveniently, she can take the order to [facility].  She has a [facility] facility that she can walk to if needed.    Discussed her headaches.  She had been seeing a neurologist but that provider left the area.  I have advised that she needs to see another neurologist due to the complications of her headache and pituitary history.  I will rely on her to discuss that with her .  The group that we usually use does not participate with her insurance.  I will refill a few butalbital but I again advised her that this is habit-forming and can be abused.  I have asked her to be very cautious with this and if it appears that she is not, I will not refill it.    F/u 3-4 months for MWGAURI

## 2025-03-06 LAB — PROLACTIN SERPL-MCNC: 3.8 NG/ML

## 2025-03-24 ENCOUNTER — CARE COORDINATION (OUTPATIENT)
Facility: CLINIC | Age: 70
End: 2025-03-24

## 2025-03-24 NOTE — CARE COORDINATION
Care Transitions Note    Initial Call - Call within 2 business days of discharge: Yes    Attempted to reach patient for transitions of care follow up. Unable to reach patient.    Outreach Attempts:   HIPAA compliant voicemail left for patient.   Pocket[employer] message sent.   PHI list Presley Souza, son however no contact number was provided.     Patient: Sugey Souza    Patient : 1955   MRN: [id number]    Reason for Admission: Syncope s/p fall, RLE hip/knee pain  Discharge Date: 20  RURS: No data recorded  Last Discharge Facility       None            Was this an external facility discharge? Yes. Discharge Date: 3/21/25. Facility Name: [facility]    Follow Up Appointment:   Patient does not have a follow up appointment scheduled at time of call.  CTN will submit transition of care appointment request.  Future Appointments         Provider Specialty Dept Phone    2025 9:00 AM Geraldine Dalton MD Internal Medicine [phone]            Plan for follow-up on next business day.      Dee Rodrigues RN

## 2025-03-25 ENCOUNTER — TELEPHONE (OUTPATIENT)
Facility: CLINIC | Age: 70
End: 2025-03-25

## 2025-03-25 ENCOUNTER — CARE COORDINATION (OUTPATIENT)
Facility: CLINIC | Age: 70
End: 2025-03-25

## 2025-03-25 NOTE — CARE COORDINATION
Care Transitions Note    Initial Call - Call within 2 business days of discharge: Yes    Attempted to reach patient for transitions of care follow up. Unable to reach patient.    Outreach Attempts:   HIPAA compliant voicemail left for patient.   Triea System[employer] message sent.   Unable to reach contact listed on PHI.    Patient: Sugey Souza    Patient : 1955   MRN: [id number]    Reason for Admission: Syncope s/p fall, RLE hip/knee pain  Discharge Date: 20  RURS: No data recorded  Last Discharge Facility       None            Was this an external facility discharge? Yes. Discharge Date: 3/21/25. Facility Name: [facility]    Follow Up Appointment:   Patient does not have a follow up appointment scheduled at time of call.  CTN submitted transition of care appointment request.  Future Appointments         Provider Specialty Dept Phone    2025 9:00 AM Geraldine Dalton MD Internal Medicine [phone]            No further follow-up call indicated     Dee Rodrigues RN

## 2025-03-25 NOTE — TELEPHONE ENCOUNTER
Received a call from Boyd   for  Sugey Souza, 1955 in regards to update on patient. Two patient identifier's verified.      Maria De Jesus  is stating  that patient had a high BP of 200/102 today at PT, they requested for patient to go to ER but patient refused. Maria De Jesus stated that patient don't have all her meds. Patient don't have BP medication so she took a old medication that was . Patient has been in pain with no medication. Maria De Jesus contact number is [phone]. She stated that she is also request for patient to get Speech therapy.    Clinical team notified.

## 2025-04-04 RX ORDER — ERGOCALCIFEROL 1.25 MG/1
CAPSULE, LIQUID FILLED ORAL
Qty: 12 CAPSULE | Refills: 3 | Status: SHIPPED | OUTPATIENT
Start: 2025-04-04

## 2025-04-18 ENCOUNTER — TELEPHONE (OUTPATIENT)
Facility: CLINIC | Age: 70
End: 2025-04-18

## 2025-04-18 NOTE — TELEPHONE ENCOUNTER
Ms. Souza is requesting refills of:    rOPINIRole (REQUIP) 2 MG tablet         to be sent to     [facility]/pharmacy #[id number] - [place], [place] - [address] - P [phone] - F [phone]  [address] [address]  Phone: [phone] Fax: [phone]  .     LAST OFFICE VISIT:  3/5/2025     UPCOMING APPOINTMENT(S):  Future Appointments   Date Time Provider Department Center   6/26/2025  9:00 AM Geraldine Dalton MD [facility] DEP       Patient offered an appointment? N/A  How much medication does the patient have on hand? 0    Provided notified

## 2025-04-28 RX ORDER — ROPINIROLE 2 MG/1
2 TABLET, FILM COATED ORAL 2 TIMES DAILY
Qty: 60 TABLET | Refills: 2 | Status: SHIPPED | OUTPATIENT
Start: 2025-04-28

## 2025-04-29 NOTE — TELEPHONE ENCOUNTER
Orders Placed This Encounter    rOPINIRole (REQUIP) 2 MG tablet     Sig: Take 1 tablet by mouth in the morning and at bedtime     Dispense:  60 tablet     Refill:  2

## 2025-05-02 NOTE — TELEPHONE ENCOUNTER
Patient contacted at home number. 2 patient identifiers confirmed. Patient informed of below. Patient states she switched pharm so she will call previous pharm to see about refill for Xanax. Patient is also asking if Dr NAVARRO will refill her pain medication oxyCODONE-acetaminophen (PERCOCET 7.5) 7.5-325 mg per tablet she states she has been out and she missed an appointment with Dr Dennie Mews because she had her time wrong.   Patient is scheduled 04/15/2019 with Dr FAN [facility]. (1) More than 48 hours/None

## 2025-06-12 DIAGNOSIS — Z76.0 MEDICATION REFILL: ICD-10-CM

## 2025-06-12 RX ORDER — MONTELUKAST SODIUM 10 MG/1
10 TABLET ORAL NIGHTLY
Qty: 90 TABLET | Refills: 1 | Status: SHIPPED | OUTPATIENT
Start: 2025-06-12

## 2025-06-12 RX ORDER — ACYCLOVIR 400 MG/1
TABLET ORAL
Qty: 50 TABLET | Refills: 5 | Status: SHIPPED | OUTPATIENT
Start: 2025-06-12

## 2025-06-12 RX ORDER — BACLOFEN 10 MG/1
TABLET ORAL
Refills: 0 | OUTPATIENT
Start: 2025-06-12

## 2025-06-13 NOTE — TELEPHONE ENCOUNTER
Orders Placed This Encounter    acyclovir (ZOVIRAX) 400 MG tablet     Sig: Take 1 tablet by mouth 5 times a day for outbreak     Dispense:  50 tablet     Refill:  5    montelukast (SINGULAIR) 10 MG tablet     Sig: Take 1 tablet by mouth nightly     Dispense:  90 tablet     Refill:  1     Baclofen denied as I can find no record of our having prescribed this medication

## 2025-07-23 DIAGNOSIS — G44.89 OTHER HEADACHE SYNDROME: ICD-10-CM

## 2025-07-23 DIAGNOSIS — Z76.0 MEDICATION REFILL: ICD-10-CM

## 2025-07-23 RX ORDER — ROPINIROLE 2 MG/1
TABLET, FILM COATED ORAL
Qty: 180 TABLET | Refills: 1 | Status: SHIPPED | OUTPATIENT
Start: 2025-07-23 | End: 2025-07-24 | Stop reason: SDUPTHER

## 2025-07-23 RX ORDER — BUTALBITAL, ACETAMINOPHEN AND CAFFEINE 50; 325; 40 MG/1; MG/1; MG/1
TABLET ORAL
Qty: 30 TABLET | Refills: 5 | Status: SHIPPED | OUTPATIENT
Start: 2025-07-23

## 2025-07-23 NOTE — TELEPHONE ENCOUNTER
Ms. Souza is requesting refills of:    Requested Prescriptions     Pending Prescriptions Disp Refills    rOPINIRole (REQUIP) 2 MG tablet [Pharmacy Med Name: ROPINIROLE HCL 2 MG TABLET] 180 tablet      Sig: TAKE 1 TABLET BY MOUTH IN THE MORNING AND IN THE EVENING    butalbital-acetaminophen-caffeine (FIORICET, ESGIC) -40 MG per tablet [Pharmacy Med Name: [id number] -40] 30 tablet 1     Sig: TAKE 1 TABLET BY MOUTH THREE TIMES A DAY IF NEEDED. LIMIT TO 2 DAYS A WEEK FOR MIGRAINE HEADACHE         to be sent to     [facility]/pharmacy #[id number] - [place], [place] - [address] - P [phone] - F [phone]  [address] [address]  Phone: [phone] Fax: [phone]  .     LAST OFFICE VISIT:  3/5/2025     UPCOMING APPOINTMENT(S):  Future Appointments   Date Time Provider Department Center   7/24/2025 11:00 AM Geraldine Dalton MD [facility] DEP   7/31/2025 10:15 AM Geraldine Dalton MD [facility] DEP         Provided notified

## 2025-07-24 ENCOUNTER — OFFICE VISIT (OUTPATIENT)
Facility: CLINIC | Age: 70
End: 2025-07-24
Payer: MEDICARE

## 2025-07-24 VITALS
BODY MASS INDEX: 43.94 KG/M2 | OXYGEN SATURATION: 96 % | HEART RATE: 64 BPM | RESPIRATION RATE: 16 BRPM | TEMPERATURE: 97 F | DIASTOLIC BLOOD PRESSURE: 52 MMHG | HEIGHT: 62 IN | WEIGHT: 238.8 LBS | SYSTOLIC BLOOD PRESSURE: 81 MMHG

## 2025-07-24 DIAGNOSIS — Z76.0 MEDICATION REFILL: ICD-10-CM

## 2025-07-24 DIAGNOSIS — G47.00 INSOMNIA, UNSPECIFIED TYPE: ICD-10-CM

## 2025-07-24 DIAGNOSIS — R30.0 DYSURIA: ICD-10-CM

## 2025-07-24 DIAGNOSIS — G89.29 CHRONIC LOW BACK PAIN, UNSPECIFIED BACK PAIN LATERALITY, UNSPECIFIED WHETHER SCIATICA PRESENT: ICD-10-CM

## 2025-07-24 DIAGNOSIS — R35.0 URINARY FREQUENCY: ICD-10-CM

## 2025-07-24 DIAGNOSIS — M54.50 CHRONIC MIDLINE LOW BACK PAIN, UNSPECIFIED WHETHER SCIATICA PRESENT: ICD-10-CM

## 2025-07-24 DIAGNOSIS — I10 ESSENTIAL (PRIMARY) HYPERTENSION: Primary | ICD-10-CM

## 2025-07-24 DIAGNOSIS — M54.50 CHRONIC LOW BACK PAIN, UNSPECIFIED BACK PAIN LATERALITY, UNSPECIFIED WHETHER SCIATICA PRESENT: ICD-10-CM

## 2025-07-24 DIAGNOSIS — G89.29 CHRONIC MIDLINE LOW BACK PAIN, UNSPECIFIED WHETHER SCIATICA PRESENT: ICD-10-CM

## 2025-07-24 DIAGNOSIS — F11.99 OPIOID USE, UNSPECIFIED WITH UNSPECIFIED OPIOID-INDUCED DISORDER (HCC): ICD-10-CM

## 2025-07-24 PROCEDURE — 1036F TOBACCO NON-USER: CPT | Performed by: INTERNAL MEDICINE

## 2025-07-24 PROCEDURE — 3017F COLORECTAL CA SCREEN DOC REV: CPT | Performed by: INTERNAL MEDICINE

## 2025-07-24 PROCEDURE — 99214 OFFICE O/P EST MOD 30 MIN: CPT | Performed by: INTERNAL MEDICINE

## 2025-07-24 PROCEDURE — G2211 COMPLEX E/M VISIT ADD ON: HCPCS | Performed by: INTERNAL MEDICINE

## 2025-07-24 PROCEDURE — 1090F PRES/ABSN URINE INCON ASSESS: CPT | Performed by: INTERNAL MEDICINE

## 2025-07-24 PROCEDURE — G8399 PT W/DXA RESULTS DOCUMENT: HCPCS | Performed by: INTERNAL MEDICINE

## 2025-07-24 PROCEDURE — G8417 CALC BMI ABV UP PARAM F/U: HCPCS | Performed by: INTERNAL MEDICINE

## 2025-07-24 PROCEDURE — 3074F SYST BP LT 130 MM HG: CPT | Performed by: INTERNAL MEDICINE

## 2025-07-24 PROCEDURE — 3078F DIAST BP <80 MM HG: CPT | Performed by: INTERNAL MEDICINE

## 2025-07-24 PROCEDURE — G8428 CUR MEDS NOT DOCUMENT: HCPCS | Performed by: INTERNAL MEDICINE

## 2025-07-24 PROCEDURE — 1123F ACP DISCUSS/DSCN MKR DOCD: CPT | Performed by: INTERNAL MEDICINE

## 2025-07-24 RX ORDER — VENLAFAXINE HYDROCHLORIDE 75 MG/1
CAPSULE, EXTENDED RELEASE ORAL
Qty: 270 CAPSULE | Refills: 3 | Status: SHIPPED | OUTPATIENT
Start: 2025-07-24

## 2025-07-24 RX ORDER — DICLOFENAC SODIUM 75 MG/1
75 TABLET, DELAYED RELEASE ORAL 2 TIMES DAILY
Qty: 180 TABLET | Refills: 3 | Status: SHIPPED | OUTPATIENT
Start: 2025-07-24

## 2025-07-24 RX ORDER — AMLODIPINE BESYLATE 5 MG/1
5 TABLET ORAL DAILY
COMMUNITY
Start: 2025-06-20

## 2025-07-24 RX ORDER — TIZANIDINE HYDROCHLORIDE 4 MG/1
CAPSULE, GELATIN COATED ORAL
Qty: 90 CAPSULE | Refills: 5 | Status: SHIPPED | OUTPATIENT
Start: 2025-07-24

## 2025-07-24 RX ORDER — BACLOFEN 10 MG/1
10 TABLET ORAL 3 TIMES DAILY PRN
Qty: 60 TABLET | Refills: 1 | Status: CANCELLED | OUTPATIENT
Start: 2025-07-24

## 2025-07-24 RX ORDER — ALPRAZOLAM 1 MG/1
TABLET ORAL
Qty: 90 TABLET | Refills: 5 | Status: CANCELLED | OUTPATIENT
Start: 2025-07-24 | End: 2026-01-20

## 2025-07-24 RX ORDER — CETIRIZINE HYDROCHLORIDE 10 MG/1
10 TABLET ORAL DAILY
Qty: 90 TABLET | Refills: 3 | Status: SHIPPED | OUTPATIENT
Start: 2025-07-24

## 2025-07-24 RX ORDER — ROPINIROLE 2 MG/1
2 TABLET, FILM COATED ORAL 2 TIMES DAILY
Qty: 180 TABLET | Refills: 3 | Status: SHIPPED | OUTPATIENT
Start: 2025-07-24

## 2025-07-24 RX ORDER — ZOLPIDEM TARTRATE 12.5 MG/1
12.5 TABLET, FILM COATED, EXTENDED RELEASE ORAL NIGHTLY
Qty: 90 TABLET | Refills: 1 | Status: CANCELLED | OUTPATIENT
Start: 2025-07-24 | End: 2026-01-20

## 2025-07-24 RX ORDER — NYSTATIN 100000 [USP'U]/G
POWDER TOPICAL 2 TIMES DAILY
Qty: 60 G | Refills: 5 | Status: SHIPPED | OUTPATIENT
Start: 2025-07-24

## 2025-07-24 RX ORDER — CARVEDILOL 12.5 MG/1
12.5 TABLET ORAL 2 TIMES DAILY WITH MEALS
COMMUNITY
Start: 2025-06-20

## 2025-07-24 SDOH — ECONOMIC STABILITY: FOOD INSECURITY: WITHIN THE PAST 12 MONTHS, THE FOOD YOU BOUGHT JUST DIDN'T LAST AND YOU DIDN'T HAVE MONEY TO GET MORE.: NEVER TRUE

## 2025-07-24 SDOH — ECONOMIC STABILITY: FOOD INSECURITY: WITHIN THE PAST 12 MONTHS, YOU WORRIED THAT YOUR FOOD WOULD RUN OUT BEFORE YOU GOT MONEY TO BUY MORE.: NEVER TRUE

## 2025-07-24 ASSESSMENT — ENCOUNTER SYMPTOMS: BACK PAIN: 1

## 2025-07-24 ASSESSMENT — PATIENT HEALTH QUESTIONNAIRE - PHQ9
SUM OF ALL RESPONSES TO PHQ QUESTIONS 1-9: 0
2. FEELING DOWN, DEPRESSED OR HOPELESS: NOT AT ALL
SUM OF ALL RESPONSES TO PHQ QUESTIONS 1-9: 0
1. LITTLE INTEREST OR PLEASURE IN DOING THINGS: NOT AT ALL
SUM OF ALL RESPONSES TO PHQ QUESTIONS 1-9: 0
SUM OF ALL RESPONSES TO PHQ QUESTIONS 1-9: 0

## 2025-07-24 NOTE — PROGRESS NOTES
HISTORY OF PRESENT ILLNESS      Sugey Souza is a 70 y.o. female.    BP (!) 81/52 (BP Site: Left Upper Arm)   Pulse 64   Temp 97 °F (36.1 °C) (Temporal)   Resp 16   Ht 1.565 m (5' 1.6\")   Wt 108.3 kg (238 lb 12.8 oz)   SpO2 96%   BMI 44.25 kg/m²      Here with her friend Jeffrey Rachel. She needs med refills    They are very confused about her medications    She is no seeing a mental health provider currently    She was admitted at [facility] in [place]:  6/18/2025  [facility]EA  Sugey Souza \"Barbara\" - 69 y.o. Female; born [birth year]  Encounter Summary  , generated on Jun. 20, 2025June 20, 2025   Discharge Summaries  - documented in this encounter   ProvisoZia smallwood MD - 06/20/2025 7:57 AM EDT  Formatting of this note is different from the original.    Internal Medicine Discharge Summary  Admit Date: 6/18/2025  Discharge Date: 6/20/2025     Reason for Admission: Acute right-sided weakness [R53.1]    Discharge Diagnosis:   Complex migraine improved   Right sided weakness improved  Hypertension improved       70 yo obese WF with PMH significant for migraines presented to [facility] on 6-18-25 with an intractable migraine and right sided weakness. She was given high dose advil, IV compazine bid, and a dose of IV DHE with improved headache and right sided weakness. The patient asked for a dose of fioricet which she was taking at home last night, and we will continue this upon discharge.     Low dose aspirin and lipitor were added incase she had experienced a TIA, and both will be continued on discharge. An MRI of the brain has been ordered, which the patient is getting this morning, to rule out a CVA as cause of her weakness, but at this point we think she had a complex migraine as the source of her symptoms. MRI was negative for acute ischemia but showed mild atrophy and chronic white matter disease along with chronic lacunar infarcts in

## 2025-07-24 NOTE — PROGRESS NOTES
Have you been to the ER, urgent care clinic since your last visit?  Hospitalized since your last visit?   YES - When: approximately 1  weeks ago.  Where and Why: [facility] Urine issues, Headache, lower right leg pain.    Have you seen or consulted any other health care providers outside our system since your last visit?   Pain management for shots    Have you had a mammogram?”   NO    Date of last Mammogram: 10/2/2023

## 2025-07-27 DIAGNOSIS — Z76.0 MEDICATION REFILL: ICD-10-CM

## 2025-07-28 NOTE — TELEPHONE ENCOUNTER
Ms. Souza is requesting refills of:    Requested Prescriptions     Pending Prescriptions Disp Refills    SYMAX DUOTAB 0.375 MG TBCR extended release tablet [Pharmacy Med Name: SYMAX DUOTAB TABS 0.375MG] 180 tablet 3     Sig: TAKE 1 TABLET TWICE A DAY AS NEEDED (ABDOMINAL PAIN)         to be sent to   [facility] - [place], [place] - [address] [place] [address] - P [phone] - F [phone]  [address] [facility] [address] [facility] [address]  Phone: [phone] Fax: [phone]        LAST OFFICE VISIT:  7/24/2025     UPCOMING APPOINTMENT(S):  Future Appointments   Date Time Provider Department Center   7/31/2025 10:15 AM Geraldine Dalton MD [facility] DEP   8/14/2025  2:45 PM Geraldine Dalton MD [facility] DEP         Provided notified

## 2025-07-29 RX ORDER — HYOSCYAMINE SULFATE 0.38 MG/1
TABLET, MULTILAYER, EXTENDED RELEASE ORAL
Qty: 180 TABLET | Refills: 3 | Status: SHIPPED | OUTPATIENT
Start: 2025-07-29

## 2025-08-14 ENCOUNTER — OFFICE VISIT (OUTPATIENT)
Facility: CLINIC | Age: 70
End: 2025-08-14
Payer: MEDICARE

## 2025-08-14 VITALS
DIASTOLIC BLOOD PRESSURE: 77 MMHG | RESPIRATION RATE: 14 BRPM | OXYGEN SATURATION: 96 % | HEART RATE: 90 BPM | WEIGHT: 244 LBS | SYSTOLIC BLOOD PRESSURE: 138 MMHG | HEIGHT: 66 IN | BODY MASS INDEX: 39.21 KG/M2 | TEMPERATURE: 98.4 F

## 2025-08-14 DIAGNOSIS — Z76.0 MEDICATION REFILL: ICD-10-CM

## 2025-08-14 DIAGNOSIS — I10 ESSENTIAL (PRIMARY) HYPERTENSION: Primary | ICD-10-CM

## 2025-08-14 DIAGNOSIS — R06.09 OTHER FORM OF DYSPNEA: ICD-10-CM

## 2025-08-14 DIAGNOSIS — G47.30 SLEEP APNEA, UNSPECIFIED TYPE: ICD-10-CM

## 2025-08-14 PROBLEM — N89.8 VAGINAL DISCHARGE: Status: RESOLVED | Noted: 2017-03-03 | Resolved: 2025-08-14

## 2025-08-14 PROBLEM — K29.70 GASTRITIS: Status: RESOLVED | Noted: 2019-11-13 | Resolved: 2025-08-14

## 2025-08-14 PROCEDURE — G8399 PT W/DXA RESULTS DOCUMENT: HCPCS | Performed by: INTERNAL MEDICINE

## 2025-08-14 PROCEDURE — 1036F TOBACCO NON-USER: CPT | Performed by: INTERNAL MEDICINE

## 2025-08-14 PROCEDURE — G8417 CALC BMI ABV UP PARAM F/U: HCPCS | Performed by: INTERNAL MEDICINE

## 2025-08-14 PROCEDURE — 1090F PRES/ABSN URINE INCON ASSESS: CPT | Performed by: INTERNAL MEDICINE

## 2025-08-14 PROCEDURE — G8428 CUR MEDS NOT DOCUMENT: HCPCS | Performed by: INTERNAL MEDICINE

## 2025-08-14 PROCEDURE — 3078F DIAST BP <80 MM HG: CPT | Performed by: INTERNAL MEDICINE

## 2025-08-14 PROCEDURE — 3075F SYST BP GE 130 - 139MM HG: CPT | Performed by: INTERNAL MEDICINE

## 2025-08-14 PROCEDURE — 99214 OFFICE O/P EST MOD 30 MIN: CPT | Performed by: INTERNAL MEDICINE

## 2025-08-14 PROCEDURE — G2211 COMPLEX E/M VISIT ADD ON: HCPCS | Performed by: INTERNAL MEDICINE

## 2025-08-14 PROCEDURE — 1123F ACP DISCUSS/DSCN MKR DOCD: CPT | Performed by: INTERNAL MEDICINE

## 2025-08-14 PROCEDURE — 3017F COLORECTAL CA SCREEN DOC REV: CPT | Performed by: INTERNAL MEDICINE

## 2025-08-14 RX ORDER — VENLAFAXINE HYDROCHLORIDE 75 MG/1
CAPSULE, EXTENDED RELEASE ORAL
Qty: 270 CAPSULE | Refills: 3 | Status: SHIPPED | OUTPATIENT
Start: 2025-08-14

## 2025-08-14 RX ORDER — ALPRAZOLAM 0.5 MG
0.5 TABLET ORAL NIGHTLY PRN
COMMUNITY
Start: 2025-08-13

## 2025-08-14 RX ORDER — FLUTICASONE PROPIONATE AND SALMETEROL XINAFOATE 230; 21 UG/1; UG/1
2 AEROSOL, METERED RESPIRATORY (INHALATION) 2 TIMES DAILY
Qty: 1 EACH | Refills: 3 | Status: SHIPPED | OUTPATIENT
Start: 2025-08-14

## 2025-08-14 SDOH — ECONOMIC STABILITY: FOOD INSECURITY: WITHIN THE PAST 12 MONTHS, THE FOOD YOU BOUGHT JUST DIDN'T LAST AND YOU DIDN'T HAVE MONEY TO GET MORE.: NEVER TRUE

## 2025-08-14 SDOH — ECONOMIC STABILITY: FOOD INSECURITY: WITHIN THE PAST 12 MONTHS, YOU WORRIED THAT YOUR FOOD WOULD RUN OUT BEFORE YOU GOT MONEY TO BUY MORE.: NEVER TRUE

## 2025-08-14 ASSESSMENT — PATIENT HEALTH QUESTIONNAIRE - PHQ9
SUM OF ALL RESPONSES TO PHQ QUESTIONS 1-9: 0
SUM OF ALL RESPONSES TO PHQ QUESTIONS 1-9: 0
2. FEELING DOWN, DEPRESSED OR HOPELESS: NOT AT ALL
SUM OF ALL RESPONSES TO PHQ QUESTIONS 1-9: 0
1. LITTLE INTEREST OR PLEASURE IN DOING THINGS: NOT AT ALL
SUM OF ALL RESPONSES TO PHQ QUESTIONS 1-9: 0

## 2025-08-14 ASSESSMENT — ENCOUNTER SYMPTOMS: SHORTNESS OF BREATH: 1

## 2025-09-04 ENCOUNTER — OFFICE VISIT (OUTPATIENT)
Facility: CLINIC | Age: 70
End: 2025-09-04
Payer: MEDICARE

## 2025-09-04 ENCOUNTER — HOSPITAL ENCOUNTER (OUTPATIENT)
Facility: HOSPITAL | Age: 70
Setting detail: SPECIMEN
Discharge: HOME | End: 2025-09-04
Payer: MEDICARE

## 2025-09-04 VITALS
SYSTOLIC BLOOD PRESSURE: 152 MMHG | WEIGHT: 237 LBS | OXYGEN SATURATION: 96 % | DIASTOLIC BLOOD PRESSURE: 96 MMHG | HEART RATE: 80 BPM | BODY MASS INDEX: 38.09 KG/M2 | HEIGHT: 66 IN | RESPIRATION RATE: 14 BRPM | TEMPERATURE: 97.3 F

## 2025-09-04 DIAGNOSIS — F25.9 SCHIZOAFFECTIVE DISORDER, UNSPECIFIED TYPE (HCC): ICD-10-CM

## 2025-09-04 DIAGNOSIS — R73.9 ELEVATED BLOOD SUGAR: ICD-10-CM

## 2025-09-04 DIAGNOSIS — G89.29 CHRONIC LOW BACK PAIN, UNSPECIFIED BACK PAIN LATERALITY, UNSPECIFIED WHETHER SCIATICA PRESENT: ICD-10-CM

## 2025-09-04 DIAGNOSIS — L30.9 DERMATITIS: ICD-10-CM

## 2025-09-04 DIAGNOSIS — R06.09 OTHER FORM OF DYSPNEA: ICD-10-CM

## 2025-09-04 DIAGNOSIS — Z76.0 MEDICATION REFILL: ICD-10-CM

## 2025-09-04 DIAGNOSIS — M54.50 CHRONIC LOW BACK PAIN, UNSPECIFIED BACK PAIN LATERALITY, UNSPECIFIED WHETHER SCIATICA PRESENT: ICD-10-CM

## 2025-09-04 DIAGNOSIS — E23.0 HYPOPITUITARISM: ICD-10-CM

## 2025-09-04 DIAGNOSIS — I10 ESSENTIAL (PRIMARY) HYPERTENSION: ICD-10-CM

## 2025-09-04 DIAGNOSIS — I10 ESSENTIAL (PRIMARY) HYPERTENSION: Primary | ICD-10-CM

## 2025-09-04 LAB
ALBUMIN SERPL-MCNC: 3.8 G/DL (ref 3.4–5)
ALBUMIN/GLOB SERPL: 1.2 (ref 0.8–1.7)
ALP SERPL-CCNC: 73 U/L (ref 45–117)
ALT SERPL-CCNC: 23 U/L (ref 10–35)
ANION GAP SERPL CALC-SCNC: 15 MMOL/L (ref 3–18)
APPEARANCE UR: CLEAR
AST SERPL-CCNC: 27 U/L (ref 10–38)
BACTERIA URNS QL MICRO: ABNORMAL /HPF
BASOPHILS # BLD: 0.05 K/UL (ref 0–0.1)
BASOPHILS NFR BLD: 0.6 % (ref 0–2)
BILIRUB SERPL-MCNC: 0.4 MG/DL (ref 0.2–1)
BILIRUB UR QL: NEGATIVE
BUN SERPL-MCNC: 8 MG/DL (ref 6–23)
BUN/CREAT SERPL: 8 (ref 12–20)
CALCIUM SERPL-MCNC: 10.1 MG/DL (ref 8.5–10.1)
CHLORIDE SERPL-SCNC: 106 MMOL/L (ref 98–107)
CHOLEST SERPL-MCNC: 184 MG/DL
CO2 SERPL-SCNC: 22 MMOL/L (ref 21–32)
COLOR UR: YELLOW
CREAT SERPL-MCNC: 1.04 MG/DL (ref 0.6–1.3)
DIFFERENTIAL METHOD BLD: ABNORMAL
EOSINOPHIL # BLD: 0.25 K/UL (ref 0–0.4)
EOSINOPHIL NFR BLD: 2.9 % (ref 0–5)
EPITH CASTS URNS QL MICRO: ABNORMAL /LPF (ref 0–5)
ERYTHROCYTE [DISTWIDTH] IN BLOOD BY AUTOMATED COUNT: 14.8 % (ref 11.6–14.5)
EST. AVERAGE GLUCOSE BLD GHB EST-MCNC: 117 MG/DL
GLOBULIN SER CALC-MCNC: 3.3 G/DL (ref 2–4)
GLUCOSE SERPL-MCNC: 91 MG/DL (ref 74–108)
GLUCOSE UR STRIP.AUTO-MCNC: NEGATIVE MG/DL
HBA1C MFR BLD: 5.7 % (ref 4.2–5.6)
HCT VFR BLD AUTO: 42.6 % (ref 35–45)
HDLC SERPL-MCNC: 59 MG/DL (ref 40–60)
HDLC SERPL: 3.1 (ref 0–5)
HGB BLD-MCNC: 13.6 G/DL (ref 12–16)
HGB UR QL STRIP: NEGATIVE
IMM GRANULOCYTES # BLD AUTO: 0.02 K/UL (ref 0–0.04)
IMM GRANULOCYTES NFR BLD AUTO: 0.2 % (ref 0–0.5)
KETONES UR QL STRIP.AUTO: NEGATIVE MG/DL
LDLC SERPL CALC-MCNC: 104 MG/DL (ref 0–100)
LEUKOCYTE ESTERASE UR QL STRIP.AUTO: ABNORMAL
LYMPHOCYTES # BLD: 2.64 K/UL (ref 0.9–3.6)
LYMPHOCYTES NFR BLD: 31.1 % (ref 21–52)
MCH RBC QN AUTO: 32.7 PG (ref 24–34)
MCHC RBC AUTO-ENTMCNC: 31.9 G/DL (ref 31–37)
MCV RBC AUTO: 102.4 FL (ref 78–100)
MONOCYTES # BLD: 1.12 K/UL (ref 0.05–1.2)
MONOCYTES NFR BLD: 13.2 % (ref 3–10)
NEUTS SEG # BLD: 4.42 K/UL (ref 1.8–8)
NEUTS SEG NFR BLD: 52 % (ref 40–73)
NITRITE UR QL STRIP.AUTO: NEGATIVE
NRBC # BLD: 0 K/UL (ref 0–0.01)
NRBC BLD-RTO: 0 PER 100 WBC
PH UR STRIP: 6 (ref 5–8)
PLATELET # BLD AUTO: 342 K/UL (ref 135–420)
PMV BLD AUTO: 11.3 FL (ref 9.2–11.8)
POTASSIUM SERPL-SCNC: 3.6 MMOL/L (ref 3.5–5.5)
PROT SERPL-MCNC: 7.1 G/DL (ref 6.4–8.2)
PROT UR STRIP-MCNC: ABNORMAL MG/DL
RBC # BLD AUTO: 4.16 M/UL (ref 4.2–5.3)
RBC #/AREA URNS HPF: ABNORMAL /HPF (ref 0–5)
SODIUM SERPL-SCNC: 142 MMOL/L (ref 136–145)
SP GR UR REFRACTOMETRY: 1.01 (ref 1–1.03)
T4 FREE SERPL-MCNC: 1.1 NG/DL (ref 0.9–1.7)
TRIGL SERPL-MCNC: 105 MG/DL (ref 0–150)
TSH, 3RD GENERATION: 2.37 UIU/ML (ref 0.27–4.2)
UROBILINOGEN UR QL STRIP.AUTO: 1 EU/DL (ref 0.2–1)
VLDLC SERPL CALC-MCNC: 21 MG/DL
WBC # BLD AUTO: 8.5 K/UL (ref 4.6–13.2)
WBC URNS QL MICRO: ABNORMAL /HPF (ref 0–5)

## 2025-09-04 PROCEDURE — 85025 COMPLETE CBC W/AUTO DIFF WBC: CPT

## 2025-09-04 PROCEDURE — 3077F SYST BP >= 140 MM HG: CPT | Performed by: INTERNAL MEDICINE

## 2025-09-04 PROCEDURE — 84146 ASSAY OF PROLACTIN: CPT

## 2025-09-04 PROCEDURE — 81001 URINALYSIS AUTO W/SCOPE: CPT

## 2025-09-04 PROCEDURE — G8428 CUR MEDS NOT DOCUMENT: HCPCS | Performed by: INTERNAL MEDICINE

## 2025-09-04 PROCEDURE — 3080F DIAST BP >= 90 MM HG: CPT | Performed by: INTERNAL MEDICINE

## 2025-09-04 PROCEDURE — G8417 CALC BMI ABV UP PARAM F/U: HCPCS | Performed by: INTERNAL MEDICINE

## 2025-09-04 PROCEDURE — 84439 ASSAY OF FREE THYROXINE: CPT

## 2025-09-04 PROCEDURE — 80053 COMPREHEN METABOLIC PANEL: CPT

## 2025-09-04 PROCEDURE — 1036F TOBACCO NON-USER: CPT | Performed by: INTERNAL MEDICINE

## 2025-09-04 PROCEDURE — 36415 COLL VENOUS BLD VENIPUNCTURE: CPT

## 2025-09-04 PROCEDURE — 80061 LIPID PANEL: CPT

## 2025-09-04 PROCEDURE — 99214 OFFICE O/P EST MOD 30 MIN: CPT | Performed by: INTERNAL MEDICINE

## 2025-09-04 PROCEDURE — 1090F PRES/ABSN URINE INCON ASSESS: CPT | Performed by: INTERNAL MEDICINE

## 2025-09-04 PROCEDURE — G2211 COMPLEX E/M VISIT ADD ON: HCPCS | Performed by: INTERNAL MEDICINE

## 2025-09-04 PROCEDURE — 84443 ASSAY THYROID STIM HORMONE: CPT

## 2025-09-04 PROCEDURE — 1123F ACP DISCUSS/DSCN MKR DOCD: CPT | Performed by: INTERNAL MEDICINE

## 2025-09-04 PROCEDURE — G8399 PT W/DXA RESULTS DOCUMENT: HCPCS | Performed by: INTERNAL MEDICINE

## 2025-09-04 PROCEDURE — 83036 HEMOGLOBIN GLYCOSYLATED A1C: CPT

## 2025-09-04 PROCEDURE — 3017F COLORECTAL CA SCREEN DOC REV: CPT | Performed by: INTERNAL MEDICINE

## 2025-09-04 RX ORDER — MUPIROCIN 2 %
OINTMENT (GRAM) TOPICAL 3 TIMES DAILY
Qty: 15 G | Refills: 5 | Status: SHIPPED | OUTPATIENT
Start: 2025-09-04

## 2025-09-04 RX ORDER — NYSTATIN 100000 [USP'U]/G
POWDER TOPICAL 2 TIMES DAILY
Qty: 60 G | Refills: 5 | Status: SHIPPED | OUTPATIENT
Start: 2025-09-04

## 2025-09-04 RX ORDER — VALACYCLOVIR HYDROCHLORIDE 500 MG/1
500 TABLET, FILM COATED ORAL 3 TIMES DAILY
Qty: 21 TABLET | Refills: 0 | Status: SHIPPED | OUTPATIENT
Start: 2025-09-04 | End: 2025-09-11

## 2025-09-04 SDOH — ECONOMIC STABILITY: FOOD INSECURITY: WITHIN THE PAST 12 MONTHS, YOU WORRIED THAT YOUR FOOD WOULD RUN OUT BEFORE YOU GOT MONEY TO BUY MORE.: NEVER TRUE

## 2025-09-04 SDOH — ECONOMIC STABILITY: FOOD INSECURITY: WITHIN THE PAST 12 MONTHS, THE FOOD YOU BOUGHT JUST DIDN'T LAST AND YOU DIDN'T HAVE MONEY TO GET MORE.: NEVER TRUE

## 2025-09-04 ASSESSMENT — PATIENT HEALTH QUESTIONNAIRE - PHQ9
1. LITTLE INTEREST OR PLEASURE IN DOING THINGS: NOT AT ALL
2. FEELING DOWN, DEPRESSED OR HOPELESS: NOT AT ALL
SUM OF ALL RESPONSES TO PHQ QUESTIONS 1-9: 0

## 2025-09-04 ASSESSMENT — ENCOUNTER SYMPTOMS: SHORTNESS OF BREATH: 1

## 2025-09-06 LAB — PROLACTIN SERPL-MCNC: 3.5 NG/ML (ref 4.8–23.3)
